# Patient Record
Sex: MALE | Race: BLACK OR AFRICAN AMERICAN | NOT HISPANIC OR LATINO | Employment: OTHER | URBAN - METROPOLITAN AREA
[De-identification: names, ages, dates, MRNs, and addresses within clinical notes are randomized per-mention and may not be internally consistent; named-entity substitution may affect disease eponyms.]

---

## 2017-01-12 ENCOUNTER — OFFICE VISIT (OUTPATIENT)
Dept: INTERNAL MEDICINE | Facility: CLINIC | Age: 62
End: 2017-01-12
Attending: INTERNAL MEDICINE
Payer: MEDICARE

## 2017-01-12 VITALS
BODY MASS INDEX: 42.66 KG/M2 | WEIGHT: 315 LBS | HEART RATE: 89 BPM | DIASTOLIC BLOOD PRESSURE: 78 MMHG | SYSTOLIC BLOOD PRESSURE: 152 MMHG | HEIGHT: 72 IN

## 2017-01-12 DIAGNOSIS — G47.33 OSA (OBSTRUCTIVE SLEEP APNEA): ICD-10-CM

## 2017-01-12 DIAGNOSIS — M17.0 PRIMARY OSTEOARTHRITIS OF BOTH KNEES: ICD-10-CM

## 2017-01-12 DIAGNOSIS — Z12.11 SCREENING FOR COLORECTAL CANCER: ICD-10-CM

## 2017-01-12 DIAGNOSIS — Z12.5 ENCOUNTER FOR SCREENING FOR MALIGNANT NEOPLASM OF PROSTATE: ICD-10-CM

## 2017-01-12 DIAGNOSIS — Z11.59 NEED FOR HEPATITIS C SCREENING TEST: Primary | ICD-10-CM

## 2017-01-12 DIAGNOSIS — Z12.12 SCREENING FOR COLORECTAL CANCER: ICD-10-CM

## 2017-01-12 DIAGNOSIS — R79.9 ABNORMAL BLOOD CHEMISTRY: ICD-10-CM

## 2017-01-12 DIAGNOSIS — I71.60 THORACOABDOMINAL AORTIC ANEURYSM, WITHOUT RUPTURE: ICD-10-CM

## 2017-01-12 DIAGNOSIS — I10 BENIGN ESSENTIAL HTN: ICD-10-CM

## 2017-01-12 PROCEDURE — 99214 OFFICE O/P EST MOD 30 MIN: CPT | Mod: S$PBB,,, | Performed by: INTERNAL MEDICINE

## 2017-01-12 PROCEDURE — 99214 OFFICE O/P EST MOD 30 MIN: CPT | Mod: PBBFAC | Performed by: INTERNAL MEDICINE

## 2017-01-12 PROCEDURE — 90715 TDAP VACCINE 7 YRS/> IM: CPT | Mod: PBBFAC | Performed by: INTERNAL MEDICINE

## 2017-01-12 PROCEDURE — G0008 ADMIN INFLUENZA VIRUS VAC: HCPCS | Mod: PBBFAC | Performed by: INTERNAL MEDICINE

## 2017-01-12 PROCEDURE — 90471 IMMUNIZATION ADMIN: CPT | Mod: PBBFAC | Performed by: INTERNAL MEDICINE

## 2017-01-12 PROCEDURE — 99999 PR PBB SHADOW E&M-EST. PATIENT-LVL IV: CPT | Mod: PBBFAC,,, | Performed by: INTERNAL MEDICINE

## 2017-01-12 RX ORDER — CARVEDILOL 25 MG/1
25 TABLET ORAL 2 TIMES DAILY WITH MEALS
Qty: 180 TABLET | Refills: 1 | Status: SHIPPED | OUTPATIENT
Start: 2017-01-12 | End: 2017-07-17 | Stop reason: SDUPTHER

## 2017-01-12 NOTE — MR AVS SNAPSHOT
[facility]  [address]  [facility] [address]  Phone: [phone]  Fax: [phone]                  Edd Wills   2017 11:20 AM   Office Visit    Description:  Male : 1955   Provider:  Haresh Yang MD   Department:  [facility]           Reason for Visit     Establish Care           Diagnoses this Visit        Comments    Need for hepatitis C screening test    -  Primary     Screening for colorectal cancer         Thoracoabdominal aortic aneurysm, without rupture         LLUVIA (obstructive sleep apnea)         Abnormal blood chemistry         Nocturia         Encounter for screening for malignant neoplasm of prostate                To Do List           Future Appointments        Provider Department Dept Phone    2017 2:15 PM Dany Ahn MD [facility] [phone]      Goals (5 Years of Data)     None       These Medications        Disp Refills Start End    carvedilol (COREG) 25 MG tablet 180 tablet 1 2017     Take 1 tablet (25 mg total) by mouth 2 (two) times daily with meals. - Oral    Pharmacy: [facility] [address] [place] [address] AT [facility] Ph #: [phone]         [facility] On Call     [facility] On Call Nurse Care Line -  Assistance  Registered nurses in the [facility] On Call Center provide clinical advisement, health education, appointment booking, and other advisory services.  Call for this free service at [phone].             Medications           Message regarding Medications     Verify the changes and/or additions to your medication regime listed below are the same as discussed with your clinician today.  If any of these changes or additions are incorrect, please notify your healthcare provider.        CHANGE how you are taking these medications     Start Taking Instead of    carvedilol (COREG) 25 MG tablet carvedilol (COREG) 12.5 MG tablet     Dosage:  Take 1 tablet (25 mg total) by mouth 2 (two) times daily with meals. Dosage:  TAKE 1 TABLET(12.5 MG) BY MOUTH TWICE DAILY    Reason for Change:  Reorder            Verify that the below list of medications is an accurate representation of the medications you are currently taking.  If none reported, the list may be blank. If incorrect, please contact your healthcare provider. Carry this list with you in case of emergency.           Current Medications     amlodipine (NORVASC) 5 MG tablet TAKE 1 TABLET(5 MG) BY MOUTH EVERY DAY    carvedilol (COREG) 25 MG tablet Take 1 tablet (25 mg total) by mouth 2 (two) times daily with meals.    diclofenac sodium 1 % Gel Apply 2 g topically 4 (four) times daily.    docusate sodium (COLACE) 100 MG capsule Take 1 capsule (100 mg total) by mouth 2 (two) times daily as needed for Constipation.    furosemide (LASIX) 40 MG tablet TAKE 1 TABLET(40 MG) BY MOUTH EVERY DAY    gabapentin (NEURONTIN) 600 MG tablet TAKE 1/2 TABLET BY MOUTH TWICE DAILY(MORNING AND EARLY AFTERNOON) AND 1 TABLET EVERY NIGHT AT BEDTIME.    lisinopril (PRINIVIL,ZESTRIL) 40 MG tablet TAKE 1 TABLET(40 MG) BY MOUTH EVERY DAY    methocarbamol (ROBAXIN) 750 MG Tab TAKE 1 TABLET(750 MG) BY MOUTH THREE TIMES DAILY AS NEEDED    oxycodone-acetaminophen (PERCOCET) 7.5-325 mg per tablet Take 1 tablet by mouth every 6 to 8 hours as needed for Pain.    polyethylene glycol (GLYCOLAX) 17 gram PwPk TAKE 1 PACKET(17 GIVE) BY MOUTH ONCE DAILY.           Clinical Reference Information           Vital Signs - Last Recorded  Most recent update: 1/12/2017 11:43 AM by Jose Merlos MA    BP Pulse Ht Wt BMI    (!) 152/78 89 6' (1.829 m) (!) 169.1 kg (372 lb 12.8 oz) 50.56 kg/m2      Blood Pressure          Most Recent Value    BP  (!)  152/78      Allergies as of 1/12/2017     No Known Allergies      Immunizations Administered on Date of Encounter - 1/12/2017     Name Date Dose VIS Date Route    TDAP 1/12/2017 0.5 mL 2/24/2015  Intramuscular    influenza - Quadrivalent - PF (ADULT) 1/12/2017 0.5 mL 8/7/2015 Intramuscular      Orders Placed During Today's Visit      Normal Orders This Visit    Ambulatory consult for Sleep Study     Influenza - Quadrivalent (3 years & older) (PF)     Tdap Vaccine (Adult)     Future Labs/Procedures Expected by Expires    CTA Chest Non Coronary  1/12/2017 1/12/2018    Hemoglobin A1c  1/12/2017 3/13/2018    Hepatitis C antibody  1/12/2017 3/12/2018    Lipid panel  1/12/2017 1/12/2018    PSA, Screening  1/12/2017 4/12/2017      [facility] Sign-Up     Activating your [facility] account is as easy as 1-2-3!     1) Visit [email or web address], select Sign Up Now, enter this activation code and your date of birth, then select Next.  [email or web address]  Expires: 2/26/2017 12:30 PM      2) Create a username and password to use when you visit [facility] in the future and select a security question in case you lose your password and select Next.    3) Enter your e-mail address and click Sign Up!    Additional Information  If you have questions, please e-mail [email or web address] or call [phone] to talk to our [facility] staff. Remember, [facility] is NOT to be used for urgent needs. For medical emergencies, dial 911.         Instructions      Low-Salt Diet  This diet removes foods that are high in salt. It also limits the amount of salt you use when cooking. It is most often used for people with high blood pressure, edema (fluid retention), and kidney, liver, or heart disease.  Table salt contains the mineral sodium. Your body needs sodium to work normally. But too much sodium can make your health problems worse. Your healthcare provider is recommending a low-salt (also called low-sodium) diet for you. Your total daily allowance of salt is 1,500 to 2,300 milligrams (mg). It is less than 1 teaspoon of table salt. This means you can have only about 500 to 700 mg of sodium at each meal. People with certain health problems should  limit salt intake to the lower end of the recommended range.    When you cook, dont add much salt. If you can cook without using salt, even better. Dont add salt to your food at the table.  When shopping, read food labels. Salt is often called sodium on the label. Choose foods that are salt-free, low salt, or very low salt. Note that foods with reduced salt may not lower your salt intake enough.    Beans, potatoes, and pasta  Ok: Dry beans, split peas, lentils, potatoes, rice, macaroni, pasta, spaghetti without added salt  Avoid: Potato chips, tortilla chips, and similar products  Breads and cereals  Ok: Low-sodium breads, rolls, cereals, and cakes; low-salt crackers, matzo crackers  Avoid: Salted crackers, pretzels, popcorn, Vietnamese toast, pancakes, muffins  Dairy  Ok: Milk, chocolate milk, hot chocolate mix, low-salt cheeses, and yogurt  Avoid: Processed cheese and cheese spreads; Roquefort, Camembert, and cottage cheese; buttermilk, instant breakfast drink  Desserts  Ok: Ice cream, frozen yogurt, juice bars, gelatin, cookies and pies, sugar, honey, jelly, hard candy  Avoid: Most pies, cakes and cookies prepared or processed with salt; instant pudding  Drinks  Ok: Tea, coffee, fizzy (carbonated) drinks, juices  Avoid: Flavored coffees, electrolyte replacement drinks, sports drinks  Meats  Ok: All fresh meat, fish, poultry, low-salt tuna, eggs, egg substitute  Avoid: Smoked, pickled, brine-cured, or salted meats and fish. This includes quinonez, chipped beef, corned beef, hot dogs, deli meats, ham, kosher meats, salt pork, sausage, canned tuna, salted codfish, smoked salmon, herring, sardines, or anchovies.  Seasonings and spices  Ok: Most seasonings are okay. Good substitutes for salt include: fresh herb blends, hot sauce, lemon, garlic, chaudhry, vinegar, dry mustard, parsley, cilantro, horseradish, tomato paste, regular margarine, mayonnaise, unsalted butter, cream cheese, vegetable oil, cream, low-salt salad  dressing and gravy.  Avoid: Regular ketchup, relishes, pickles, soy sauce, teriyaki sauce, Worcestershire sauce, BBQ sauce, tartar sauce, meat tenderizer, chili sauce, regular gravy, regular salad dressing, salted butter  Soups  Ok: Low-salt soups and broths made with allowed foods  Avoid: Bouillon cubes, soups with smoked or salted meats, regular soup and broth  Vegetables  Ok: Most vegetables are okay; also low-salt tomato and vegetable juices  Avoid: Sauerkraut and other brine-soaked vegetables; pickles and other pickled vegetables; tomato juice, olives  © 4941-3764 [employer]. [address] [place] [address], [place] [address]. All rights reserved. This information is not intended as a substitute for professional medical care. Always follow your healthcare professional's instructions.
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
## 2017-01-12 NOTE — PATIENT INSTRUCTIONS

## 2017-01-12 NOTE — PROGRESS NOTES
Patient given Quadrivalent Influenza IM in the RD. Patient tolerated well and Band-Aid was applied. Lot#[id number] Exp:06/30/2017. Patient advised to wait in the lobby for 15 min to make sure no adverse reactions occur. Patient given VIS information sheet.Patient states verbal understanding and has no further questions.Patient given TDAP IM in the LD. Patient tolerated well and Band-Aid was applied. Lot#[id number] Exp:12/10/2018. Patient advised to wait in the lobby for 15 min to make sure no adverse reactions occur.Patient given VIS information sheet. Patient states verbal understanding and has no further questions.

## 2017-01-17 ENCOUNTER — TELEPHONE (OUTPATIENT)
Dept: SPINE | Facility: CLINIC | Age: 62
End: 2017-01-17

## 2017-01-17 ENCOUNTER — HOSPITAL ENCOUNTER (OUTPATIENT)
Dept: RADIOLOGY | Facility: HOSPITAL | Age: 62
Discharge: HOME | End: 2017-01-17
Attending: ORTHOPAEDIC SURGERY
Payer: MEDICARE

## 2017-01-17 ENCOUNTER — OFFICE VISIT (OUTPATIENT)
Dept: ORTHOPEDICS | Facility: CLINIC | Age: 62
End: 2017-01-17
Payer: MEDICARE

## 2017-01-17 VITALS
DIASTOLIC BLOOD PRESSURE: 97 MMHG | BODY MASS INDEX: 42.66 KG/M2 | HEIGHT: 72 IN | SYSTOLIC BLOOD PRESSURE: 176 MMHG | WEIGHT: 315 LBS | HEART RATE: 71 BPM | RESPIRATION RATE: 18 BRPM

## 2017-01-17 DIAGNOSIS — M54.2 CERVICAL SPINE PAIN: Primary | ICD-10-CM

## 2017-01-17 DIAGNOSIS — M54.12 CERVICAL RADICULOPATHY: Primary | ICD-10-CM

## 2017-01-17 DIAGNOSIS — M54.2 CERVICAL SPINE PAIN: ICD-10-CM

## 2017-01-17 PROCEDURE — 99999 PR PBB SHADOW E&M-EST. PATIENT-LVL III: CPT | Mod: PBBFAC,,, | Performed by: ORTHOPAEDIC SURGERY

## 2017-01-17 PROCEDURE — 72040 X-RAY EXAM NECK SPINE 2-3 VW: CPT | Mod: 26,,, | Performed by: RADIOLOGY

## 2017-01-17 PROCEDURE — 72040 X-RAY EXAM NECK SPINE 2-3 VW: CPT | Mod: TC

## 2017-01-17 PROCEDURE — 99212 OFFICE O/P EST SF 10 MIN: CPT | Mod: S$PBB,,, | Performed by: ORTHOPAEDIC SURGERY

## 2017-01-17 RX ORDER — OXYCODONE AND ACETAMINOPHEN 5; 325 MG/1; MG/1
1 TABLET ORAL EVERY 8 HOURS PRN
Qty: 90 TABLET | Refills: 0 | Status: SHIPPED | OUTPATIENT
Start: 2017-01-17 | End: 2017-03-06 | Stop reason: SDUPTHER

## 2017-01-17 NOTE — PROGRESS NOTES
Date of Surgery: 6/14/16    Procedure: C2-5 Lami/Fusion for myelopathy    History: Edd Wills is seen today for follow-up following the above listed procedure. Overall the patient is doing well, his hand strength and dexterity is much improved, he is walking with a walker.    Exam: Incision is healed. There is no sign of infection. The patient ambulates well with a walker, he has good  strength bilaterally. He no longer demonstrates small finger escape.    Radiographs: AP/Lat c-spine films today demonstrate no e/o loosening or hardware complication    Assessment/Plan: Doing well postoperatively with good return of funciton. I will plan to see the patient back for the next postop visit in 5 months. Thank you for the opportunity to participate in this patient's care. Please give me a call if there are any concerns or questions.    RTC @  1 year from surgery.

## 2017-02-03 ENCOUNTER — TELEPHONE (OUTPATIENT)
Dept: INTERNAL MEDICINE | Facility: CLINIC | Age: 62
End: 2017-02-03

## 2017-02-03 DIAGNOSIS — Z91.89 AT RISK FOR SIDE EFFECT OF MEDICATION: Primary | ICD-10-CM

## 2017-02-03 NOTE — TELEPHONE ENCOUNTER
----- Message from Janna Dill sent at 2/2/2017  9:09 AM CST -----  Contact: Nataliya with [facility]  X   1st Request  _  2nd Request  _  3rd Request        Who: Nataliya with [facility]    Why: Pt has a CT today at 11 am and pt needs labs for Bun/ Creatinine. Please call Nataliya with any questions, thanks!    What Number to Call Back: Ext [phone]    When to Expect a call back: (Before the end of the day)   -- if the call is after 12:00, the call back will be tomorrow.

## 2017-02-06 NOTE — TELEPHONE ENCOUNTER
After reviewing pt chart, pt had CT completed on 02/02/17, but BUN and creatine labs were not completed

## 2017-02-16 ENCOUNTER — TELEPHONE (OUTPATIENT)
Dept: INTERNAL MEDICINE | Facility: CLINIC | Age: 62
End: 2017-02-16

## 2017-02-16 NOTE — TELEPHONE ENCOUNTER
----- Message from Sharyn Franklin sent at 2/15/2017  2:22 PM CST -----  Contact: JUHI CARPIO [[id number]]  x_  1st Request  _  2nd Request  _  3rd Request        Who: JUHI CARPIO [[id number]]    Why: Patient would like a call back says he would like to reschedule the appts that he missed. I attempted to reschedule but patient would need orders. Please give patient a call back at your earliest convenience. Thanks!    What Number to Call Back:[phone]    When to Expect a call back: (Before the end of the day)   -- if the call is after 12:00, the call back will be tomorrow.

## 2017-02-20 DIAGNOSIS — Z98.1 S/P CERVICAL SPINAL FUSION: Primary | ICD-10-CM

## 2017-02-21 RX ORDER — TRAMADOL HYDROCHLORIDE 50 MG/1
50 TABLET ORAL EVERY 6 HOURS PRN
Qty: 60 TABLET | Refills: 0 | Status: SHIPPED | OUTPATIENT
Start: 2017-02-21 | End: 2017-03-03

## 2017-03-02 ENCOUNTER — TELEPHONE (OUTPATIENT)
Dept: INTERNAL MEDICINE | Facility: CLINIC | Age: 62
End: 2017-03-02

## 2017-03-02 NOTE — TELEPHONE ENCOUNTER
----- Message from Anne Artis sent at 3/2/2017  1:14 PM CST -----  Dr. Yi Ext is  [phone] called and stated that this would be a better number for him to be reached.

## 2017-03-03 ENCOUNTER — HOSPITAL ENCOUNTER (OUTPATIENT)
Dept: RADIOLOGY | Facility: OTHER | Age: 62
Discharge: HOME | End: 2017-03-03
Attending: INTERNAL MEDICINE
Payer: MEDICARE

## 2017-03-03 DIAGNOSIS — I71.019 AORTIC DISSECTION DISTAL TO LEFT SUBCLAVIAN: ICD-10-CM

## 2017-03-03 PROCEDURE — 74174 CTA ABD&PLVS W/CONTRAST: CPT | Mod: 26,,, | Performed by: INTERNAL MEDICINE

## 2017-03-03 PROCEDURE — 74174 CTA ABD&PLVS W/CONTRAST: CPT | Mod: TC

## 2017-03-03 PROCEDURE — 25500020 PHARM REV CODE 255: Performed by: INTERNAL MEDICINE

## 2017-03-03 PROCEDURE — 71275 CT ANGIOGRAPHY CHEST: CPT | Mod: 26,,, | Performed by: INTERNAL MEDICINE

## 2017-03-03 RX ADMIN — IOHEXOL 100 ML: 350 INJECTION, SOLUTION INTRAVENOUS at 10:03

## 2017-03-06 DIAGNOSIS — Z98.1 S/P CERVICAL SPINAL FUSION: Primary | ICD-10-CM

## 2017-03-07 RX ORDER — OXYCODONE AND ACETAMINOPHEN 5; 325 MG/1; MG/1
1 TABLET ORAL EVERY 8 HOURS PRN
Qty: 90 TABLET | Refills: 0 | Status: SHIPPED | OUTPATIENT
Start: 2017-03-07 | End: 2017-05-25

## 2017-04-04 ENCOUNTER — TELEPHONE (OUTPATIENT)
Dept: SLEEP MEDICINE | Facility: CLINIC | Age: 62
End: 2017-04-04

## 2017-04-10 RX ORDER — METHOCARBAMOL 750 MG/1
TABLET, FILM COATED ORAL
Qty: 90 TABLET | Refills: 4 | Status: SHIPPED | OUTPATIENT
Start: 2017-04-10 | End: 2018-01-15 | Stop reason: SDUPTHER

## 2017-04-17 DIAGNOSIS — Z98.1 S/P CERVICAL SPINAL FUSION: ICD-10-CM

## 2017-04-17 NOTE — TELEPHONE ENCOUNTER
----- Message from Rosetta Eastman sent at 4/17/2017  9:05 AM CDT -----  _  1st Request  _  2nd Request  _  3rd Request    Please refill the medication(s) listed below. Please call the patient when the prescription(s) is ready for  at the phone number [phone]    Medication #1    oxycodone-acetaminophen (PERCOCET) 5-325 mg per tablet 90 tablet 0 3/7/2017  No  Sig - Route: Take 1 tablet by mouth every 8 (eight) hours as needed for Pain. - Oral  Class: Print          Preferred Pharmacy:

## 2017-04-18 DIAGNOSIS — Z98.1 S/P CERVICAL SPINAL FUSION: ICD-10-CM

## 2017-04-18 RX ORDER — OXYCODONE AND ACETAMINOPHEN 5; 325 MG/1; MG/1
1 TABLET ORAL EVERY 8 HOURS PRN
Qty: 90 TABLET | Refills: 0 | OUTPATIENT
Start: 2017-04-18

## 2017-04-18 RX ORDER — OXYCODONE AND ACETAMINOPHEN 5; 325 MG/1; MG/1
1 TABLET ORAL EVERY 8 HOURS PRN
Qty: 90 TABLET | Refills: 0 | Status: CANCELLED | OUTPATIENT
Start: 2017-04-18

## 2017-04-19 RX ORDER — FUROSEMIDE 40 MG/1
TABLET ORAL
Qty: 90 TABLET | Refills: 1 | Status: SHIPPED | OUTPATIENT
Start: 2017-04-19 | End: 2017-11-16 | Stop reason: SDUPTHER

## 2017-04-19 RX ORDER — LISINOPRIL 40 MG/1
TABLET ORAL
Qty: 90 TABLET | Refills: 0 | Status: SHIPPED | OUTPATIENT
Start: 2017-04-19 | End: 2017-07-17 | Stop reason: SDUPTHER

## 2017-04-19 RX ORDER — AMLODIPINE BESYLATE 5 MG/1
TABLET ORAL
Qty: 90 TABLET | Refills: 0 | Status: SHIPPED | OUTPATIENT
Start: 2017-04-19 | End: 2017-07-17 | Stop reason: SDUPTHER

## 2017-04-19 NOTE — TELEPHONE ENCOUNTER
Left message for pt advising that Dr. Ahn said he is too far out from surgery for him to refill the Percocet script. He feels that he needs to go to something not as strong, so he has called in Tramadol to his pharmacy. Script was called in to [facility] as listed in chart.

## 2017-04-26 ENCOUNTER — OFFICE VISIT (OUTPATIENT)
Dept: ORTHOPEDICS | Facility: CLINIC | Age: 62
End: 2017-04-26
Payer: MEDICARE

## 2017-04-26 VITALS — WEIGHT: 315 LBS | BODY MASS INDEX: 42.66 KG/M2 | HEIGHT: 72 IN

## 2017-04-26 DIAGNOSIS — M17.0 PRIMARY OSTEOARTHRITIS OF KNEES, BILATERAL: Primary | ICD-10-CM

## 2017-04-26 PROCEDURE — 99213 OFFICE O/P EST LOW 20 MIN: CPT | Mod: 25,S$PBB,, | Performed by: PHYSICIAN ASSISTANT

## 2017-04-26 PROCEDURE — 99999 PR PBB SHADOW E&M-EST. PATIENT-LVL III: CPT | Mod: PBBFAC,,, | Performed by: PHYSICIAN ASSISTANT

## 2017-04-26 PROCEDURE — 99213 OFFICE O/P EST LOW 20 MIN: CPT | Mod: PBBFAC | Performed by: PHYSICIAN ASSISTANT

## 2017-04-26 PROCEDURE — 20610 DRAIN/INJ JOINT/BURSA W/O US: CPT | Mod: 50,PBBFAC | Performed by: PHYSICIAN ASSISTANT

## 2017-04-26 PROCEDURE — 20610 DRAIN/INJ JOINT/BURSA W/O US: CPT | Mod: 50,S$PBB,, | Performed by: PHYSICIAN ASSISTANT

## 2017-04-26 RX ORDER — TRIAMCINOLONE ACETONIDE 40 MG/ML
80 INJECTION, SUSPENSION INTRA-ARTICULAR; INTRAMUSCULAR
Status: COMPLETED | OUTPATIENT
Start: 2017-04-26 | End: 2017-04-26

## 2017-04-26 RX ADMIN — TRIAMCINOLONE ACETONIDE 80 MG: 40 INJECTION, SUSPENSION INTRA-ARTICULAR; INTRAMUSCULAR at 10:04

## 2017-04-26 NOTE — PROGRESS NOTES
CC:  Knee pain    HX:  Edd Wills returns for re-evaluation of bilateral knee arthritis. He was last seen by me 11/2/2016.  Today he is doing well but notes the synvisc gave some relief until recently.  He has a known history of osteoarthritis of the bilateral knee. He localizes the pain anterior and medial and describes the pain as achy. He has tried NSAID's.  He has tried prior injection therapy.     PE:  On physical examination there is not an effusion in the knee.  The knee is ligamentally stable to varus/valgus stress.  There is no warmth or erythema. There is no specific pain over the pes anserine bursa. ROM is 0 to 110.    ROM of the hip does not reproduce pain.  There is no significant distal edema, and there is no calf tenderness to palpation.     Impression:  Diagnoses and all orders for this visit:    Primary osteoarthritis of knees, bilateral    Other orders  -     triamcinolone acetonide injection 80 mg; Inject 2 mLs (80 mg total) into the articular space one time.    Plan:  The conservative options including NSAIDs, activity modification, physical therapy, corticosteroid injection, and viscosupplimentation were discussed. He is not interested in surgical intervention at this time.    It is too soon for synvisc injections -- 5/3    Cortisone injections today.  Will schedule synvisc injections for a few weeks out.  He will call prior to his appointment to see how he is feeling.  If he is doing well, may postpone synvisc.     PROCEDURE:  I have explained the risks, benefits, and alternatives of the procedure in detail.  The patient voices understanding and all questions have been answered.  The patient agrees to proceed as planned. So after I performed a sterile prep of the skin in the normal fashion the bilateral knee is injected using a 22 gauge needle from the anterolateral approach with a combination of 4cc 1% plain lidocaine and 40 mg of kenalog.  The patient is cautioned an immediate relief of  pain is secondary to the local anesthetic and will be temporary.  After the anesthetic wears off there may be a increase in pain that may last for a few hours or a few days and they should use ice to help alleviate this flair up of pain.

## 2017-04-30 RX ORDER — DICLOFENAC SODIUM 10 MG/G
GEL TOPICAL
Qty: 500 G | Refills: 6 | Status: SHIPPED | OUTPATIENT
Start: 2017-04-30 | End: 2020-07-08 | Stop reason: SDUPTHER

## 2017-05-08 DIAGNOSIS — Z98.1 S/P CERVICAL SPINAL FUSION: Primary | ICD-10-CM

## 2017-05-24 ENCOUNTER — OFFICE VISIT (OUTPATIENT)
Dept: ORTHOPEDICS | Facility: CLINIC | Age: 62
End: 2017-05-24
Payer: MEDICARE

## 2017-05-24 VITALS — BODY MASS INDEX: 42.66 KG/M2 | WEIGHT: 315 LBS | HEIGHT: 72 IN

## 2017-05-24 DIAGNOSIS — M17.0 PRIMARY OSTEOARTHRITIS OF KNEES, BILATERAL: Primary | ICD-10-CM

## 2017-05-24 PROCEDURE — 99999 PR PBB SHADOW E&M-EST. PATIENT-LVL III: CPT | Mod: PBBFAC,,, | Performed by: PHYSICIAN ASSISTANT

## 2017-05-24 PROCEDURE — 99499 UNLISTED E&M SERVICE: CPT | Mod: S$PBB,,, | Performed by: PHYSICIAN ASSISTANT

## 2017-05-24 PROCEDURE — 20610 DRAIN/INJ JOINT/BURSA W/O US: CPT | Mod: 50,PBBFAC | Performed by: PHYSICIAN ASSISTANT

## 2017-05-24 PROCEDURE — 20610 DRAIN/INJ JOINT/BURSA W/O US: CPT | Mod: 50,S$PBB,, | Performed by: PHYSICIAN ASSISTANT

## 2017-05-24 PROCEDURE — 99213 OFFICE O/P EST LOW 20 MIN: CPT | Mod: PBBFAC | Performed by: PHYSICIAN ASSISTANT

## 2017-05-24 RX ORDER — TRAMADOL HYDROCHLORIDE 50 MG/1
TABLET ORAL
Refills: 0 | COMMUNITY
Start: 2017-05-08 | End: 2017-05-25 | Stop reason: SDUPTHER

## 2017-05-24 RX ADMIN — Medication 32 MG: at 11:05

## 2017-05-24 NOTE — PROGRESS NOTES
Edd Wills is a 62 y.o. year old his here today for his first Synvisc injection for degenerative changes of his bilateral knee.  He was last seen and treated in the clinic on 4/26/2017. There has been no significant change in his medical status since his last visit. No Fever, chills, malaise, or unexplained weight change.      Allergies, Medications, past medical and surgical history were reviewed .    Examination of the knee demonstrates  No evidence of edema, erythema , echymosis strength and range of motion are unchanged from previous visit.    PROCEDURE:  I have explained the risks, benefits, and alternatives of the procedure in detail.  The patient voices understanding and all questions have been answered.  The patient agrees to proceed as planned. So after I performed a sterile prep of the skin in the normal fashion the bilateral knee is injected using a 22 gauge needle from the anterolateral approach with 2cc of synvisc solution. The patient is reminded that it can take 6 - 8 weeks to see all the affects of this treatment, they must complete all three injections to see all the affects and the treatment can not be repeated any earlier than six months.  I will see him back in 1 week. Sooner if he has any problems or concerns.

## 2017-05-24 NOTE — LETTER
May 24, 2017      Dany Ahn MD  [address] [facility] [address]           [place] [address] [facility]  [facility] [address]  Phone: [phone]          Patient: Edd Wills   MR Number: [id number]   YOB: 1955   Date of Visit: 5/24/2017       Dear Dr. Dany Ahn:    Thank you for referring Edd Wills to me for evaluation. Attached you will find relevant portions of my assessment and plan of care.    If you have questions, please do not hesitate to call me. I look forward to following Edd Wills along with you.    Sincerely,    Khadra Lopez PA-C    Enclosure  CC:  No Recipients    If you would like to receive this communication electronically, please contact [email or web address] or [phone] to request more information on [facility] access.    For providers and/or their staff who would like to refer a patient to [facility], please contact us through our one-stop-shop provider referral line, [facility], at [phone].    If you feel you have received this communication in error or would no longer like to receive these types of communications, please e-mail [email or web address]

## 2017-05-25 ENCOUNTER — TELEPHONE (OUTPATIENT)
Dept: SPINE | Facility: CLINIC | Age: 62
End: 2017-05-25

## 2017-05-25 DIAGNOSIS — Z98.890 S/P SPINAL SURGERY: Primary | ICD-10-CM

## 2017-05-25 RX ORDER — TRAMADOL HYDROCHLORIDE 50 MG/1
TABLET ORAL
Qty: 90 TABLET | Refills: 0 | Status: SHIPPED | OUTPATIENT
Start: 2017-05-25 | End: 2017-06-20 | Stop reason: SDUPTHER

## 2017-05-25 NOTE — TELEPHONE ENCOUNTER
----- Message from Gee Germain sent at 5/25/2017  2:06 PM CDT -----  X_  1st Request  _  2nd Request  _  3rd Request    Please refill the medication(s) listed below. Please call the patient when the prescription(s) is ready for  at the phone number [phone] .    Medication #1  tramadol (ULTRAM) 50 mg tablet  0 5/8/2017  --  Sig: TK 1 T PO  Q 6 H  Class: Historical Med  Order: [id number]

## 2017-05-26 DIAGNOSIS — Z98.890 S/P SPINAL SURGERY: ICD-10-CM

## 2017-05-26 RX ORDER — TRAMADOL HYDROCHLORIDE 50 MG/1
TABLET ORAL
Qty: 60 TABLET | Refills: 0 | Status: CANCELLED | OUTPATIENT
Start: 2017-05-26

## 2017-06-01 ENCOUNTER — OFFICE VISIT (OUTPATIENT)
Dept: ORTHOPEDICS | Facility: CLINIC | Age: 62
End: 2017-06-01
Payer: MEDICARE

## 2017-06-01 DIAGNOSIS — M17.0 PRIMARY OSTEOARTHRITIS OF KNEES, BILATERAL: Primary | ICD-10-CM

## 2017-06-01 PROCEDURE — 20610 DRAIN/INJ JOINT/BURSA W/O US: CPT | Mod: 50,PBBFAC | Performed by: PHYSICIAN ASSISTANT

## 2017-06-01 PROCEDURE — 99999 PR PBB SHADOW E&M-EST. PATIENT-LVL II: CPT | Mod: PBBFAC,,, | Performed by: PHYSICIAN ASSISTANT

## 2017-06-01 PROCEDURE — 20610 DRAIN/INJ JOINT/BURSA W/O US: CPT | Mod: 50,S$PBB,, | Performed by: PHYSICIAN ASSISTANT

## 2017-06-01 PROCEDURE — 99499 UNLISTED E&M SERVICE: CPT | Mod: S$PBB,,, | Performed by: PHYSICIAN ASSISTANT

## 2017-06-01 PROCEDURE — 99212 OFFICE O/P EST SF 10 MIN: CPT | Mod: PBBFAC | Performed by: PHYSICIAN ASSISTANT

## 2017-06-01 RX ADMIN — Medication 32 MG: at 10:06

## 2017-06-01 NOTE — PROGRESS NOTES
Edd Wills is a 62 y.o. year old his here today for his second Synvisc injection for degenerative changes of his bilateral knee.  He was last seen and treated in the clinic on 5/24/2017. There has been no significant change in his medical status since his last visit. No Fever, chills, malaise, or unexplained weight change.      Allergies, Medications, past medical and surgical history were reviewed .    Examination of the knee demonstrates  No evidence of edema, erythema , echymosis strength and range of motion are unchanged from previous visit.    PROCEDURE:  I have explained the risks, benefits, and alternatives of the procedure in detail.  The patient voices understanding and all questions have been answered.  The patient agrees to proceed as planned. So after I performed a sterile prep of the skin in the normal fashion the bilateral knee is injected using a 22 gauge needle from the anterolateral approach with 2cc of synvisc solution. The patient is reminded that it can take 6 - 8 weeks to see all the affects of this treatment, they must complete all three injections to see all the affects and the treatment can not be repeated any earlier than six months.  I will see him back in 1 week. Sooner if he has any problems or concerns.

## 2017-06-01 NOTE — LETTER
June 1, 2017      Dany Ahn MD  [address] [facility] [address]           [place] [id number] [facility]  [facility] [address]  Phone: [phone]          Patient: Edd Wills   MR Number: [id number]   YOB: 1955   Date of Visit: 6/1/2017       Dear Dr. Dany Ahn:    Thank you for referring Edd Wills to me for evaluation. Attached you will find relevant portions of my assessment and plan of care.    If you have questions, please do not hesitate to call me. I look forward to following Edd Wills along with you.    Sincerely,    Khadra Lopez PA-C    Enclosure  CC:  No Recipients    If you would like to receive this communication electronically, please contact [email or web address] or [phone] to request more information on [email or web address] Link access.    For providers and/or their staff who would like to refer a patient to [facility], please contact us through our one-stop-shop provider referral line, [facility], at [phone].    If you feel you have received this communication in error or would no longer like to receive these types of communications, please e-mail [email or web address]

## 2017-06-07 ENCOUNTER — OFFICE VISIT (OUTPATIENT)
Dept: ORTHOPEDICS | Facility: CLINIC | Age: 62
End: 2017-06-07
Payer: MEDICARE

## 2017-06-07 VITALS — HEIGHT: 72 IN | WEIGHT: 315 LBS | BODY MASS INDEX: 42.66 KG/M2

## 2017-06-07 DIAGNOSIS — M17.0 PRIMARY OSTEOARTHRITIS OF KNEES, BILATERAL: Primary | ICD-10-CM

## 2017-06-07 PROCEDURE — 20610 DRAIN/INJ JOINT/BURSA W/O US: CPT | Mod: 50,PBBFAC | Performed by: PHYSICIAN ASSISTANT

## 2017-06-07 PROCEDURE — 99499 UNLISTED E&M SERVICE: CPT | Mod: S$PBB,,, | Performed by: PHYSICIAN ASSISTANT

## 2017-06-07 PROCEDURE — 20610 DRAIN/INJ JOINT/BURSA W/O US: CPT | Mod: 50,S$PBB,, | Performed by: PHYSICIAN ASSISTANT

## 2017-06-07 PROCEDURE — 99213 OFFICE O/P EST LOW 20 MIN: CPT | Mod: PBBFAC | Performed by: PHYSICIAN ASSISTANT

## 2017-06-07 PROCEDURE — 99999 PR PBB SHADOW E&M-EST. PATIENT-LVL III: CPT | Mod: PBBFAC,,, | Performed by: PHYSICIAN ASSISTANT

## 2017-06-07 RX ADMIN — Medication 32 MG: at 11:06

## 2017-06-07 NOTE — PROGRESS NOTES
Edd Wills is a 62 y.o. year old his here today for his third Synvisc injection for degenerative changes of his bilateral knee.  He was last seen and treated in the clinic on 6/1/2017. There has been no significant change in his medical status since his last visit. No Fever, chills, malaise, or unexplained weight change.      Allergies, Medications, past medical and surgical history were reviewed .    Examination of the knee demonstrates  No evidence of edema, erythema , echymosis strength and range of motion are unchanged from previous visit.    PROCEDURE:  I have explained the risks, benefits, and alternatives of the procedure in detail.  The patient voices understanding and all questions have been answered.  The patient agrees to proceed as planned. So after I performed a sterile prep of the skin in the normal fashion the bilateral knee is injected using a 22 gauge needle from the anterolateral approach with 2cc of synvisc solution. The patient is reminded that it can take 6 - 8 weeks to see all the affects of this treatment, they must complete all three injections to see all the affects and the treatment can not be repeated any earlier than six months.  I will see him back as needed. Sooner if he has any problems or concerns.

## 2017-06-07 NOTE — LETTER
June 7, 2017      Dany Ahn MD  [address] [facility] [address]           [place] [address] [facility]  [facility] [address]  Phone: [phone]          Patient: Edd Wills   MR Number: [id number]   YOB: 1955   Date of Visit: 6/7/2017       Dear Dr. Dany Ahn:    Thank you for referring Edd Wills to me for evaluation. Attached you will find relevant portions of my assessment and plan of care.    If you have questions, please do not hesitate to call me. I look forward to following Edd Wills along with you.    Sincerely,    Khadra Lopez PA-C    Enclosure  CC:  No Recipients    If you would like to receive this communication electronically, please contact [email or web address] or [phone] to request more information on [email or web address] Link access.    For providers and/or their staff who would like to refer a patient to [facility], please contact us through our one-stop-shop provider referral line, [facility], at [phone].    If you feel you have received this communication in error or would no longer like to receive these types of communications, please e-mail [email or web address]

## 2017-06-15 DIAGNOSIS — Z98.890 S/P SPINAL SURGERY: ICD-10-CM

## 2017-06-15 RX ORDER — TRAMADOL HYDROCHLORIDE 50 MG/1
TABLET ORAL
Qty: 60 TABLET | Refills: 0 | Status: CANCELLED | OUTPATIENT
Start: 2017-06-15

## 2017-06-15 RX ORDER — GABAPENTIN 600 MG/1
TABLET ORAL
Qty: 60 TABLET | Refills: 6 | Status: SHIPPED | OUTPATIENT
Start: 2017-06-15 | End: 2020-07-08

## 2017-06-20 DIAGNOSIS — Z98.890 S/P SPINAL SURGERY: ICD-10-CM

## 2017-06-20 RX ORDER — TRAMADOL HYDROCHLORIDE 50 MG/1
TABLET ORAL
Qty: 90 TABLET | Refills: 0 | Status: SHIPPED | OUTPATIENT
Start: 2017-06-20 | End: 2017-07-17 | Stop reason: SDUPTHER

## 2017-07-10 DIAGNOSIS — Z12.11 COLON CANCER SCREENING: ICD-10-CM

## 2017-07-17 DIAGNOSIS — Z98.890 S/P SPINAL SURGERY: ICD-10-CM

## 2017-07-17 RX ORDER — CARVEDILOL 25 MG/1
TABLET ORAL
Qty: 180 TABLET | Refills: 2 | Status: SHIPPED | OUTPATIENT
Start: 2017-07-17 | End: 2018-04-15 | Stop reason: SDUPTHER

## 2017-07-17 RX ORDER — LISINOPRIL 40 MG/1
TABLET ORAL
Qty: 90 TABLET | Refills: 2 | Status: SHIPPED | OUTPATIENT
Start: 2017-07-17 | End: 2020-07-08 | Stop reason: SDUPTHER

## 2017-07-17 RX ORDER — TRAMADOL HYDROCHLORIDE 50 MG/1
TABLET ORAL
Qty: 90 TABLET | Refills: 0 | Status: SHIPPED | OUTPATIENT
Start: 2017-07-17 | End: 2017-08-08 | Stop reason: SDUPTHER

## 2017-07-17 RX ORDER — AMLODIPINE BESYLATE 5 MG/1
TABLET ORAL
Qty: 90 TABLET | Refills: 2 | Status: SHIPPED | OUTPATIENT
Start: 2017-07-17 | End: 2017-09-13 | Stop reason: SDUPTHER

## 2017-07-20 RX ORDER — POLYETHYLENE GLYCOL 3350 17 G/17G
POWDER, FOR SOLUTION ORAL
Refills: 0 | OUTPATIENT
Start: 2017-07-20

## 2017-08-04 DIAGNOSIS — Z12.11 COLON CANCER SCREENING: ICD-10-CM

## 2017-08-08 DIAGNOSIS — Z98.890 S/P SPINAL SURGERY: ICD-10-CM

## 2017-08-08 RX ORDER — TRAMADOL HYDROCHLORIDE 50 MG/1
TABLET ORAL
Qty: 90 TABLET | Refills: 0 | OUTPATIENT
Start: 2017-08-08

## 2017-08-08 RX ORDER — TRAMADOL HYDROCHLORIDE 50 MG/1
TABLET ORAL
Qty: 90 TABLET | Refills: 0 | Status: SHIPPED | OUTPATIENT
Start: 2017-08-08 | End: 2017-10-16 | Stop reason: SDUPTHER

## 2017-08-08 RX ORDER — POLYETHYLENE GLYCOL 3350 17 G/17G
POWDER, FOR SOLUTION ORAL
Qty: 30 PACKET | Refills: 11 | Status: SHIPPED | OUTPATIENT
Start: 2017-08-08 | End: 2018-09-27 | Stop reason: SDUPTHER

## 2017-08-30 ENCOUNTER — PATIENT OUTREACH (OUTPATIENT)
Dept: ADMINISTRATIVE | Facility: HOSPITAL | Age: 62
End: 2017-08-30

## 2017-08-30 NOTE — PROGRESS NOTES
Pre chart for visit with Dr. Yang on 9/13/17. Health maintenance letter mailed to pt.       [facility] is committed to your overall health.  To help you get the most out of each of your visits, we will review your information to make sure you are up to date on all of your recommended tests and/or procedures.      Dr. Yang has found that you may be due for:    Colonoscopy  Flu shot  Zoster vaccine  Hepatitis C screening    If you have had any of the above done at another facility, please bring the records or information with you so that your record at [facility] will be complete.  If you would like to schedule any of these, please contact me.    Medicare does not cover all immunizations to be given in the clinic.  Check your benefits to ensure that you do not need to receive your immunizations at the pharmacy. (Zoster)    If you are currently taking medication, please bring it with you to your appointment for review.    Also, if you have any type of Advanced Directives, please bring them with you to your office visit so we may scan them into your chart.    Elida Lim LPN  Clinic Care Coordinator  [facility]  [address]  [place] [address]

## 2017-09-07 DIAGNOSIS — Z98.890 S/P SPINAL SURGERY: ICD-10-CM

## 2017-09-07 RX ORDER — TRAMADOL HYDROCHLORIDE 50 MG/1
TABLET ORAL
Qty: 90 TABLET | Refills: 0 | OUTPATIENT
Start: 2017-09-07

## 2017-09-13 ENCOUNTER — OFFICE VISIT (OUTPATIENT)
Dept: INTERNAL MEDICINE | Facility: CLINIC | Age: 62
End: 2017-09-13
Attending: INTERNAL MEDICINE
Payer: MEDICARE

## 2017-09-13 ENCOUNTER — LAB VISIT (OUTPATIENT)
Dept: LAB | Facility: OTHER | Age: 62
End: 2017-09-13
Attending: INTERNAL MEDICINE
Payer: MEDICARE

## 2017-09-13 ENCOUNTER — DOCUMENTATION ONLY (OUTPATIENT)
Dept: INTERNAL MEDICINE | Facility: CLINIC | Age: 62
End: 2017-09-13

## 2017-09-13 ENCOUNTER — TELEPHONE (OUTPATIENT)
Dept: INTERNAL MEDICINE | Facility: CLINIC | Age: 62
End: 2017-09-13

## 2017-09-13 VITALS
WEIGHT: 315 LBS | HEART RATE: 65 BPM | SYSTOLIC BLOOD PRESSURE: 150 MMHG | DIASTOLIC BLOOD PRESSURE: 98 MMHG | HEIGHT: 72 IN | BODY MASS INDEX: 42.66 KG/M2

## 2017-09-13 DIAGNOSIS — Z11.59 NEED FOR HEPATITIS C SCREENING TEST: ICD-10-CM

## 2017-09-13 DIAGNOSIS — I71.00 DISSECTION OF AORTA, UNSPECIFIED PORTION OF AORTA: ICD-10-CM

## 2017-09-13 DIAGNOSIS — I10 BENIGN ESSENTIAL HTN: ICD-10-CM

## 2017-09-13 DIAGNOSIS — E66.01 MORBID OBESITY, UNSPECIFIED OBESITY TYPE: ICD-10-CM

## 2017-09-13 DIAGNOSIS — R79.9 ABNORMAL BLOOD CHEMISTRY: ICD-10-CM

## 2017-09-13 DIAGNOSIS — Z12.11 SCREEN FOR COLON CANCER: Primary | ICD-10-CM

## 2017-09-13 DIAGNOSIS — Z98.890 S/P SPINAL SURGERY: ICD-10-CM

## 2017-09-13 DIAGNOSIS — G47.33 OSA (OBSTRUCTIVE SLEEP APNEA): ICD-10-CM

## 2017-09-13 LAB
ALBUMIN SERPL BCP-MCNC: 3.7 G/DL
ALP SERPL-CCNC: 78 U/L
ALT SERPL W/O P-5'-P-CCNC: 11 U/L
ANION GAP SERPL CALC-SCNC: 7 MMOL/L
AST SERPL-CCNC: 18 U/L
BASOPHILS # BLD AUTO: 0.01 K/UL
BASOPHILS NFR BLD: 0.2 %
BILIRUB SERPL-MCNC: 0.4 MG/DL
BUN SERPL-MCNC: 15 MG/DL
CALCIUM SERPL-MCNC: 9.5 MG/DL
CHLORIDE SERPL-SCNC: 103 MMOL/L
CHOLEST SERPL-MCNC: 164 MG/DL
CHOLEST/HDLC SERPL: 3.8 {RATIO}
CO2 SERPL-SCNC: 28 MMOL/L
CREAT SERPL-MCNC: 0.9 MG/DL
DIFFERENTIAL METHOD: NORMAL
EOSINOPHIL # BLD AUTO: 0.1 K/UL
EOSINOPHIL NFR BLD: 1.9 %
ERYTHROCYTE [DISTWIDTH] IN BLOOD BY AUTOMATED COUNT: 13.8 %
EST. GFR  (AFRICAN AMERICAN): >60 ML/MIN/1.73 M^2
EST. GFR  (NON AFRICAN AMERICAN): >60 ML/MIN/1.73 M^2
GLUCOSE SERPL-MCNC: 82 MG/DL
HCT VFR BLD AUTO: 44.3 %
HDLC SERPL-MCNC: 43 MG/DL
HDLC SERPL: 26.2 %
HGB BLD-MCNC: 14.4 G/DL
LDLC SERPL CALC-MCNC: 107 MG/DL
LYMPHOCYTES # BLD AUTO: 2.4 K/UL
LYMPHOCYTES NFR BLD: 37.7 %
MCH RBC QN AUTO: 28 PG
MCHC RBC AUTO-ENTMCNC: 32.5 G/DL
MCV RBC AUTO: 86 FL
MONOCYTES # BLD AUTO: 0.6 K/UL
MONOCYTES NFR BLD: 9.4 %
NEUTROPHILS # BLD AUTO: 3.2 K/UL
NEUTROPHILS NFR BLD: 50.5 %
NONHDLC SERPL-MCNC: 121 MG/DL
PLATELET # BLD AUTO: 233 K/UL
PMV BLD AUTO: 11.3 FL
POTASSIUM SERPL-SCNC: 4.2 MMOL/L
PROT SERPL-MCNC: 7.8 G/DL
RBC # BLD AUTO: 5.15 M/UL
SODIUM SERPL-SCNC: 138 MMOL/L
TRIGL SERPL-MCNC: 70 MG/DL
WBC # BLD AUTO: 6.37 K/UL

## 2017-09-13 PROCEDURE — 83036 HEMOGLOBIN GLYCOSYLATED A1C: CPT

## 2017-09-13 PROCEDURE — 80061 LIPID PANEL: CPT

## 2017-09-13 PROCEDURE — 3077F SYST BP >= 140 MM HG: CPT | Mod: ,,, | Performed by: INTERNAL MEDICINE

## 2017-09-13 PROCEDURE — 99214 OFFICE O/P EST MOD 30 MIN: CPT | Mod: S$PBB,,, | Performed by: INTERNAL MEDICINE

## 2017-09-13 PROCEDURE — 99213 OFFICE O/P EST LOW 20 MIN: CPT | Mod: PBBFAC | Performed by: INTERNAL MEDICINE

## 2017-09-13 PROCEDURE — 3080F DIAST BP >= 90 MM HG: CPT | Mod: ,,, | Performed by: INTERNAL MEDICINE

## 2017-09-13 PROCEDURE — 80053 COMPREHEN METABOLIC PANEL: CPT

## 2017-09-13 PROCEDURE — 99999 PR PBB SHADOW E&M-EST. PATIENT-LVL III: CPT | Mod: PBBFAC,,, | Performed by: INTERNAL MEDICINE

## 2017-09-13 PROCEDURE — 86803 HEPATITIS C AB TEST: CPT

## 2017-09-13 PROCEDURE — 36415 COLL VENOUS BLD VENIPUNCTURE: CPT

## 2017-09-13 PROCEDURE — 85025 COMPLETE CBC W/AUTO DIFF WBC: CPT

## 2017-09-13 RX ORDER — DICLOFENAC SODIUM 75 MG/1
75 TABLET, DELAYED RELEASE ORAL 2 TIMES DAILY
Qty: 60 TABLET | Refills: 1 | Status: SHIPPED | OUTPATIENT
Start: 2017-09-13 | End: 2017-09-13 | Stop reason: SDUPTHER

## 2017-09-13 RX ORDER — AMLODIPINE BESYLATE 10 MG/1
10 TABLET ORAL DAILY
Qty: 90 TABLET | Refills: 2 | Status: SHIPPED | OUTPATIENT
Start: 2017-09-13 | End: 2018-07-14 | Stop reason: SDUPTHER

## 2017-09-13 RX ORDER — CICLOPIROX 80 MG/ML
SOLUTION TOPICAL DAILY
Qty: 6.6 ML | Refills: 3 | Status: SHIPPED | OUTPATIENT
Start: 2017-09-13 | End: 2020-07-08

## 2017-09-13 RX ORDER — DICLOFENAC SODIUM 75 MG/1
TABLET, DELAYED RELEASE ORAL
Qty: 180 TABLET | Refills: 1 | Status: SHIPPED | OUTPATIENT
Start: 2017-09-13 | End: 2020-07-08

## 2017-09-13 NOTE — PATIENT INSTRUCTIONS
Zyrtec, claritin, or allegra    Low-Salt Diet  This diet removes foods that are high in salt. It also limits the amount of salt you use when cooking. It is most often used for people with high blood pressure, edema (fluid retention), and kidney, liver, or heart disease.  Table salt contains the mineral sodium. Your body needs sodium to work normally. But too much sodium can make your health problems worse. Your healthcare provider is recommending a low-salt (also called low-sodium) diet for you. Your total daily allowance of salt is 1,500 to 2,300 milligrams (mg). It is less than 1 teaspoon of table salt. This means you can have only about 500 to 700 mg of sodium at each meal. People with certain health problems should limit salt intake to the lower end of the recommended range.    When you cook, dont add much salt. If you can cook without using salt, even better. Dont add salt to your food at the table.  When shopping, read food labels. Salt is often called sodium on the label. Choose foods that are salt-free, low salt, or very low salt. Note that foods with reduced salt may not lower your salt intake enough.    Beans, potatoes, and pasta  Ok: Dry beans, split peas, lentils, potatoes, rice, macaroni, pasta, spaghetti without added salt  Avoid: Potato chips, tortilla chips, and similar products  Breads and cereals  Ok: Low-sodium breads, rolls, cereals, and cakes; low-salt crackers, matzo crackers  Avoid: Salted crackers, pretzels, popcorn, Mont[place]grin toast, pancakes, muffins  Dairy  Ok: Milk, chocolate milk, hot chocolate mix, low-salt cheeses, and yogurt  Avoid: Processed cheese and cheese spreads; Roquefort, Camembert, and cottage cheese; buttermilk, instant breakfast drink  Desserts  Ok: Ice cream, frozen yogurt, juice bars, gelatin, cookies and pies, sugar, honey, jelly, hard candy  Avoid: Most pies, cakes and cookies prepared or processed with salt; instant pudding  Drinks  Ok: Tea, coffee, fizzy (carbonated)  drinks, juices  Avoid: Flavored coffees, electrolyte replacement drinks, sports drinks  Meats  Ok: All fresh meat, fish, poultry, low-salt tuna, eggs, egg substitute  Avoid: Smoked, pickled, brine-cured, or salted meats and fish. This includes quinonez, chipped beef, corned beef, hot dogs, deli meats, ham, kosher meats, salt pork, sausage, canned tuna, salted codfish, smoked salmon, herring, sardines, or anchovies.  Seasonings and spices  Ok: Most seasonings are okay. Good substitutes for salt include: fresh herb blends, hot sauce, lemon, garlic, chaudhry, vinegar, dry mustard, parsley, cilantro, horseradish, tomato paste, regular margarine, mayonnaise, unsalted butter, cream cheese, vegetable oil, cream, low-salt salad dressing and gravy.  Avoid: Regular ketchup, relishes, pickles, soy sauce, teriyaki sauce, Worcestershire sauce, BBQ sauce, tartar sauce, meat tenderizer, chili sauce, regular gravy, regular salad dressing, salted butter  Soups  Ok: Low-salt soups and broths made with allowed foods  Avoid: Bouillon cubes, soups with smoked or salted meats, regular soup and broth  Vegetables  Ok: Most vegetables are okay; also low-salt tomato and vegetable juices  Avoid: Sauerkraut and other brine-soaked vegetables; pickles and other pickled vegetables; tomato juice, olives  Date Last Reviewed: 8/1/2016 © 2000-2017 [employer]. [address] [place] [id number]. All rights reserved. This information is not intended as a substitute for professional medical care. Always follow your healthcare professional's instructions.        Kidney Disease: Avoiding High-Sodium Foods  Sodium is a mineral that the body needs in small amounts. Sodium is found in table salt. Table salt is sodium chloride. Most people eat far more salt than they need. There are 2 main reasons for this. Salt is present in high amounts in most processed foods (pre-prepared foods like breakfast cereals, cookies, and pickles) and in all  restaurant foods. In other words, if you are not cooking from fresh ingredients at home, you are very likely eating more salt than you need. When sodium intake is too high, it can increase thirst and cause the body to retain fluid. This can increase blood pressure and strain the kidneys. If you have chronic kidney disease, try not to eat the foods listed here, unless the label states that they are made without salt. People with chronic kidney disease should restrict their sodium intake to less than 1,500 mg of sodium (3,800 mg of table salt) each day.     · Canned and processed foods, such as gravies, instant cereal, packaged noodles and potato mixes, olives, pickles, soups, vegetables  · Cheeses, such as American, Blue, Parmesan, Roquefort  · Cured meats, such as quinonez, beef jerky, bologna, corned beef, ham, hot dogs, sandwich meats, sausages  · Fast foods, such as burritos, fish sandwiches, milk shakes, salted French fries, tacos  · Frozen foods, such as meat pies, TV dinners, waffles  · Salted snacks, such as chips, crackers, peanut popcorn, pretzels, and nuts  · Other packaged items, such as antacids, baking soda, bouillon, catsup, lite salt, relish, salted butter and margarine, soy and teriyaki sauce, steak sauce, vegetable juices  Date Last Reviewed: 2/1/2017 © 2000-2017 [employer]. [address] [place] [address], [place] [address]. All rights reserved. This information is not intended as a substitute for professional medical care. Always follow your healthcare professional's instructions.

## 2017-09-13 NOTE — PROGRESS NOTES
Subjective:       Patient ID: Edd Wills is a 62 y.o. male.    Chief Complaint: Annual Exam    Here for f/u  Last seen approx 9 months prior when he came to establish care    Has completed synovist injections of knee for OA. He has continued neck pain for which he takes tramadol. Hx of LLUVIA. Has not seen sleep MD. He has not had colon CA screening. He uses [employer] for transportation and is fearful of SE from colon prep with public transportation as well as difficulty finding someone to accompany him. His weight is up along with his BP. He admits to lack of dietary adherence. Hx of thoracic/abdominal AA. Saw Vascular Surgery (Dr Ash) once, hasn't followed up on this yet. Discussed need for tight BP control with goal 120/80. CTA last done 03/2017 which showed dialtion is stable.           Review of Systems    Objective:      Vitals:    09/13/17 1106   BP: (!) 150/98   Pulse: 65   Weight: (!) 173.2 kg (381 lb 13.4 oz)   Height: 6' (1.829 m)      Physical Exam   Constitutional: He is oriented to person, place, and time. He appears well-developed and well-nourished. No distress.   HENT:   Head: Normocephalic and atraumatic.   Mouth/Throat: Oropharynx is clear and moist. No oropharyngeal exudate.   Eyes: Conjunctivae and EOM are normal. Pupils are equal, round, and reactive to light. No scleral icterus.   Neck: No thyromegaly present.   Cardiovascular: Normal rate, regular rhythm and normal heart sounds.    No murmur heard.  Pulmonary/Chest: Effort normal and breath sounds normal. He has no wheezes. He has no rales.   Abdominal: Soft. He exhibits no distension. There is no tenderness.   Musculoskeletal: He exhibits edema (1 pitting bilaterally). He exhibits no tenderness.   Lymphadenopathy:     He has no cervical adenopathy.   Neurological: He is alert and oriented to person, place, and time.   Skin: Skin is warm and dry.        Psychiatric: He has a normal mood and affect. His behavior is normal.       Assessment:        1. Screen for colon cancer    2. Need for hepatitis C screening test    3. Dissection of aorta, unspecified portion of aorta    4. Benign essential HTN    5. Morbid obesity, unspecified obesity type    6. LLUVIA (obstructive sleep apnea)        Plan:       Edd was seen today for annual exam.    Diagnoses and all orders for this visit:    Screen for colon cancer  -will obtain FIT test due to logistical complication of colonoscopy.    Need for hepatitis C screening test  -     Hepatitis C antibody; Future    Dissection of aorta, unspecified portion of aorta  -     Comprehensive metabolic panel; Future  -     CBC auto differential; Future    Benign essential HTN  -increase amlodipine back to 10mg and f/u in 3-4 weeks for nurse visit for BP check   -     amlodipine (NORVASC) 10 MG tablet; Take 1 tablet (10 mg total) by mouth once daily.  Morbid obesity, unspecified obesity type  Diet and exercise stressed to patient and need for weight loss.  Discussed intake as well as at minimum walking at moderate pace for 30-45 minutes/day 4 times per week.    LLUVIA (obstructive sleep apnea)  -needs to f/u with sleep.       -     ciclopirox (PENLAC) 8 % Soln; Apply topically once daily. Remove with rubbing alcohol every 7 days  -     diclofenac (VOLTAREN) 75 MG EC tablet; Take 1 tablet (75 mg total) by mouth 2 (two) times daily.    RTC in 6 months or sooner prn             Notification of Lab Results: Phone Call    Side effects of medication(s) were discussed in detail and patient voiced understanding.  Patient will call back for any issues or complications.

## 2017-09-14 LAB
ESTIMATED AVG GLUCOSE: 103 MG/DL
HBA1C MFR BLD HPLC: 5.2 %
HCV AB SERPL QL IA: NEGATIVE
HCV AB SERPL QL IA: NEGATIVE

## 2017-09-14 NOTE — TELEPHONE ENCOUNTER
Pt inform that labs are fine and he can continue taking med as Rx Pt agrees and verbalize and has no furher questions.

## 2017-09-22 ENCOUNTER — TELEPHONE (OUTPATIENT)
Dept: INTERNAL MEDICINE | Facility: CLINIC | Age: 62
End: 2017-09-22

## 2017-09-22 ENCOUNTER — LAB VISIT (OUTPATIENT)
Dept: LAB | Facility: HOSPITAL | Age: 62
End: 2017-09-22
Attending: INTERNAL MEDICINE
Payer: MEDICARE

## 2017-09-22 DIAGNOSIS — Z12.11 COLON CANCER SCREENING: ICD-10-CM

## 2017-09-22 LAB — HEMOCCULT STL QL IA: POSITIVE

## 2017-09-22 PROCEDURE — 82274 ASSAY TEST FOR BLOOD FECAL: CPT

## 2017-09-22 NOTE — TELEPHONE ENCOUNTER
Please let patient know that his stool studies do contain microscopic amounts of blood. While there are plenty of benign causes of blood in our stool, such as hemorrhoids or diverticula, there is no way to be certain that this is not due to colon cancer or a precancerous polyp without getting a colonoscopy done. I have placed an order for you to have a colonoscopy done. This will be done at main campus. If there is any problem or concern about this plans, or any other concerns, please let us know.

## 2017-09-29 ENCOUNTER — TELEPHONE (OUTPATIENT)
Dept: INTERNAL MEDICINE | Facility: CLINIC | Age: 62
End: 2017-09-29

## 2017-09-29 NOTE — TELEPHONE ENCOUNTER
----- Message from Toya Eastman sent at 9/28/2017  4:11 PM CDT -----  Contact: Pt  X 1st Request  _  2nd Request  _  3rd Request        Who: JUHI CARPIO [[id number]]    Why: Requesting a call back in regards to getting the [employer] paperwork filled out.  Please return the call at earliest convenience. Thanks!    What Number to Call Back: [phone]    When to Expect a call back: (Within 24 hours)

## 2017-10-03 ENCOUNTER — TELEPHONE (OUTPATIENT)
Dept: INTERNAL MEDICINE | Facility: CLINIC | Age: 62
End: 2017-10-03

## 2017-10-03 NOTE — TELEPHONE ENCOUNTER
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

## 2017-10-10 ENCOUNTER — TELEPHONE (OUTPATIENT)
Dept: INTERNAL MEDICINE | Facility: CLINIC | Age: 62
End: 2017-10-10

## 2017-10-10 NOTE — TELEPHONE ENCOUNTER
Spoke with pt regarding his concern. Advised pt that Dr. Yang is out of the office until tomorrow.

## 2017-10-10 NOTE — TELEPHONE ENCOUNTER
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 Thanks!

## 2017-10-10 NOTE — TELEPHONE ENCOUNTER
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 Thanks!

## 2017-10-13 ENCOUNTER — TELEPHONE (OUTPATIENT)
Dept: INTERNAL MEDICINE | Facility: CLINIC | Age: 62
End: 2017-10-13

## 2017-10-13 DIAGNOSIS — Z98.890 S/P SPINAL SURGERY: ICD-10-CM

## 2017-10-13 RX ORDER — TRAMADOL HYDROCHLORIDE 50 MG/1
TABLET ORAL
Qty: 60 TABLET | Refills: 0 | Status: CANCELLED | OUTPATIENT
Start: 2017-10-13

## 2017-10-13 NOTE — TELEPHONE ENCOUNTER
Spoke with pt regarding [employer] form. Advised pt that [employer] has to give him the form and he has to bring it in to get filled out .

## 2017-10-13 NOTE — TELEPHONE ENCOUNTER
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 Thanks!

## 2017-10-16 DIAGNOSIS — Z98.890 S/P SPINAL SURGERY: ICD-10-CM

## 2017-10-16 RX ORDER — TRAMADOL HYDROCHLORIDE 50 MG/1
50 TABLET ORAL EVERY 6 HOURS
Qty: 60 TABLET | Refills: 0 | Status: SHIPPED | OUTPATIENT
Start: 2017-10-16 | End: 2018-01-18 | Stop reason: SDUPTHER

## 2017-10-16 NOTE — TELEPHONE ENCOUNTER
Spoke with pt and let him know that his Tramadol refill has been sent to his pharmacy. I also advised that per Khadra Lopez, this the last time we can refill it, after that he will need to be referred to pain management. Pt verbalized understanding.

## 2017-10-16 NOTE — TELEPHONE ENCOUNTER
----- Message from Hailey Armando sent at 10/16/2017  1:28 PM CDT -----  Contact: self  Patient ask for a call in regards to needing a refill on his tramadol (ULTRAM) 50 mg tablet Patient ask for prescription be sent to [facility] on file patient can be reached at

## 2017-11-16 RX ORDER — FUROSEMIDE 40 MG/1
TABLET ORAL
Qty: 90 TABLET | Refills: 2 | Status: SHIPPED | OUTPATIENT
Start: 2017-11-16 | End: 2018-09-26 | Stop reason: SDUPTHER

## 2017-11-16 RX ORDER — DICLOFENAC SODIUM 75 MG/1
TABLET, DELAYED RELEASE ORAL
Qty: 60 TABLET | Refills: 2 | Status: SHIPPED | OUTPATIENT
Start: 2017-11-16 | End: 2018-03-20 | Stop reason: SDUPTHER

## 2018-01-15 DIAGNOSIS — M19.90 OSTEOARTHRITIS, UNSPECIFIED OSTEOARTHRITIS TYPE, UNSPECIFIED SITE: Primary | ICD-10-CM

## 2018-01-15 RX ORDER — METHOCARBAMOL 750 MG/1
TABLET, FILM COATED ORAL
Qty: 90 TABLET | Refills: 4 | Status: SHIPPED | OUTPATIENT
Start: 2018-01-15 | End: 2018-11-20 | Stop reason: SDUPTHER

## 2018-01-15 NOTE — TELEPHONE ENCOUNTER
----- Message from Becky Dupree sent at 1/15/2018  9:24 AM CST -----  Contact: pt   x  1st Request  _  2nd Request  _  3rd Request      Please refill the medication(s) listed below. Please call the patient when the prescription(s) is ready for  at the phone number [phone] .    Medication #1methocarbamol (ROBAXIN) 750 MG Tab 90 tablet     Medication #2      Preferred Pharmacy:  [facility] [address] [place] [address] AT [facility]

## 2018-01-18 DIAGNOSIS — Z98.890 S/P SPINAL SURGERY: ICD-10-CM

## 2018-01-18 RX ORDER — TRAMADOL HYDROCHLORIDE 50 MG/1
50 TABLET ORAL EVERY 6 HOURS
Qty: 60 TABLET | Refills: 0 | Status: SHIPPED | OUTPATIENT
Start: 2018-01-18 | End: 2018-02-16 | Stop reason: SDUPTHER

## 2018-01-18 NOTE — TELEPHONE ENCOUNTER
----- Message from Janna Dill sent at 1/18/2018  9:22 AM CST -----  Contact: Self    x  1st Request  _  2nd Request  _  3rd Request    Please refill the medication(s) listed below. Please call the patient when the prescription(s) is ready for  at this phone number  [phone]         Medication #1 tramadol (ULTRAM) 50 mg tablet      Preferred Pharmacy: [facility] at [phone] fax [phone]

## 2018-02-16 DIAGNOSIS — Z98.890 S/P SPINAL SURGERY: ICD-10-CM

## 2018-02-16 RX ORDER — TRAMADOL HYDROCHLORIDE 50 MG/1
TABLET ORAL
Qty: 60 TABLET | Refills: 0 | Status: SHIPPED | OUTPATIENT
Start: 2018-02-16 | End: 2018-03-20 | Stop reason: SDUPTHER

## 2018-03-09 RX ORDER — TRAMADOL HYDROCHLORIDE 50 MG/1
50 TABLET ORAL EVERY 6 HOURS PRN
Qty: 60 TABLET | Refills: 0 | OUTPATIENT
Start: 2018-03-09 | End: 2018-03-19

## 2018-03-09 RX ORDER — TRAMADOL HYDROCHLORIDE 50 MG/1
50 TABLET ORAL
Qty: 90 TABLET | Refills: 0 | OUTPATIENT
Start: 2018-03-09 | End: 2018-03-19

## 2018-03-20 DIAGNOSIS — Z98.890 S/P SPINAL SURGERY: ICD-10-CM

## 2018-03-20 RX ORDER — TRAMADOL HYDROCHLORIDE 50 MG/1
TABLET ORAL
Qty: 60 TABLET | Refills: 0 | Status: SHIPPED | OUTPATIENT
Start: 2018-03-20 | End: 2018-04-15 | Stop reason: SDUPTHER

## 2018-03-20 RX ORDER — DICLOFENAC SODIUM 75 MG/1
TABLET, DELAYED RELEASE ORAL
Qty: 60 TABLET | Refills: 0 | Status: SHIPPED | OUTPATIENT
Start: 2018-03-20 | End: 2018-04-15 | Stop reason: SDUPTHER

## 2018-03-20 NOTE — TELEPHONE ENCOUNTER
Refill sent in. PLease remind pt his stool has blood in it and he needs to get a colonoscopy done at main campus.

## 2018-04-15 DIAGNOSIS — Z98.890 S/P SPINAL SURGERY: ICD-10-CM

## 2018-04-16 RX ORDER — DICLOFENAC SODIUM 75 MG/1
TABLET, DELAYED RELEASE ORAL
Qty: 60 TABLET | Refills: 0 | Status: SHIPPED | OUTPATIENT
Start: 2018-04-16 | End: 2018-05-15 | Stop reason: SDUPTHER

## 2018-04-16 RX ORDER — CARVEDILOL 25 MG/1
TABLET ORAL
Qty: 180 TABLET | Refills: 0 | Status: SHIPPED | OUTPATIENT
Start: 2018-04-16 | End: 2018-07-14 | Stop reason: SDUPTHER

## 2018-04-16 RX ORDER — TRAMADOL HYDROCHLORIDE 50 MG/1
TABLET ORAL
Qty: 60 TABLET | Refills: 0 | Status: SHIPPED | OUTPATIENT
Start: 2018-04-16 | End: 2018-05-15 | Stop reason: SDUPTHER

## 2018-05-15 DIAGNOSIS — Z98.890 S/P SPINAL SURGERY: ICD-10-CM

## 2018-05-15 RX ORDER — DICLOFENAC SODIUM 75 MG/1
TABLET, DELAYED RELEASE ORAL
Qty: 180 TABLET | Refills: 1 | Status: SHIPPED | OUTPATIENT
Start: 2018-05-15 | End: 2018-11-02 | Stop reason: SDUPTHER

## 2018-05-15 RX ORDER — TRAMADOL HYDROCHLORIDE 50 MG/1
TABLET ORAL
Qty: 60 TABLET | Refills: 0 | Status: SHIPPED | OUTPATIENT
Start: 2018-05-15 | End: 2021-05-27

## 2018-06-20 DIAGNOSIS — Z98.890 S/P SPINAL SURGERY: ICD-10-CM

## 2018-06-20 RX ORDER — TRAMADOL HYDROCHLORIDE 50 MG/1
TABLET ORAL
Qty: 60 TABLET | Refills: 0 | OUTPATIENT
Start: 2018-06-20

## 2018-07-14 RX ORDER — CARVEDILOL 25 MG/1
TABLET ORAL
Qty: 180 TABLET | Refills: 2 | Status: SHIPPED | OUTPATIENT
Start: 2018-07-14 | End: 2020-05-12 | Stop reason: SDUPTHER

## 2018-07-14 RX ORDER — AMLODIPINE BESYLATE 10 MG/1
TABLET ORAL
Qty: 90 TABLET | Refills: 2 | Status: SHIPPED | OUTPATIENT
Start: 2018-07-14 | End: 2020-05-12 | Stop reason: SDUPTHER

## 2018-09-27 DIAGNOSIS — R53.81 PHYSICAL DECONDITIONING: Primary | ICD-10-CM

## 2018-09-27 RX ORDER — FUROSEMIDE 40 MG/1
TABLET ORAL
Qty: 90 TABLET | Refills: 0 | Status: SHIPPED | OUTPATIENT
Start: 2018-09-27 | End: 2018-12-13 | Stop reason: SDUPTHER

## 2018-09-27 NOTE — TELEPHONE ENCOUNTER
Please let patient know a 3 month refill has been sent in, but no more refills until seen in clinic.

## 2018-09-27 NOTE — TELEPHONE ENCOUNTER
----- Message from Janna Dill sent at 9/27/2018  8:57 AM CDT -----  Contact: Self      Can the clinic reply in [facility]: N      Please refill the medication(s) listed below. Please call the patient when the prescription(s) is ready for  at this phone number   [phone]      Medication #1 polyethylene glycol (GLYCOLAX) 17 gram PwPk(previously prescribed by another Physician, pt no longer sees)      Preferred Pharmacy: [facility] at [phone] fax [phone]

## 2018-09-28 DIAGNOSIS — Z12.11 COLON CANCER SCREENING: ICD-10-CM

## 2018-09-28 RX ORDER — POLYETHYLENE GLYCOL 3350 17 G/17G
POWDER, FOR SOLUTION ORAL
Qty: 30 PACKET | Refills: 11 | Status: SHIPPED | OUTPATIENT
Start: 2018-09-28 | End: 2019-09-13 | Stop reason: SDUPTHER

## 2018-11-05 ENCOUNTER — PATIENT OUTREACH (OUTPATIENT)
Dept: ADMINISTRATIVE | Facility: HOSPITAL | Age: 63
End: 2018-11-05

## 2018-11-05 NOTE — PROGRESS NOTES
Patient was contacted to scheduled visit with Haresh Yang MD. Attempt unsuccessful,will follow up with patient at a later time.     Left message for patient to contact office to schedule annual visit and labs. Will mail letter in efforts to reach patient.

## 2018-11-07 RX ORDER — DICLOFENAC SODIUM 75 MG/1
TABLET, DELAYED RELEASE ORAL
Qty: 180 TABLET | Refills: 0 | Status: SHIPPED | OUTPATIENT
Start: 2018-11-07 | End: 2020-07-08

## 2018-11-08 ENCOUNTER — PATIENT OUTREACH (OUTPATIENT)
Dept: ADMINISTRATIVE | Facility: HOSPITAL | Age: 63
End: 2018-11-08

## 2018-11-08 NOTE — PROGRESS NOTES
[facility] is committed to your overall health.  To help you get the most out of each of your visits, we will review your information to make sure you are up to date on all of your recommended tests and/or procedures.       Your PCP  Haresh Yang MD   found that you may be due for:       Health Maintenance Due   Topic Date Due    Zoster Vaccine  02/11/2013    Pneumococcal (65+) (1 of 2 - PCV13) 02/11/2018    Influenza Vaccine  08/01/2018    Fecal Occult Blood Test (FOBT)/FitKit  09/22/2018             If you have had any of the above done at another facility, please bring the records or information with you so that your record at [facility] will be complete.  If you would like to schedule any of these, please contact me.     If you are currently taking medication, please bring it with you to your appointment for review.     Also, if you have any type of Advanced Directives, please bring them with you to your office visit so we may scan them into your chart.       Thank you for Choosing [facility] for your healthcare needs.        Additional Information  If you have questions, you can email [email or web address] or call [phone]  to talk to our [facility] staff. Remember, [facility] is NOT to be used for urgent needs. For medical emergencies, dial 911.

## 2018-11-20 DIAGNOSIS — M19.90 OSTEOARTHRITIS, UNSPECIFIED OSTEOARTHRITIS TYPE, UNSPECIFIED SITE: ICD-10-CM

## 2018-11-20 RX ORDER — METHOCARBAMOL 750 MG/1
TABLET, FILM COATED ORAL
Qty: 60 TABLET | Refills: 0 | Status: SHIPPED | OUTPATIENT
Start: 2018-11-20 | End: 2020-07-08

## 2018-12-13 RX ORDER — FUROSEMIDE 40 MG/1
TABLET ORAL
Qty: 90 TABLET | Refills: 2 | Status: SHIPPED | OUTPATIENT
Start: 2018-12-13 | End: 2020-07-08

## 2019-01-24 ENCOUNTER — PATIENT OUTREACH (OUTPATIENT)
Dept: ADMINISTRATIVE | Facility: HOSPITAL | Age: 64
End: 2019-01-24

## 2019-01-24 NOTE — PROGRESS NOTES
[facility] is committed to your overall health and would to ensure that you are up to date on your recommended health testing. Haresh Yang MD has found that you maybe due for the following:    Health Maintenance Due   Topic Date Due    Zoster Vaccine  02/11/2013    Pneumococcal Vaccine (65+ Low/Medium Risk) (1 of 2 - PCV13) 02/11/2018    Influenza Vaccine  08/01/2018    Fecal Occult Blood Test (FOBT)/FitKit  09/22/2018           Also, Your last recorded [facility] blood pressure reading was elevated. Please schedule a nurse visit or doctor visit to have your blood pressure retaken. Please take any prescribed medication as directed the day of the appointment. Please bring in a home blood pressure machine to compare if you have one.     Please schedule these appointments at your earliest convenience by calling [phone]* or going to [email or web address]

## 2019-04-23 ENCOUNTER — PES CALL (OUTPATIENT)
Dept: ADMINISTRATIVE | Facility: CLINIC | Age: 64
End: 2019-04-23

## 2019-09-13 DIAGNOSIS — R53.81 PHYSICAL DECONDITIONING: ICD-10-CM

## 2019-09-17 RX ORDER — POLYETHYLENE GLYCOL 3350 17 G/17G
POWDER, FOR SOLUTION ORAL
Qty: 30 PACKET | Refills: 11 | Status: SHIPPED | OUTPATIENT
Start: 2019-09-17 | End: 2020-07-08

## 2020-02-12 DIAGNOSIS — M17.0 PRIMARY OSTEOARTHRITIS OF BOTH KNEES: Primary | ICD-10-CM

## 2020-05-12 ENCOUNTER — OFFICE VISIT (OUTPATIENT)
Dept: ORTHOPEDICS | Facility: CLINIC | Age: 65
End: 2020-05-12
Payer: MEDICARE

## 2020-05-12 VITALS — BODY MASS INDEX: 42.66 KG/M2 | HEIGHT: 72 IN | WEIGHT: 315 LBS

## 2020-05-12 DIAGNOSIS — M17.0 PRIMARY OSTEOARTHRITIS OF BOTH KNEES: Primary | ICD-10-CM

## 2020-05-12 DIAGNOSIS — E66.01 CLASS 3 SEVERE OBESITY DUE TO EXCESS CALORIES WITH BODY MASS INDEX (BMI) OF 50.0 TO 59.9 IN ADULT, UNSPECIFIED WHETHER SERIOUS COMORBIDITY PRESENT: ICD-10-CM

## 2020-05-12 DIAGNOSIS — I10 BENIGN ESSENTIAL HTN: Primary | ICD-10-CM

## 2020-05-12 PROCEDURE — 99213 OFFICE O/P EST LOW 20 MIN: CPT | Mod: PBBFAC | Performed by: NURSE PRACTITIONER

## 2020-05-12 PROCEDURE — 20610 DRAIN/INJ JOINT/BURSA W/O US: CPT | Mod: 50,PBBFAC | Performed by: NURSE PRACTITIONER

## 2020-05-12 PROCEDURE — 99213 PR OFFICE/OUTPT VISIT, EST, LEVL III, 20-29 MIN: ICD-10-PCS | Mod: S$PBB,25,, | Performed by: NURSE PRACTITIONER

## 2020-05-12 PROCEDURE — 99213 OFFICE O/P EST LOW 20 MIN: CPT | Mod: S$PBB,25,, | Performed by: NURSE PRACTITIONER

## 2020-05-12 PROCEDURE — 99999 PR PBB SHADOW E&M-EST. PATIENT-LVL III: CPT | Mod: PBBFAC,,, | Performed by: NURSE PRACTITIONER

## 2020-05-12 PROCEDURE — 20610 DRAIN/INJ JOINT/BURSA W/O US: CPT | Mod: 50,S$PBB,, | Performed by: NURSE PRACTITIONER

## 2020-05-12 PROCEDURE — 99999 PR PBB SHADOW E&M-EST. PATIENT-LVL III: ICD-10-PCS | Mod: PBBFAC,,, | Performed by: NURSE PRACTITIONER

## 2020-05-12 PROCEDURE — 20610 PR DRAIN/INJECT LARGE JOINT/BURSA: ICD-10-PCS | Mod: 50,S$PBB,, | Performed by: NURSE PRACTITIONER

## 2020-05-12 RX ORDER — CARVEDILOL 25 MG/1
25 TABLET ORAL 2 TIMES DAILY WITH MEALS
Qty: 180 TABLET | Refills: 2 | Status: SHIPPED | OUTPATIENT
Start: 2020-05-12 | End: 2020-07-08 | Stop reason: SDUPTHER

## 2020-05-12 RX ORDER — AMLODIPINE BESYLATE 10 MG/1
TABLET ORAL
Qty: 90 TABLET | Refills: 2 | Status: SHIPPED | OUTPATIENT
Start: 2020-05-12 | End: 2021-05-27 | Stop reason: SDUPTHER

## 2020-05-12 RX ADMIN — TRIAMCINOLONE ACETONIDE 80 MG: 40 INJECTION, SUSPENSION INTRA-ARTICULAR; INTRAMUSCULAR at 10:05

## 2020-05-13 NOTE — PROGRESS NOTES
CC: Injections of the Right Knee and Injections of the Left Knee      HPI: Pt with c/o of bilateral knee pain for several years. The pain is aching and global and worse with increased activity. He has known severe djd of the knees and has had injections in the past with some relief. He is ambulating with a rolling walker for support. He is aware that he has to lose some weight to be a surgical candidate, but he has not been to bariatric medicine or done anything to pursue weight loss. He is taking voltaren as needed with minimal relief.    ROS  General: denies fever and chills  Resp: no c/o sob  CVS: no c/o cp  MSK: c/o bilateral knee pain which is aching and global and worse with getting up and down and with ambulating    PE  General: AAOx3, pleasant and cooperative  Resp: respirations even and unlabored  MSK: bilateral knee exam  -5 degrees extension  100 degrees flexion  No warmth or erythema   - effusion  + crepitus  + tenderness over the medial and lateral joint line    Assessment:  Bilateral knee djd    Plan:  Cortisone injection in bilateral knees today  Consult with bariatric medicine- long discussion with patient regarding necessity of weight loss for future surgery  nsaids as ordered  F/u as needed for injections    Knee Injection Procedure Note  Diagnosis: bilateral knee degenerative arthritis  Indications: bilateral knee pain  Procedure Details: Verbal consent was obtained for the procedure. The injection site was identified and the skin was prepared with alcohol. The bilateral knee was injected from an anterolateral approach with 1 ml of Kenalog and 4 ml Lidocaine each under sterile technique using a 22 gauge needle. The needle was removed and the area cleansed and dressed.  Complications:  Patient tolerated the procedure well.    he was advised to rest the knee today, using ice and elevation as needed for comfort and swelling.Immediate relief of the knee pain may be short lived and secondary to the  lidocaine. he may have an increase in discomfort tonight followed by steady improvement over the next several days. It may take 1-2 weeks following the injection to get the full benefit of the medication.

## 2020-05-14 RX ORDER — TRIAMCINOLONE ACETONIDE 40 MG/ML
80 INJECTION, SUSPENSION INTRA-ARTICULAR; INTRAMUSCULAR
Status: COMPLETED | OUTPATIENT
Start: 2020-05-12 | End: 2020-05-12

## 2020-06-02 ENCOUNTER — TELEPHONE (OUTPATIENT)
Dept: ORTHOPEDICS | Facility: CLINIC | Age: 65
End: 2020-06-02

## 2020-06-02 NOTE — TELEPHONE ENCOUNTER
Spoke to patient, he had an issue with transportation for his 1st two injection appointments. He advised he will keep his upcoming appointment next Tuesday 6/9. We will schedule following injection appointments when he comes in     ----- Message from Joyce Chua sent at 6/2/2020 10:11 AM CDT -----  Contact: JUHI CARPIO [[id number]]  Name of Who is Calling : JUHI CARPIO [[id number]]    Patient is requesting a call from staff in regards to rescheduling patient transportation never came   .....Please contact to further discuss and advise.    Can the clinic reply by [facility] : No    What Number to Call Back :  [phone]

## 2020-06-09 ENCOUNTER — OFFICE VISIT (OUTPATIENT)
Dept: ORTHOPEDICS | Facility: CLINIC | Age: 65
End: 2020-06-09
Payer: MEDICARE

## 2020-06-09 VITALS — HEIGHT: 72 IN | BODY MASS INDEX: 51.79 KG/M2

## 2020-06-09 DIAGNOSIS — M17.0 PRIMARY OSTEOARTHRITIS OF BOTH KNEES: Primary | ICD-10-CM

## 2020-06-09 PROCEDURE — 20610 PR DRAIN/INJECT LARGE JOINT/BURSA: ICD-10-PCS | Mod: 50,S$PBB,, | Performed by: NURSE PRACTITIONER

## 2020-06-09 PROCEDURE — 99999 PR PBB SHADOW E&M-EST. PATIENT-LVL II: ICD-10-PCS | Mod: PBBFAC,,, | Performed by: NURSE PRACTITIONER

## 2020-06-09 PROCEDURE — 99499 UNLISTED E&M SERVICE: CPT | Mod: S$PBB,,, | Performed by: NURSE PRACTITIONER

## 2020-06-09 PROCEDURE — 99212 OFFICE O/P EST SF 10 MIN: CPT | Mod: PBBFAC,25 | Performed by: NURSE PRACTITIONER

## 2020-06-09 PROCEDURE — 99499 NO LOS: ICD-10-PCS | Mod: S$PBB,,, | Performed by: NURSE PRACTITIONER

## 2020-06-09 PROCEDURE — 99999 PR PBB SHADOW E&M-EST. PATIENT-LVL II: CPT | Mod: PBBFAC,,, | Performed by: NURSE PRACTITIONER

## 2020-06-09 PROCEDURE — 20610 DRAIN/INJ JOINT/BURSA W/O US: CPT | Mod: 50,PBBFAC | Performed by: NURSE PRACTITIONER

## 2020-06-09 PROCEDURE — 20610 DRAIN/INJ JOINT/BURSA W/O US: CPT | Mod: 50,S$PBB,, | Performed by: NURSE PRACTITIONER

## 2020-06-09 RX ORDER — HYDROCODONE BITARTRATE AND ACETAMINOPHEN 5; 325 MG/1; MG/1
1 TABLET ORAL EVERY 6 HOURS PRN
Qty: 28 TABLET | Refills: 0 | Status: SHIPPED | OUTPATIENT
Start: 2020-06-09 | End: 2020-06-19

## 2020-06-09 RX ADMIN — Medication 32 MG: at 01:06

## 2020-06-10 NOTE — PROGRESS NOTES
Edd Wills presents to clinic today for the first bilateral knee Synvisc injection.    Exam demonstrates the no effusion in the bilateral knee, and the skin is intact.    Diagnosis: primary osteoarthritis of both knees     After time out was performed and patient ID, side, and site were verified, the right knee and the left knee were sterilly prepped in the standard fashion.  A 22-gauge needle was introduced into the right knee and the left knee joint from an maria fernanda-lateral site without complication. The right knee and the left knee were then injected with 2 ml of Synvisc each. Sterile dressing was applied.  The patient was instructed to resume activities as tolerated and to call with any problems.     We will see Edd Wills back next week for the second injection.

## 2020-07-01 DIAGNOSIS — M25.561 PAIN IN BOTH KNEES, UNSPECIFIED CHRONICITY: Primary | ICD-10-CM

## 2020-07-01 DIAGNOSIS — M25.562 PAIN IN BOTH KNEES, UNSPECIFIED CHRONICITY: Primary | ICD-10-CM

## 2020-07-08 ENCOUNTER — OFFICE VISIT (OUTPATIENT)
Dept: PRIMARY CARE CLINIC | Facility: CLINIC | Age: 65
End: 2020-07-08
Payer: MEDICARE

## 2020-07-08 VITALS
BODY MASS INDEX: 45.1 KG/M2 | DIASTOLIC BLOOD PRESSURE: 84 MMHG | HEIGHT: 70 IN | OXYGEN SATURATION: 98 % | HEART RATE: 84 BPM | TEMPERATURE: 98 F | SYSTOLIC BLOOD PRESSURE: 158 MMHG | WEIGHT: 315 LBS

## 2020-07-08 DIAGNOSIS — I10 ESSENTIAL HYPERTENSION: ICD-10-CM

## 2020-07-08 DIAGNOSIS — Z76.89 ESTABLISHING CARE WITH NEW DOCTOR, ENCOUNTER FOR: ICD-10-CM

## 2020-07-08 DIAGNOSIS — Z12.11 SCREENING FOR MALIGNANT NEOPLASM OF COLON: ICD-10-CM

## 2020-07-08 DIAGNOSIS — L02.91 ABSCESS: ICD-10-CM

## 2020-07-08 DIAGNOSIS — Z53.20 HIV SCREENING DECLINED: ICD-10-CM

## 2020-07-08 DIAGNOSIS — Z01.84 IMMUNITY STATUS TESTING: ICD-10-CM

## 2020-07-08 DIAGNOSIS — E66.01 CLASS 3 SEVERE OBESITY DUE TO EXCESS CALORIES WITH SERIOUS COMORBIDITY AND BODY MASS INDEX (BMI) OF 50.0 TO 59.9 IN ADULT: ICD-10-CM

## 2020-07-08 DIAGNOSIS — Z01.818 PREOP TESTING: ICD-10-CM

## 2020-07-08 DIAGNOSIS — Z12.5 SCREENING FOR MALIGNANT NEOPLASM OF PROSTATE: ICD-10-CM

## 2020-07-08 DIAGNOSIS — Z00.00 ANNUAL PHYSICAL EXAM: Primary | ICD-10-CM

## 2020-07-08 DIAGNOSIS — R26.81 GAIT INSTABILITY: ICD-10-CM

## 2020-07-08 DIAGNOSIS — Z86.79 HISTORY OF AORTIC DISSECTION: ICD-10-CM

## 2020-07-08 DIAGNOSIS — G47.33 OSA (OBSTRUCTIVE SLEEP APNEA): ICD-10-CM

## 2020-07-08 DIAGNOSIS — Z13.220 ENCOUNTER FOR LIPID SCREENING FOR CARDIOVASCULAR DISEASE: ICD-10-CM

## 2020-07-08 DIAGNOSIS — Z13.6 ENCOUNTER FOR LIPID SCREENING FOR CARDIOVASCULAR DISEASE: ICD-10-CM

## 2020-07-08 DIAGNOSIS — M17.0 PRIMARY OSTEOARTHRITIS OF BOTH KNEES: ICD-10-CM

## 2020-07-08 DIAGNOSIS — K59.04 CHRONIC IDIOPATHIC CONSTIPATION: ICD-10-CM

## 2020-07-08 DIAGNOSIS — Z98.890 HISTORY OF CERVICAL SPINAL SURGERY: ICD-10-CM

## 2020-07-08 LAB
ALBUMIN/CREAT UR: 8.2 UG/MG (ref 0–30)
CREAT UR-MCNC: 182 MG/DL (ref 23–375)
MICROALBUMIN UR DL<=1MG/L-MCNC: 15 UG/ML

## 2020-07-08 PROCEDURE — 99397 PR PREVENTIVE VISIT,EST,65 & OVER: ICD-10-PCS | Mod: S$PBB,,, | Performed by: FAMILY MEDICINE

## 2020-07-08 PROCEDURE — 93005 ELECTROCARDIOGRAM TRACING: CPT | Mod: PBBFAC,PN | Performed by: INTERNAL MEDICINE

## 2020-07-08 PROCEDURE — 93010 ELECTROCARDIOGRAM REPORT: CPT | Mod: S$PBB,,, | Performed by: INTERNAL MEDICINE

## 2020-07-08 PROCEDURE — 99999 PR PBB SHADOW E&M-EST. PATIENT-LVL V: CPT | Mod: PBBFAC,,, | Performed by: FAMILY MEDICINE

## 2020-07-08 PROCEDURE — 82043 UR ALBUMIN QUANTITATIVE: CPT

## 2020-07-08 PROCEDURE — 99999 PR PBB SHADOW E&M-EST. PATIENT-LVL V: ICD-10-PCS | Mod: PBBFAC,,, | Performed by: FAMILY MEDICINE

## 2020-07-08 PROCEDURE — 99397 PER PM REEVAL EST PAT 65+ YR: CPT | Mod: S$PBB,,, | Performed by: FAMILY MEDICINE

## 2020-07-08 PROCEDURE — 93010 EKG 12-LEAD: ICD-10-PCS | Mod: S$PBB,,, | Performed by: INTERNAL MEDICINE

## 2020-07-08 PROCEDURE — 99215 OFFICE O/P EST HI 40 MIN: CPT | Mod: PBBFAC,25,PN | Performed by: FAMILY MEDICINE

## 2020-07-08 RX ORDER — IBUPROFEN 200 MG
200 TABLET ORAL
Status: ON HOLD | COMMUNITY
End: 2023-05-31 | Stop reason: HOSPADM

## 2020-07-08 RX ORDER — DICLOFENAC SODIUM 10 MG/G
GEL TOPICAL 3 TIMES DAILY PRN
Qty: 500 G | Refills: 6 | Status: SHIPPED | OUTPATIENT
Start: 2020-07-08 | End: 2021-05-27

## 2020-07-08 RX ORDER — DOCUSATE SODIUM 100 MG/1
100 CAPSULE, LIQUID FILLED ORAL 2 TIMES DAILY PRN
Qty: 60 CAPSULE | Refills: 0 | Status: ON HOLD | OUTPATIENT
Start: 2020-07-08 | End: 2023-05-31 | Stop reason: HOSPADM

## 2020-07-08 RX ORDER — LISINOPRIL 40 MG/1
40 TABLET ORAL DAILY
Qty: 90 TABLET | Refills: 3 | Status: SHIPPED | OUTPATIENT
Start: 2020-07-08 | End: 2021-05-27 | Stop reason: SDUPTHER

## 2020-07-08 RX ORDER — CARVEDILOL 25 MG/1
25 TABLET ORAL DAILY
Qty: 90 TABLET | Refills: 3 | Status: SHIPPED | OUTPATIENT
Start: 2020-07-08 | End: 2021-05-27 | Stop reason: SDUPTHER

## 2020-07-09 PROBLEM — Z86.79 HISTORY OF AORTIC DISSECTION: Status: ACTIVE | Noted: 2020-07-09

## 2020-07-13 ENCOUNTER — TELEPHONE (OUTPATIENT)
Dept: SPORTS MEDICINE | Facility: CLINIC | Age: 65
End: 2020-07-13

## 2020-07-13 NOTE — TELEPHONE ENCOUNTER
Called patient again to let him know if he is only coming to get his injection, we can not give injection due to order already done by another Provider.  Patient needs to finish his injection with same provider then if he want can start here at [facility] due transportation.

## 2020-07-13 NOTE — TELEPHONE ENCOUNTER
Called and spoke with patient Edd, to let him know that we will be canceling his apt for today due to he already has an apt tomorrow with an Orthopedic injections. Patient Edd state that he wants to finish his injection and start at [facility] due to it is close to home.

## 2020-07-17 ENCOUNTER — OFFICE VISIT (OUTPATIENT)
Dept: ORTHOPEDICS | Facility: CLINIC | Age: 65
End: 2020-07-17
Payer: MEDICARE

## 2020-07-17 DIAGNOSIS — M17.0 PRIMARY OSTEOARTHRITIS OF BOTH KNEES: ICD-10-CM

## 2020-07-17 PROCEDURE — 99499 NO LOS: ICD-10-PCS | Mod: S$PBB,,, | Performed by: NURSE PRACTITIONER

## 2020-07-17 PROCEDURE — 20610 PR DRAIN/INJECT LARGE JOINT/BURSA: ICD-10-PCS | Mod: 50,S$PBB,, | Performed by: NURSE PRACTITIONER

## 2020-07-17 PROCEDURE — 20610 DRAIN/INJ JOINT/BURSA W/O US: CPT | Mod: 50,S$PBB,, | Performed by: NURSE PRACTITIONER

## 2020-07-17 PROCEDURE — 99999 PR PBB SHADOW E&M-EST. PATIENT-LVL III: CPT | Mod: PBBFAC,,, | Performed by: NURSE PRACTITIONER

## 2020-07-17 PROCEDURE — 99213 OFFICE O/P EST LOW 20 MIN: CPT | Mod: PBBFAC | Performed by: NURSE PRACTITIONER

## 2020-07-17 PROCEDURE — 20610 DRAIN/INJ JOINT/BURSA W/O US: CPT | Mod: 50,PBBFAC | Performed by: NURSE PRACTITIONER

## 2020-07-17 PROCEDURE — 99999 PR PBB SHADOW E&M-EST. PATIENT-LVL III: ICD-10-PCS | Mod: PBBFAC,,, | Performed by: NURSE PRACTITIONER

## 2020-07-17 PROCEDURE — 99499 UNLISTED E&M SERVICE: CPT | Mod: S$PBB,,, | Performed by: NURSE PRACTITIONER

## 2020-07-17 RX ADMIN — Medication 32 MG: at 12:07

## 2020-07-17 NOTE — PROGRESS NOTES
Edd Wills presents to clinic today for the second bilateral knee Synvisc injection.    Exam demonstrates the no effusion in the bilateral knee, and the skin is intact.    Diagnosis: primary osteoarthritis of both knees     After time out was performed and patient ID, side, and site were verified, the right knee and the left knee were sterilly prepped in the standard fashion.  A 22-gauge needle was introduced into the right knee and the left knee joint from an maria fernanda-lateral site without complication. The right knee and the left knee were then injected with 2 ml of Synvisc each. Sterile dressing was applied.  The patient was instructed to resume activities as tolerated and to call with any problems.     We will see Edd Wills back next week for the third injection.

## 2020-07-22 ENCOUNTER — CLINICAL SUPPORT (OUTPATIENT)
Dept: PRIMARY CARE CLINIC | Facility: CLINIC | Age: 65
End: 2020-07-22
Payer: MEDICARE

## 2020-07-22 ENCOUNTER — LAB VISIT (OUTPATIENT)
Dept: LAB | Facility: HOSPITAL | Age: 65
End: 2020-07-22
Attending: FAMILY MEDICINE
Payer: MEDICARE

## 2020-07-22 DIAGNOSIS — Z01.30 BP CHECK: Primary | ICD-10-CM

## 2020-07-22 DIAGNOSIS — Z01.84 IMMUNITY STATUS TESTING: ICD-10-CM

## 2020-07-22 DIAGNOSIS — I10 ESSENTIAL HYPERTENSION: ICD-10-CM

## 2020-07-22 DIAGNOSIS — Z13.6 ENCOUNTER FOR LIPID SCREENING FOR CARDIOVASCULAR DISEASE: ICD-10-CM

## 2020-07-22 DIAGNOSIS — Z12.5 SCREENING FOR MALIGNANT NEOPLASM OF PROSTATE: ICD-10-CM

## 2020-07-22 DIAGNOSIS — Z13.220 ENCOUNTER FOR LIPID SCREENING FOR CARDIOVASCULAR DISEASE: ICD-10-CM

## 2020-07-22 LAB
ALBUMIN SERPL BCP-MCNC: 3.9 G/DL (ref 3.5–5.2)
ALP SERPL-CCNC: 86 U/L (ref 55–135)
ALT SERPL W/O P-5'-P-CCNC: 17 U/L (ref 10–44)
ANION GAP SERPL CALC-SCNC: 10 MMOL/L (ref 8–16)
AST SERPL-CCNC: 21 U/L (ref 10–40)
BILIRUB SERPL-MCNC: 0.4 MG/DL (ref 0.1–1)
BUN SERPL-MCNC: 13 MG/DL (ref 8–23)
CALCIUM SERPL-MCNC: 9.3 MG/DL (ref 8.7–10.5)
CHLORIDE SERPL-SCNC: 104 MMOL/L (ref 95–110)
CHOLEST SERPL-MCNC: 163 MG/DL (ref 120–199)
CHOLEST/HDLC SERPL: 3.8 {RATIO} (ref 2–5)
CO2 SERPL-SCNC: 24 MMOL/L (ref 23–29)
CREAT SERPL-MCNC: 0.8 MG/DL (ref 0.5–1.4)
ERYTHROCYTE [DISTWIDTH] IN BLOOD BY AUTOMATED COUNT: 14.9 % (ref 11.5–14.5)
EST. GFR  (AFRICAN AMERICAN): >60 ML/MIN/1.73 M^2
EST. GFR  (NON AFRICAN AMERICAN): >60 ML/MIN/1.73 M^2
GLUCOSE SERPL-MCNC: 71 MG/DL (ref 70–110)
HCT VFR BLD AUTO: 49 % (ref 40–54)
HDLC SERPL-MCNC: 43 MG/DL (ref 40–75)
HDLC SERPL: 26.4 % (ref 20–50)
HGB BLD-MCNC: 15.2 G/DL (ref 14–18)
LDLC SERPL CALC-MCNC: 109.4 MG/DL (ref 63–159)
MCH RBC QN AUTO: 27.4 PG (ref 27–31)
MCHC RBC AUTO-ENTMCNC: 31 G/DL (ref 32–36)
MCV RBC AUTO: 88 FL (ref 82–98)
NONHDLC SERPL-MCNC: 120 MG/DL
PLATELET # BLD AUTO: 251 K/UL (ref 150–350)
PMV BLD AUTO: 11.6 FL (ref 9.2–12.9)
POTASSIUM SERPL-SCNC: 3.7 MMOL/L (ref 3.5–5.1)
PROT SERPL-MCNC: 8.1 G/DL (ref 6–8.4)
RBC # BLD AUTO: 5.55 M/UL (ref 4.6–6.2)
SARS-COV-2 IGG SERPLBLD QL IA.RAPID: NEGATIVE
SODIUM SERPL-SCNC: 138 MMOL/L (ref 136–145)
TRIGL SERPL-MCNC: 53 MG/DL (ref 30–150)
TSH SERPL DL<=0.005 MIU/L-ACNC: 1.53 UIU/ML (ref 0.4–4)
WBC # BLD AUTO: 6.72 K/UL (ref 3.9–12.7)

## 2020-07-22 PROCEDURE — 99999 PR PBB SHADOW E&M-EST. PATIENT-LVL III: ICD-10-PCS | Mod: PBBFAC,,,

## 2020-07-22 PROCEDURE — 99999 PR PBB SHADOW E&M-EST. PATIENT-LVL III: CPT | Mod: PBBFAC,,,

## 2020-07-22 PROCEDURE — 80061 LIPID PANEL: CPT

## 2020-07-22 PROCEDURE — 80053 COMPREHEN METABOLIC PANEL: CPT

## 2020-07-22 PROCEDURE — 36415 COLL VENOUS BLD VENIPUNCTURE: CPT | Mod: PN

## 2020-07-22 PROCEDURE — 85027 COMPLETE CBC AUTOMATED: CPT

## 2020-07-22 PROCEDURE — 99213 OFFICE O/P EST LOW 20 MIN: CPT | Mod: PBBFAC,PN

## 2020-07-22 PROCEDURE — 84153 ASSAY OF PSA TOTAL: CPT

## 2020-07-22 PROCEDURE — 86769 SARS-COV-2 COVID-19 ANTIBODY: CPT

## 2020-07-22 PROCEDURE — 84443 ASSAY THYROID STIM HORMONE: CPT

## 2020-07-23 ENCOUNTER — TELEPHONE (OUTPATIENT)
Dept: PRIMARY CARE CLINIC | Facility: CLINIC | Age: 65
End: 2020-07-23

## 2020-07-23 VITALS — SYSTOLIC BLOOD PRESSURE: 126 MMHG | DIASTOLIC BLOOD PRESSURE: 67 MMHG

## 2020-07-23 VITALS — DIASTOLIC BLOOD PRESSURE: 84 MMHG | HEART RATE: 78 BPM | SYSTOLIC BLOOD PRESSURE: 142 MMHG | OXYGEN SATURATION: 97 %

## 2020-07-23 DIAGNOSIS — E78.2 MIXED HYPERLIPIDEMIA: Primary | ICD-10-CM

## 2020-07-23 LAB — COMPLEXED PSA SERPL-MCNC: 0.57 NG/ML (ref 0–4)

## 2020-07-23 RX ORDER — ATORVASTATIN CALCIUM 20 MG/1
20 TABLET, FILM COATED ORAL DAILY
Qty: 90 TABLET | Refills: 3 | Status: SHIPPED | OUTPATIENT
Start: 2020-07-23 | End: 2021-05-27 | Stop reason: SDUPTHER

## 2020-07-23 NOTE — TELEPHONE ENCOUNTER
Patient present in clinic today for a Nurse BP Check. Patient advised that he is feeling well. When reviewing his medication list, he admitted to occasionally forgetting to take his BP medication.    Medications & Allergy List reviewed.    Initial BP Reading in Right Arm - 156/88    Five minute interval prior the second set of readings.     Second Reading Right Arm - 142/84  Pulse - 78    Advised the patient that they will be contacted for any medication changes or if further appointments are required.

## 2020-07-23 NOTE — PROGRESS NOTES
Patient present in clinic today for a Nurse BP Check. Patient advised that he is feeling well. When reviewing his medication list, he admitted to occasionally forgetting to take his BP medication.    Medications & Allergy List reviewed.    Initial BP Reading in Right Arm - 156/88    Five minute interval prior the second set of readings.     Second Reading Right Arm - 142/84  Pulse - 78    Dr. Ruiz advised of the readings via telephone encounter.  Advised the patient that they will be contacted for any medication changes or if further appointments are required.

## 2020-07-23 NOTE — TELEPHONE ENCOUNTER
Please update chart to reflect the blood pressure reading is 126/67.  Patient took his blood pressure medications when he reached home and recorded as blood pressure.  Thank you.

## 2020-07-23 NOTE — TELEPHONE ENCOUNTER
Coordinator: Please assist in scheduling Bariatric appointment or route to appropriate team.  .....................    Staff: please record /67     ..........................................    General note: patient agrees to starting statin, side effects reviewed; no hx of brain bleed. He was given the number for colonoscopy again; strongly recommend he does the colonoscopy given positive FIT kit. /67; states that at his nurse only today he did not take medications but when he got home he took his medication and rechecked /67.

## 2020-07-31 ENCOUNTER — TELEPHONE (OUTPATIENT)
Dept: PRIMARY CARE CLINIC | Facility: CLINIC | Age: 65
End: 2020-07-31

## 2020-07-31 NOTE — TELEPHONE ENCOUNTER
----- Message from Karine Hannah MA sent at 7/23/2020  4:19 PM CDT -----    ----- Message -----  From: Misti Ruiz MD  Sent: 7/23/2020   8:11 AM CDT  To: Sara Chappell Staff    Letter sent. I will need an update on his blood pressure, if he has no assess to a cuff then a nurse only visit can be arranged or he will need to tell me his readings from his upcoming appointments (I will not remember to go back into the chart). If he had no history of brain bleeding, I STRONLY RECOMMEND that he starts a cholesterol pill to reduce future heart attacks/ strokes. Let me know if he is willing to start a cholesterol medication once daily at bedtime. He should get his colonoscopy scheduled since he had a positive FIT test.  I would like to see him again in 3 months. Please let patient know about the tests below:    Prostate test, within normal limits    No COVID-19 antibodies detected    Normal thyroid function    No diabetes as evidenced by normal fasting glucose.    Normal liver and kidney function testing    Your calculated risk of heart disease and stroke is high.    Cell count within normal limits    I STRONGLY URGE that you get a colonoscopy:  This will screen for colon cancer in prevent risk of morbidity and death from untreated conditions.

## 2020-07-31 NOTE — TELEPHONE ENCOUNTER
Results were reviewed with the pt, by Dr. Ruiz via telephone call on 07/23/2020. Pt agreed to statin therapy on the call & rx was sent.

## 2020-08-03 ENCOUNTER — PATIENT OUTREACH (OUTPATIENT)
Dept: ADMINISTRATIVE | Facility: OTHER | Age: 65
End: 2020-08-03

## 2020-08-05 ENCOUNTER — OFFICE VISIT (OUTPATIENT)
Dept: SURGERY | Facility: CLINIC | Age: 65
End: 2020-08-05
Payer: MEDICARE

## 2020-08-05 VITALS
SYSTOLIC BLOOD PRESSURE: 204 MMHG | OXYGEN SATURATION: 95 % | DIASTOLIC BLOOD PRESSURE: 97 MMHG | TEMPERATURE: 98 F | HEART RATE: 71 BPM

## 2020-08-05 DIAGNOSIS — L03.115 CELLULITIS OF RIGHT THIGH: Primary | ICD-10-CM

## 2020-08-05 PROCEDURE — 99213 OFFICE O/P EST LOW 20 MIN: CPT | Mod: PBBFAC | Performed by: SURGERY

## 2020-08-05 PROCEDURE — 99202 OFFICE O/P NEW SF 15 MIN: CPT | Mod: S$PBB,,, | Performed by: SURGERY

## 2020-08-05 PROCEDURE — 99999 PR PBB SHADOW E&M-EST. PATIENT-LVL III: CPT | Mod: PBBFAC,,, | Performed by: SURGERY

## 2020-08-05 PROCEDURE — 99999 PR PBB SHADOW E&M-EST. PATIENT-LVL III: ICD-10-PCS | Mod: PBBFAC,,, | Performed by: SURGERY

## 2020-08-05 PROCEDURE — 99202 PR OFFICE/OUTPT VISIT, NEW, LEVL II, 15-29 MIN: ICD-10-PCS | Mod: S$PBB,,, | Performed by: SURGERY

## 2020-08-05 RX ORDER — SULFAMETHOXAZOLE AND TRIMETHOPRIM 800; 160 MG/1; MG/1
1 TABLET ORAL 2 TIMES DAILY
Qty: 20 TABLET | Refills: 1 | Status: SHIPPED | OUTPATIENT
Start: 2020-08-05 | End: 2020-08-15

## 2020-08-05 NOTE — PROGRESS NOTES
64 yo male, morbidly obese with two week history of swollen right medial thigh    No fevers, chills, mild tenderness  No drainage    PE:  Mild erythema in redundant area of obese medial thigh    No flocculence,   Minimal tenderness      Imp :   Cellulitis vs abcess    Will attempt course of oral antibiotics and follow up in one week

## 2020-08-05 NOTE — LETTER
August 5, 2020      Misti Ruiz MD  [address]  [facility] [address]           [facility]  [address]  [place] [address]  Phone: [phone]          Patient: Edd Wills   MR Number: [id number]   YOB: 1955   Date of Visit: 8/5/2020       Dear Dr. Misti Ruiz:    Thank you for referring Edd Wills to me for evaluation. Attached you will find relevant portions of my assessment and plan of care.    If you have questions, please do not hesitate to call me. I look forward to following Edd Wills along with you.    Sincerely,    José Manuel Bruner MD    Enclosure  CC:  No Recipients    If you would like to receive this communication electronically, please contact [email or web address] or [phone] to request more information on [employer] Link access.    For providers and/or their staff who would like to refer a patient to [facility], please contact us through our one-stop-shop provider referral line, [facility] , at [phone].    If you feel you have received this communication in error or would no longer like to receive these types of communications, please e-mail [email or web address]

## 2020-08-10 ENCOUNTER — TELEPHONE (OUTPATIENT)
Dept: SPORTS MEDICINE | Facility: CLINIC | Age: 65
End: 2020-08-10

## 2020-08-10 NOTE — TELEPHONE ENCOUNTER
LVM to let patient know that his appointment has been cancel per Jurgen due to his last visit with his PCP. He can reschedule his apt this Wednesday or 08/24/20. The injection may trig to his health.      ----- Message from Ren North sent at 8/10/2020  9:12 AM CDT -----  Pt is requesting a call back to see why his appt was cancelled, please call back.     Contact Info [phone]

## 2020-08-10 NOTE — TELEPHONE ENCOUNTER
Contacted patient to discuss possible complication with injections in his knees today due to recent cellulitis/abscess infeciton in right thigh.  Patient seen by pcp on 8/5/2020.  Rx bactrim, he did not pick this up until Friday due to transportation issues.  He has 2 days of abx so far.  I advised that giving injections at this time may be a risk due to recent infection.  I offered to have him come in to see me for evaluation to see if the injections would be an issue based on infection site.  He states he had cancelled his transportation to [facility] already.  I discussed with him that I will see about possibly coming back to this location at an earlier time if possible to give him the injections.  If not, I will see him on 8/24 for his final synvisc injection.  Patient happy with phone call and verbalized understanding.    Naveed Kuhn PA-C

## 2020-08-10 NOTE — TELEPHONE ENCOUNTER
LVM to let patient know that his appointment has been cancel due to his visit last week with his PCP and his injection for today might link trig to his health.

## 2020-08-10 NOTE — TELEPHONE ENCOUNTER
LVM to let patient know that I got his message, his appointment is schedule for 08/24/20 at 10 am for his injection of synvisc #3 , ok per Jurgen and Tatum. Jurgen didn't want to give injection due to Edd appointment with his PCP and was giving oral antibiotic and didn't want to link injection to his health.    ----- Message from Lety Dill sent at 8/10/2020  9:29 AM CDT -----  Pt rescheduled appt to 8/24, but he is asking or a call back.    Contact info [phone]

## 2020-08-12 ENCOUNTER — OFFICE VISIT (OUTPATIENT)
Dept: SURGERY | Facility: CLINIC | Age: 65
End: 2020-08-12
Payer: MEDICARE

## 2020-08-12 VITALS
HEART RATE: 77 BPM | DIASTOLIC BLOOD PRESSURE: 86 MMHG | TEMPERATURE: 99 F | OXYGEN SATURATION: 95 % | SYSTOLIC BLOOD PRESSURE: 189 MMHG

## 2020-08-12 DIAGNOSIS — M79.89 RIGHT LEG SWELLING: Primary | ICD-10-CM

## 2020-08-12 PROCEDURE — 99213 PR OFFICE/OUTPT VISIT, EST, LEVL III, 20-29 MIN: ICD-10-PCS | Mod: S$PBB,,, | Performed by: SURGERY

## 2020-08-12 PROCEDURE — 99213 OFFICE O/P EST LOW 20 MIN: CPT | Mod: S$PBB,,, | Performed by: SURGERY

## 2020-08-12 PROCEDURE — 99999 PR PBB SHADOW E&M-EST. PATIENT-LVL III: CPT | Mod: PBBFAC,,, | Performed by: SURGERY

## 2020-08-12 PROCEDURE — 99999 PR PBB SHADOW E&M-EST. PATIENT-LVL III: ICD-10-PCS | Mod: PBBFAC,,, | Performed by: SURGERY

## 2020-08-12 PROCEDURE — 99213 OFFICE O/P EST LOW 20 MIN: CPT | Mod: PBBFAC | Performed by: SURGERY
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
## 2020-08-14 ENCOUNTER — HOSPITAL ENCOUNTER (OUTPATIENT)
Dept: RADIOLOGY | Facility: HOSPITAL | Age: 65
Discharge: HOME | End: 2020-08-14
Attending: STUDENT IN AN ORGANIZED HEALTH CARE EDUCATION/TRAINING PROGRAM
Payer: MEDICARE

## 2020-08-14 DIAGNOSIS — M79.89 RIGHT LEG SWELLING: ICD-10-CM

## 2020-08-14 PROCEDURE — 76882 US LMTD JT/FCL EVL NVASC XTR: CPT | Mod: TC,RT

## 2020-08-14 PROCEDURE — 76882 US LMTD JT/FCL EVL NVASC XTR: CPT | Mod: 26,RT,, | Performed by: RADIOLOGY

## 2020-08-14 PROCEDURE — 76882 US SOFT TISSUE, LOWER EXTREMITY, RIGHT: ICD-10-PCS | Mod: 26,RT,, | Performed by: RADIOLOGY

## 2020-08-19 ENCOUNTER — TELEPHONE (OUTPATIENT)
Dept: SURGERY | Facility: CLINIC | Age: 65
End: 2020-08-19

## 2020-08-19 NOTE — TELEPHONE ENCOUNTER
----- Message from Lety Dill sent at 8/19/2020 11:54 AM CDT -----  Pt is calling to cancel appt for today at 2pm and he wants to reschedule asking for a call back.      Contact [facility] [phone]      Called Mr. Wills canceled appointment for 8/19/20 and rescheduled for 8/26/20.

## 2020-09-02 ENCOUNTER — OFFICE VISIT (OUTPATIENT)
Dept: SURGERY | Facility: CLINIC | Age: 65
End: 2020-09-02
Payer: MEDICARE

## 2020-09-02 ENCOUNTER — TELEPHONE (OUTPATIENT)
Dept: SPORTS MEDICINE | Facility: CLINIC | Age: 65
End: 2020-09-02

## 2020-09-02 VITALS
SYSTOLIC BLOOD PRESSURE: 211 MMHG | DIASTOLIC BLOOD PRESSURE: 103 MMHG | TEMPERATURE: 98 F | OXYGEN SATURATION: 96 % | HEART RATE: 79 BPM

## 2020-09-02 DIAGNOSIS — L03.116 CELLULITIS OF LEFT THIGH: Primary | ICD-10-CM

## 2020-09-02 PROCEDURE — 99213 OFFICE O/P EST LOW 20 MIN: CPT | Mod: S$PBB,,, | Performed by: SURGERY

## 2020-09-02 PROCEDURE — 99999 PR PBB SHADOW E&M-EST. PATIENT-LVL II: CPT | Mod: PBBFAC,,, | Performed by: SURGERY

## 2020-09-02 PROCEDURE — 99999 PR PBB SHADOW E&M-EST. PATIENT-LVL II: ICD-10-PCS | Mod: PBBFAC,,, | Performed by: SURGERY

## 2020-09-02 PROCEDURE — 99213 PR OFFICE/OUTPT VISIT, EST, LEVL III, 20-29 MIN: ICD-10-PCS | Mod: S$PBB,,, | Performed by: SURGERY

## 2020-09-02 PROCEDURE — 99212 OFFICE O/P EST SF 10 MIN: CPT | Mod: PBBFAC | Performed by: SURGERY

## 2020-09-02 NOTE — TELEPHONE ENCOUNTER
Spoke with patient Edd and schedule him for next week on 09/09/20 at 10:30am.      ----- Message from Marnie Mcclelland sent at 9/2/2020 12:38 PM CDT -----  Contact: pt  Please call pt at [phone]    Patient is requesting an injection appt     Thank you
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
## 2020-09-04 ENCOUNTER — PATIENT OUTREACH (OUTPATIENT)
Dept: ADMINISTRATIVE | Facility: OTHER | Age: 65
End: 2020-09-04

## 2020-09-04 NOTE — PROGRESS NOTES
Care Everywhere: updated  Immunization: updated  Health Maintenance: updated  Media Review: review for outside colon cancer report  Legacy Review:   Order placed:   Upcoming appts:  Colonoscopy case request 7/8/2020

## 2020-09-09 ENCOUNTER — OFFICE VISIT (OUTPATIENT)
Dept: SPORTS MEDICINE | Facility: CLINIC | Age: 65
End: 2020-09-09
Payer: MEDICARE

## 2020-09-09 VITALS
SYSTOLIC BLOOD PRESSURE: 180 MMHG | HEIGHT: 70 IN | WEIGHT: 315 LBS | HEART RATE: 74 BPM | BODY MASS INDEX: 45.1 KG/M2 | DIASTOLIC BLOOD PRESSURE: 82 MMHG

## 2020-09-09 DIAGNOSIS — M17.0 PRIMARY OSTEOARTHRITIS OF BOTH KNEES: Primary | ICD-10-CM

## 2020-09-09 DIAGNOSIS — E66.01 CLASS 3 SEVERE OBESITY DUE TO EXCESS CALORIES WITH BODY MASS INDEX (BMI) OF 50.0 TO 59.9 IN ADULT, UNSPECIFIED WHETHER SERIOUS COMORBIDITY PRESENT: ICD-10-CM

## 2020-09-09 PROCEDURE — 20610 PR DRAIN/INJECT LARGE JOINT/BURSA: ICD-10-PCS | Mod: 50,S$PBB,, | Performed by: PHYSICIAN ASSISTANT

## 2020-09-09 PROCEDURE — 20610 DRAIN/INJ JOINT/BURSA W/O US: CPT | Mod: 50,PBBFAC,PN | Performed by: PHYSICIAN ASSISTANT

## 2020-09-09 PROCEDURE — 99214 OFFICE O/P EST MOD 30 MIN: CPT | Mod: PBBFAC,PN,25 | Performed by: PHYSICIAN ASSISTANT

## 2020-09-09 PROCEDURE — 99999 PR PBB SHADOW E&M-EST. PATIENT-LVL IV: ICD-10-PCS | Mod: PBBFAC,,, | Performed by: PHYSICIAN ASSISTANT

## 2020-09-09 PROCEDURE — 99499 NO LOS: ICD-10-PCS | Mod: S$PBB,,, | Performed by: PHYSICIAN ASSISTANT

## 2020-09-09 PROCEDURE — 99499 UNLISTED E&M SERVICE: CPT | Mod: S$PBB,,, | Performed by: PHYSICIAN ASSISTANT

## 2020-09-09 PROCEDURE — 99999 PR PBB SHADOW E&M-EST. PATIENT-LVL IV: CPT | Mod: PBBFAC,,, | Performed by: PHYSICIAN ASSISTANT

## 2020-09-09 PROCEDURE — 20610 DRAIN/INJ JOINT/BURSA W/O US: CPT | Mod: 50,S$PBB,, | Performed by: PHYSICIAN ASSISTANT

## 2020-09-09 RX ADMIN — Medication 32 MG: at 10:09

## 2020-09-09 NOTE — LETTER
September 9, 2020      Tatum Hernandez, TALYA  [address]  [facility] [address]           [facility]  [address]  [facility] [address]  Phone: [phone]  Fax: [phone]          Patient: Edd Wills   MR Number: [id number]   YOB: 1955   Date of Visit: 9/9/2020       Dear Tatum Hernandez:    Thank you for referring dEd Wills to me for evaluation. Attached you will find relevant portions of my assessment and plan of care.    If you have questions, please do not hesitate to call me. I look forward to following Edd Wills along with you.    Sincerely,    Naveed Kuhn PA-C    Enclosure  CC:  No Recipients    If you would like to receive this communication electronically, please contact [email or web address] or [phone] to request more information on [employer] Link access.    For providers and/or their staff who would like to refer a patient to [facility], please contact us through our one-stop-shop provider referral line, [facility], at [phone].    If you feel you have received this communication in error or would no longer like to receive these types of communications, please e-mail [email or web address]

## 2020-09-09 NOTE — PROGRESS NOTES
Edd Wills is a 65 y.o. year old, his is here today for his 3rd Synvisc injection for degenerative changes of his bilateral knee. he was last seen and treated in the clinic on Visit date not found. There has been no significant change in his medical status since his last visit. No Fever, chills, malaise, or unexplained weight change.      Allergies, Medications, past medical and surgical history were reviewed.    Examination of the knee demonstrates no evidence of edema, erythema, or echymosis. Strength and range of motion are unchanged from previous visit.    The injection site was identified and the skin was prepared with a chloraprep. The  bilateral knee  joint was injected with 2 ml of Synvisc solution under sterile technique. Edd Wills tolerated the procedure well, he was advised to rest the knee today, ice and elevation. It may take 3-6 weeks following the last injection to get the full benefit of the medication.  I will see him back in 3 months. Sooner if he has any problems or concerns.

## 2020-12-07 ENCOUNTER — TELEPHONE (OUTPATIENT)
Dept: ADMINISTRATIVE | Facility: HOSPITAL | Age: 65
End: 2020-12-07

## 2021-05-15 ENCOUNTER — PATIENT OUTREACH (OUTPATIENT)
Dept: ADMINISTRATIVE | Facility: OTHER | Age: 66
End: 2021-05-15

## 2021-05-17 ENCOUNTER — OFFICE VISIT (OUTPATIENT)
Dept: SPORTS MEDICINE | Facility: CLINIC | Age: 66
End: 2021-05-17
Payer: MEDICARE

## 2021-05-17 VITALS
HEIGHT: 70 IN | WEIGHT: 315 LBS | BODY MASS INDEX: 45.1 KG/M2 | SYSTOLIC BLOOD PRESSURE: 198 MMHG | HEART RATE: 92 BPM | DIASTOLIC BLOOD PRESSURE: 89 MMHG

## 2021-05-17 DIAGNOSIS — M17.0 PRIMARY OSTEOARTHRITIS OF BOTH KNEES: Primary | ICD-10-CM

## 2021-05-17 DIAGNOSIS — M25.562 PAIN IN BOTH KNEES, UNSPECIFIED CHRONICITY: ICD-10-CM

## 2021-05-17 DIAGNOSIS — M25.561 PAIN IN BOTH KNEES, UNSPECIFIED CHRONICITY: ICD-10-CM

## 2021-05-17 PROCEDURE — 20610 DRAIN/INJ JOINT/BURSA W/O US: CPT | Mod: 50,PBBFAC,PN | Performed by: PHYSICIAN ASSISTANT

## 2021-05-17 PROCEDURE — 99999 PR PBB SHADOW E&M-EST. PATIENT-LVL III: ICD-10-PCS | Mod: PBBFAC,,, | Performed by: PHYSICIAN ASSISTANT

## 2021-05-17 PROCEDURE — 99999 PR PBB SHADOW E&M-EST. PATIENT-LVL III: CPT | Mod: PBBFAC,,, | Performed by: PHYSICIAN ASSISTANT

## 2021-05-17 PROCEDURE — 20610 DRAIN/INJ JOINT/BURSA W/O US: CPT | Mod: 50,S$PBB,, | Performed by: PHYSICIAN ASSISTANT

## 2021-05-17 PROCEDURE — 20610 PR DRAIN/INJECT LARGE JOINT/BURSA: ICD-10-PCS | Mod: 50,S$PBB,, | Performed by: PHYSICIAN ASSISTANT

## 2021-05-17 PROCEDURE — 99213 OFFICE O/P EST LOW 20 MIN: CPT | Mod: PBBFAC,PN,25 | Performed by: PHYSICIAN ASSISTANT

## 2021-05-17 PROCEDURE — 99213 PR OFFICE/OUTPT VISIT, EST, LEVL III, 20-29 MIN: ICD-10-PCS | Mod: S$PBB,25,, | Performed by: PHYSICIAN ASSISTANT

## 2021-05-17 PROCEDURE — 99213 OFFICE O/P EST LOW 20 MIN: CPT | Mod: S$PBB,25,, | Performed by: PHYSICIAN ASSISTANT

## 2021-05-17 RX ORDER — TRIAMCINOLONE ACETONIDE 40 MG/ML
40 INJECTION, SUSPENSION INTRA-ARTICULAR; INTRAMUSCULAR
Status: COMPLETED | OUTPATIENT
Start: 2021-05-17 | End: 2021-05-17

## 2021-05-17 RX ADMIN — TRIAMCINOLONE ACETONIDE 40 MG: 40 INJECTION, SUSPENSION INTRA-ARTICULAR; INTRAMUSCULAR at 12:05

## 2021-05-26 ENCOUNTER — TELEPHONE (OUTPATIENT)
Dept: SPORTS MEDICINE | Facility: CLINIC | Age: 66
End: 2021-05-26

## 2021-05-27 ENCOUNTER — TELEPHONE (OUTPATIENT)
Dept: SPORTS MEDICINE | Facility: CLINIC | Age: 66
End: 2021-05-27

## 2021-05-27 ENCOUNTER — OFFICE VISIT (OUTPATIENT)
Dept: PRIMARY CARE CLINIC | Facility: CLINIC | Age: 66
End: 2021-05-27
Payer: MEDICARE

## 2021-05-27 ENCOUNTER — LAB VISIT (OUTPATIENT)
Dept: LAB | Facility: HOSPITAL | Age: 66
End: 2021-05-27
Attending: FAMILY MEDICINE
Payer: MEDICARE

## 2021-05-27 VITALS
OXYGEN SATURATION: 96 % | HEIGHT: 70 IN | TEMPERATURE: 99 F | BODY MASS INDEX: 45.1 KG/M2 | DIASTOLIC BLOOD PRESSURE: 124 MMHG | SYSTOLIC BLOOD PRESSURE: 200 MMHG | WEIGHT: 315 LBS | HEART RATE: 74 BPM

## 2021-05-27 DIAGNOSIS — Z86.39 PERSONAL HISTORY OF OTHER ENDOCRINE, METABOLIC, AND IMMUNITY DISORDERS: ICD-10-CM

## 2021-05-27 DIAGNOSIS — I87.2 VENOUS INSUFFICIENCY: ICD-10-CM

## 2021-05-27 DIAGNOSIS — Z86.79 HISTORY OF AORTIC DISSECTION: ICD-10-CM

## 2021-05-27 DIAGNOSIS — R19.5 POSITIVE FIT (FECAL IMMUNOCHEMICAL TEST): ICD-10-CM

## 2021-05-27 DIAGNOSIS — R26.81 GAIT INSTABILITY: ICD-10-CM

## 2021-05-27 DIAGNOSIS — L03.115 CELLULITIS OF RIGHT LOWER EXTREMITY: ICD-10-CM

## 2021-05-27 DIAGNOSIS — Z86.2 PERSONAL HISTORY OF OTHER ENDOCRINE, METABOLIC, AND IMMUNITY DISORDERS: ICD-10-CM

## 2021-05-27 DIAGNOSIS — I10 ESSENTIAL HYPERTENSION: Primary | ICD-10-CM

## 2021-05-27 DIAGNOSIS — M17.0 PRIMARY OSTEOARTHRITIS OF BOTH KNEES: ICD-10-CM

## 2021-05-27 DIAGNOSIS — I10 ESSENTIAL HYPERTENSION: ICD-10-CM

## 2021-05-27 DIAGNOSIS — E66.01 CLASS 3 SEVERE OBESITY DUE TO EXCESS CALORIES WITH SERIOUS COMORBIDITY AND BODY MASS INDEX (BMI) OF 60.0 TO 69.9 IN ADULT: ICD-10-CM

## 2021-05-27 DIAGNOSIS — E78.2 MIXED HYPERLIPIDEMIA: ICD-10-CM

## 2021-05-27 DIAGNOSIS — G47.33 OSA (OBSTRUCTIVE SLEEP APNEA): ICD-10-CM

## 2021-05-27 DIAGNOSIS — R22.41 LUMP OF RIGHT THIGH: ICD-10-CM

## 2021-05-27 DIAGNOSIS — Z76.89 ENCOUNTER FOR WEIGHT MANAGEMENT: ICD-10-CM

## 2021-05-27 DIAGNOSIS — K59.04 CHRONIC IDIOPATHIC CONSTIPATION: ICD-10-CM

## 2021-05-27 DIAGNOSIS — G95.9 CERVICAL MYELOPATHY: ICD-10-CM

## 2021-05-27 PROCEDURE — 99215 OFFICE O/P EST HI 40 MIN: CPT | Mod: S$PBB,,, | Performed by: FAMILY MEDICINE

## 2021-05-27 PROCEDURE — 99215 OFFICE O/P EST HI 40 MIN: CPT | Mod: PBBFAC,PN | Performed by: FAMILY MEDICINE

## 2021-05-27 PROCEDURE — 83036 HEMOGLOBIN GLYCOSYLATED A1C: CPT | Performed by: FAMILY MEDICINE

## 2021-05-27 PROCEDURE — 36415 COLL VENOUS BLD VENIPUNCTURE: CPT | Mod: PN | Performed by: FAMILY MEDICINE

## 2021-05-27 PROCEDURE — 99215 PR OFFICE/OUTPT VISIT, EST, LEVL V, 40-54 MIN: ICD-10-PCS | Mod: S$PBB,,, | Performed by: FAMILY MEDICINE

## 2021-05-27 PROCEDURE — 84443 ASSAY THYROID STIM HORMONE: CPT | Performed by: FAMILY MEDICINE

## 2021-05-27 PROCEDURE — 82043 UR ALBUMIN QUANTITATIVE: CPT | Performed by: FAMILY MEDICINE

## 2021-05-27 PROCEDURE — 85025 COMPLETE CBC W/AUTO DIFF WBC: CPT | Performed by: FAMILY MEDICINE

## 2021-05-27 PROCEDURE — 99999 PR PBB SHADOW E&M-EST. PATIENT-LVL V: ICD-10-PCS | Mod: PBBFAC,,, | Performed by: FAMILY MEDICINE

## 2021-05-27 PROCEDURE — 99999 PR PBB SHADOW E&M-EST. PATIENT-LVL V: CPT | Mod: PBBFAC,,, | Performed by: FAMILY MEDICINE

## 2021-05-27 PROCEDURE — 80053 COMPREHEN METABOLIC PANEL: CPT | Performed by: FAMILY MEDICINE

## 2021-05-27 PROCEDURE — 82570 ASSAY OF URINE CREATININE: CPT | Performed by: FAMILY MEDICINE

## 2021-05-27 RX ORDER — CARVEDILOL 25 MG/1
25 TABLET ORAL 2 TIMES DAILY
Qty: 180 TABLET | Refills: 3 | Status: SHIPPED | OUTPATIENT
Start: 2021-05-27 | End: 2021-12-16

## 2021-05-27 RX ORDER — SULFAMETHOXAZOLE AND TRIMETHOPRIM 800; 160 MG/1; MG/1
1 TABLET ORAL 2 TIMES DAILY
Qty: 20 TABLET | Refills: 0 | Status: SHIPPED | OUTPATIENT
Start: 2021-05-27 | End: 2021-06-06

## 2021-05-27 RX ORDER — POLYETHYLENE GLYCOL 3350 17 G/17G
POWDER, FOR SOLUTION ORAL
COMMUNITY
End: 2021-10-19

## 2021-05-27 RX ORDER — FUROSEMIDE 20 MG/1
20 TABLET ORAL DAILY PRN
Qty: 90 TABLET | Refills: 3 | Status: SHIPPED | OUTPATIENT
Start: 2021-05-27 | End: 2021-12-16

## 2021-05-27 RX ORDER — DEXTROMETHORPHAN HYDROBROMIDE, GUAIFENESIN 5; 100 MG/5ML; MG/5ML
650 LIQUID ORAL EVERY 8 HOURS
COMMUNITY
End: 2021-10-19

## 2021-05-27 RX ORDER — LISINOPRIL 40 MG/1
40 TABLET ORAL DAILY
Qty: 90 TABLET | Refills: 3 | Status: SHIPPED | OUTPATIENT
Start: 2021-05-27 | End: 2022-12-22 | Stop reason: SDUPTHER

## 2021-05-27 RX ORDER — AMLODIPINE BESYLATE 10 MG/1
TABLET ORAL
Qty: 90 TABLET | Refills: 3 | Status: SHIPPED | OUTPATIENT
Start: 2021-05-27 | End: 2022-05-31

## 2021-05-27 RX ORDER — ATORVASTATIN CALCIUM 20 MG/1
20 TABLET, FILM COATED ORAL DAILY
Qty: 90 TABLET | Refills: 3 | Status: SHIPPED | OUTPATIENT
Start: 2021-05-27 | End: 2021-10-19 | Stop reason: SDUPTHER

## 2021-05-28 ENCOUNTER — TELEPHONE (OUTPATIENT)
Dept: PRIMARY CARE CLINIC | Facility: CLINIC | Age: 66
End: 2021-05-28

## 2021-05-28 LAB
ALBUMIN SERPL BCP-MCNC: 3.8 G/DL (ref 3.5–5.2)
ALBUMIN/CREAT UR: 22 UG/MG (ref 0–30)
ALP SERPL-CCNC: 84 U/L (ref 55–135)
ALT SERPL W/O P-5'-P-CCNC: 15 U/L (ref 10–44)
ANION GAP SERPL CALC-SCNC: 13 MMOL/L (ref 8–16)
AST SERPL-CCNC: 19 U/L (ref 10–40)
BASOPHILS # BLD AUTO: 0.04 K/UL (ref 0–0.2)
BASOPHILS NFR BLD: 0.6 % (ref 0–1.9)
BILIRUB SERPL-MCNC: 0.4 MG/DL (ref 0.1–1)
BUN SERPL-MCNC: 16 MG/DL (ref 8–23)
CALCIUM SERPL-MCNC: 9.8 MG/DL (ref 8.7–10.5)
CHLORIDE SERPL-SCNC: 105 MMOL/L (ref 95–110)
CO2 SERPL-SCNC: 22 MMOL/L (ref 23–29)
CREAT SERPL-MCNC: 0.9 MG/DL (ref 0.5–1.4)
CREAT UR-MCNC: 127 MG/DL (ref 23–375)
DIFFERENTIAL METHOD: ABNORMAL
EOSINOPHIL # BLD AUTO: 0.1 K/UL (ref 0–0.5)
EOSINOPHIL NFR BLD: 1 % (ref 0–8)
ERYTHROCYTE [DISTWIDTH] IN BLOOD BY AUTOMATED COUNT: 15.3 % (ref 11.5–14.5)
EST. GFR  (AFRICAN AMERICAN): >60 ML/MIN/1.73 M^2
EST. GFR  (NON AFRICAN AMERICAN): >60 ML/MIN/1.73 M^2
ESTIMATED AVG GLUCOSE: 108 MG/DL (ref 68–131)
GLUCOSE SERPL-MCNC: 79 MG/DL (ref 70–110)
HBA1C MFR BLD: 5.4 % (ref 4–5.6)
HCT VFR BLD AUTO: 46.3 % (ref 40–54)
HGB BLD-MCNC: 14.1 G/DL (ref 14–18)
IMM GRANULOCYTES # BLD AUTO: 0.02 K/UL (ref 0–0.04)
IMM GRANULOCYTES NFR BLD AUTO: 0.3 % (ref 0–0.5)
LYMPHOCYTES # BLD AUTO: 2.1 K/UL (ref 1–4.8)
LYMPHOCYTES NFR BLD: 30 % (ref 18–48)
MCH RBC QN AUTO: 26 PG (ref 27–31)
MCHC RBC AUTO-ENTMCNC: 30.5 G/DL (ref 32–36)
MCV RBC AUTO: 85 FL (ref 82–98)
MICROALBUMIN UR DL<=1MG/L-MCNC: 28 UG/ML
MONOCYTES # BLD AUTO: 0.9 K/UL (ref 0.3–1)
MONOCYTES NFR BLD: 13.1 % (ref 4–15)
NEUTROPHILS # BLD AUTO: 3.9 K/UL (ref 1.8–7.7)
NEUTROPHILS NFR BLD: 55 % (ref 38–73)
NRBC BLD-RTO: 0 /100 WBC
PLATELET # BLD AUTO: 276 K/UL (ref 150–450)
PMV BLD AUTO: 12 FL (ref 9.2–12.9)
POTASSIUM SERPL-SCNC: 4.6 MMOL/L (ref 3.5–5.1)
PROT SERPL-MCNC: 8.1 G/DL (ref 6–8.4)
RBC # BLD AUTO: 5.42 M/UL (ref 4.6–6.2)
SODIUM SERPL-SCNC: 140 MMOL/L (ref 136–145)
TSH SERPL DL<=0.005 MIU/L-ACNC: 1.76 UIU/ML (ref 0.4–4)
WBC # BLD AUTO: 7.11 K/UL (ref 3.9–12.7)

## 2021-06-07 ENCOUNTER — TELEPHONE (OUTPATIENT)
Dept: SPORTS MEDICINE | Facility: CLINIC | Age: 66
End: 2021-06-07

## 2021-06-14 ENCOUNTER — TELEPHONE (OUTPATIENT)
Dept: SPORTS MEDICINE | Facility: CLINIC | Age: 66
End: 2021-06-14

## 2021-06-18 ENCOUNTER — PATIENT OUTREACH (OUTPATIENT)
Dept: ADMINISTRATIVE | Facility: OTHER | Age: 66
End: 2021-06-18

## 2021-06-23 ENCOUNTER — TELEPHONE (OUTPATIENT)
Dept: PRIMARY CARE CLINIC | Facility: CLINIC | Age: 66
End: 2021-06-23

## 2021-06-24 ENCOUNTER — TELEPHONE (OUTPATIENT)
Dept: SPORTS MEDICINE | Facility: CLINIC | Age: 66
End: 2021-06-24

## 2021-06-29 ENCOUNTER — TELEPHONE (OUTPATIENT)
Dept: INTERNAL MEDICINE | Facility: CLINIC | Age: 66
End: 2021-06-29

## 2021-07-22 ENCOUNTER — TELEPHONE (OUTPATIENT)
Dept: SPORTS MEDICINE | Facility: CLINIC | Age: 66
End: 2021-07-22

## 2021-07-22 DIAGNOSIS — M25.561 PAIN IN BOTH KNEES, UNSPECIFIED CHRONICITY: Primary | ICD-10-CM

## 2021-07-22 DIAGNOSIS — M25.562 PAIN IN BOTH KNEES, UNSPECIFIED CHRONICITY: Primary | ICD-10-CM

## 2021-07-29 ENCOUNTER — PATIENT OUTREACH (OUTPATIENT)
Dept: ADMINISTRATIVE | Facility: OTHER | Age: 66
End: 2021-07-29

## 2021-08-02 ENCOUNTER — OFFICE VISIT (OUTPATIENT)
Dept: SPORTS MEDICINE | Facility: CLINIC | Age: 66
End: 2021-08-02
Payer: MEDICARE

## 2021-08-02 ENCOUNTER — HOSPITAL ENCOUNTER (OUTPATIENT)
Dept: RADIOLOGY | Facility: HOSPITAL | Age: 66
Discharge: HOME | End: 2021-08-02
Attending: PHYSICIAN ASSISTANT
Payer: MEDICARE

## 2021-08-02 VITALS
BODY MASS INDEX: 45.1 KG/M2 | HEIGHT: 70 IN | DIASTOLIC BLOOD PRESSURE: 84 MMHG | WEIGHT: 315 LBS | SYSTOLIC BLOOD PRESSURE: 172 MMHG | HEART RATE: 76 BPM

## 2021-08-02 DIAGNOSIS — M25.561 PAIN IN BOTH KNEES, UNSPECIFIED CHRONICITY: ICD-10-CM

## 2021-08-02 DIAGNOSIS — M17.0 PRIMARY OSTEOARTHRITIS OF BOTH KNEES: Primary | ICD-10-CM

## 2021-08-02 DIAGNOSIS — M25.562 PAIN IN BOTH KNEES, UNSPECIFIED CHRONICITY: ICD-10-CM

## 2021-08-02 PROCEDURE — 99213 OFFICE O/P EST LOW 20 MIN: CPT | Mod: PBBFAC,PN,25 | Performed by: PHYSICIAN ASSISTANT

## 2021-08-02 PROCEDURE — 99999 PR PBB SHADOW E&M-EST. PATIENT-LVL III: CPT | Mod: PBBFAC,,, | Performed by: PHYSICIAN ASSISTANT

## 2021-08-02 PROCEDURE — 20610 DRAIN/INJ JOINT/BURSA W/O US: CPT | Mod: 50,PBBFAC,PN | Performed by: PHYSICIAN ASSISTANT

## 2021-08-02 PROCEDURE — 73565 XR KNEE AP STANDING BILATERAL: ICD-10-PCS | Mod: 26,,, | Performed by: RADIOLOGY

## 2021-08-02 PROCEDURE — 20610 PR DRAIN/INJECT LARGE JOINT/BURSA: ICD-10-PCS | Mod: 50,S$PBB,, | Performed by: PHYSICIAN ASSISTANT

## 2021-08-02 PROCEDURE — 99499 NO LOS: ICD-10-PCS | Mod: S$PBB,,, | Performed by: PHYSICIAN ASSISTANT

## 2021-08-02 PROCEDURE — 73565 X-RAY EXAM OF KNEES: CPT | Mod: 26,,, | Performed by: RADIOLOGY

## 2021-08-02 PROCEDURE — 99999 PR PBB SHADOW E&M-EST. PATIENT-LVL III: ICD-10-PCS | Mod: PBBFAC,,, | Performed by: PHYSICIAN ASSISTANT

## 2021-08-02 PROCEDURE — 73565 X-RAY EXAM OF KNEES: CPT | Mod: TC,PN

## 2021-08-02 PROCEDURE — 99499 UNLISTED E&M SERVICE: CPT | Mod: S$PBB,,, | Performed by: PHYSICIAN ASSISTANT

## 2021-08-02 PROCEDURE — 20610 DRAIN/INJ JOINT/BURSA W/O US: CPT | Mod: 50,S$PBB,, | Performed by: PHYSICIAN ASSISTANT

## 2021-08-02 RX ADMIN — Medication 16 MG: at 11:08

## 2021-08-09 ENCOUNTER — OFFICE VISIT (OUTPATIENT)
Dept: SPORTS MEDICINE | Facility: CLINIC | Age: 66
End: 2021-08-09
Payer: MEDICARE

## 2021-08-09 VITALS
TEMPERATURE: 99 F | HEIGHT: 70 IN | WEIGHT: 315 LBS | DIASTOLIC BLOOD PRESSURE: 79 MMHG | BODY MASS INDEX: 45.1 KG/M2 | HEART RATE: 76 BPM | SYSTOLIC BLOOD PRESSURE: 155 MMHG

## 2021-08-09 DIAGNOSIS — M17.12 PRIMARY OSTEOARTHRITIS OF LEFT KNEE: ICD-10-CM

## 2021-08-09 DIAGNOSIS — M17.11 PRIMARY OSTEOARTHRITIS OF RIGHT KNEE: Primary | ICD-10-CM

## 2021-08-09 PROCEDURE — 20610 PR DRAIN/INJECT LARGE JOINT/BURSA: ICD-10-PCS | Mod: 50,S$PBB,, | Performed by: PHYSICIAN ASSISTANT

## 2021-08-09 PROCEDURE — 20610 DRAIN/INJ JOINT/BURSA W/O US: CPT | Mod: 50,PBBFAC | Performed by: PHYSICIAN ASSISTANT

## 2021-08-09 PROCEDURE — 20610 DRAIN/INJ JOINT/BURSA W/O US: CPT | Mod: 50,S$PBB,, | Performed by: PHYSICIAN ASSISTANT

## 2021-08-09 PROCEDURE — 99213 OFFICE O/P EST LOW 20 MIN: CPT | Mod: PBBFAC | Performed by: PHYSICIAN ASSISTANT

## 2021-08-09 PROCEDURE — 99999 PR PBB SHADOW E&M-EST. PATIENT-LVL III: CPT | Mod: PBBFAC,,, | Performed by: PHYSICIAN ASSISTANT

## 2021-08-09 PROCEDURE — 99999 PR PBB SHADOW E&M-EST. PATIENT-LVL III: ICD-10-PCS | Mod: PBBFAC,,, | Performed by: PHYSICIAN ASSISTANT

## 2021-08-09 PROCEDURE — 99499 NO LOS: ICD-10-PCS | Mod: S$PBB,,, | Performed by: PHYSICIAN ASSISTANT

## 2021-08-09 PROCEDURE — 99499 UNLISTED E&M SERVICE: CPT | Mod: S$PBB,,, | Performed by: PHYSICIAN ASSISTANT

## 2021-08-09 RX ADMIN — Medication 16 MG: at 10:08

## 2021-08-16 ENCOUNTER — TELEPHONE (OUTPATIENT)
Dept: SPORTS MEDICINE | Facility: CLINIC | Age: 66
End: 2021-08-16

## 2021-08-20 ENCOUNTER — OFFICE VISIT (OUTPATIENT)
Dept: SPORTS MEDICINE | Facility: CLINIC | Age: 66
End: 2021-08-20
Payer: MEDICARE

## 2021-08-20 VITALS
HEIGHT: 72 IN | SYSTOLIC BLOOD PRESSURE: 181 MMHG | WEIGHT: 315 LBS | BODY MASS INDEX: 42.66 KG/M2 | DIASTOLIC BLOOD PRESSURE: 92 MMHG | HEART RATE: 73 BPM

## 2021-08-20 DIAGNOSIS — M17.12 PRIMARY OSTEOARTHRITIS OF LEFT KNEE: ICD-10-CM

## 2021-08-20 DIAGNOSIS — M17.11 PRIMARY OSTEOARTHRITIS OF RIGHT KNEE: Primary | ICD-10-CM

## 2021-08-20 PROCEDURE — 99499 NO LOS: ICD-10-PCS | Mod: S$PBB,,, | Performed by: PHYSICIAN ASSISTANT

## 2021-08-20 PROCEDURE — 99999 PR PBB SHADOW E&M-EST. PATIENT-LVL III: CPT | Mod: PBBFAC,,, | Performed by: PHYSICIAN ASSISTANT

## 2021-08-20 PROCEDURE — 20610 DRAIN/INJ JOINT/BURSA W/O US: CPT | Mod: 50,S$PBB,, | Performed by: PHYSICIAN ASSISTANT

## 2021-08-20 PROCEDURE — 20610 PR DRAIN/INJECT LARGE JOINT/BURSA: ICD-10-PCS | Mod: 50,S$PBB,, | Performed by: PHYSICIAN ASSISTANT

## 2021-08-20 PROCEDURE — 99213 OFFICE O/P EST LOW 20 MIN: CPT | Mod: PBBFAC | Performed by: PHYSICIAN ASSISTANT

## 2021-08-20 PROCEDURE — 20610 DRAIN/INJ JOINT/BURSA W/O US: CPT | Mod: 50,PBBFAC | Performed by: PHYSICIAN ASSISTANT

## 2021-08-20 PROCEDURE — 99499 UNLISTED E&M SERVICE: CPT | Mod: S$PBB,,, | Performed by: PHYSICIAN ASSISTANT

## 2021-08-20 PROCEDURE — 99999 PR PBB SHADOW E&M-EST. PATIENT-LVL III: ICD-10-PCS | Mod: PBBFAC,,, | Performed by: PHYSICIAN ASSISTANT

## 2021-08-20 RX ADMIN — Medication 16 MG: at 12:08

## 2021-10-19 ENCOUNTER — TELEPHONE (OUTPATIENT)
Dept: ADMINISTRATIVE | Facility: OTHER | Age: 66
End: 2021-10-19

## 2021-10-19 ENCOUNTER — OFFICE VISIT (OUTPATIENT)
Dept: INTERNAL MEDICINE | Facility: CLINIC | Age: 66
End: 2021-10-19
Payer: MEDICARE

## 2021-10-19 ENCOUNTER — LAB VISIT (OUTPATIENT)
Dept: LAB | Facility: OTHER | Age: 66
End: 2021-10-19
Attending: STUDENT IN AN ORGANIZED HEALTH CARE EDUCATION/TRAINING PROGRAM
Payer: MEDICARE

## 2021-10-19 ENCOUNTER — TELEPHONE (OUTPATIENT)
Dept: INTERNAL MEDICINE | Facility: CLINIC | Age: 66
End: 2021-10-19

## 2021-10-19 VITALS
HEIGHT: 72 IN | DIASTOLIC BLOOD PRESSURE: 90 MMHG | SYSTOLIC BLOOD PRESSURE: 142 MMHG | HEART RATE: 70 BPM | WEIGHT: 315 LBS | OXYGEN SATURATION: 96 % | BODY MASS INDEX: 42.66 KG/M2

## 2021-10-19 DIAGNOSIS — Z23 NEED FOR VACCINATION: ICD-10-CM

## 2021-10-19 DIAGNOSIS — Z12.5 SCREENING FOR PROSTATE CANCER: ICD-10-CM

## 2021-10-19 DIAGNOSIS — R73.01 IMPAIRED FASTING GLUCOSE: ICD-10-CM

## 2021-10-19 DIAGNOSIS — E66.01 MORBID OBESITY: ICD-10-CM

## 2021-10-19 DIAGNOSIS — Z00.00 HEALTH MAINTENANCE EXAMINATION: ICD-10-CM

## 2021-10-19 DIAGNOSIS — I35.8 AORTIC VALVE SCLEROSIS: ICD-10-CM

## 2021-10-19 DIAGNOSIS — E65 PANNICULUS: ICD-10-CM

## 2021-10-19 DIAGNOSIS — I10 PRIMARY HYPERTENSION: ICD-10-CM

## 2021-10-19 DIAGNOSIS — E78.2 MIXED HYPERLIPIDEMIA: ICD-10-CM

## 2021-10-19 DIAGNOSIS — I71.03 DISSECTION OF THORACOABDOMINAL AORTA: ICD-10-CM

## 2021-10-19 DIAGNOSIS — I10 PRIMARY HYPERTENSION: Primary | ICD-10-CM

## 2021-10-19 DIAGNOSIS — I87.8 VENOUS STASIS: ICD-10-CM

## 2021-10-19 DIAGNOSIS — G47.33 OSA (OBSTRUCTIVE SLEEP APNEA): ICD-10-CM

## 2021-10-19 DIAGNOSIS — Z12.11 SCREENING FOR COLORECTAL CANCER: ICD-10-CM

## 2021-10-19 DIAGNOSIS — Z12.12 SCREENING FOR COLORECTAL CANCER: ICD-10-CM

## 2021-10-19 LAB
ALBUMIN SERPL BCP-MCNC: 3.8 G/DL (ref 3.5–5.2)
ALP SERPL-CCNC: 85 U/L (ref 55–135)
ALT SERPL W/O P-5'-P-CCNC: 18 U/L (ref 10–44)
ANION GAP SERPL CALC-SCNC: 11 MMOL/L (ref 8–16)
AST SERPL-CCNC: 22 U/L (ref 10–40)
BASOPHILS # BLD AUTO: 0.04 K/UL (ref 0–0.2)
BASOPHILS NFR BLD: 0.6 % (ref 0–1.9)
BILIRUB SERPL-MCNC: 0.3 MG/DL (ref 0.1–1)
BUN SERPL-MCNC: 15 MG/DL (ref 8–23)
CALCIUM SERPL-MCNC: 9.4 MG/DL (ref 8.7–10.5)
CHLORIDE SERPL-SCNC: 104 MMOL/L (ref 95–110)
CHOLEST SERPL-MCNC: 148 MG/DL (ref 120–199)
CHOLEST/HDLC SERPL: 3.6 {RATIO} (ref 2–5)
CO2 SERPL-SCNC: 25 MMOL/L (ref 23–29)
CREAT SERPL-MCNC: 0.8 MG/DL (ref 0.5–1.4)
DIFFERENTIAL METHOD: ABNORMAL
EOSINOPHIL # BLD AUTO: 0.1 K/UL (ref 0–0.5)
EOSINOPHIL NFR BLD: 1.3 % (ref 0–8)
ERYTHROCYTE [DISTWIDTH] IN BLOOD BY AUTOMATED COUNT: 13.7 % (ref 11.5–14.5)
EST. GFR  (AFRICAN AMERICAN): >60 ML/MIN/1.73 M^2
EST. GFR  (NON AFRICAN AMERICAN): >60 ML/MIN/1.73 M^2
ESTIMATED AVG GLUCOSE: 103 MG/DL (ref 68–131)
GLUCOSE SERPL-MCNC: 102 MG/DL (ref 70–110)
HBA1C MFR BLD: 5.2 % (ref 4–5.6)
HCT VFR BLD AUTO: 44.9 % (ref 40–54)
HDLC SERPL-MCNC: 41 MG/DL (ref 40–75)
HDLC SERPL: 27.7 % (ref 20–50)
HGB BLD-MCNC: 14.3 G/DL (ref 14–18)
IMM GRANULOCYTES # BLD AUTO: 0.02 K/UL (ref 0–0.04)
IMM GRANULOCYTES NFR BLD AUTO: 0.3 % (ref 0–0.5)
LDLC SERPL CALC-MCNC: 94.6 MG/DL (ref 63–159)
LYMPHOCYTES # BLD AUTO: 2.1 K/UL (ref 1–4.8)
LYMPHOCYTES NFR BLD: 30.9 % (ref 18–48)
MCH RBC QN AUTO: 27.3 PG (ref 27–31)
MCHC RBC AUTO-ENTMCNC: 31.8 G/DL (ref 32–36)
MCV RBC AUTO: 86 FL (ref 82–98)
MONOCYTES # BLD AUTO: 0.9 K/UL (ref 0.3–1)
MONOCYTES NFR BLD: 12.6 % (ref 4–15)
NEUTROPHILS # BLD AUTO: 3.7 K/UL (ref 1.8–7.7)
NEUTROPHILS NFR BLD: 54.3 % (ref 38–73)
NONHDLC SERPL-MCNC: 107 MG/DL
NRBC BLD-RTO: 0 /100 WBC
PLATELET # BLD AUTO: 244 K/UL (ref 150–450)
PMV BLD AUTO: 10.6 FL (ref 9.2–12.9)
POTASSIUM SERPL-SCNC: 3.9 MMOL/L (ref 3.5–5.1)
PROT SERPL-MCNC: 8.3 G/DL (ref 6–8.4)
RBC # BLD AUTO: 5.23 M/UL (ref 4.6–6.2)
SODIUM SERPL-SCNC: 140 MMOL/L (ref 136–145)
TRIGL SERPL-MCNC: 62 MG/DL (ref 30–150)
TSH SERPL DL<=0.005 MIU/L-ACNC: 1.51 UIU/ML (ref 0.4–4)
WBC # BLD AUTO: 6.76 K/UL (ref 3.9–12.7)

## 2021-10-19 PROCEDURE — 99999 PR PBB SHADOW E&M-EST. PATIENT-LVL IV: CPT | Mod: PBBFAC,,, | Performed by: STUDENT IN AN ORGANIZED HEALTH CARE EDUCATION/TRAINING PROGRAM

## 2021-10-19 PROCEDURE — 99215 OFFICE O/P EST HI 40 MIN: CPT | Mod: S$PBB,,, | Performed by: STUDENT IN AN ORGANIZED HEALTH CARE EDUCATION/TRAINING PROGRAM

## 2021-10-19 PROCEDURE — 99999 PR PBB SHADOW E&M-EST. PATIENT-LVL IV: ICD-10-PCS | Mod: PBBFAC,,, | Performed by: STUDENT IN AN ORGANIZED HEALTH CARE EDUCATION/TRAINING PROGRAM

## 2021-10-19 PROCEDURE — 83036 HEMOGLOBIN GLYCOSYLATED A1C: CPT | Performed by: STUDENT IN AN ORGANIZED HEALTH CARE EDUCATION/TRAINING PROGRAM

## 2021-10-19 PROCEDURE — 84443 ASSAY THYROID STIM HORMONE: CPT | Performed by: STUDENT IN AN ORGANIZED HEALTH CARE EDUCATION/TRAINING PROGRAM

## 2021-10-19 PROCEDURE — 99215 PR OFFICE/OUTPT VISIT, EST, LEVL V, 40-54 MIN: ICD-10-PCS | Mod: S$PBB,,, | Performed by: STUDENT IN AN ORGANIZED HEALTH CARE EDUCATION/TRAINING PROGRAM

## 2021-10-19 PROCEDURE — 85025 COMPLETE CBC W/AUTO DIFF WBC: CPT | Performed by: STUDENT IN AN ORGANIZED HEALTH CARE EDUCATION/TRAINING PROGRAM

## 2021-10-19 PROCEDURE — 80053 COMPREHEN METABOLIC PANEL: CPT | Performed by: STUDENT IN AN ORGANIZED HEALTH CARE EDUCATION/TRAINING PROGRAM

## 2021-10-19 PROCEDURE — 99214 OFFICE O/P EST MOD 30 MIN: CPT | Mod: PBBFAC | Performed by: STUDENT IN AN ORGANIZED HEALTH CARE EDUCATION/TRAINING PROGRAM

## 2021-10-19 PROCEDURE — 80061 LIPID PANEL: CPT | Performed by: STUDENT IN AN ORGANIZED HEALTH CARE EDUCATION/TRAINING PROGRAM

## 2021-10-19 PROCEDURE — 36415 COLL VENOUS BLD VENIPUNCTURE: CPT | Performed by: STUDENT IN AN ORGANIZED HEALTH CARE EDUCATION/TRAINING PROGRAM

## 2021-10-19 RX ORDER — ATORVASTATIN CALCIUM 20 MG/1
20 TABLET, FILM COATED ORAL DAILY
Qty: 90 TABLET | Refills: 0 | Status: SHIPPED | OUTPATIENT
Start: 2021-10-19 | End: 2021-12-16

## 2021-10-21 ENCOUNTER — TELEPHONE (OUTPATIENT)
Dept: INTERNAL MEDICINE | Facility: CLINIC | Age: 66
End: 2021-10-21

## 2021-10-22 ENCOUNTER — TELEPHONE (OUTPATIENT)
Dept: INTERNAL MEDICINE | Facility: CLINIC | Age: 66
End: 2021-10-22

## 2021-10-22 ENCOUNTER — TELEPHONE (OUTPATIENT)
Dept: PHARMACY | Facility: CLINIC | Age: 66
End: 2021-10-22
Payer: MEDICARE

## 2021-10-22 DIAGNOSIS — I10 BENIGN ESSENTIAL HTN: ICD-10-CM

## 2021-10-22 DIAGNOSIS — Z86.79 HISTORY OF AORTIC DISSECTION: ICD-10-CM

## 2021-10-22 DIAGNOSIS — E66.01 MORBID OBESITY: Primary | ICD-10-CM

## 2021-10-22 RX ORDER — SEMAGLUTIDE 0.5 MG/.5ML
0.5 INJECTION, SOLUTION SUBCUTANEOUS WEEKLY
Qty: 2 ML | Refills: 0 | Status: SHIPPED | OUTPATIENT
Start: 2021-11-22 | End: 2021-11-17 | Stop reason: SDUPTHER

## 2021-10-25 ENCOUNTER — TELEPHONE (OUTPATIENT)
Dept: INTERNAL MEDICINE | Facility: CLINIC | Age: 66
End: 2021-10-25
Payer: MEDICARE

## 2021-10-26 ENCOUNTER — OFFICE VISIT (OUTPATIENT)
Dept: PODIATRY | Facility: CLINIC | Age: 66
End: 2021-10-26
Payer: MEDICARE

## 2021-10-26 VITALS
WEIGHT: 315 LBS | SYSTOLIC BLOOD PRESSURE: 182 MMHG | HEART RATE: 75 BPM | BODY MASS INDEX: 42.66 KG/M2 | HEIGHT: 72 IN | DIASTOLIC BLOOD PRESSURE: 85 MMHG

## 2021-10-26 DIAGNOSIS — I87.8 VENOUS STASIS: ICD-10-CM

## 2021-10-26 DIAGNOSIS — I89.0 LYMPHEDEMA: Primary | ICD-10-CM

## 2021-10-26 PROCEDURE — 99202 OFFICE O/P NEW SF 15 MIN: CPT | Mod: S$PBB,,, | Performed by: PODIATRIST

## 2021-10-26 PROCEDURE — 99214 OFFICE O/P EST MOD 30 MIN: CPT | Mod: PBBFAC,PN | Performed by: PODIATRIST

## 2021-10-26 PROCEDURE — 99202 PR OFFICE/OUTPT VISIT, NEW, LEVL II, 15-29 MIN: ICD-10-PCS | Mod: S$PBB,,, | Performed by: PODIATRIST

## 2021-10-26 PROCEDURE — 99999 PR PBB SHADOW E&M-EST. PATIENT-LVL IV: CPT | Mod: PBBFAC,,, | Performed by: PODIATRIST

## 2021-10-26 PROCEDURE — 99999 PR PBB SHADOW E&M-EST. PATIENT-LVL IV: ICD-10-PCS | Mod: PBBFAC,,, | Performed by: PODIATRIST

## 2021-10-28 ENCOUNTER — HOSPITAL ENCOUNTER (OUTPATIENT)
Dept: CARDIOLOGY | Facility: OTHER | Age: 66
Discharge: HOME | End: 2021-10-28
Attending: STUDENT IN AN ORGANIZED HEALTH CARE EDUCATION/TRAINING PROGRAM
Payer: MEDICARE

## 2021-10-28 VITALS
HEIGHT: 72 IN | DIASTOLIC BLOOD PRESSURE: 85 MMHG | SYSTOLIC BLOOD PRESSURE: 182 MMHG | BODY MASS INDEX: 42.66 KG/M2 | HEART RATE: 75 BPM | WEIGHT: 315 LBS

## 2021-10-28 DIAGNOSIS — I35.8 AORTIC VALVE SCLEROSIS: ICD-10-CM

## 2021-10-28 LAB
ASCENDING AORTA: 2.68 CM
AV INDEX (PROSTH): 0.83
AV MEAN GRADIENT: 3 MMHG
AV PEAK GRADIENT: 7 MMHG
AV VALVE AREA: 3.31 CM2
AV VELOCITY RATIO: 0.8
BSA FOR ECHO PROCEDURE: 3.12 M2
CV ECHO LV RWT: 0.57 CM
DOP CALC AO PEAK VEL: 1.34 M/S
DOP CALC AO VTI: 25.98 CM
DOP CALC LVOT AREA: 4 CM2
DOP CALC LVOT DIAMETER: 2.25 CM
DOP CALC LVOT PEAK VEL: 1.07 M/S
DOP CALC LVOT STROKE VOLUME: 86.08 CM3
DOP CALCLVOT PEAK VEL VTI: 21.66 CM
E WAVE DECELERATION TIME: 145.47 MSEC
E/A RATIO: 0.68
E/E' RATIO: 4.52 M/S
ECHO LV POSTERIOR WALL: 1.28 CM (ref 0.6–1.1)
FRACTIONAL SHORTENING: 26 % (ref 28–44)
INTERVENTRICULAR SEPTUM: 1.33 CM (ref 0.6–1.1)
IVRT: 96.12 MSEC
LA MAJOR: 5.13 CM
LA MINOR: 4.22 CM
LA WIDTH: 4.02 CM
LEFT ATRIUM SIZE: 3.61 CM
LEFT ATRIUM VOLUME INDEX MOD: 20.8 ML/M2
LEFT ATRIUM VOLUME INDEX: 19.5 ML/M2
LEFT ATRIUM VOLUME MOD: 61 CM3
LEFT ATRIUM VOLUME: 57.12 CM3
LEFT INTERNAL DIMENSION IN SYSTOLE: 3.31 CM (ref 2.1–4)
LEFT VENTRICLE DIASTOLIC VOLUME INDEX: 31.43 ML/M2
LEFT VENTRICLE DIASTOLIC VOLUME: 92.09 ML
LEFT VENTRICLE MASS INDEX: 76 G/M2
LEFT VENTRICLE SYSTOLIC VOLUME INDEX: 15.1 ML/M2
LEFT VENTRICLE SYSTOLIC VOLUME: 44.3 ML
LEFT VENTRICULAR INTERNAL DIMENSION IN DIASTOLE: 4.49 CM (ref 3.5–6)
LEFT VENTRICULAR MASS: 223.07 G
LV LATERAL E/E' RATIO: 3.71 M/S
LV SEPTAL E/E' RATIO: 5.78 M/S
MV PEAK A VEL: 0.76 M/S
MV PEAK E VEL: 0.52 M/S
MV STENOSIS PRESSURE HALF TIME: 42.19 MS
MV VALVE AREA P 1/2 METHOD: 5.21 CM2
PISA TR MAX VEL: 1.99 M/S
PV PEAK VELOCITY: 1.51 CM/S
RA MAJOR: 4.91 CM
RA WIDTH: 4.86 CM
RV TISSUE DOPPLER FREE WALL SYSTOLIC VELOCITY 1 (APICAL 4 CHAMBER VIEW): 16.07 CM/S
SINUS: 2.85 CM
STJ: 2.64 CM
TDI LATERAL: 0.14 M/S
TDI SEPTAL: 0.09 M/S
TDI: 0.12 M/S
TR MAX PG: 16 MMHG
TRICUSPID ANNULAR PLANE SYSTOLIC EXCURSION: 1.64 CM

## 2021-10-28 PROCEDURE — 93306 ECHO (CUPID ONLY): ICD-10-PCS | Mod: 26,,, | Performed by: INTERNAL MEDICINE

## 2021-10-28 PROCEDURE — 93306 TTE W/DOPPLER COMPLETE: CPT | Mod: 26,,, | Performed by: INTERNAL MEDICINE

## 2021-10-28 PROCEDURE — 93306 TTE W/DOPPLER COMPLETE: CPT

## 2021-11-01 ENCOUNTER — TELEPHONE (OUTPATIENT)
Dept: INTERNAL MEDICINE | Facility: CLINIC | Age: 66
End: 2021-11-01
Payer: MEDICARE

## 2021-11-02 ENCOUNTER — TELEPHONE (OUTPATIENT)
Dept: VASCULAR SURGERY | Facility: CLINIC | Age: 66
End: 2021-11-02
Payer: MEDICARE

## 2021-11-04 DIAGNOSIS — I87.8 VENOUS STASIS: Primary | ICD-10-CM

## 2021-11-08 ENCOUNTER — TELEPHONE (OUTPATIENT)
Dept: INTERNAL MEDICINE | Facility: CLINIC | Age: 66
End: 2021-11-08
Payer: MEDICARE

## 2021-11-09 ENCOUNTER — TELEPHONE (OUTPATIENT)
Dept: INTERNAL MEDICINE | Facility: CLINIC | Age: 66
End: 2021-11-09
Payer: MEDICARE

## 2021-11-17 ENCOUNTER — TELEPHONE (OUTPATIENT)
Dept: PHARMACY | Facility: CLINIC | Age: 66
End: 2021-11-17
Payer: MEDICARE

## 2021-11-17 DIAGNOSIS — E66.01 MORBID OBESITY: ICD-10-CM

## 2021-11-17 DIAGNOSIS — Z86.79 HISTORY OF AORTIC DISSECTION: ICD-10-CM

## 2021-11-17 DIAGNOSIS — I10 BENIGN ESSENTIAL HTN: ICD-10-CM

## 2021-11-17 PROBLEM — I51.89 GRADE I DIASTOLIC DYSFUNCTION: Status: ACTIVE | Noted: 2021-11-17

## 2021-11-17 RX ORDER — SEMAGLUTIDE 0.5 MG/.5ML
INJECTION, SOLUTION SUBCUTANEOUS
Qty: 2 ML | Refills: 0 | Status: SHIPPED | OUTPATIENT
Start: 2021-11-22 | End: 2021-12-16

## 2021-11-30 ENCOUNTER — PATIENT OUTREACH (OUTPATIENT)
Dept: ADMINISTRATIVE | Facility: HOSPITAL | Age: 66
End: 2021-11-30
Payer: MEDICARE

## 2021-12-01 ENCOUNTER — PATIENT OUTREACH (OUTPATIENT)
Dept: ADMINISTRATIVE | Facility: HOSPITAL | Age: 66
End: 2021-12-01
Payer: MEDICARE

## 2021-12-06 ENCOUNTER — TELEPHONE (OUTPATIENT)
Dept: PHARMACY | Facility: CLINIC | Age: 66
End: 2021-12-06
Payer: MEDICARE

## 2021-12-15 ENCOUNTER — TELEPHONE (OUTPATIENT)
Dept: CARDIOLOGY | Facility: CLINIC | Age: 66
End: 2021-12-15
Payer: MEDICARE

## 2021-12-16 ENCOUNTER — PATIENT OUTREACH (OUTPATIENT)
Dept: ADMINISTRATIVE | Facility: OTHER | Age: 66
End: 2021-12-16
Payer: MEDICARE

## 2021-12-16 ENCOUNTER — OFFICE VISIT (OUTPATIENT)
Dept: CARDIOLOGY | Facility: CLINIC | Age: 66
End: 2021-12-16
Payer: MEDICARE

## 2021-12-16 VITALS
DIASTOLIC BLOOD PRESSURE: 88 MMHG | HEART RATE: 78 BPM | SYSTOLIC BLOOD PRESSURE: 188 MMHG | BODY MASS INDEX: 50.62 KG/M2 | HEIGHT: 66 IN | WEIGHT: 315 LBS

## 2021-12-16 DIAGNOSIS — I10 ESSENTIAL HYPERTENSION: ICD-10-CM

## 2021-12-16 DIAGNOSIS — Z86.79 HISTORY OF AORTIC DISSECTION: ICD-10-CM

## 2021-12-16 DIAGNOSIS — I87.8 VENOUS STASIS: ICD-10-CM

## 2021-12-16 DIAGNOSIS — I35.8 AORTIC VALVE SCLEROSIS: ICD-10-CM

## 2021-12-16 DIAGNOSIS — E66.01 MORBID OBESITY: ICD-10-CM

## 2021-12-16 DIAGNOSIS — E78.2 MIXED HYPERLIPIDEMIA: ICD-10-CM

## 2021-12-16 DIAGNOSIS — I89.0 LYMPHEDEMA: Primary | ICD-10-CM

## 2021-12-16 DIAGNOSIS — I10 PRIMARY HYPERTENSION: ICD-10-CM

## 2021-12-16 PROCEDURE — 99205 PR OFFICE/OUTPT VISIT, NEW, LEVL V, 60-74 MIN: ICD-10-PCS | Mod: S$PBB,,, | Performed by: INTERNAL MEDICINE

## 2021-12-16 PROCEDURE — 99205 OFFICE O/P NEW HI 60 MIN: CPT | Mod: S$PBB,,, | Performed by: INTERNAL MEDICINE

## 2021-12-16 PROCEDURE — 99999 PR PBB SHADOW E&M-EST. PATIENT-LVL V: CPT | Mod: PBBFAC,,, | Performed by: INTERNAL MEDICINE

## 2021-12-16 PROCEDURE — 99999 PR PBB SHADOW E&M-EST. PATIENT-LVL V: ICD-10-PCS | Mod: PBBFAC,,, | Performed by: INTERNAL MEDICINE

## 2021-12-16 PROCEDURE — 99215 OFFICE O/P EST HI 40 MIN: CPT | Mod: PBBFAC | Performed by: INTERNAL MEDICINE

## 2021-12-16 RX ORDER — ATORVASTATIN CALCIUM 20 MG/1
40 TABLET, FILM COATED ORAL DAILY
Qty: 180 TABLET | Refills: 0 | Status: SHIPPED | OUTPATIENT
Start: 2021-12-16 | End: 2022-04-27

## 2021-12-16 RX ORDER — CARVEDILOL 25 MG/1
50 TABLET ORAL 2 TIMES DAILY
Qty: 180 TABLET | Refills: 3 | Status: SHIPPED | OUTPATIENT
Start: 2021-12-16 | End: 2022-05-31

## 2021-12-16 RX ORDER — BUMETANIDE 0.5 MG/1
1 TABLET ORAL 2 TIMES DAILY
Qty: 120 TABLET | Refills: 11 | Status: SHIPPED | OUTPATIENT
Start: 2021-12-16 | End: 2022-06-08

## 2021-12-17 ENCOUNTER — TELEPHONE (OUTPATIENT)
Dept: BARIATRICS | Facility: CLINIC | Age: 66
End: 2021-12-17
Payer: MEDICARE

## 2021-12-22 ENCOUNTER — TELEPHONE (OUTPATIENT)
Dept: BARIATRICS | Facility: CLINIC | Age: 66
End: 2021-12-22
Payer: MEDICARE

## 2022-02-09 ENCOUNTER — TELEPHONE (OUTPATIENT)
Dept: ENDOSCOPY | Facility: HOSPITAL | Age: 67
End: 2022-02-09
Payer: MEDICARE

## 2022-03-23 ENCOUNTER — TELEPHONE (OUTPATIENT)
Dept: ENDOSCOPY | Facility: HOSPITAL | Age: 67
End: 2022-03-23
Payer: MEDICARE

## 2022-04-05 ENCOUNTER — TELEPHONE (OUTPATIENT)
Dept: ENDOSCOPY | Facility: HOSPITAL | Age: 67
End: 2022-04-05
Payer: MEDICARE

## 2022-04-26 DIAGNOSIS — E78.2 MIXED HYPERLIPIDEMIA: ICD-10-CM

## 2022-04-26 NOTE — TELEPHONE ENCOUNTER
No new care gaps identified.  Powered by [employer]. Reference number: [id number].   4/26/2022 6:07:21 PM CDT

## 2022-04-27 ENCOUNTER — TELEPHONE (OUTPATIENT)
Dept: INTERNAL MEDICINE | Facility: CLINIC | Age: 67
End: 2022-04-27
Payer: MEDICARE

## 2022-04-27 RX ORDER — ATORVASTATIN CALCIUM 20 MG/1
40 TABLET, FILM COATED ORAL DAILY
Qty: 180 TABLET | Refills: 3 | Status: SHIPPED | OUTPATIENT
Start: 2022-04-27 | End: 2022-07-20 | Stop reason: DRUGHIGH

## 2022-04-27 NOTE — TELEPHONE ENCOUNTER
Call received from mother asking about HH set-up. Verified pt identifiers- mother asking about process of HH set-up. Phone number given to mother for [facility].   Refill Routing Note   Medication(s) are not appropriate for processing by [facility] for the following reason(s):      - Medication requested has undergone a recent dosage adjustment (<3 months)    [facility] action(s):  Defer Medication-related problems identified: Dose adjustment     Medication Therapy Plan: Dose increased by cards 12/2020  --->Care Gap information included in message below if applicable.   Medication reconciliation completed: No   Automatic Epic Generated Protocol Data:        Requested Prescriptions   Pending Prescriptions Disp Refills    atorvastatin (LIPITOR) 20 MG tablet [Pharmacy Med Name: ATORVASTATIN 20MG TABLETS] 180 tablet 1     Sig: Take 2 tablets (40 mg total) by mouth once daily.       Cardiovascular:  Antilipid - Statins Failed - 4/26/2022  6:06 PM        Failed - Matches previous order       Previous Authorizing Provider: Neva Gardiner MD (atorvastatin (LIPITOR) 20 MG tablet)  Previous Pharmacy: [facility][id number] [address] AT [facility]            Passed - Patient is at least 18 years old        Passed - Valid encounter within last 15 months     Recent Visits  Date Type Provider Dept   10/19/21 Office Visit Zuleyka Sabillon MD [facility] Internal Medicine   05/27/21 Office Visit Misti Ruiz MD [facility] Primary Care   07/08/20 Office Visit Misti Ruiz MD [facility] Primary Care   Showing recent visits within past 720 days and meeting all other requirements  Future Appointments  No visits were found meeting these conditions.  Showing future appointments within next 150 days and meeting all other requirements                Passed - No ED/Hospital visits since last PCP visit     Last PCP Visit: 10/19/2021 Last Admission: 6/17/2016 Last ED Visit: 6/8/2016          Passed - ALT is 131 or below and within 360 days     ALT   Date Value Ref Range Status   10/19/2021 18 10 - 44 U/L Final   05/27/2021 15 10 - 44 U/L  Final   07/22/2020 17 10 - 44 U/L Final              Passed - AST is 119 or below and within 360 days     AST   Date Value Ref Range Status   10/19/2021 22 10 - 40 U/L Final   05/27/2021 19 10 - 40 U/L Final   07/22/2020 21 10 - 40 U/L Final              Passed - Total Cholesterol within 360 days     Lab Results   Component Value Date    CHOL 148 10/19/2021    CHOL 163 07/22/2020    CHOL 164 09/13/2017              Passed - LDL within 360 days     LDL Cholesterol   Date Value Ref Range Status   10/19/2021 94.6 63.0 - 159.0 mg/dL Final     Comment:     The National Cholesterol Education Program (NCEP) has set the  following guidelines (reference values) for LDL Cholesterol:  Optimal.......................<130 mg/dL  Borderline High...............130-159 mg/dL  High..........................160-189 mg/dL  Very High.....................>190 mg/dL              Passed - HDL within 360 days     HDL   Date Value Ref Range Status   10/19/2021 41 40 - 75 mg/dL Final     Comment:     The National Cholesterol Education Program (NCEP) has set the  following guidelines (reference values) for HDL Cholesterol:  Low...............<40 mg/dL  Optimal...........>60 mg/dL              Passed - Triglycerides within 360 days     Lab Results   Component Value Date    TRIG 62 10/19/2021    TRIG 53 07/22/2020    TRIG 70 09/13/2017                    Appointments  past 12m or future 3m with PCP    Date Provider   Last Visit   10/19/2021 Zuleyka Sabillon MD   Next Visit   Visit date not found Zuleyka Sabillon MD   ED visits in past 90 days: 0        Note composed:11:17 AM 04/27/2022

## 2022-05-20 ENCOUNTER — TELEPHONE (OUTPATIENT)
Dept: INTERNAL MEDICINE | Facility: CLINIC | Age: 67
End: 2022-05-20
Payer: MEDICARE

## 2022-05-20 DIAGNOSIS — I35.8 AORTIC VALVE SCLEROSIS: ICD-10-CM

## 2022-05-20 DIAGNOSIS — R26.81 GAIT INSTABILITY: ICD-10-CM

## 2022-05-20 DIAGNOSIS — M79.672 FOOT PAIN, BILATERAL: Primary | ICD-10-CM

## 2022-05-20 DIAGNOSIS — E66.01 MORBID OBESITY: ICD-10-CM

## 2022-05-20 DIAGNOSIS — M79.671 FOOT PAIN, BILATERAL: Primary | ICD-10-CM

## 2022-05-20 DIAGNOSIS — E66.01 MORBID OBESITY: Primary | ICD-10-CM

## 2022-05-20 DIAGNOSIS — R53.1 WEAKNESS: ICD-10-CM

## 2022-05-20 DIAGNOSIS — I71.00 DISSECTION OF AORTA, UNSPECIFIED PORTION OF AORTA: ICD-10-CM

## 2022-05-20 DIAGNOSIS — R53.81 PHYSICAL DECONDITIONING: ICD-10-CM

## 2022-05-20 NOTE — TELEPHONE ENCOUNTER
Orders placed for care at home and home health, please let patient know. Would also like him to come in for appt as soon as possible to ensure he's doing ok. Thank you!

## 2022-05-20 NOTE — TELEPHONE ENCOUNTER
----- Message from Cherrie Dubois MA sent at 5/20/2022 11:53 AM CDT -----  Type: Patient Call Back    Who called:self    What is the request in detail:pt. Would like to speak to someone in regards to receiving therapy at home ...    Can the clinic reply by [facility]?no    Would the patient rather a call back or a response via [facility]? yes    Best call back number:[phone]

## 2022-05-20 NOTE — TELEPHONE ENCOUNTER
Returned pt's call.  Pt wants to know if he can have nursing therapy to his home.  Asked pt questions for clarification.  Pt states his feet are painful and getting a bit worse, making it hard for him to get around. He says he has some swelling to one foot, but it's the pain that's making it hard for him to get to the bus for [employer] program and changing busses,e tc to get to MD office. Discussed Care at Home with pt for assessment. Pt agreed and said he'd appreciate that if MD approves.  Pended.

## 2022-05-20 NOTE — TELEPHONE ENCOUNTER
----- Message from Jackie Palmer sent at 5/20/2022  1:09 PM CDT -----  Contact: JUHI CARPIO [[id number]]  Type:  Patient Returning Call    Who Called:JUHI CARPIO [[id number]]    Who Left Message for Patient:Breezy    Does the patient know what this is regarding?:yes    Would the patient rather a call back or a response via [facility]? Call back     Best Call Back Number:[phone]  Additional Information:

## 2022-05-25 ENCOUNTER — PES CALL (OUTPATIENT)
Dept: ADMINISTRATIVE | Facility: CLINIC | Age: 67
End: 2022-05-25
Payer: MEDICARE

## 2022-05-26 ENCOUNTER — OUTPATIENT CASE MANAGEMENT (OUTPATIENT)
Dept: ADMINISTRATIVE | Facility: OTHER | Age: 67
End: 2022-05-26
Payer: MEDICARE

## 2022-05-26 PROCEDURE — G0180 MD CERTIFICATION HHA PATIENT: HCPCS | Mod: ,,, | Performed by: STUDENT IN AN ORGANIZED HEALTH CARE EDUCATION/TRAINING PROGRAM

## 2022-05-26 PROCEDURE — G0180 PR HOME HEALTH MD CERTIFICATION: ICD-10-PCS | Mod: ,,, | Performed by: STUDENT IN AN ORGANIZED HEALTH CARE EDUCATION/TRAINING PROGRAM

## 2022-05-26 NOTE — PROGRESS NOTES
Outpatient Care Management   - Low Risk Patient Assessment    Patient: Edd Wills  MRN:  [id number]  Date of Service:  5/26/2022  Completed by:  Susan Clark LMSW  Referral Date: 05/20/2022  Program: OPCM Low Risk    Reason for Visit   Patient presents with    OPCM SW First Assessment Attempt     05/26/2022    Social Work Assessment - Low/Mod Risk     05/26/2022    Plan Of Care     05/26/2022    Case Closure     05/26/2022       Brief Summary:SW completed assessment with patient. Patient resides alone. Patient reports son check in on him occasionally. Patient ambulates with a walker and rollator. Patient reports needing assistance with ADL's such as help with bathing and dressing. Patient reports foot swollen which prevents him for completing these tasks. Per chart review patient has a scheduled appointment with NP with [facility] on 05/31/2022. SW explained to patient at that time NP can assess foot. Patient voiced understanding. Patient denied he's able to afford a PCA to assist with needs. Patient denied being a . [facility] to wait list for services. Patient an agreement with SW referring him to [facility] for MOW and  services. Patient aware theres a wait list for service. Patient denied needing assistance with food, shelter, medication or medical. Patient uses [employer] for transportation services. Patient interested in Advance Care Planning. SW mailed Advance Directives. Patient reports current symptom as sleep disturbance. Patient reports previously using a CPAP machine but unsure what happened to equipment. Patient reports being without equipment since 2010. SW will send NP a message to address sleep disturbance on visit. SW completed PHQ screening with patient. Patient scored a 1. Patient denied SI or HI.  STONE mailed all available resources and provided her contact information for any additional needs.      Patient Summary     OPCM Social Work Assessment (Low/Moderate Risk)    General  Level of Caregiver support: Unclear if caregiver will provide assistance  Have you had to make a decision between paying for any of the following in the last 2 months?: None  Transportation means: Para transit service  Employment status: Disabled  Current symptoms: Sleep disturbances  Assessments  Was the PHQ Depression Screening completed this visit?: Yes         Complex Care Plan     Care plan was discussed and completed today with input from patient and/or caregiver.    Patient Instructions     No follow-ups on file.    Todays OPCM Self-Management Care Plan was developed with the patients/caregivers input and was based on identified barriers from todays assessment.  Goals were written today with the patient/caregiver and the patient has agreed to work towards these goals to improve his/her overall well-being. Patient verbalized understanding of the care plan, goals, and all of today's instructions. Encouraged patient/caregiver to communicate with his/her physician and health care team about health conditions and the treatment plan.  Provided my contact information today and encouraged patient/caregiver to call me with any questions as needed.Attempt #:  1  This LMSW attempted to reach patient/caregiver to provide resource and left a message requesting a return call.

## 2022-05-27 ENCOUNTER — TELEPHONE (OUTPATIENT)
Dept: INTERNAL MEDICINE | Facility: CLINIC | Age: 67
End: 2022-05-27
Payer: MEDICARE

## 2022-05-27 DIAGNOSIS — S99.912A INJURY OF LEFT ANKLE, INITIAL ENCOUNTER: ICD-10-CM

## 2022-05-27 DIAGNOSIS — G95.9 CERVICAL MYELOPATHY: ICD-10-CM

## 2022-05-27 DIAGNOSIS — S99.919S ANKLE INJURY, UNSPECIFIED LATERALITY, SEQUELA: Primary | ICD-10-CM

## 2022-05-27 NOTE — TELEPHONE ENCOUNTER
----- Message from Marva Quirosmfield sent at 5/26/2022  3:19 PM CDT -----  Who Called: Jose Luis (Home Health Nurse)    What is the request in detail: Requesting a  to be added to home health care and an order for wound care due to the patient having blister on the right inner ankle. Please advise.     Can the clinic reply by [facility]?: No    Best Call Back Number: [phone]

## 2022-05-27 NOTE — TELEPHONE ENCOUNTER
Called Jose Luis RODRIGUEZ back  He asked us to fax them to his office at [phone]  Faxed three documents

## 2022-05-27 NOTE — TELEPHONE ENCOUNTER
Returned pt's call   He said he missed a call  I apologized and let him know we did not need to speak with him, we needed to call his HH nurse, which we did, and it's all sorted out  Pt verbalized understanding and had no further concerns or questions.

## 2022-05-27 NOTE — TELEPHONE ENCOUNTER
----- Message from Nicole Butterfield sent at 5/27/2022 12:23 PM CDT -----  Contact: JUHI CARPIO [[id number]]  Type:  Patient Returning Call      Who Called:   JUHI CARPIO [[id number]]      Who Left Message for Patient: Sharyn West MA         Does the patient know what this is regarding?: Yes      Would the patient rather a call back or a response via [facility]?  Call back       Best Call Back Number: [phone] (mobile)      Additional Information:

## 2022-05-30 ENCOUNTER — TELEPHONE (OUTPATIENT)
Dept: WOUND CARE | Facility: HOSPITAL | Age: 67
End: 2022-05-30
Payer: MEDICARE

## 2022-05-31 ENCOUNTER — CARE AT HOME (OUTPATIENT)
Dept: HOME HEALTH SERVICES | Facility: CLINIC | Age: 67
End: 2022-05-31
Payer: MEDICARE

## 2022-05-31 DIAGNOSIS — G57.93 NEUROPATHY OF BOTH FEET: ICD-10-CM

## 2022-05-31 DIAGNOSIS — M17.0 PRIMARY OSTEOARTHRITIS OF BOTH KNEES: ICD-10-CM

## 2022-05-31 DIAGNOSIS — G62.9 NEUROPATHY: ICD-10-CM

## 2022-05-31 DIAGNOSIS — I87.2 VENOUS INSUFFICIENCY: ICD-10-CM

## 2022-05-31 DIAGNOSIS — R53.81 PHYSICAL DECONDITIONING: ICD-10-CM

## 2022-05-31 DIAGNOSIS — E78.2 MIXED HYPERLIPIDEMIA: ICD-10-CM

## 2022-05-31 DIAGNOSIS — I10 PRIMARY HYPERTENSION: ICD-10-CM

## 2022-05-31 DIAGNOSIS — R60.0 PERIPHERAL EDEMA: ICD-10-CM

## 2022-05-31 DIAGNOSIS — I10 ESSENTIAL HYPERTENSION: ICD-10-CM

## 2022-05-31 DIAGNOSIS — M25.569 KNEE PAIN, UNSPECIFIED CHRONICITY, UNSPECIFIED LATERALITY: Primary | ICD-10-CM

## 2022-05-31 DIAGNOSIS — E66.01 MORBID OBESITY: ICD-10-CM

## 2022-05-31 DIAGNOSIS — R26.81 GAIT INSTABILITY: ICD-10-CM

## 2022-05-31 PROCEDURE — 99350 HOME/RES VST EST HIGH MDM 60: CPT | Mod: S$GLB,,, | Performed by: NURSE PRACTITIONER

## 2022-05-31 PROCEDURE — 99497 ADVNCD CARE PLAN 30 MIN: CPT | Mod: S$GLB,,, | Performed by: NURSE PRACTITIONER

## 2022-05-31 PROCEDURE — 99497 PR ADVNCD CARE PLAN 30 MIN: ICD-10-PCS | Mod: S$GLB,,, | Performed by: NURSE PRACTITIONER

## 2022-05-31 PROCEDURE — 99350 PR HOME VISIT,ESTAB PATIENT,LEVEL IV: ICD-10-PCS | Mod: S$GLB,,, | Performed by: NURSE PRACTITIONER

## 2022-05-31 RX ORDER — CARVEDILOL 25 MG/1
25 TABLET ORAL 2 TIMES DAILY
Qty: 180 TABLET | Refills: 3 | Status: SHIPPED | OUTPATIENT
Start: 2022-05-31 | End: 2022-09-21 | Stop reason: SDUPTHER

## 2022-05-31 RX ORDER — GABAPENTIN 300 MG/1
300 CAPSULE ORAL DAILY
Qty: 30 CAPSULE | Refills: 1 | Status: SHIPPED | OUTPATIENT
Start: 2022-05-31 | End: 2022-06-08

## 2022-05-31 RX ORDER — DICLOFENAC SODIUM 10 MG/G
2 GEL TOPICAL DAILY
Qty: 30 EACH | Refills: 0 | Status: SHIPPED | OUTPATIENT
Start: 2022-05-31 | End: 2022-09-21 | Stop reason: SDUPTHER

## 2022-05-31 RX ORDER — GABAPENTIN 300 MG/1
300 CAPSULE ORAL DAILY
Qty: 30 CAPSULE | Refills: 1 | Status: SHIPPED | OUTPATIENT
Start: 2022-05-31 | End: 2022-05-31

## 2022-05-31 RX ORDER — FUROSEMIDE 20 MG/1
TABLET ORAL
Qty: 90 TABLET | Refills: 3 | OUTPATIENT
Start: 2022-05-31

## 2022-05-31 RX ORDER — AMLODIPINE BESYLATE 10 MG/1
TABLET ORAL
Qty: 90 TABLET | Refills: 3 | Status: SHIPPED | OUTPATIENT
Start: 2022-05-31 | End: 2022-09-21 | Stop reason: SDUPTHER

## 2022-05-31 NOTE — TELEPHONE ENCOUNTER
No new care gaps identified.  [facility] Embedded Care Gaps. Reference number: [id number]. 5/31/2022   5:53:24 AM CDT

## 2022-05-31 NOTE — TELEPHONE ENCOUNTER
No new care gaps identified.  [facility] Embedded Care Gaps. Reference number: [id number]. 5/31/2022   2:42:45 PM CDT

## 2022-05-31 NOTE — TELEPHONE ENCOUNTER
Refill Routing Note   Medication(s) are not appropriate for processing by [facility] for the following reason(s):      - Required vitals are abnormal  - Medication not previously prescribed by PCP  - Medication requested has undergone a recent dosage adjustment (<3 months)    OR action(s):  Defer Medication-related problems identified: Dose adjustment     Medication Therapy Plan: CARVEDIOLO 25 MG; PREV DOSE WAS TK 1 PO BID; IN RECENT ENCOUNTER ON 12/16/21 WAS INCREASED TO 2 TABS BID; WILL PEND WITH THE NEW DOSE;  --->Care Gap information included in message below if applicable.   Medication reconciliation completed: No   Automatic Epic Generated Protocol Data:        Requested Prescriptions   Pending Prescriptions Disp Refills    amLODIPine (NORVASC) 10 MG tablet [Pharmacy Med Name: AMLODIPINE BESYLATE 10MG TABLETS] 90 tablet 1     Sig: TAKE 1 TABLET(10 MG) BY MOUTH EVERY DAY       Cardiovascular:  Calcium Channel Blockers Failed - 5/31/2022  5:51 AM        Failed - Last BP in normal range within 360 days     BP Readings from Last 3 Encounters:   12/16/21 (!) 188/88   10/28/21 (!) 182/85   10/26/21 (!) 182/85               Failed - Matches previous order       Previous Authorizing Provider: Misti Ruiz MD (amLODIPine (NORVASC) 10 MG tablet)  Previous Authorizing Provider: Neva Gardiner MD (carvediloL (COREG) 25 MG tablet)  [email or web address] [address] [place] [address]  Previous Pharmacy: [facility] [address] [place] [address] AT [facility]            Passed - Patient is at least 18 years old        Passed - Valid encounter within last 15 months     Recent Visits  Date Type Provider Dept   10/19/21 Office Visit Zuleyka Sabillon MD [facility] Internal Medicine   05/27/21 Office Visit Misti Ruiz MD [facility] Primary Care   07/08/20 Office Visit Misti Ruiz  MD [facility] Primary Care   Showing recent visits within past 720 days and meeting all other requirements  Future Appointments  No visits were found meeting these conditions.  Showing future appointments within next 150 days and meeting all other requirements                Passed - No ED/Hospital visits since last PCP visit     Last PCP Visit: 10/19/2021 Last Admission: 6/17/2016 Last ED Visit: 6/8/2016            carvediloL (COREG) 25 MG tablet [Pharmacy Med Name: CARVEDILOL 25MG TABLETS] 360 tablet 1     Sig: Take 2 tablets (50 mg total) by mouth 2 (two) times a day.       Cardiovascular:  Beta Blockers Failed - 5/31/2022  5:51 AM        Failed - Last BP in normal range within 360 days     BP Readings from Last 3 Encounters:   12/16/21 (!) 188/88   10/28/21 (!) 182/85   10/26/21 (!) 182/85               Failed - Matches previous order       Previous Authorizing Provider: Misti Ruiz MD (amLODIPine (NORVASC) 10 MG tablet)  Previous Authorizing Provider: Neva Gardiner MD (carvediloL (COREG) 25 MG tablet)  Previous Pharmacy: [facility] #[id number] [address] AT [facility]  Previous Pharmacy: [facility] #[id number] [address] AT [facility]            Passed - Patient is at least 18 years old        Passed - Last Heart Rate in normal range within 360 days     Pulse Readings from Last 1 Encounters:   12/16/21 78              Passed - Valid encounter within last 15 months     Recent Visits  Date Type Provider Dept   10/19/21 Office Visit Zuleyka Sabillon MD [facility] Internal Medicine   05/27/21 Office Visit Misti Ruiz MD [facility] Primary Care   07/08/20 Office Visit Misti Ruiz MD [facility] Primary Care   Showing recent visits within past 720 days and meeting all other requirements  Future Appointments  No visits were found meeting these conditions.  Showing future appointments within next  150 days and meeting all other requirements                Passed - No ED/Hospital visits since last PCP visit     Last PCP Visit: 10/19/2021 Last Admission: 6/17/2016 Last ED Visit: 6/8/2016                Appointments  past 12m or future 3m with PCP    Date Provider   Last Visit   10/19/2021 Zuleyka Sabillon MD   Next Visit   Visit date not found Zuleyka Sabillon MD   ED visits in past 90 days: 0        Note composed:11:34 AM 05/31/2022

## 2022-06-01 NOTE — TELEPHONE ENCOUNTER
Quick DC. Inappropriate Request    Refill Authorization Note   Edd Wills  is requesting a refill authorization.  Brief Assessment and Rationale for Refill:  Quick Discontinue  Medication Therapy Plan:  Furosemide 20mg was d/c'd on 12/16/21 by Neva Gardiner MD.  Furosemide 20mg  was switched  to bumetanide 1mg.    Medication Reconciliation Completed:  No      Comments:     Note composed:11:37 PM 05/31/2022

## 2022-06-08 RX ORDER — GABAPENTIN 600 MG/1
600 TABLET ORAL 2 TIMES DAILY
Qty: 60 TABLET | Refills: 2 | Status: SHIPPED | OUTPATIENT
Start: 2022-06-08 | End: 2022-07-20 | Stop reason: SDUPTHER

## 2022-06-08 RX ORDER — FUROSEMIDE 40 MG/1
40 TABLET ORAL DAILY
Qty: 30 TABLET | Refills: 2 | Status: SHIPPED | OUTPATIENT
Start: 2022-06-08 | End: 2022-07-20 | Stop reason: SDUPTHER

## 2022-06-09 ENCOUNTER — CARE AT HOME (OUTPATIENT)
Dept: HOME HEALTH SERVICES | Facility: CLINIC | Age: 67
End: 2022-06-09
Payer: MEDICARE

## 2022-06-09 DIAGNOSIS — M79.606 CHRONIC PAIN OF LOWER EXTREMITY, UNSPECIFIED LATERALITY: ICD-10-CM

## 2022-06-09 DIAGNOSIS — R60.0 PERIPHERAL EDEMA: ICD-10-CM

## 2022-06-09 DIAGNOSIS — E66.01 MORBID OBESITY: ICD-10-CM

## 2022-06-09 DIAGNOSIS — I10 PRIMARY HYPERTENSION: ICD-10-CM

## 2022-06-09 DIAGNOSIS — G89.29 CHRONIC PAIN OF LOWER EXTREMITY, UNSPECIFIED LATERALITY: ICD-10-CM

## 2022-06-09 DIAGNOSIS — G89.29 CHRONIC PAIN OF BOTH KNEES: Primary | ICD-10-CM

## 2022-06-09 DIAGNOSIS — G57.93 NEUROPATHY OF BOTH FEET: ICD-10-CM

## 2022-06-09 DIAGNOSIS — M17.0 PRIMARY OSTEOARTHRITIS OF BOTH KNEES: ICD-10-CM

## 2022-06-09 DIAGNOSIS — M25.561 CHRONIC PAIN OF BOTH KNEES: Primary | ICD-10-CM

## 2022-06-09 DIAGNOSIS — E78.2 MIXED HYPERLIPIDEMIA: ICD-10-CM

## 2022-06-09 DIAGNOSIS — M25.562 CHRONIC PAIN OF BOTH KNEES: Primary | ICD-10-CM

## 2022-06-09 DIAGNOSIS — R23.8 BLISTERS OF MULTIPLE SITES: ICD-10-CM

## 2022-06-09 PROCEDURE — 99350 PR HOME VISIT,ESTAB PATIENT,LEVEL IV: ICD-10-PCS | Mod: S$GLB,,, | Performed by: NURSE PRACTITIONER

## 2022-06-09 PROCEDURE — 99350 HOME/RES VST EST HIGH MDM 60: CPT | Mod: S$GLB,,, | Performed by: NURSE PRACTITIONER

## 2022-06-15 ENCOUNTER — TELEPHONE (OUTPATIENT)
Dept: PAIN MEDICINE | Facility: CLINIC | Age: 67
End: 2022-06-15
Payer: MEDICARE

## 2022-06-15 NOTE — TELEPHONE ENCOUNTER
Staff called pt in regards to scheduling his appt for a  virtual with one of the physician staff stated to pt that he is a new patient and would have to have a in- person visit. Staff stated to pt that we will reach out to the Mrs. Pappas and give him a call back with any confirmation. Staff lvm with Mrs. Pappas to give us a call back in regards to Mr. Puga referral. Pt verbalized understanding and confirmed SG        ---- Message from Yeimi Villegas sent at 6/15/2022  3:17 PM CDT -----  Regarding: self [phone]  Type: Patient Call Back    Who called: self    What is the request in detail: patient was told to schedule virtual appt but none available.     Can the clinic reply by [facility]? no    Would the patient rather a call back or a response via [facility]?  [facility]    Best call back number:[phone]

## 2022-06-16 ENCOUNTER — PATIENT OUTREACH (OUTPATIENT)
Dept: ADMINISTRATIVE | Facility: HOSPITAL | Age: 67
End: 2022-06-16
Payer: MEDICARE

## 2022-06-16 VITALS
TEMPERATURE: 98 F | SYSTOLIC BLOOD PRESSURE: 126 MMHG | DIASTOLIC BLOOD PRESSURE: 75 MMHG | RESPIRATION RATE: 18 BRPM | HEART RATE: 66 BPM | OXYGEN SATURATION: 99 %

## 2022-06-16 PROBLEM — G57.93 NEUROPATHY OF BOTH FEET: Status: ACTIVE | Noted: 2022-06-16

## 2022-06-16 PROBLEM — R60.0 PERIPHERAL EDEMA: Status: ACTIVE | Noted: 2022-06-16

## 2022-06-16 PROBLEM — R23.8 BLISTERS OF MULTIPLE SITES: Status: ACTIVE | Noted: 2022-06-16

## 2022-06-16 NOTE — ASSESSMENT & PLAN NOTE
Weeping areas and blisters noted to bilateral lower extremities likely due to peripheral edema   Continue home health wound care

## 2022-06-16 NOTE — PATIENT INSTRUCTIONS
Instructions:  - [facility] Practitioner to schedule home follow-up visit with patient in 4-6 weeks or as needed.  - Continue all medications, treatments and therapies as ordered.   - Follow all instructions, recommendations as discussed.  - Maintain Safety Precautions at all times.  - Attend all medical appointments as scheduled.  - For worsening symptoms: call Primary Care Physician or Nurse Practitioner.  - For emergencies, call 911 or immediately report to the nearest emergency room.  - Limit Risks of environmental exposure to coronavirus/COVID-19 as discussed including: social distancing, hand hygiene, and limiting departures from the home for necessities only.

## 2022-06-16 NOTE — ASSESSMENT & PLAN NOTE
Elevate legs when resting. Limit sodium intake to 2000mg per day and fluid intake to 1.5L per day. 20-30 mm Hg compression stockings.   Continue Lasix as prescribed  Cardiology previously referred to Lymphdema clinic  Continue to follow with Cardiology

## 2022-06-16 NOTE — PROGRESS NOTES
msg sent to provider to get clarification for colon cancer screening. 2017 fit kit was positive with no follow-up after.

## 2022-06-16 NOTE — PROGRESS NOTES
"  [facility]  Medical Home Visit    Visit Date: 6/09/2022  Encounter Provider: Reynold Smith NP  PCP:  Zuleyka Sabillon MD    Subjective:      Patient ID: Edd Wills is a 67 y.o. male.    Consult Requested By:  No ref. provider found  Reason for Consult: Medical Visit by Home Care Provider    The patient is being seen at home due to a physical debility that presents a taxing effort to leave the home, to mitigate high risk of hospital readmission or due to the limited availability of reliable or safe options for transportation to the point of access to the provider. The visit meets the criteria for medical necessity as defined by CMS as "health-care services needed to prevent, diagnose, or treat an illness, injury, condition, disease, or its symptoms and that meet accepted standards of medicine." Prior to treatment on this visit the chart was reviewed and patient consent was obtained.    Chief Complaint: Follow-up for chronic medical conditions and medication review.     HPI: Edd Wills is a 66 y.o. male with HTN, HLD, morbid obesity.     Today:  Mr. Edd Wills is a 67 y.o. male. Edd presents with reports of continued pain to lower extremities.  "Burning" in feet and aching to bilateral knees.  Was previously prescribed gabapentin for bilateral foot pain without relief and Voltaren for bilateral knee pain without relief.  Will place referral to pain management.  Denies chest pain, signs of distress, SOB, fever, chills, cough, congestion. VSS.  Reports taking all medications as prescribed. No other needs identified at this time. Risks of environmental exposure to coronavirus discussed including: social distancing, hand hygiene, and limiting departures from the home for necessities only.  Reports understanding and willingness to comply.      Attestation: Screening criteria to assess the level of the patient's risk for infection with COVID-19 as recommended by the CDC at the time of the above " documented home visit concluded appropriateness to proceed. Universal precautions were maintained at all times, including provider use of >60% alcohol gel hand  immediately prior to entry and upon departing the patient's home as well as cleaning of equipment used in home visit with antibacterial/germicidal disposable wipes.     Review of Systems   Constitutional: Negative for chills and fatigue.   HENT: Negative for congestion and postnasal drip.    Eyes: Negative for visual disturbance.   Respiratory: Negative for chest tightness and shortness of breath.    Cardiovascular: Positive for leg swelling.   Gastrointestinal: Negative for abdominal pain, constipation, diarrhea, nausea and vomiting.   Genitourinary: Negative for difficulty urinating.   Musculoskeletal: Positive for arthralgias and myalgias.   Skin: Positive for wound. Negative for color change.   Neurological: Negative for dizziness and headaches.   Hematological: Does not bruise/bleed easily.   Psychiatric/Behavioral: Negative for agitation.       Assessments:  · Environmental: single story home, no steps to enter, adequate lighting and temperature control  · Functional Status: Assistance with ADL's/IADL's, ambulates with assistance of a cane/walker, continent of bowel and bladder  · Safety: Fall Precautions, COVID Precautions/Social Distancing/Mask Use  · Nutritional: Adequate  · Home Health: [facility]   · DME/Supplies: Rollator        Objective:     Vitals:    06/02/22 1200   BP: 126/75   Pulse: 66   Resp: 18   Temp: 98 °F (36.7 °C)   SpO2: 99%   PainSc:   6   PainLoc: Knee     There is no height or weight on file to calculate BMI.    Physical Exam  HENT:      Head: Normocephalic.      Nose: No congestion.      Mouth/Throat:      Mouth: Mucous membranes are moist.   Cardiovascular:      Rate and Rhythm: Normal rate.      Pulses: Normal pulses.   Pulmonary:      Effort: Pulmonary effort is normal. No respiratory distress.      Breath  sounds: Normal breath sounds.   Abdominal:      General: Bowel sounds are normal.      Palpations: Abdomen is soft.   Musculoskeletal:      Cervical back: Normal range of motion and neck supple.      Right lower leg: Edema present.      Left lower leg: Edema present.   Skin:     General: Skin is warm and dry.      Comments: Weeping areas and blisters noted to bilateral lower extremities likely due to peripheral edema    Neurological:      Mental Status: He is alert. Mental status is at baseline.   Psychiatric:         Mood and Affect: Mood normal.         Assessment:     1. Chronic pain of both knees    2. Morbid obesity    3. Primary osteoarthritis of both knees    4. Primary hypertension    5. Mixed hyperlipidemia    6. Peripheral edema    7. Chronic pain of lower extremity, unspecified laterality    8. Neuropathy of both feet    9. Blisters of multiple sites      Plan:          1. Chronic pain of both knees  -     Ambulatory referral/consult to Pain Clinic    2. Morbid obesity  Assessment & Plan:  He has been educated on the negative effects of obesity to one's health and encouraged to consider lifestyle diet modifications and exercise.       3. Primary osteoarthritis of both knees  Assessment & Plan:  Voltaren not working, according to patient  Referral placed to pain management       4. Primary hypertension  Assessment & Plan:  Chronic, stable  Continue meds as prescribed  Continue to follow with cardiology  Low Na diet       5. Mixed hyperlipidemia  Assessment & Plan:  Denies chest pain/SOB  Continue statin       6. Peripheral edema  Assessment & Plan:  Elevate legs when resting. Limit sodium intake to 2000mg per day and fluid intake to 1.5L per day. 20-30 mm Hg compression stockings.   Continue Lasix as prescribed  Cardiology previously referred to Lymphdema clinic  Continue to follow with Cardiology       7. Chronic pain of lower extremity, unspecified laterality  -     Ambulatory referral/consult to Pain  Clinic    8. Neuropathy of both feet  Assessment & Plan:  Continue gabapentin  Referral placed to pain management       9. Blisters of multiple sites  Assessment & Plan:  Weeping areas and blisters noted to bilateral lower extremities likely due to peripheral edema   Continue home health wound care             Encounter for Medical Follow-Up and Medication Review   - [facility] Nurse Practitioner to schedule home visit with patient in one month or PRN.    Were controlled substances prescribed? No    Instructions:  - [facility] to schedule home follow-up visit with patient in 4-6 weeks or as needed.  - Continue all medications, treatments and therapies as ordered.   - Follow all instructions, recommendations as discussed.  - Maintain Safety Precautions at all times.  - Attend all medical appointments as scheduled.  - For worsening symptoms: call Primary Care Physician or Nurse Practitioner.  - For emergencies, call 911 or immediately report to the nearest emergency room.  - Limit Risks of environmental exposure to coronavirus/COVID-19 as discussed including: social distancing, hand hygiene, and limiting departures from the home for necessities only.         Follow Up Appointments:   No future appointments.    Patient consent was obtained prior to treatment on this visit.    Signature:       Reynold Smith, MSN, APRN, FNP-C  [facility]    Total face-to-face time was 60 min, >50% of this was spent on counseling and coordination of care. The following issues were discussed: primary and secondary diagnoses, co-morbidities, prescribed medications, treatment modalities, importance of compliance with medical advice and directives for follow-up care

## 2022-06-16 NOTE — ASSESSMENT & PLAN NOTE
He has been educated on the negative effects of obesity to one's health and encouraged to consider lifestyle diet modifications and exercise.

## 2022-06-18 VITALS
SYSTOLIC BLOOD PRESSURE: 126 MMHG | OXYGEN SATURATION: 99 % | RESPIRATION RATE: 18 BRPM | TEMPERATURE: 98 F | DIASTOLIC BLOOD PRESSURE: 66 MMHG | HEART RATE: 74 BPM

## 2022-06-18 NOTE — PROGRESS NOTES
"[facility]  Medical Home Visit    Visit Date: 5/31/2022  Encounter Provider: Reynold Smith NP  PCP:  Zuleyka Sabillon MD    Subjective:      Patient ID: Edd Wills is a 67 y.o. male.    Consult Requested By:  Dr. Zuleyka Sabillon  Reason for Consult: Medical Visit by Home Care Provider    The patient is being seen at home due to a physical debility that presents a taxing effort to leave the home, to mitigate high risk of hospital readmission or due to the limited availability of reliable or safe options for transportation to the point of access to the provider. The visit meets the criteria for medical necessity as defined by CMS as "health-care services needed to prevent, diagnose, or treat an illness, injury, condition, disease, or its symptoms and that meet accepted standards of medicine." Prior to treatment on this visit the chart was reviewed and patient consent was obtained.    Chief Complaint: Follow-up for chronic medical conditions and medication review    HPI: Edd Wills is a 66 y.o. male with HTN, HLD, morbid obesity. He follows with PCP Dr. Sabillon and cardiology.     Today:  Mr. Edd Wills is a 67 y.o. male. Edd presents with reports of increased lower extremity edema, right greater than left and lower extremity pain.  "Burning" in feet and aching to bilateral knees. He reports he recently applied an over the counter "arthritis cream" that made pain and swelling worse. No signs of distress noted. Denies chest pain, signs of distress, SOB, fever, chills, cough, congestion. VSS.  Reports taking all medications as prescribed. He lives alone.  He requires assistance with ADLs.  He has family members that come over and assist when needed.  He is current with [facility]. No other needs identified at this time. Risks of environmental exposure to coronavirus discussed including: social distancing, hand hygiene, and limiting departures from the home for necessities only.  Reports understanding " and willingness to comply.         Attestation: Screening criteria to assess the level of the patient's risk for infection with COVID-19 as recommended by the CDC at the time of the above documented home visit concluded appropriateness to proceed. Universal precautions were maintained at all times, including provider use of >60% alcohol gel hand  immediately prior to entry and upon departing the patient's home as well as cleaning of equipment used in home visit with antibacterial/germicidal disposable wipes.     Review of Systems   Constitutional: Negative for chills and fever.   HENT: Negative for congestion, postnasal drip and rhinorrhea.    Eyes: Negative for visual disturbance.   Respiratory: Negative for chest tightness and shortness of breath.    Cardiovascular: Positive for leg swelling. Negative for chest pain and palpitations.   Gastrointestinal: Negative for abdominal pain, constipation, diarrhea, nausea and vomiting.   Genitourinary: Negative for difficulty urinating.   Musculoskeletal: Positive for arthralgias, gait problem and myalgias.   Neurological: Negative for dizziness.   Hematological: Does not bruise/bleed easily.   Psychiatric/Behavioral: Negative for agitation.       Assessments:  · Environmental: single story home, no steps to enter, adequate lighting and temperature control  · Functional Status: Assistance with ADL's/IADL's, ambulates with assistance of a cane/walker, continent of bowel and bladder  · Safety: Fall Precautions, COVID Precautions/Social Distancing/Mask Use  · Nutritional: Adequate  · Home Health: [facility]   · DME/Supplies: rollator        Ethical / Legal: Advance Care Planning   Capacity to make medical decisions:  Yes, Conflict No  · Surrogate decision maker:  Name Edd Fuentes JrArpit, Relationship: son  · Advance Directives:  No  · HCPOA: No  · [place]:  No  · Code Status:  Full code      Advance Care Planning/Goals of Care:  Advanced Care Directives, HCPOA and [place]  forms left in the home for family review, discussion and signing with instructions to return upon their next provider encounter for inclusion to the medical record.  Discussed Advance Care Planning for 20 minutes.     Objective:     Vitals:    05/31/22 1400   BP: 126/66   Pulse: 74   Resp: 18   Temp: 98 °F (36.7 °C)   SpO2: 99%   PainSc:   6   PainLoc: Knee     There is no height or weight on file to calculate BMI.    Physical Exam  Constitutional:       Appearance: Normal appearance.   HENT:      Head: Normocephalic and atraumatic.      Nose: No congestion or rhinorrhea.      Mouth/Throat:      Mouth: Mucous membranes are moist.   Cardiovascular:      Rate and Rhythm: Normal rate.   Pulmonary:      Effort: No respiratory distress.      Breath sounds: Normal breath sounds.   Abdominal:      General: Bowel sounds are normal.      Palpations: Abdomen is soft.      Tenderness: There is no abdominal tenderness.   Musculoskeletal:      Cervical back: Neck supple.      Right lower leg: Edema present.      Left lower leg: Edema present.   Skin:     General: Skin is warm and dry.   Neurological:      General: No focal deficit present.      Mental Status: He is alert and oriented to person, place, and time.   Psychiatric:         Mood and Affect: Mood normal.         Assessment:     1. Knee pain, unspecified chronicity, unspecified laterality    2. Morbid obesity    3. Physical deconditioning    4. Gait instability    5. Neuropathy    6. Neuropathy of both feet    7. Peripheral edema    8. Mixed hyperlipidemia    9. Primary hypertension    10. Primary osteoarthritis of both knees      Plan:          1. Knee pain, unspecified chronicity, unspecified laterality  -     Ambulatory referral/consult to Pain Clinic    2. Morbid obesity  Assessment & Plan:  He has been educated on the negative effects of obesity to one's health and encouraged to consider lifestyle diet modifications and exercise.     Orders:  -     Ambulatory  referral/consult to [facility]    3. Physical deconditioning  Assessment & Plan:  Continue home health pt/ot    Orders:  -     Ambulatory referral/consult to [facility]    4. Gait instability  -     Ambulatory referral/consult to [facility]    5. Neuropathy  -     Ambulatory referral/consult to Pain Clinic  -     gabapentin (NEURONTIN) 600 MG tablet    6. Neuropathy of both feet  Assessment & Plan:  Ordered Gabapentin  Referral to pain management         7. Peripheral edema  Assessment & Plan:  Blisters of multiple sites    Right leg greater than left, has been a chronic problem but has increased more over the last week   Currently taking furosemide 40mg daily, instructed to take twice daily for the next 5 days  Low Na diet   Elevate lower extremities while sitting/lying  Compression stocking with 20mmhg-30mmhg          8. Mixed hyperlipidemia  Assessment & Plan:  Chronic  Continue statin       9. Primary hypertension  Assessment & Plan:  Chronic, stable  Continue amlodipine, coreg, furosemide, lisinopril        10. Primary osteoarthritis of both knees  Assessment & Plan:  Start voltaren  Continue home therapy  Tylenol prn       Other orders  -     diclofenac sodium (VOLTAREN) 1 % Gel  -     furosemide (LASIX) 40 MG tablet       Were controlled substances prescribed? No    Instructions:  - [facility] Nurse Practitioner to schedule home follow-up visit with patient in 4-6 weeks or as needed.  - Continue all medications, treatments and therapies as ordered.   - Follow all instructions, recommendations as discussed.  - Maintain Safety Precautions at all times.  - Attend all medical appointments as scheduled.  - For worsening symptoms: call Primary Care Physician or Nurse Practitioner.  - For emergencies, call 911 or immediately report to the nearest emergency room.  - Limit Risks of environmental exposure to  coronavirus/COVID-19 as discussed including: social distancing, hand hygiene, and limiting departures from the home for necessities only.         Follow Up Appointments:   Future Appointments   Date Time Provider Department Center   7/20/2022  8:00 AM Edie Smith NP [address]       Patient consent was obtained prior to treatment on this visit.    Signature:       Reynold Smith, MSN, APRN, FNP-C  [facility] [place]    Total face-to-face time was 60 min, >50% of this was spent on counseling and coordination of care. The following issues were discussed: primary and secondary diagnoses, co-morbidities, prescribed medications, treatment modalities, importance of compliance with medical advice and directives for follow-up care

## 2022-06-18 NOTE — PATIENT INSTRUCTIONS
Instructions:  - [facility] schedule home follow-up visit with patient in 4-6 weeks or as needed.  - Continue all medications, treatments and therapies as ordered.   - Follow all instructions, recommendations as discussed.  - Maintain Safety Precautions at all times.  - Attend all medical appointments as scheduled.  - For worsening symptoms: call Primary Care Physician or Nurse Practitioner.  - For emergencies, call 911 or immediately report to the nearest emergency room.  - Limit Risks of environmental exposure to coronavirus/COVID-19 as discussed including: social distancing, hand hygiene, and limiting departures from the home for necessities only.

## 2022-06-18 NOTE — ASSESSMENT & PLAN NOTE
Blisters of multiple sites    Right leg greater than left, has been a chronic problem but has increased more over the last week   Currently taking furosemide 40mg daily, instructed to take twice daily for the next 5 days  Low Na diet   Elevate lower extremities while sitting/lying  Compression stocking with 20mmhg-30mmhg

## 2022-06-20 ENCOUNTER — TELEPHONE (OUTPATIENT)
Dept: CARDIOLOGY | Facility: CLINIC | Age: 67
End: 2022-06-20

## 2022-06-29 ENCOUNTER — PATIENT MESSAGE (OUTPATIENT)
Dept: ADMINISTRATIVE | Facility: HOSPITAL | Age: 67
End: 2022-06-29
Payer: MEDICARE

## 2022-06-29 ENCOUNTER — PATIENT OUTREACH (OUTPATIENT)
Dept: ADMINISTRATIVE | Facility: HOSPITAL | Age: 67
End: 2022-06-29
Payer: MEDICARE

## 2022-06-30 ENCOUNTER — DOCUMENT SCAN (OUTPATIENT)
Dept: HOME HEALTH SERVICES | Facility: HOSPITAL | Age: 67
End: 2022-06-30
Payer: MEDICARE

## 2022-07-17 ENCOUNTER — DOCUMENT SCAN (OUTPATIENT)
Dept: HOME HEALTH SERVICES | Facility: HOSPITAL | Age: 67
End: 2022-07-17
Payer: MEDICARE

## 2022-07-20 ENCOUNTER — CARE AT HOME (OUTPATIENT)
Dept: HOME HEALTH SERVICES | Facility: CLINIC | Age: 67
End: 2022-07-20
Payer: MEDICARE

## 2022-07-20 DIAGNOSIS — G57.93 NEUROPATHY OF BOTH FEET: ICD-10-CM

## 2022-07-20 DIAGNOSIS — R60.0 PERIPHERAL EDEMA: ICD-10-CM

## 2022-07-20 DIAGNOSIS — G62.9 NEUROPATHY: Primary | ICD-10-CM

## 2022-07-20 DIAGNOSIS — R23.8 BLISTERS OF MULTIPLE SITES: ICD-10-CM

## 2022-07-20 DIAGNOSIS — E66.01 MORBID OBESITY: ICD-10-CM

## 2022-07-20 DIAGNOSIS — I10 PRIMARY HYPERTENSION: ICD-10-CM

## 2022-07-20 PROCEDURE — 99350 HOME/RES VST EST HIGH MDM 60: CPT | Mod: S$GLB,,, | Performed by: NURSE PRACTITIONER

## 2022-07-20 PROCEDURE — 99350 PR HOME VISIT,ESTAB PATIENT,LEVEL IV: ICD-10-PCS | Mod: S$GLB,,, | Performed by: NURSE PRACTITIONER

## 2022-07-20 RX ORDER — GABAPENTIN 600 MG/1
600 TABLET ORAL 2 TIMES DAILY
Qty: 180 TABLET | Refills: 2 | Status: SHIPPED | OUTPATIENT
Start: 2022-07-20 | End: 2023-12-22 | Stop reason: SDUPTHER

## 2022-07-20 RX ORDER — FUROSEMIDE 40 MG/1
40 TABLET ORAL DAILY
Qty: 90 TABLET | Refills: 2 | Status: SHIPPED | OUTPATIENT
Start: 2022-07-20 | End: 2023-09-21 | Stop reason: SDUPTHER

## 2022-07-20 RX ORDER — ATORVASTATIN CALCIUM 40 MG/1
40 TABLET, FILM COATED ORAL DAILY
Qty: 90 TABLET | Refills: 3 | Status: SHIPPED | OUTPATIENT
Start: 2022-07-20 | End: 2023-12-22 | Stop reason: SDUPTHER

## 2022-07-22 ENCOUNTER — EXTERNAL HOME HEALTH (OUTPATIENT)
Dept: HOME HEALTH SERVICES | Facility: HOSPITAL | Age: 67
End: 2022-07-22
Payer: MEDICARE

## 2022-07-24 VITALS
HEART RATE: 76 BPM | OXYGEN SATURATION: 99 % | SYSTOLIC BLOOD PRESSURE: 121 MMHG | TEMPERATURE: 98 F | RESPIRATION RATE: 18 BRPM | DIASTOLIC BLOOD PRESSURE: 62 MMHG

## 2022-07-24 NOTE — PROGRESS NOTES
"  [facility]  Medical Home Visit    Visit Date: 7/20/2022  Encounter Provider: Reynold Smith NP  PCP:  Zuleyka Sabillon MD    Subjective:      Patient ID: Edd Wills is a 67 y.o. male.    Consult Requested By:  No ref. provider found  Reason for Consult: Medical Visit by Home Care Provider    The patient is being seen at home due to a physical debility that presents a taxing effort to leave the home, to mitigate high risk of hospital readmission or due to the limited availability of reliable or safe options for transportation to the point of access to the provider. The visit meets the criteria for medical necessity as defined by CMS as "health-care services needed to prevent, diagnose, or treat an illness, injury, condition, disease, or its symptoms and that meet accepted standards of medicine." Prior to treatment on this visit the chart was reviewed and patient consent was obtained.    Chief Complaint: Follow-up for chronic medical conditions and medication review.     HPI: Edd Wills is a 66 y.o. male with HTN, HLD, morbid obesity.     Today:  Mr. Edd Wills is a 67 y.o. male. Edd presents with improved pain to lower extremities, currently on gabapentin.  He is currently receiving in home wound care and leg wraps by home health. Blisters to right lower extremity not improving, will consult [facility] home wound care service.  Right leg swelling is significantly greater than left.  Reports taking lasix as prescribed, compliant with leg wraps.  Discussed Low Na diet and elevation at rest.  He would benefit from lymphedema therapy but does not have transportation to clinic.   Denies chest pain, signs of distress, SOB, fever, chills, cough, congestion. VSS.  Reports taking all medications as prescribed. No other needs identified at this time. Risks of environmental exposure to coronavirus discussed including: social distancing, hand hygiene, and limiting departures from the home for necessities " only.  Reports understanding and willingness to comply.      Attestation: Screening criteria to assess the level of the patient's risk for infection with COVID-19 as recommended by the CDC at the time of the above documented home visit concluded appropriateness to proceed. Universal precautions were maintained at all times, including provider use of >60% alcohol gel hand  immediately prior to entry and upon departing the patient's home as well as cleaning of equipment used in home visit with antibacterial/germicidal disposable wipes.     Review of Systems   Constitutional: Negative for chills and fatigue.   HENT: Negative for congestion and postnasal drip.    Eyes: Negative for visual disturbance.   Respiratory: Negative for chest tightness and shortness of breath.    Cardiovascular: Positive for leg swelling.   Gastrointestinal: Negative for abdominal pain, constipation, diarrhea, nausea and vomiting.   Genitourinary: Negative for difficulty urinating.   Musculoskeletal: Positive for arthralgias and myalgias.   Skin: Positive for wound. Negative for color change.   Neurological: Negative for dizziness and headaches.   Hematological: Does not bruise/bleed easily.   Psychiatric/Behavioral: Negative for agitation.       Assessments:  · Environmental: single story home, no steps to enter, adequate lighting and temperature control  · Functional Status: Assistance with ADL's/IADL's, ambulates with assistance of a cane/walker, continent of bowel and bladder  · Safety: Fall Precautions, COVID Precautions/Social Distancing/Mask Use  · Nutritional: Adequate  · Home Health: [facility]   · DME/Supplies: [employer]        Objective:     Vitals:    07/20/22 1400   BP: 121/62   Pulse: 76   Resp: 18   Temp: 98 °F (36.7 °C)   SpO2: 99%   PainSc: 0-No pain     There is no height or weight on file to calculate BMI.    Physical Exam  HENT:      Head: Normocephalic.      Nose: No congestion.      Mouth/Throat:      Mouth:  Mucous membranes are moist.   Cardiovascular:      Rate and Rhythm: Normal rate.      Pulses: Normal pulses.   Pulmonary:      Effort: Pulmonary effort is normal. No respiratory distress.      Breath sounds: Normal breath sounds.   Abdominal:      General: Bowel sounds are normal.      Palpations: Abdomen is soft.   Musculoskeletal:      Cervical back: Normal range of motion and neck supple.      Right lower leg: Edema (greater than left ) present.      Left lower leg: Edema present.   Skin:     General: Skin is warm and dry.      Comments: Weeping areas and blisters noted to bilateral lower extremities likely due to peripheral edema    Neurological:      Mental Status: He is alert. Mental status is at baseline.   Psychiatric:         Mood and Affect: Mood normal.         Assessment:     1. Neuropathy    2. Neuropathy of both feet    3. Blisters of multiple sites    4. Primary hypertension    5. Morbid obesity    6. Peripheral edema      Plan:          1. Neuropathy  -     gabapentin (NEURONTIN) 600 MG tablet    2. Neuropathy of both feet  Assessment & Plan:  Improved on gabapentin       3. Blisters of multiple sites  Assessment & Plan:  Weeping areas and blisters noted to right lower extremity likely due to peripheral edema   Continue home health wound care  Ordered [facility] home wound care       4. Primary hypertension  Assessment & Plan:  Chronic, stable  Continue meds as prescribed  Continue to follow with cardiology  Low Na diet       5. Morbid obesity  Assessment & Plan:  He has been educated on the negative effects of obesity to one's health and encouraged to consider lifestyle diet modifications and exercise.       6. Peripheral edema  Assessment & Plan:  Blisters of multiple sites    Right leg greater than left, has been a chronic problem  Continue lasix as prescribed  Elevate lower extremities while sitting/lying  Continue leg wrap          Other orders  -     atorvastatin (LIPITOR) 40 MG tablet  -      furosemide (LASIX) 40 MG tablet         Were controlled substances prescribed? No    Instructions:  - [facility] Nurse Practitioner to schedule home follow-up visit with patient in 4-6 weeks or as needed.  - Continue all medications, treatments and therapies as ordered.   - Follow all instructions, recommendations as discussed.  - Maintain Safety Precautions at all times.  - Attend all medical appointments as scheduled.  - For worsening symptoms: call Primary Care Physician or Nurse Practitioner.  - For emergencies, call 911 or immediately report to the nearest emergency room.  - Limit Risks of environmental exposure to coronavirus/COVID-19 as discussed including: social distancing, hand hygiene, and limiting departures from the home for necessities only.         Follow Up Appointments:   No future appointments.    Patient consent was obtained prior to treatment on this visit.    Signature:       Reynold Smith, MSN, APRN, FNP-C  [facility]    Total face-to-face time was 60 min, >50% of this was spent on counseling and coordination of care. The following issues were discussed: primary and secondary diagnoses, co-morbidities, prescribed medications, treatment modalities, importance of compliance with medical advice and directives for follow-up care

## 2022-07-24 NOTE — PATIENT INSTRUCTIONS
Instructions:  - [facility] to schedule home follow-up visit with patient in 4-6 weeks or as needed.  - Continue all medications, treatments and therapies as ordered.   - Follow all instructions, recommendations as discussed.  - Maintain Safety Precautions at all times.  - Attend all medical appointments as scheduled.  - For worsening symptoms: call Primary Care Physician or Nurse Practitioner.  - For emergencies, call 911 or immediately report to the nearest emergency room.  - Limit Risks of environmental exposure to coronavirus/COVID-19 as discussed including: social distancing, hand hygiene, and limiting departures from the home for necessities only.

## 2022-07-24 NOTE — ASSESSMENT & PLAN NOTE
Weeping areas and blisters noted to right lower extremity likely due to peripheral edema   Continue home health wound care  Ordered [facility] home wound care

## 2022-07-24 NOTE — ASSESSMENT & PLAN NOTE
Blisters of multiple sites    Right leg greater than left, has been a chronic problem  Continue lasix as prescribed  Elevate lower extremities while sitting/lying  Continue leg wrap

## 2022-07-25 PROCEDURE — G0179 MD RECERTIFICATION HHA PT: HCPCS | Mod: ,,, | Performed by: NURSE PRACTITIONER

## 2022-07-25 PROCEDURE — G0179 PR HOME HEALTH MD RECERTIFICATION: ICD-10-PCS | Mod: ,,, | Performed by: NURSE PRACTITIONER

## 2022-08-24 ENCOUNTER — EXTERNAL HOME HEALTH (OUTPATIENT)
Dept: HOME HEALTH SERVICES | Facility: HOSPITAL | Age: 67
End: 2022-08-24
Payer: MEDICARE

## 2022-08-30 ENCOUNTER — EXTERNAL HOME HEALTH (OUTPATIENT)
Dept: HOME HEALTH SERVICES | Facility: HOSPITAL | Age: 67
End: 2022-08-30
Payer: MEDICARE

## 2022-09-01 ENCOUNTER — DOCUMENT SCAN (OUTPATIENT)
Dept: HOME HEALTH SERVICES | Facility: HOSPITAL | Age: 67
End: 2022-09-01
Payer: MEDICARE

## 2022-09-15 ENCOUNTER — DOCUMENT SCAN (OUTPATIENT)
Dept: HOME HEALTH SERVICES | Facility: HOSPITAL | Age: 67
End: 2022-09-15
Payer: MEDICARE

## 2022-09-16 ENCOUNTER — LAB VISIT (OUTPATIENT)
Dept: PRIMARY CARE CLINIC | Facility: CLINIC | Age: 67
End: 2022-09-16
Payer: MEDICARE

## 2022-09-16 DIAGNOSIS — I87.2 PERIPHERAL VENOUS INSUFFICIENCY: Primary | ICD-10-CM

## 2022-09-16 DIAGNOSIS — I87.2 PERIPHERAL VENOUS INSUFFICIENCY: ICD-10-CM

## 2022-09-16 PROCEDURE — 85027 COMPLETE CBC AUTOMATED: CPT

## 2022-09-16 PROCEDURE — 85652 RBC SED RATE AUTOMATED: CPT

## 2022-09-17 ENCOUNTER — LAB VISIT (OUTPATIENT)
Dept: LAB | Facility: HOSPITAL | Age: 67
End: 2022-09-17
Payer: MEDICARE

## 2022-09-17 DIAGNOSIS — I87.2 PERIPHERAL VENOUS INSUFFICIENCY: Primary | ICD-10-CM

## 2022-09-17 LAB
ERYTHROCYTE [DISTWIDTH] IN BLOOD BY AUTOMATED COUNT: 15.8 % (ref 11.5–14.5)
ERYTHROCYTE [SEDIMENTATION RATE] IN BLOOD BY PHOTOMETRIC METHOD: 78 MM/HR (ref 0–23)
HCT VFR BLD AUTO: 40.4 % (ref 40–54)
HGB BLD-MCNC: 12.7 G/DL (ref 14–18)
MCH RBC QN AUTO: 27.1 PG (ref 27–31)
MCHC RBC AUTO-ENTMCNC: 31.4 G/DL (ref 32–36)
MCV RBC AUTO: 86 FL (ref 82–98)
PLATELET # BLD AUTO: 295 K/UL (ref 150–450)
PMV BLD AUTO: 11.7 FL (ref 9.2–12.9)
RBC # BLD AUTO: 4.69 M/UL (ref 4.6–6.2)
WBC # BLD AUTO: 6.47 K/UL (ref 3.9–12.7)

## 2022-09-17 PROCEDURE — 36415 COLL VENOUS BLD VENIPUNCTURE: CPT

## 2022-09-21 DIAGNOSIS — I10 ESSENTIAL HYPERTENSION: ICD-10-CM

## 2022-09-21 RX ORDER — DICLOFENAC SODIUM 10 MG/G
2 GEL TOPICAL DAILY
Qty: 30 EACH | Refills: 3 | Status: SHIPPED | OUTPATIENT
Start: 2022-09-21

## 2022-09-21 RX ORDER — CARVEDILOL 25 MG/1
25 TABLET ORAL 2 TIMES DAILY
Qty: 180 TABLET | Refills: 3 | Status: SHIPPED | OUTPATIENT
Start: 2022-09-21 | End: 2023-12-22 | Stop reason: DRUGHIGH

## 2022-09-21 RX ORDER — AMLODIPINE BESYLATE 10 MG/1
10 TABLET ORAL DAILY
Qty: 90 TABLET | Refills: 3 | Status: SHIPPED | OUTPATIENT
Start: 2022-09-21 | End: 2023-12-22 | Stop reason: DRUGHIGH

## 2022-09-23 PROCEDURE — G0179 PR HOME HEALTH MD RECERTIFICATION: ICD-10-PCS | Mod: ,,, | Performed by: NURSE PRACTITIONER

## 2022-09-23 PROCEDURE — G0179 MD RECERTIFICATION HHA PT: HCPCS | Mod: ,,, | Performed by: NURSE PRACTITIONER

## 2022-09-28 ENCOUNTER — CARE AT HOME (OUTPATIENT)
Dept: HOME HEALTH SERVICES | Facility: CLINIC | Age: 67
End: 2022-09-28
Payer: MEDICARE

## 2022-09-28 DIAGNOSIS — R53.81 PHYSICAL DECONDITIONING: ICD-10-CM

## 2022-09-28 DIAGNOSIS — I89.0 LYMPHEDEMA: ICD-10-CM

## 2022-09-28 DIAGNOSIS — G57.93 NEUROPATHY OF BOTH FEET: ICD-10-CM

## 2022-09-28 DIAGNOSIS — I10 PRIMARY HYPERTENSION: ICD-10-CM

## 2022-09-28 DIAGNOSIS — E66.01 MORBID OBESITY: ICD-10-CM

## 2022-09-28 DIAGNOSIS — I87.8 VENOUS STASIS: ICD-10-CM

## 2022-09-28 PROCEDURE — 99349 HOME/RES VST EST MOD MDM 40: CPT | Mod: S$GLB,,, | Performed by: NURSE PRACTITIONER

## 2022-09-28 PROCEDURE — 99349 PR HOME VISIT,ESTAB PATIENT,LEVEL III: ICD-10-PCS | Mod: S$GLB,,, | Performed by: NURSE PRACTITIONER

## 2022-10-04 VITALS
TEMPERATURE: 98 F | SYSTOLIC BLOOD PRESSURE: 139 MMHG | OXYGEN SATURATION: 98 % | DIASTOLIC BLOOD PRESSURE: 81 MMHG | RESPIRATION RATE: 20 BRPM | HEART RATE: 88 BPM

## 2022-10-04 PROBLEM — I89.0 LYMPHEDEMA: Status: ACTIVE | Noted: 2022-10-04

## 2022-10-05 NOTE — PROGRESS NOTES
"  [facility]  Medical Home Visit    Visit Date: 9/28/2022  Encounter Provider: Reynold Smith NP  PCP:  Zuleyka Sabillon MD    Subjective:      Patient ID: Edd Wills is a 67 y.o. male.    Consult Requested By:  No ref. provider found  Reason for Consult: Medical Visit by Home Care Provider    The patient is being seen at home due to a physical debility that presents a taxing effort to leave the home, to mitigate high risk of hospital readmission or due to the limited availability of reliable or safe options for transportation to the point of access to the provider. The visit meets the criteria for medical necessity as defined by CMS as "health-care services needed to prevent, diagnose, or treat an illness, injury, condition, disease, or its symptoms and that meet accepted standards of medicine." Prior to treatment on this visit the chart was reviewed and patient consent was obtained.    Chief Complaint: Follow-up for chronic medical conditions and medication review.     HPI: Edd Wills is a 66 y.o. male with HTN, HLD, morbid obesity.     Today:  Mr. Edd Wills is a 67 y.o. male. Edd presents with worsening edema to right lower extremity, declining functional ability due to worsening edema.  Edema due to lymphedema and possible venous insufficiency. Previously referred to lymphedema clinic, however, patient's mobility continues to decline and patient unable to transport to clinic.  Attempted to set up with home lymphedema therapy, however, not available at this time.  He is currently receiving in home wound care and leg wraps by home health. [facility] wound care managing wound.  Blisters to right lower extremity not improving.  Right leg swelling is significantly greater than left.  Reports taking lasix as prescribed, compliant with leg wraps.  Discussed Low Na diet and elevation at rest. Denies chest pain, signs of distress, SOB, fever, chills, cough, congestion. VSS.  Reports taking all " medications as prescribed. No other needs identified at this time. Risks of environmental exposure to coronavirus discussed including: social distancing, hand hygiene, and limiting departures from the home for necessities only.  Reports understanding and willingness to comply.      Attestation: Screening criteria to assess the level of the patient's risk for infection with COVID-19 as recommended by the CDC at the time of the above documented home visit concluded appropriateness to proceed. Universal precautions were maintained at all times, including provider use of >60% alcohol gel hand  immediately prior to entry and upon departing the patient's home as well as cleaning of equipment used in home visit with antibacterial/germicidal disposable wipes.     Review of Systems   Constitutional:  Negative for chills and fatigue.   HENT:  Negative for congestion and postnasal drip.    Eyes:  Negative for visual disturbance.   Respiratory:  Negative for chest tightness and shortness of breath.    Cardiovascular:  Positive for leg swelling.   Gastrointestinal:  Negative for abdominal pain, constipation, diarrhea, nausea and vomiting.   Genitourinary:  Negative for difficulty urinating.   Musculoskeletal:  Positive for arthralgias and myalgias.   Skin:  Positive for wound. Negative for color change.   Neurological:  Negative for dizziness and headaches.   Hematological:  Does not bruise/bleed easily.   Psychiatric/Behavioral:  Negative for agitation.      Assessments:  Environmental: single story home, no steps to enter, adequate lighting and temperature control  Functional Status: Assistance with ADL's/IADL's, ambulates with assistance of a cane/walker, continent of bowel and bladder  Safety: Fall Precautions, COVID Precautions/Social Distancing/Mask Use  Nutritional: Adequate  Home Health: [facility]  DME/Supplies: [employer]        Objective:     Vitals:    09/28/22 1200   BP: 139/81    Pulse: 88   Resp: 20   Temp: 98.3 °F (36.8 °C)   SpO2: 98%     There is no height or weight on file to calculate BMI.    Physical Exam  HENT:      Head: Normocephalic.      Nose: No congestion.      Mouth/Throat:      Mouth: Mucous membranes are moist.   Cardiovascular:      Rate and Rhythm: Normal rate.      Pulses: Normal pulses.   Pulmonary:      Effort: Pulmonary effort is normal. No respiratory distress.      Breath sounds: Normal breath sounds.   Abdominal:      General: Bowel sounds are normal.      Palpations: Abdomen is soft.   Musculoskeletal:      Cervical back: Normal range of motion and neck supple.      Right lower leg: Edema (greater than left ) present.      Left lower leg: Edema present.   Skin:     General: Skin is warm and dry.      Comments: Peripheral edema noted, right greater than left. Weeping ulcerated, blistered areas to right lower extremity.   Neurological:      Mental Status: He is alert. Mental status is at baseline.   Psychiatric:         Mood and Affect: Mood normal.       Assessment:     1. Lymphedema    2. Morbid obesity    3. Physical deconditioning    4. Venous stasis    5. Primary hypertension    6. Neuropathy of both feet        Plan:          1. Lymphedema    2. Morbid obesity  Assessment & Plan:  He has been educated on the negative effects of obesity to one's health and encouraged to consider lifestyle diet modifications and exercise.       3. Physical deconditioning  Assessment & Plan:  Mobility continues to decline, likely due to leg swelling.  Patient lives alone, needs more assistance with ADLs.  Will refer to rehab       4. Venous stasis  Assessment & Plan:  Lymphedema    Continue home health wound care for now  Elevated low extremities while sitting/lying  Low Na diet   Continue furosemide  Could benefit from inpatient therapy and wound care, will refer      5. Primary hypertension  Assessment & Plan:  Chronic  Continue meds as prescribed  Continue home health wound  care      6. Neuropathy of both feet  Assessment & Plan:  Continue gabapentin              Were controlled substances prescribed? No    Instructions:  - [facility] Nurse Practitioner to schedule home follow-up visit with patient in 4-6 weeks or as needed.  - Continue all medications, treatments and therapies as ordered.   - Follow all instructions, recommendations as discussed.  - Maintain Safety Precautions at all times.  - Attend all medical appointments as scheduled.  - For worsening symptoms: call Primary Care Physician or Nurse Practitioner.  - For emergencies, call 911 or immediately report to the nearest emergency room.  - Limit Risks of environmental exposure to coronavirus/COVID-19 as discussed including: social distancing, hand hygiene, and limiting departures from the home for necessities only.         Follow Up Appointments:   Future Appointments   Date Time Provider Department Center   11/4/2022 10:30 AM Rambo Alracon MD PhD [facility]       Patient consent was obtained prior to treatment on this visit.    Signature:       Reynold Smith, JOSEPH, APRN, FNP-C  [facility]    Total face-to-face time was 60 min, >50% of this was spent on counseling and coordination of care. The following issues were discussed: primary and secondary diagnoses, co-morbidities, prescribed medications, treatment modalities, importance of compliance with medical advice and directives for follow-up care

## 2022-10-05 NOTE — ASSESSMENT & PLAN NOTE
Lymphedema    Continue home health wound care for now  Elevated low extremities while sitting/lying  Low Na diet   Continue furosemide  Could benefit from inpatient therapy and wound care, will refer

## 2022-10-05 NOTE — ASSESSMENT & PLAN NOTE
Mobility continues to decline, likely due to leg swelling.  Patient lives alone, needs more assistance with ADLs.  Will refer to rehab

## 2022-10-05 NOTE — PATIENT INSTRUCTIONS
Instructions:  - [facility] to schedule home follow-up visit with patient in 4-6 weeks or as needed.  - Continue all medications, treatments and therapies as ordered.   - Follow all instructions, recommendations as discussed.  - Maintain Safety Precautions at all times.  - Attend all medical appointments as scheduled.  - For worsening symptoms: call Primary Care Physician or Nurse Practitioner.  - For emergencies, call 911 or immediately report to the nearest emergency room.  - Limit Risks of environmental exposure to coronavirus/COVID-19 as discussed including: social distancing, hand hygiene, and limiting departures from the home for necessities only.

## 2022-10-06 DIAGNOSIS — I89.0 LYMPHEDEMA: Primary | ICD-10-CM

## 2022-10-06 DIAGNOSIS — R53.81 DECLINING FUNCTIONAL STATUS: ICD-10-CM

## 2022-10-06 NOTE — PROGRESS NOTES
Admit to [facility]  Consult wound care, PT/OT         ADRIANNA Bains FNP-C   Nurse Practitioner- [facility]

## 2022-10-11 ENCOUNTER — EXTERNAL HOME HEALTH (OUTPATIENT)
Dept: HOME HEALTH SERVICES | Facility: HOSPITAL | Age: 67
End: 2022-10-11
Payer: MEDICARE

## 2022-10-28 ENCOUNTER — PATIENT OUTREACH (OUTPATIENT)
Dept: ADMINISTRATIVE | Facility: HOSPITAL | Age: 67
End: 2022-10-28
Payer: MEDICARE

## 2022-11-07 ENCOUNTER — DOCUMENT SCAN (OUTPATIENT)
Dept: HOME HEALTH SERVICES | Facility: HOSPITAL | Age: 67
End: 2022-11-07
Payer: MEDICARE

## 2022-11-12 ENCOUNTER — DOCUMENT SCAN (OUTPATIENT)
Dept: HOME HEALTH SERVICES | Facility: HOSPITAL | Age: 67
End: 2022-11-12
Payer: MEDICARE

## 2022-11-12 ENCOUNTER — DOCUMENT SCAN (OUTPATIENT)
Dept: HOME HEALTH SERVICES | Facility: HOSPITAL | Age: 67
End: 2022-11-12

## 2022-11-17 ENCOUNTER — LAB VISIT (OUTPATIENT)
Dept: LAB | Facility: HOSPITAL | Age: 67
End: 2022-11-17
Payer: MEDICARE

## 2022-11-17 DIAGNOSIS — I87.2 PERIPHERAL VENOUS INSUFFICIENCY: Primary | ICD-10-CM

## 2022-11-17 LAB
ALBUMIN SERPL BCP-MCNC: 3.7 G/DL (ref 3.5–5.2)
ALP SERPL-CCNC: 87 U/L (ref 55–135)
ALT SERPL W/O P-5'-P-CCNC: 10 U/L (ref 10–44)
ANION GAP SERPL CALC-SCNC: 11 MMOL/L (ref 8–16)
AST SERPL-CCNC: 18 U/L (ref 10–40)
BILIRUB SERPL-MCNC: 0.5 MG/DL (ref 0.1–1)
BUN SERPL-MCNC: 15 MG/DL (ref 8–23)
CALCIUM SERPL-MCNC: 9.3 MG/DL (ref 8.7–10.5)
CHLORIDE SERPL-SCNC: 104 MMOL/L (ref 95–110)
CO2 SERPL-SCNC: 25 MMOL/L (ref 23–29)
CREAT SERPL-MCNC: 0.9 MG/DL (ref 0.5–1.4)
CRP SERPL-MCNC: 8.7 MG/L (ref 0–8.2)
ERYTHROCYTE [DISTWIDTH] IN BLOOD BY AUTOMATED COUNT: 15.9 % (ref 11.5–14.5)
ERYTHROCYTE [SEDIMENTATION RATE] IN BLOOD BY PHOTOMETRIC METHOD: 40 MM/HR (ref 0–23)
EST. GFR  (NO RACE VARIABLE): >60 ML/MIN/1.73 M^2
GLUCOSE SERPL-MCNC: 49 MG/DL (ref 70–110)
HCT VFR BLD AUTO: 40.3 % (ref 40–54)
HGB BLD-MCNC: 12.4 G/DL (ref 14–18)
MCH RBC QN AUTO: 26.4 PG (ref 27–31)
MCHC RBC AUTO-ENTMCNC: 30.8 G/DL (ref 32–36)
MCV RBC AUTO: 86 FL (ref 82–98)
PLATELET # BLD AUTO: 235 K/UL (ref 150–450)
PMV BLD AUTO: 11.8 FL (ref 9.2–12.9)
POTASSIUM SERPL-SCNC: 4.8 MMOL/L (ref 3.5–5.1)
PREALB SERPL-MCNC: 18 MG/DL (ref 20–43)
PROT SERPL-MCNC: 7.7 G/DL (ref 6–8.4)
RBC # BLD AUTO: 4.7 M/UL (ref 4.6–6.2)
SODIUM SERPL-SCNC: 140 MMOL/L (ref 136–145)
WBC # BLD AUTO: 6.78 K/UL (ref 3.9–12.7)

## 2022-11-17 PROCEDURE — 80053 COMPREHEN METABOLIC PANEL: CPT

## 2022-11-17 PROCEDURE — 86140 C-REACTIVE PROTEIN: CPT

## 2022-11-17 PROCEDURE — 85027 COMPLETE CBC AUTOMATED: CPT

## 2022-11-17 PROCEDURE — 84134 ASSAY OF PREALBUMIN: CPT

## 2022-11-17 PROCEDURE — 85652 RBC SED RATE AUTOMATED: CPT

## 2022-11-21 ENCOUNTER — DOCUMENT SCAN (OUTPATIENT)
Dept: HOME HEALTH SERVICES | Facility: HOSPITAL | Age: 67
End: 2022-11-21
Payer: MEDICARE

## 2022-11-22 PROCEDURE — G0179 PR HOME HEALTH MD RECERTIFICATION: ICD-10-PCS | Mod: ,,, | Performed by: NURSE PRACTITIONER

## 2022-11-22 PROCEDURE — G0179 MD RECERTIFICATION HHA PT: HCPCS | Mod: ,,, | Performed by: NURSE PRACTITIONER

## 2022-12-05 ENCOUNTER — PATIENT OUTREACH (OUTPATIENT)
Dept: INTERNAL MEDICINE | Facility: CLINIC | Age: 67
End: 2022-12-05
Payer: MEDICARE

## 2022-12-09 ENCOUNTER — TELEPHONE (OUTPATIENT)
Dept: HOME HEALTH SERVICES | Facility: CLINIC | Age: 67
End: 2022-12-09
Payer: MEDICARE

## 2022-12-10 NOTE — TELEPHONE ENCOUNTER
I spoke with [facility] wound care provider, Austin.  Austin has been managing patient's wounds in the home. He stated he is currently treating wound to right heel that is almost closed, right thigh mass has increased swelling, lower extremities are weeping profusely since yesterday.  He has about 4-5 blisters that have burst to lower extremities.  Austin dressed and wrapped wounds today.  He is working on getting patient leg pumps for lymphedema.  I instructed patient to increase lasix to 40mg bid for 3days.  Reports no signs of distress.  Will follow up next week.

## 2022-12-12 ENCOUNTER — LAB VISIT (OUTPATIENT)
Dept: LAB | Facility: HOSPITAL | Age: 67
End: 2022-12-12
Attending: NURSE PRACTITIONER
Payer: MEDICARE

## 2022-12-12 DIAGNOSIS — I11.9 MALIGNANT HYPERTENSIVE HEART DISEASE WITHOUT HEART FAILURE: Primary | ICD-10-CM

## 2022-12-12 LAB
ALBUMIN SERPL BCP-MCNC: 3.3 G/DL (ref 3.5–5.2)
ALP SERPL-CCNC: 83 U/L (ref 55–135)
ALT SERPL W/O P-5'-P-CCNC: 15 U/L (ref 10–44)
ANION GAP SERPL CALC-SCNC: 12 MMOL/L (ref 8–16)
AST SERPL-CCNC: 18 U/L (ref 10–40)
BASOPHILS # BLD AUTO: 0.04 K/UL (ref 0–0.2)
BASOPHILS NFR BLD: 0.4 % (ref 0–1.9)
BILIRUB SERPL-MCNC: 0.5 MG/DL (ref 0.1–1)
BUN SERPL-MCNC: 18 MG/DL (ref 8–23)
CALCIUM SERPL-MCNC: 9.4 MG/DL (ref 8.7–10.5)
CHLORIDE SERPL-SCNC: 103 MMOL/L (ref 95–110)
CO2 SERPL-SCNC: 23 MMOL/L (ref 23–29)
CREAT SERPL-MCNC: 0.9 MG/DL (ref 0.5–1.4)
DIFFERENTIAL METHOD: ABNORMAL
EOSINOPHIL # BLD AUTO: 0.2 K/UL (ref 0–0.5)
EOSINOPHIL NFR BLD: 2.6 % (ref 0–8)
ERYTHROCYTE [DISTWIDTH] IN BLOOD BY AUTOMATED COUNT: 15.6 % (ref 11.5–14.5)
EST. GFR  (NO RACE VARIABLE): >60 ML/MIN/1.73 M^2
GLUCOSE SERPL-MCNC: 62 MG/DL (ref 70–110)
HCT VFR BLD AUTO: 38.3 % (ref 40–54)
HGB BLD-MCNC: 12.1 G/DL (ref 14–18)
IMM GRANULOCYTES # BLD AUTO: 0.02 K/UL (ref 0–0.04)
IMM GRANULOCYTES NFR BLD AUTO: 0.2 % (ref 0–0.5)
LYMPHOCYTES # BLD AUTO: 2.3 K/UL (ref 1–4.8)
LYMPHOCYTES NFR BLD: 25.2 % (ref 18–48)
MCH RBC QN AUTO: 26 PG (ref 27–31)
MCHC RBC AUTO-ENTMCNC: 31.6 G/DL (ref 32–36)
MCV RBC AUTO: 82 FL (ref 82–98)
MONOCYTES # BLD AUTO: 1 K/UL (ref 0.3–1)
MONOCYTES NFR BLD: 11 % (ref 4–15)
NEUTROPHILS # BLD AUTO: 5.5 K/UL (ref 1.8–7.7)
NEUTROPHILS NFR BLD: 60.6 % (ref 38–73)
NRBC BLD-RTO: 0 /100 WBC
PLATELET # BLD AUTO: 381 K/UL (ref 150–450)
PMV BLD AUTO: 10.4 FL (ref 9.2–12.9)
POTASSIUM SERPL-SCNC: 3.5 MMOL/L (ref 3.5–5.1)
PROT SERPL-MCNC: 8 G/DL (ref 6–8.4)
RBC # BLD AUTO: 4.65 M/UL (ref 4.6–6.2)
SODIUM SERPL-SCNC: 138 MMOL/L (ref 136–145)
WBC # BLD AUTO: 9.13 K/UL (ref 3.9–12.7)

## 2022-12-12 PROCEDURE — 85025 COMPLETE CBC W/AUTO DIFF WBC: CPT | Performed by: NURSE PRACTITIONER

## 2022-12-12 PROCEDURE — 80053 COMPREHEN METABOLIC PANEL: CPT | Performed by: NURSE PRACTITIONER

## 2022-12-13 ENCOUNTER — CARE AT HOME (OUTPATIENT)
Dept: HOME HEALTH SERVICES | Facility: CLINIC | Age: 67
End: 2022-12-13
Payer: MEDICARE

## 2022-12-13 DIAGNOSIS — G57.93 NEUROPATHY OF BOTH FEET: ICD-10-CM

## 2022-12-13 DIAGNOSIS — R23.8 BLISTERS OF MULTIPLE SITES: ICD-10-CM

## 2022-12-13 DIAGNOSIS — E78.2 MIXED HYPERLIPIDEMIA: ICD-10-CM

## 2022-12-13 DIAGNOSIS — I51.89 GRADE I DIASTOLIC DYSFUNCTION: ICD-10-CM

## 2022-12-13 DIAGNOSIS — E66.01 MORBID OBESITY: ICD-10-CM

## 2022-12-13 DIAGNOSIS — I10 PRIMARY HYPERTENSION: ICD-10-CM

## 2022-12-13 DIAGNOSIS — R60.0 PERIPHERAL EDEMA: ICD-10-CM

## 2022-12-13 PROCEDURE — 99348 HOME/RES VST EST LOW MDM 30: CPT | Mod: ,,, | Performed by: NURSE PRACTITIONER

## 2022-12-13 PROCEDURE — 99348 PR HOME VISIT,ESTAB PATIENT,LEVEL II: ICD-10-PCS | Mod: ,,, | Performed by: NURSE PRACTITIONER

## 2022-12-14 VITALS
OXYGEN SATURATION: 98 % | HEART RATE: 72 BPM | RESPIRATION RATE: 20 BRPM | DIASTOLIC BLOOD PRESSURE: 78 MMHG | SYSTOLIC BLOOD PRESSURE: 160 MMHG

## 2022-12-15 NOTE — PATIENT INSTRUCTIONS
Instructions:  - [facility] Nurse Practitioner to schedule home follow-up visit with patient in 4-6 weeks or as needed.  - Continue all medications, treatments and therapies as ordered.   - Follow all instructions, recommendations as discussed.  - Maintain Safety Precautions at all times.  - Attend all medical appointments as scheduled.  - For worsening symptoms: call Primary Care Physician or Nurse Practitioner.  - For emergencies, call 911 or immediately report to the nearest emergency room.  - Limit Risks of environmental exposure to coronavirus/COVID-19 as discussed including: social distancing, hand hygiene, and limiting departures from the home for necessities only.

## 2022-12-15 NOTE — PROGRESS NOTES
"  [facility]  Medical Home Visit    Visit Date: 12/13/22  Encounter Provider: Reynold Smith NP  PCP:  Zuleyka Sabillon MD    Subjective:      Patient ID: Edd Wills is a 67 y.o. male.    Consult Requested By:  No ref. provider found  Reason for Consult: Medical Visit by Home Care Provider    The patient is being seen at home due to a physical debility that presents a taxing effort to leave the home, to mitigate high risk of hospital readmission or due to the limited availability of reliable or safe options for transportation to the point of access to the provider. The visit meets the criteria for medical necessity as defined by CMS as "health-care services needed to prevent, diagnose, or treat an illness, injury, condition, disease, or its symptoms and that meet accepted standards of medicine." Prior to treatment on this visit the chart was reviewed and patient consent was obtained.    Chief Complaint: Follow-up for chronic medical conditions and medication review/peripheral edema/lymphedema.     HPI: Edd Wills is a 67 y.o. male with HTN, HLD, morbid obesity.     Today:  Mr. Edd Wills is a 67 y.o. male. Edd presents with worsening peripheral edema/lymphedema.  He was previously instructed to double lasix to twice daily for 3 days but has not had any improvement in peripheral edema/lymphedema.  He endorses high sodium diet.  He reports eating a lot of quinonez and pork.  He also sits with legs not elevated most of day.  BP elevated on exam, he reports non-compliance with medication regimen.  Mr. Wills is, overall, non-compliant with plan of care.  Discussed, in detail, the risk of non-compliance, patient verbalized understanding.  He is current with [facility] and [facility] home wound care.  [facility] is working on getting home leg compressions for lymphedema therapy.  His right lower extremity wounds are not visible, they are dressed, dressing not changed due to no wound care supplies in home.  " Patient awaiting home health to deliver supplies. Denies chest pain, signs of distress, SOB, fever, chills, cough, congestion. Reports good bm pattern, sleep pattern, appetite. No other needs identified at this time. Risks of environmental exposure to coronavirus discussed including: social distancing, hand hygiene, and limiting departures from the home for necessities only.  Reports understanding and willingness to comply.      Attestation: Screening criteria to assess the level of the patient's risk for infection with COVID-19 as recommended by the CDC at the time of the above documented home visit concluded appropriateness to proceed. Universal precautions were maintained at all times, including provider use of >60% alcohol gel hand  immediately prior to entry and upon departing the patient's home as well as cleaning of equipment used in home visit with antibacterial/germicidal disposable wipes.     Review of Systems   Constitutional:  Negative for chills and fatigue.   HENT:  Negative for congestion and postnasal drip.    Eyes:  Negative for visual disturbance.   Respiratory:  Negative for chest tightness and shortness of breath.    Cardiovascular:  Positive for leg swelling.   Gastrointestinal:  Negative for abdominal pain, constipation, diarrhea, nausea and vomiting.   Genitourinary:  Negative for difficulty urinating.   Musculoskeletal:  Positive for gait problem.   Skin:  Positive for wound. Negative for color change.   Neurological:  Negative for dizziness and headaches.   Hematological:  Does not bruise/bleed easily.   Psychiatric/Behavioral:  Negative for agitation.      Assessments:  Environmental: single story home, no steps to enter, adequate lighting and temperature control  Functional Status: Assistance with ADL's/IADL's, ambulates with assistance of a cane/walker, continent of bowel and bladder  Safety: Fall Precautions, COVID Precautions/Social Distancing/Mask Use  Nutritional:  Adequate  Home Health: [facility]  DME/Supplies: Rollator        Objective:     Vitals:    12/13/22 1100   BP: (!) 160/78   Pulse: 72   Resp: 20   SpO2: 98%   PainSc: 0-No pain     There is no height or weight on file to calculate BMI.    Physical Exam  HENT:      Head: Normocephalic.      Nose: No congestion.      Mouth/Throat:      Mouth: Mucous membranes are moist.   Cardiovascular:      Rate and Rhythm: Normal rate.      Pulses: Normal pulses.   Pulmonary:      Effort: Pulmonary effort is normal. No respiratory distress.      Breath sounds: Normal breath sounds.   Abdominal:      General: Bowel sounds are normal.      Palpations: Abdomen is soft.   Musculoskeletal:      Cervical back: Normal range of motion and neck supple.      Right lower leg: Edema (greater than left ) present.      Left lower leg: Edema present.   Skin:     General: Skin is warm and dry.      Comments: Dressings to right leg, clean, dry, intact    Neurological:      Mental Status: He is alert. Mental status is at baseline.   Psychiatric:         Mood and Affect: Mood normal.       Assessment:     1. Neuropathy of both feet    2. Blisters of multiple sites    3. Primary hypertension    4. Mixed hyperlipidemia    5. Grade I diastolic dysfunction    6. Morbid obesity    7. Peripheral edema        Plan:          1. Neuropathy of both feet  Assessment & Plan:  Continue gabapentin       2. Blisters of multiple sites  Assessment & Plan:  Due to increased peripheral edema   Continue home health wound care  Discussed low Na diet, medication compliance and elevation of lower extremities to assist with decreasing edema.       3. Primary hypertension  Assessment & Plan:  BP elevated  Reports non-compliance  Continue norvasc, coreg, lisinopril and lasix as prescribed       4. Mixed hyperlipidemia  Assessment & Plan:  Denies chest pain  Continue statin       5. Grade I diastolic dysfunction  Assessment & Plan:  Denies chest  pain, SOB  Peripheral edema noted on exam  Continue medication as prescribed  Keep scheduled follow up appts  Activity and Diet restrictions:   Recommend 2-3 gram sodium restriction and 1500cc- 2000cc fluid restriction.  Encourage physical activity with graded exercise program.  Requested patient to weigh themselves daily, and to notify us if their weight increases by more than 3 lbs in 1 day or 5 lbs in 3 days.  Elevate lower extremities while sitting/lying         6. Morbid obesity  Assessment & Plan:  He has been educated on the negative effects of obesity to one's health and encouraged to consider lifestyle diet modifications and exercise.       7. Peripheral edema  Assessment & Plan:  Non-compliant with treatment regimen   Discussed low Na diet, medication compliance and elevation of lower extremities to assist with decreasing edema.   Continue home health wound care and compression wraps                Were controlled substances prescribed? No    Instructions:  - [facility] Nurse Practitioner to schedule home follow-up visit with patient in 4-6 weeks or as needed.  - Continue all medications, treatments and therapies as ordered.   - Follow all instructions, recommendations as discussed.  - Maintain Safety Precautions at all times.  - Attend all medical appointments as scheduled.  - For worsening symptoms: call Primary Care Physician or Nurse Practitioner.  - For emergencies, call 911 or immediately report to the nearest emergency room.  - Limit Risks of environmental exposure to coronavirus/COVID-19 as discussed including: social distancing, hand hygiene, and limiting departures from the home for necessities only.         Follow Up Appointments:   No future appointments.    Patient consent was obtained prior to treatment on this visit.    Signature:       Reynold Smith, MSN, APRN, FNP-C  [facility]    Total face-to-face time was 24 min, >50% of this was spent on counseling and coordination of care. The  following issues were discussed: primary and secondary diagnoses, co-morbidities, prescribed medications, treatment modalities, importance of compliance with medical advice and directives for follow-up care

## 2022-12-15 NOTE — ASSESSMENT & PLAN NOTE
Non-compliant with treatment regimen   Discussed low Na diet, medication compliance and elevation of lower extremities to assist with decreasing edema.   Continue home health wound care and compression wraps

## 2022-12-15 NOTE — ASSESSMENT & PLAN NOTE
Due to increased peripheral edema   Continue home health wound care  Discussed low Na diet, medication compliance and elevation of lower extremities to assist with decreasing edema.

## 2022-12-15 NOTE — ASSESSMENT & PLAN NOTE
1. Denies chest pain, SOB  2. Peripheral edema noted on exam  3. Continue medication as prescribed  4. Keep scheduled follow up appts  5. Activity and Diet restrictions:   ? Recommend 2-3 gram sodium restriction and 1500cc- 2000cc fluid restriction.  ? Encourage physical activity with graded exercise program.  ? Requested patient to weigh themselves daily, and to notify us if their weight increases by more than 3 lbs in 1 day or 5 lbs in 3 days.  ? Elevate lower extremities while sitting/lying

## 2022-12-22 RX ORDER — LISINOPRIL 40 MG/1
40 TABLET ORAL DAILY
Qty: 90 TABLET | Refills: 3 | Status: SHIPPED | OUTPATIENT
Start: 2022-12-22 | End: 2023-02-28 | Stop reason: SDUPTHER

## 2022-12-22 RX ORDER — POTASSIUM CHLORIDE 20 MEQ/1
20 TABLET, EXTENDED RELEASE ORAL 2 TIMES DAILY
Qty: 60 TABLET | Refills: 3 | Status: ON HOLD | OUTPATIENT
Start: 2022-12-22 | End: 2023-05-31 | Stop reason: HOSPADM

## 2022-12-30 ENCOUNTER — PATIENT OUTREACH (OUTPATIENT)
Dept: HOME HEALTH SERVICES | Facility: HOSPITAL | Age: 67
End: 2022-12-30
Payer: MEDICARE

## 2023-01-03 ENCOUNTER — LAB VISIT (OUTPATIENT)
Dept: LAB | Facility: HOSPITAL | Age: 68
End: 2023-01-03
Attending: NURSE PRACTITIONER
Payer: MEDICARE

## 2023-01-03 ENCOUNTER — TELEPHONE (OUTPATIENT)
Dept: INTERNAL MEDICINE | Facility: CLINIC | Age: 68
End: 2023-01-03
Payer: MEDICARE

## 2023-01-03 ENCOUNTER — EXTERNAL HOME HEALTH (OUTPATIENT)
Dept: HOME HEALTH SERVICES | Facility: HOSPITAL | Age: 68
End: 2023-01-03
Payer: MEDICARE

## 2023-01-03 DIAGNOSIS — E65 PANNICULUS: ICD-10-CM

## 2023-01-03 DIAGNOSIS — Z86.79 HISTORY OF AORTIC DISSECTION: ICD-10-CM

## 2023-01-03 DIAGNOSIS — S14.109A INJURY OF CERVICAL SPINAL CORD, INITIAL ENCOUNTER: Primary | ICD-10-CM

## 2023-01-03 DIAGNOSIS — I87.2 PERIPHERAL VENOUS INSUFFICIENCY: Primary | ICD-10-CM

## 2023-01-03 LAB
ALBUMIN SERPL BCP-MCNC: 3.4 G/DL (ref 3.5–5.2)
ALP SERPL-CCNC: 96 U/L (ref 55–135)
ALT SERPL W/O P-5'-P-CCNC: 9 U/L (ref 10–44)
ANION GAP SERPL CALC-SCNC: 8 MMOL/L (ref 8–16)
AST SERPL-CCNC: 17 U/L (ref 10–40)
BASOPHILS # BLD AUTO: 0.06 K/UL (ref 0–0.2)
BASOPHILS NFR BLD: 0.8 % (ref 0–1.9)
BILIRUB SERPL-MCNC: 0.5 MG/DL (ref 0.1–1)
BUN SERPL-MCNC: 17 MG/DL (ref 8–23)
CALCIUM SERPL-MCNC: 9.2 MG/DL (ref 8.7–10.5)
CHLORIDE SERPL-SCNC: 102 MMOL/L (ref 95–110)
CO2 SERPL-SCNC: 26 MMOL/L (ref 23–29)
CREAT SERPL-MCNC: 0.9 MG/DL (ref 0.5–1.4)
DIFFERENTIAL METHOD: ABNORMAL
EOSINOPHIL # BLD AUTO: 0.3 K/UL (ref 0–0.5)
EOSINOPHIL NFR BLD: 4.1 % (ref 0–8)
ERYTHROCYTE [DISTWIDTH] IN BLOOD BY AUTOMATED COUNT: 15.5 % (ref 11.5–14.5)
EST. GFR  (NO RACE VARIABLE): >60 ML/MIN/1.73 M^2
GLUCOSE SERPL-MCNC: 66 MG/DL (ref 70–110)
HCT VFR BLD AUTO: 36.8 % (ref 40–54)
HGB BLD-MCNC: 11.6 G/DL (ref 14–18)
IMM GRANULOCYTES # BLD AUTO: 0.02 K/UL (ref 0–0.04)
IMM GRANULOCYTES NFR BLD AUTO: 0.3 % (ref 0–0.5)
LYMPHOCYTES # BLD AUTO: 1.9 K/UL (ref 1–4.8)
LYMPHOCYTES NFR BLD: 25.2 % (ref 18–48)
MCH RBC QN AUTO: 25.8 PG (ref 27–31)
MCHC RBC AUTO-ENTMCNC: 31.5 G/DL (ref 32–36)
MCV RBC AUTO: 82 FL (ref 82–98)
MONOCYTES # BLD AUTO: 1.1 K/UL (ref 0.3–1)
MONOCYTES NFR BLD: 13.8 % (ref 4–15)
NEUTROPHILS # BLD AUTO: 4.3 K/UL (ref 1.8–7.7)
NEUTROPHILS NFR BLD: 55.8 % (ref 38–73)
NRBC BLD-RTO: 0 /100 WBC
PLATELET # BLD AUTO: 337 K/UL (ref 150–450)
PMV BLD AUTO: 11.1 FL (ref 9.2–12.9)
POTASSIUM SERPL-SCNC: 3.5 MMOL/L (ref 3.5–5.1)
PROT SERPL-MCNC: 7.9 G/DL (ref 6–8.4)
RBC # BLD AUTO: 4.49 M/UL (ref 4.6–6.2)
SODIUM SERPL-SCNC: 136 MMOL/L (ref 136–145)
WBC # BLD AUTO: 7.63 K/UL (ref 3.9–12.7)

## 2023-01-03 PROCEDURE — 80053 COMPREHEN METABOLIC PANEL: CPT | Performed by: NURSE PRACTITIONER

## 2023-01-03 PROCEDURE — 85025 COMPLETE CBC W/AUTO DIFF WBC: CPT | Performed by: NURSE PRACTITIONER

## 2023-01-03 NOTE — TELEPHONE ENCOUNTER
----- Message from Merced Logan MA sent at 12/29/2022 10:04 AM CST -----  Regarding: FW: OPCM Potential- HHRR Recertification    ----- Message -----  From: Patricia Fuentes LPN  Sent: 12/29/2022   7:46 AM CST  To: [facility] Staff  Subject: OPCM Potential- HHRR Recertification             The recertification of home health services for Edd Wills is under review by our Post-Acute UM team.  Please note the following:  A potential need for OPCM has been identified. Please consider referring to OPCM for further assistance.    Per the statements listed in the home health order, it is noted that patient resides alone, has had falls in the home, and is incontinent. In addition, patient has an active wound, and takes multiple medications. Please consider placing an order for an OPCM (outpatient case management) referral, so that they can review this patient and perhaps identify additional community resources that would be needed. Thank you. (Summary/Free Text)      Respectfully,    Patricia Fuentes LPN  Clinical Coordinator  [facility] Util[facility] Management

## 2023-01-06 ENCOUNTER — TELEPHONE (OUTPATIENT)
Dept: HEPATOLOGY | Facility: HOSPITAL | Age: 68
End: 2023-01-06
Payer: MEDICARE

## 2023-01-06 NOTE — TELEPHONE ENCOUNTER
Contacted by home health due to multiple blisters and wounds to patient's lower extremities.  Patient agreeable to go to LTAC for further management.  [facility] assessed patient in home and is willing to admit him.  Will place orders.

## 2023-01-06 NOTE — PROGRESS NOTES
Admit to [facility]  Low Salt Cardiac diet  Activity as tolerated    Admit with current meds:  Ibuprofen 600mg po BID prn  Protonix 40mg po daily  Gabapentin 600mg po bid  Atorvastatin 40mg po qhs  Amlodipine 10mg po daily  Carvedilol 25mg po bid  Lasix 40mg po daily  Potassium Chloride 20meq po bid  Lisinopril 40mg po daily      Consult wound care, PT/OT           ADRIANNA Bains FNP-C   Nurse Practitioner- [facility]

## 2023-01-12 ENCOUNTER — OUTPATIENT CASE MANAGEMENT (OUTPATIENT)
Dept: ADMINISTRATIVE | Facility: OTHER | Age: 68
End: 2023-01-12
Payer: MEDICARE

## 2023-01-12 NOTE — PROGRESS NOTES
SW contacted patient regarding referral. SW explained to patient reason for referral to assist with community resources . Patient reports he's currently in Rehab. Per chart review patient in LTAC. Patient an agreement with SW contacting him in two weeks to complete SW assessment 01/27/2023

## 2023-01-27 NOTE — PROGRESS NOTES
Attempt #:  1  This LMSW attempted to reach patient/caregiver to provide resource and left a message requesting a return call.         SW reports he's still in SNF. SW provided patient with her contact information if any needs arises after discharge.

## 2023-02-13 ENCOUNTER — TELEPHONE (OUTPATIENT)
Dept: CARDIOLOGY | Facility: CLINIC | Age: 68
End: 2023-02-13
Payer: MEDICARE

## 2023-02-13 NOTE — TELEPHONE ENCOUNTER
----- Message from Larissa Loera sent at 2/13/2023  3:28 PM CST -----  Contact: [facility] Shasha/Lindsay/ call pt @[phone]  Lindsay said that she is calling in regards to pt was discharged from the hospital and was sent to the rehab where she is , pt stated that pt has venus statis ulcers in the  right lower extremity and she is asking for the nurse to call the pt to schedule an appointment. Please advise

## 2023-02-27 ENCOUNTER — PATIENT OUTREACH (OUTPATIENT)
Dept: ADMINISTRATIVE | Facility: HOSPITAL | Age: 68
End: 2023-02-27
Payer: MEDICARE

## 2023-02-27 DIAGNOSIS — R73.03 PREDIABETES: Primary | ICD-10-CM

## 2023-02-28 ENCOUNTER — CARE AT HOME (OUTPATIENT)
Dept: HOME HEALTH SERVICES | Facility: CLINIC | Age: 68
End: 2023-02-28
Payer: MEDICARE

## 2023-02-28 DIAGNOSIS — I10 BENIGN ESSENTIAL HTN: ICD-10-CM

## 2023-02-28 DIAGNOSIS — R23.8 BLISTERS OF MULTIPLE SITES: ICD-10-CM

## 2023-02-28 DIAGNOSIS — I87.8 VENOUS STASIS: ICD-10-CM

## 2023-02-28 DIAGNOSIS — E78.2 MIXED HYPERLIPIDEMIA: ICD-10-CM

## 2023-02-28 DIAGNOSIS — R53.81 PHYSICAL DECONDITIONING: ICD-10-CM

## 2023-02-28 DIAGNOSIS — R26.81 GAIT INSTABILITY: ICD-10-CM

## 2023-02-28 DIAGNOSIS — G57.93 NEUROPATHY OF BOTH FEET: ICD-10-CM

## 2023-02-28 DIAGNOSIS — E66.01 MORBID OBESITY: ICD-10-CM

## 2023-02-28 DIAGNOSIS — I51.89 GRADE I DIASTOLIC DYSFUNCTION: ICD-10-CM

## 2023-02-28 DIAGNOSIS — M17.0 PRIMARY OSTEOARTHRITIS OF BOTH KNEES: ICD-10-CM

## 2023-02-28 PROCEDURE — 99350 HOME/RES VST EST HIGH MDM 60: CPT | Mod: S$GLB,,, | Performed by: NURSE PRACTITIONER

## 2023-02-28 PROCEDURE — 99350 PR HOME VISIT,ESTAB PATIENT,LEVEL IV: ICD-10-PCS | Mod: S$GLB,,, | Performed by: NURSE PRACTITIONER

## 2023-02-28 RX ORDER — LISINOPRIL 40 MG/1
40 TABLET ORAL DAILY
Qty: 90 TABLET | Refills: 3 | Status: SHIPPED | OUTPATIENT
Start: 2023-02-28 | End: 2023-12-22 | Stop reason: SDUPTHER

## 2023-02-28 RX ORDER — DOXYCYCLINE 100 MG/1
100 CAPSULE ORAL EVERY 12 HOURS
Qty: 20 CAPSULE | Refills: 0 | Status: SHIPPED | OUTPATIENT
Start: 2023-02-28 | End: 2023-03-10

## 2023-03-02 VITALS
RESPIRATION RATE: 20 BRPM | DIASTOLIC BLOOD PRESSURE: 80 MMHG | HEART RATE: 78 BPM | TEMPERATURE: 98 F | SYSTOLIC BLOOD PRESSURE: 140 MMHG | OXYGEN SATURATION: 99 %

## 2023-03-02 RX ORDER — ASPIRIN 81 MG/1
81 TABLET ORAL DAILY
COMMUNITY

## 2023-03-02 RX ORDER — TRAMADOL HYDROCHLORIDE 50 MG/1
50 TABLET ORAL EVERY 8 HOURS PRN
Status: ON HOLD | COMMUNITY
End: 2023-05-31 | Stop reason: HOSPADM

## 2023-03-02 NOTE — ASSESSMENT & PLAN NOTE
Venous Stasis   Lymphedema    3 indurated foul draining wounds noted to right leg mass   Denies fever, chills  Ordered doxycycline   Packed wounds and dressed  Continue home health wound care for now  Will order [facility] wound care  Elevated low extremities while sitting/lying  Low Na diet   Continue furosemide

## 2023-03-02 NOTE — PATIENT INSTRUCTIONS
Instructions:  - Continue all medications, treatments and therapies as ordered.   - Follow all instructions, recommendations as discussed.  - Maintain Safety Precautions at all times.  - Attend all medical appointments as scheduled.  - For worsening symptoms: call Primary Care Physician or Nurse Practitioner.  - For emergencies, call 911 or immediately report to the nearest emergency room.  - Limit Risks of environmental exposure to coronavirus/COVID-19 as discussed including: social distancing, hand hygiene, and limiting departures from the home for necessities only.

## 2023-03-02 NOTE — ASSESSMENT & PLAN NOTE
Not taking lisinopril  Discussed importance of taking meds as prescribed  Continue lisinopril, furosemide, coreg and amlodipine as prescribed  Low Na diet

## 2023-03-02 NOTE — PROGRESS NOTES
"  [facility]  Medical Home Visit    Visit Date: 2/28/23  Encounter Provider: Reynold Smith NP  PCP:  Zuleyka Sabillon MD    Subjective:      Patient ID: Edd Wills is a 68 y.o. male.    Consult Requested By:  No ref. provider found  Reason for Consult: Medical Visit by Home Care Provider    The patient is being seen at home due to a physical debility that presents a taxing effort to leave the home, to mitigate high risk of hospital readmission or due to the limited availability of reliable or safe options for transportation to the point of access to the provider. The visit meets the criteria for medical necessity as defined by CMS as "health-care services needed to prevent, diagnose, or treat an illness, injury, condition, disease, or its symptoms and that meet accepted standards of medicine." Prior to treatment on this visit the chart was reviewed and patient consent was obtained.    Chief Complaint: Follow-up for chronic medical conditions and medication review/peripheral edema/lymphedema.     HPI: Edd Wills is a 67 y.o. male with HTN, HLD, morbid obesity.     Today:  Mr. Edd Wills is a 68 y.o. male. Edd recently discharged from LTAC/rehab where he was receiving wound care and PT/OT.  Peripheral edema to lower extremities still present but improved.  He reports persistent high Na diet. Lymphedema and large mass still noted to right leg. 3 indurated foul draining wounds noted to right leg mass.  Denies fever, chills, SOB, chest pain, abdominal pain, change in bladder habits, change in bowel habits.  He is current with [facility] wound care and PT/OT. Reviewed medications.  He has not been taking lisinopril as prescribed.  Discussed importance of taking meds as prescribed. Discussed risk of non-compliance, patient verbalized understanding. Denies chest pain, signs of distress, SOB, fever, chills, cough, congestion. Reports good bm pattern, sleep pattern, appetite. No other needs " identified at this time. Risks of environmental exposure to coronavirus discussed including: social distancing, hand hygiene, and limiting departures from the home for necessities only.  Reports understanding and willingness to comply.      Attestation: Screening criteria to assess the level of the patient's risk for infection with COVID-19 as recommended by the CDC at the time of the above documented home visit concluded appropriateness to proceed. Universal precautions were maintained at all times, including provider use of >60% alcohol gel hand  immediately prior to entry and upon departing the patient's home as well as cleaning of equipment used in home visit with antibacterial/germicidal disposable wipes.     Review of Systems   Constitutional:  Negative for chills and fatigue.   HENT:  Negative for congestion and postnasal drip.    Eyes:  Negative for visual disturbance.   Respiratory:  Negative for chest tightness and shortness of breath.    Cardiovascular:  Positive for leg swelling.   Gastrointestinal:  Negative for abdominal pain, constipation, diarrhea, nausea and vomiting.   Genitourinary:  Negative for difficulty urinating.   Musculoskeletal:  Positive for gait problem.   Skin:  Positive for wound. Negative for color change.   Neurological:  Negative for dizziness and headaches.   Hematological:  Does not bruise/bleed easily.   Psychiatric/Behavioral:  Negative for agitation.      Assessments:  Environmental: adequate lighting and temperature control  Functional Status: Assistance with ADL's/IADL's, ambulates with assistance of a cane/walker, continent of bowel and bladder  Safety: Fall Precautions, COVID Precautions/Social Distancing/Mask Use  Nutritional: Adequate  Home Health: [facility]  DME/Supplies: Rollator        Objective:     Vitals:    02/28/23 1300 03/02/23 0855   BP: (!) 140/80    Pulse: 78    Resp: 20 20   Temp: 98.3 °F (36.8 °C)    SpO2: 99%    PainSc:   4   4   PainLoc: Foot Foot      There is no height or weight on file to calculate BMI.    Physical Exam  HENT:      Head: Normocephalic.      Nose: No congestion.      Mouth/Throat:      Mouth: Mucous membranes are moist.   Cardiovascular:      Rate and Rhythm: Normal rate.      Pulses: Normal pulses.   Pulmonary:      Effort: Pulmonary effort is normal. No respiratory distress.      Breath sounds: Normal breath sounds.   Abdominal:      General: Bowel sounds are normal.      Palpations: Abdomen is soft.   Musculoskeletal:      Cervical back: Normal range of motion and neck supple.      Right lower leg: Edema (greater than left ) present.      Left lower leg: Edema present.   Skin:     General: Skin is warm and dry.      Comments: Dressings changed to right lower extremity, three indurated wounds noted to right leg mass with foul smelling drainage, packed wound and dressed lower extremity.    Neurological:      Mental Status: He is alert. Mental status is at baseline.   Psychiatric:         Mood and Affect: Mood normal.       Assessment:     1. Morbid obesity    2. Primary osteoarthritis of both knees    3. Physical deconditioning    4. Gait instability    5. Benign essential HTN    6. Mixed hyperlipidemia    7. Venous stasis    8. Blisters of multiple sites    9. Neuropathy of both feet    10. Grade I diastolic dysfunction          Plan:          1. Morbid obesity  Assessment & Plan:  He has been educated on the negative effects of obesity to one's health and encouraged to consider lifestyle diet modifications and exercise.       2. Primary osteoarthritis of both knees  Assessment & Plan:  No acute issues  Continue pain patches  Continue home therapy  Prn pain control       3. Physical deconditioning  Assessment & Plan:  Continue [facility]      4. Gait instability  Assessment & Plan:  Continue [facility] PT/OT      5. Benign essential HTN  Assessment & Plan:  Not taking lisinopril  Discussed importance of taking meds as prescribed  Continue  lisinopril, furosemide, coreg and amlodipine as prescribed  Low Na diet       6. Mixed hyperlipidemia  Assessment & Plan:  Denies chest pain  Continue aspirin and statin       7. Venous stasis    8. Blisters of multiple sites  Assessment & Plan:  Venous Stasis   Lymphedema    3 indurated foul draining wounds noted to right leg mass   Denies fever, chills  Ordered doxycycline   Packed wounds and dressed  Continue home health wound care for now  Will order [facility] wound care  Elevated low extremities while sitting/lying  Low Na diet   Continue furosemide        9. Neuropathy of both feet  Assessment & Plan:  Continue gabapentin       10. Grade I diastolic dysfunction  Assessment & Plan:  Denies chest pain, SOB  Peripheral edema noted on exam  Continue medication as prescribed  Keep scheduled follow up appts  Activity and Diet restrictions:   Recommend 2-3 gram sodium restriction and 1500cc- 2000cc fluid restriction.  Encourage physical activity with graded exercise program.  Requested patient to weigh themselves daily, and to notify us if their weight increases by more than 3 lbs in 1 day or 5 lbs in 3 days.  Elevate lower extremities while sitting/lying         Other orders  -     lisinopriL (PRINIVIL,ZESTRIL) 40 MG tablet; Take 1 tablet (40 mg total) by mouth once daily.  Dispense: 90 tablet; Refill: 3  -     doxycycline (VIBRAMYCIN) 100 MG Cap; Take 1 capsule (100 mg total) by mouth every 12 (twelve) hours. for 10 days  Dispense: 20 capsule; Refill: 0               Were controlled substances prescribed? No    Instructions:  - Continue all medications, treatments and therapies as ordered.   - Follow all instructions, recommendations as discussed.  - Maintain Safety Precautions at all times.  - Attend all medical appointments as scheduled.  - For worsening symptoms: call Primary Care Physician or Nurse Practitioner.  - For emergencies, call 911 or immediately report to the nearest emergency room.  - Limit Risks of  environmental exposure to coronavirus/COVID-19 as discussed including: social distancing, hand hygiene, and limiting departures from the home for necessities only.          Follow Up Appointments:   No future appointments.    Patient consent was obtained prior to treatment on this visit.    Signature:       Reynold Smith, MSN, APRN, FNP-C  [facility]    Total face-to-face time was 60 min, >50% of this was spent on counseling and coordination of care. The following issues were discussed: primary and secondary diagnoses, co-morbidities, prescribed medications, treatment modalities, importance of compliance with medical advice and directives for follow-up care

## 2023-03-22 DIAGNOSIS — L97.909 VENOUS STASIS ULCER, UNSPECIFIED SITE, UNSPECIFIED ULCER STAGE, UNSPECIFIED WHETHER VARICOSE VEINS PRESENT: ICD-10-CM

## 2023-03-22 DIAGNOSIS — I89.0 LYMPHEDEMA: Primary | ICD-10-CM

## 2023-03-22 DIAGNOSIS — I83.009 VENOUS STASIS ULCER, UNSPECIFIED SITE, UNSPECIFIED ULCER STAGE, UNSPECIFIED WHETHER VARICOSE VEINS PRESENT: ICD-10-CM

## 2023-03-22 DIAGNOSIS — I10 HYPERTENSION, UNSPECIFIED TYPE: ICD-10-CM

## 2023-03-22 NOTE — PROGRESS NOTES
[facility] discharged patient due to lack of nurses in area.  Will order [facility].

## 2023-03-23 PROCEDURE — G0180 PR HOME HEALTH MD CERTIFICATION: ICD-10-PCS | Mod: ,,, | Performed by: NURSE PRACTITIONER

## 2023-03-23 PROCEDURE — G0180 MD CERTIFICATION HHA PATIENT: HCPCS | Mod: ,,, | Performed by: NURSE PRACTITIONER

## 2023-05-03 ENCOUNTER — EXTERNAL HOME HEALTH (OUTPATIENT)
Dept: HOME HEALTH SERVICES | Facility: HOSPITAL | Age: 68
End: 2023-05-03
Payer: MEDICARE

## 2023-05-22 PROCEDURE — G0179 MD RECERTIFICATION HHA PT: HCPCS | Mod: ,,, | Performed by: NURSE PRACTITIONER

## 2023-05-22 PROCEDURE — G0179 PR HOME HEALTH MD RECERTIFICATION: ICD-10-PCS | Mod: ,,, | Performed by: NURSE PRACTITIONER

## 2023-05-23 ENCOUNTER — PATIENT OUTREACH (OUTPATIENT)
Dept: ADMINISTRATIVE | Facility: HOSPITAL | Age: 68
End: 2023-05-23
Payer: MEDICARE

## 2023-05-23 ENCOUNTER — PATIENT MESSAGE (OUTPATIENT)
Dept: ADMINISTRATIVE | Facility: HOSPITAL | Age: 68
End: 2023-05-23
Payer: MEDICARE

## 2023-05-23 DIAGNOSIS — R73.03 PREDIABETES: Primary | ICD-10-CM

## 2023-05-26 ENCOUNTER — HOSPITAL ENCOUNTER (INPATIENT)
Facility: HOSPITAL | Age: 68
LOS: 5 days | Discharge: NURSING, REHAB OR LONG-TERM CARE | DRG: 872 | End: 2023-05-31
Attending: EMERGENCY MEDICINE | Admitting: EMERGENCY MEDICINE
Payer: MEDICARE

## 2023-05-26 DIAGNOSIS — S81.801A WOUND OF RIGHT LOWER EXTREMITY, INITIAL ENCOUNTER: Primary | ICD-10-CM

## 2023-05-26 DIAGNOSIS — A41.9 SEPSIS, DUE TO UNSPECIFIED ORGANISM, UNSPECIFIED WHETHER ACUTE ORGAN DYSFUNCTION PRESENT: ICD-10-CM

## 2023-05-26 DIAGNOSIS — R53.81 PHYSICAL DECONDITIONING: ICD-10-CM

## 2023-05-26 DIAGNOSIS — R00.0 TACHYCARDIA: ICD-10-CM

## 2023-05-26 DIAGNOSIS — J18.9 PNEUMONIA OF BOTH LUNGS DUE TO INFECTIOUS ORGANISM, UNSPECIFIED PART OF LUNG: ICD-10-CM

## 2023-05-26 LAB
ALBUMIN SERPL BCP-MCNC: 3.2 G/DL (ref 3.5–5.2)
ALLENS TEST: NORMAL
ALP SERPL-CCNC: 87 U/L (ref 55–135)
ALT SERPL W/O P-5'-P-CCNC: 10 U/L (ref 10–44)
ANION GAP SERPL CALC-SCNC: 7 MMOL/L (ref 8–16)
AST SERPL-CCNC: 22 U/L (ref 10–40)
BASOPHILS # BLD AUTO: 0.03 K/UL (ref 0–0.2)
BASOPHILS NFR BLD: 0.2 % (ref 0–1.9)
BILIRUB SERPL-MCNC: 0.5 MG/DL (ref 0.1–1)
BILIRUB UR QL STRIP: NEGATIVE
BNP SERPL-MCNC: 220 PG/ML (ref 0–99)
BUN SERPL-MCNC: 18 MG/DL (ref 8–23)
CALCIUM SERPL-MCNC: 8.1 MG/DL (ref 8.7–10.5)
CHLORIDE SERPL-SCNC: 109 MMOL/L (ref 95–110)
CLARITY UR REFRACT.AUTO: CLEAR
CO2 SERPL-SCNC: 23 MMOL/L (ref 23–29)
COLOR UR AUTO: YELLOW
CREAT SERPL-MCNC: 1 MG/DL (ref 0.5–1.4)
DIFFERENTIAL METHOD: ABNORMAL
EOSINOPHIL # BLD AUTO: 0 K/UL (ref 0–0.5)
EOSINOPHIL NFR BLD: 0.1 % (ref 0–8)
ERYTHROCYTE [DISTWIDTH] IN BLOOD BY AUTOMATED COUNT: 14.6 % (ref 11.5–14.5)
EST. GFR  (NO RACE VARIABLE): >60 ML/MIN/1.73 M^2
GLUCOSE SERPL-MCNC: 77 MG/DL (ref 70–110)
GLUCOSE UR QL STRIP: NEGATIVE
HCT VFR BLD AUTO: 40.8 % (ref 40–54)
HCV AB SERPL QL IA: NORMAL
HGB BLD-MCNC: 13.2 G/DL (ref 14–18)
HGB UR QL STRIP: NEGATIVE
HIV 1+2 AB+HIV1 P24 AG SERPL QL IA: NORMAL
IMM GRANULOCYTES # BLD AUTO: 0.16 K/UL (ref 0–0.04)
IMM GRANULOCYTES NFR BLD AUTO: 1 % (ref 0–0.5)
KETONES UR QL STRIP: NEGATIVE
LDH SERPL L TO P-CCNC: 1.51 MMOL/L (ref 0.5–2.2)
LEUKOCYTE ESTERASE UR QL STRIP: NEGATIVE
LYMPHOCYTES # BLD AUTO: 0.5 K/UL (ref 1–4.8)
LYMPHOCYTES NFR BLD: 3.2 % (ref 18–48)
MCH RBC QN AUTO: 26.5 PG (ref 27–31)
MCHC RBC AUTO-ENTMCNC: 32.4 G/DL (ref 32–36)
MCV RBC AUTO: 82 FL (ref 82–98)
MONOCYTES # BLD AUTO: 0.7 K/UL (ref 0.3–1)
MONOCYTES NFR BLD: 4.8 % (ref 4–15)
NEUTROPHILS # BLD AUTO: 14.1 K/UL (ref 1.8–7.7)
NEUTROPHILS NFR BLD: 90.7 % (ref 38–73)
NITRITE UR QL STRIP: NEGATIVE
NRBC BLD-RTO: 0 /100 WBC
PH UR STRIP: 7 [PH] (ref 5–8)
PLATELET # BLD AUTO: 252 K/UL (ref 150–450)
PMV BLD AUTO: 10.9 FL (ref 9.2–12.9)
POTASSIUM SERPL-SCNC: 3.8 MMOL/L (ref 3.5–5.1)
PROT SERPL-MCNC: 7.2 G/DL (ref 6–8.4)
PROT UR QL STRIP: ABNORMAL
RBC # BLD AUTO: 4.99 M/UL (ref 4.6–6.2)
SAMPLE: NORMAL
SITE: NORMAL
SODIUM SERPL-SCNC: 139 MMOL/L (ref 136–145)
SP GR UR STRIP: 1.02 (ref 1–1.03)
TROPONIN I SERPL DL<=0.01 NG/ML-MCNC: 0.02 NG/ML (ref 0–0.03)
URN SPEC COLLECT METH UR: ABNORMAL
WBC # BLD AUTO: 15.55 K/UL (ref 3.9–12.7)

## 2023-05-26 PROCEDURE — 99291 CRITICAL CARE FIRST HOUR: CPT | Mod: ,,, | Performed by: EMERGENCY MEDICINE

## 2023-05-26 PROCEDURE — 99291 PR CRITICAL CARE, E/M 30-74 MINUTES: ICD-10-PCS | Mod: ,,, | Performed by: EMERGENCY MEDICINE

## 2023-05-26 PROCEDURE — 21400001 HC TELEMETRY ROOM

## 2023-05-26 PROCEDURE — 25500020 PHARM REV CODE 255: Performed by: EMERGENCY MEDICINE

## 2023-05-26 PROCEDURE — 81003 URINALYSIS AUTO W/O SCOPE: CPT

## 2023-05-26 PROCEDURE — 94761 N-INVAS EAR/PLS OXIMETRY MLT: CPT

## 2023-05-26 PROCEDURE — 63600175 PHARM REV CODE 636 W HCPCS: Performed by: INTERNAL MEDICINE

## 2023-05-26 PROCEDURE — 27000221 HC OXYGEN, UP TO 24 HOURS

## 2023-05-26 PROCEDURE — 25000003 PHARM REV CODE 250: Performed by: INTERNAL MEDICINE

## 2023-05-26 PROCEDURE — 84484 ASSAY OF TROPONIN QUANT: CPT

## 2023-05-26 PROCEDURE — 99223 PR INITIAL HOSPITAL CARE,LEVL III: ICD-10-PCS | Mod: ,,, | Performed by: INTERNAL MEDICINE

## 2023-05-26 PROCEDURE — 93010 ELECTROCARDIOGRAM REPORT: CPT | Mod: ,,, | Performed by: INTERNAL MEDICINE

## 2023-05-26 PROCEDURE — 87040 BLOOD CULTURE FOR BACTERIA: CPT

## 2023-05-26 PROCEDURE — 80053 COMPREHEN METABOLIC PANEL: CPT

## 2023-05-26 PROCEDURE — 86803 HEPATITIS C AB TEST: CPT | Performed by: PHYSICIAN ASSISTANT

## 2023-05-26 PROCEDURE — 85025 COMPLETE CBC W/AUTO DIFF WBC: CPT

## 2023-05-26 PROCEDURE — 63600175 PHARM REV CODE 636 W HCPCS

## 2023-05-26 PROCEDURE — 99900035 HC TECH TIME PER 15 MIN (STAT)

## 2023-05-26 PROCEDURE — 96366 THER/PROPH/DIAG IV INF ADDON: CPT

## 2023-05-26 PROCEDURE — 99223 1ST HOSP IP/OBS HIGH 75: CPT | Mod: ,,, | Performed by: INTERNAL MEDICINE

## 2023-05-26 PROCEDURE — 25000003 PHARM REV CODE 250

## 2023-05-26 PROCEDURE — 99285 EMERGENCY DEPT VISIT HI MDM: CPT | Mod: 25

## 2023-05-26 PROCEDURE — 83605 ASSAY OF LACTIC ACID: CPT

## 2023-05-26 PROCEDURE — 96365 THER/PROPH/DIAG IV INF INIT: CPT

## 2023-05-26 PROCEDURE — 83880 ASSAY OF NATRIURETIC PEPTIDE: CPT

## 2023-05-26 PROCEDURE — 93005 ELECTROCARDIOGRAM TRACING: CPT

## 2023-05-26 PROCEDURE — 93010 EKG 12-LEAD: ICD-10-PCS | Mod: ,,, | Performed by: INTERNAL MEDICINE

## 2023-05-26 PROCEDURE — 96367 TX/PROPH/DG ADDL SEQ IV INF: CPT

## 2023-05-26 PROCEDURE — 87389 HIV-1 AG W/HIV-1&-2 AB AG IA: CPT | Performed by: PHYSICIAN ASSISTANT

## 2023-05-26 RX ORDER — ONDANSETRON 2 MG/ML
4 INJECTION INTRAMUSCULAR; INTRAVENOUS EVERY 8 HOURS PRN
Status: DISCONTINUED | OUTPATIENT
Start: 2023-05-26 | End: 2023-05-31 | Stop reason: HOSPADM

## 2023-05-26 RX ORDER — SODIUM CHLORIDE 450 MG/100ML
INJECTION, SOLUTION INTRAVENOUS CONTINUOUS
Status: ACTIVE | OUTPATIENT
Start: 2023-05-26 | End: 2023-05-27

## 2023-05-26 RX ORDER — HYDRALAZINE HYDROCHLORIDE 25 MG/1
25 TABLET, FILM COATED ORAL EVERY 8 HOURS PRN
Status: DISCONTINUED | OUTPATIENT
Start: 2023-05-26 | End: 2023-05-31 | Stop reason: HOSPADM

## 2023-05-26 RX ORDER — DEXTROSE 40 %
30 GEL (GRAM) ORAL
Status: DISCONTINUED | OUTPATIENT
Start: 2023-05-26 | End: 2023-05-31 | Stop reason: HOSPADM

## 2023-05-26 RX ORDER — ENOXAPARIN SODIUM 100 MG/ML
40 INJECTION SUBCUTANEOUS EVERY 24 HOURS
Status: DISCONTINUED | OUTPATIENT
Start: 2023-05-26 | End: 2023-05-31 | Stop reason: HOSPADM

## 2023-05-26 RX ORDER — ACETAMINOPHEN 500 MG
1000 TABLET ORAL
Status: COMPLETED | OUTPATIENT
Start: 2023-05-26 | End: 2023-05-26

## 2023-05-26 RX ORDER — GLUCAGON 1 MG
1 KIT INJECTION
Status: DISCONTINUED | OUTPATIENT
Start: 2023-05-26 | End: 2023-05-31 | Stop reason: HOSPADM

## 2023-05-26 RX ORDER — DEXTROSE 40 %
15 GEL (GRAM) ORAL
Status: DISCONTINUED | OUTPATIENT
Start: 2023-05-26 | End: 2023-05-31 | Stop reason: HOSPADM

## 2023-05-26 RX ORDER — ASPIRIN 81 MG/1
81 TABLET ORAL DAILY
Status: DISCONTINUED | OUTPATIENT
Start: 2023-05-27 | End: 2023-05-31 | Stop reason: HOSPADM

## 2023-05-26 RX ORDER — SODIUM CHLORIDE 0.9 % (FLUSH) 0.9 %
10 SYRINGE (ML) INJECTION EVERY 12 HOURS PRN
Status: DISCONTINUED | OUTPATIENT
Start: 2023-05-26 | End: 2023-05-31 | Stop reason: HOSPADM

## 2023-05-26 RX ORDER — IBUPROFEN 400 MG/1
400 TABLET ORAL EVERY 6 HOURS PRN
Status: DISCONTINUED | OUTPATIENT
Start: 2023-05-26 | End: 2023-05-26

## 2023-05-26 RX ORDER — ACETAMINOPHEN 325 MG/1
650 TABLET ORAL EVERY 4 HOURS PRN
Status: DISCONTINUED | OUTPATIENT
Start: 2023-05-26 | End: 2023-05-31 | Stop reason: HOSPADM

## 2023-05-26 RX ORDER — OXYCODONE HYDROCHLORIDE 5 MG/1
5 TABLET ORAL EVERY 6 HOURS PRN
Status: DISCONTINUED | OUTPATIENT
Start: 2023-05-26 | End: 2023-05-31 | Stop reason: HOSPADM

## 2023-05-26 RX ORDER — IBUPROFEN 200 MG
16 TABLET ORAL
Status: DISCONTINUED | OUTPATIENT
Start: 2023-05-26 | End: 2023-05-26

## 2023-05-26 RX ORDER — IBUPROFEN 200 MG
24 TABLET ORAL
Status: DISCONTINUED | OUTPATIENT
Start: 2023-05-26 | End: 2023-05-26

## 2023-05-26 RX ORDER — ACETAMINOPHEN 325 MG/1
650 TABLET ORAL EVERY 8 HOURS PRN
Status: DISCONTINUED | OUTPATIENT
Start: 2023-05-26 | End: 2023-05-31 | Stop reason: HOSPADM

## 2023-05-26 RX ORDER — ATORVASTATIN CALCIUM 40 MG/1
40 TABLET, FILM COATED ORAL DAILY
Status: DISCONTINUED | OUTPATIENT
Start: 2023-05-27 | End: 2023-05-31 | Stop reason: HOSPADM

## 2023-05-26 RX ADMIN — IOHEXOL 100 ML: 350 INJECTION, SOLUTION INTRAVENOUS at 09:05

## 2023-05-26 RX ADMIN — SODIUM CHLORIDE, POTASSIUM CHLORIDE, SODIUM LACTATE AND CALCIUM CHLORIDE 500 ML: 600; 310; 30; 20 INJECTION, SOLUTION INTRAVENOUS at 08:05

## 2023-05-26 RX ADMIN — PIPERACILLIN AND TAZOBACTAM 4.5 G: 4; .5 INJECTION, POWDER, LYOPHILIZED, FOR SOLUTION INTRAVENOUS; PARENTERAL at 06:05

## 2023-05-26 RX ADMIN — VANCOMYCIN HYDROCHLORIDE 2000 MG: 500 INJECTION, POWDER, LYOPHILIZED, FOR SOLUTION INTRAVENOUS at 08:05

## 2023-05-26 RX ADMIN — ACETAMINOPHEN 1000 MG: 500 TABLET ORAL at 07:05

## 2023-05-26 RX ADMIN — PIPERACILLIN AND TAZOBACTAM 4.5 G: 4; .5 INJECTION, POWDER, LYOPHILIZED, FOR SOLUTION INTRAVENOUS; PARENTERAL at 08:05

## 2023-05-26 RX ADMIN — VANCOMYCIN HYDROCHLORIDE 1250 MG: 1.25 INJECTION, POWDER, LYOPHILIZED, FOR SOLUTION INTRAVENOUS at 11:05

## 2023-05-26 RX ADMIN — OXYCODONE HYDROCHLORIDE 5 MG: 5 TABLET ORAL at 07:05

## 2023-05-26 RX ADMIN — SODIUM CHLORIDE: 4.5 INJECTION, SOLUTION INTRAVENOUS at 06:05

## 2023-05-26 RX ADMIN — ENOXAPARIN SODIUM 40 MG: 40 INJECTION SUBCUTANEOUS at 06:05

## 2023-05-26 NOTE — H&P
[facility] - [facility] ([address] Medicine  History & Physical    Patient Name: Edd Wills  MRN: [id number]  Patient Class: IP- Inpatient  Admission Date: 5/26/2023  Attending Physician: Mal Collier MD   Primary Care Provider: Zuleyka Sabillon MD         Patient information was obtained from patient and ER records.     Subjective:     Principal Problem:Sepsis    Chief Complaint:   Chief Complaint   Patient presents with    Shortness of Breath     Onset 1am, arrived via ems from home, also reports BLE swelling and legs giving out this AM, denies fall        HPI: 68-year-old gentleman past medical history of hypertension, chronic lymphedema to right lower extremity who has home health aide as an outpatient and history of aortic dissection was well until this morning around 1:00 a.m. he woke from sleep and went to the bathroom to defecate.  Patient said that after passing stool, he was weak and could not get up and therefore asked his nephew to call 911.  Patient was brought to hospital and was found to be febrile.  He denies any urinary symptoms, denies any abdominal pain nausea vomiting or diarrhea.  He denies any chest pain, cough or shortness of breath.  Denies any neck stiffness or headache or visual disturbances.  Did mentioned that his lower extremities has been hurting him more than normal in recent times.  He has a chronic wound to his right lower extremity which he says has according to him been worsening over the last couple of months, uncertain if there is any purulent discharge as he is unable to see.    In the emergency room patient was febrile T-max 103.3°, normal heart rate, mild hypertension.  Leukocytosis noted.  Lactic acid normal.  Patient had chest x-ray, CT chest,abdomen and pelvis, report noted.  Blood cultures obtained and he was started on vancomycin and Zosyn      Past Medical History:   Diagnosis Date    HTN (hypertension)     Hyperlipemia     Obesity     LLUVIA (obstructive  sleep apnea)     Osteoarthritis of knee        Past Surgical History:   Procedure Laterality Date    CERVICAL FUSION  06/14/2016    LAMINECTOMY  06/14/2016       Review of patient's allergies indicates:  No Known Allergies    No current facility-administered medications on file prior to encounter.     Current Outpatient Medications on File Prior to Encounter   Medication Sig    amLODIPine (NORVASC) 10 MG tablet Take 1 tablet (10 mg total) by mouth once daily.    aspirin (ECOTRIN) 81 MG EC tablet Take 81 mg by mouth once daily.    atorvastatin (LIPITOR) 40 MG tablet Take 1 tablet (40 mg total) by mouth once daily.    carvediloL (COREG) 25 MG tablet Take 1 tablet (25 mg total) by mouth 2 (two) times a day.    diclofenac sodium (VOLTAREN) 1 % Gel Apply 2 g topically once daily. Apply to affected area    docusate sodium (COLACE) 100 MG capsule Take 1 capsule (100 mg total) by mouth 2 (two) times daily as needed for Constipation.    furosemide (LASIX) 40 MG tablet Take 1 tablet (40 mg total) by mouth once daily.    gabapentin (NEURONTIN) 600 MG tablet Take 1 tablet (600 mg total) by mouth 2 (two) times daily.    ibuprofen (ADVIL,MOTRIN) 200 MG tablet Take 200 mg by mouth.    lisinopriL (PRINIVIL,ZESTRIL) 40 MG tablet Take 1 tablet (40 mg total) by mouth once daily.    potassium chloride SA (K-DUR,KLOR-CON) 20 MEQ tablet Take 1 tablet (20 mEq total) by mouth 2 (two) times daily.    traMADoL (ULTRAM) 50 mg tablet Take 50 mg by mouth every 8 (eight) hours as needed.     Family History       Problem Relation (Age of Onset)    Cancer Mother    Diabetes Father    Diabetes type II Mother    Hyperlipidemia Mother, Father    Hypertension Mother, Father    Stroke Maternal Grandmother          Tobacco Use    Smoking status: Never    Smokeless tobacco: Never   Substance and Sexual Activity    Alcohol use: Yes    Drug use: No     Comment: Has tried marijuana and crack in past, but no drug use since 2003    Sexual  activity: Not on file     Review of Systems   Constitutional:  Positive for chills and fever.   Musculoskeletal:  Positive for arthralgias and myalgias.   Objective:     Vital Signs (Most Recent):  Temp: 98.3 °F (36.8 °C) (05/26/23 1615)  Pulse: 99 (05/26/23 1615)  Resp: 17 (05/26/23 1615)  BP: 136/76 (05/26/23 1615)  SpO2: 96 % (05/26/23 1615) Vital Signs (24h Range):  Temp:  [98.3 °F (36.8 °C)-103.3 °F (39.6 °C)] 98.3 °F (36.8 °C)  Pulse:  [] 99  Resp:  [17-35] 17  SpO2:  [93 %-99 %] 96 %  BP: (124-195)/(49-85) 136/76     Weight: (!) 191.9 kg (423 lb)  Body mass index is 68.27 kg/m².     Physical Exam  Constitutional:       Appearance: He is obese.   HENT:      Head: Normocephalic.      Right Ear: External ear normal.      Left Ear: External ear normal.      Nose: Nose normal.   Eyes:      General: No scleral icterus.  Cardiovascular:      Rate and Rhythm: Normal rate.      Heart sounds: Normal heart sounds.   Pulmonary:      Breath sounds: Normal breath sounds.   Abdominal:      Palpations: Abdomen is soft.      Tenderness: There is no abdominal tenderness.   Musculoskeletal:      Cervical back: Normal range of motion. No rigidity.      Comments: Significant lymphedema to right lower extremity.  Patient's right thigh has significant swelling, with satellite wounds, partially healing and sloughing, picture taken   Skin:     General: Skin is warm.   Neurological:      Mental Status: He is alert and oriented to person, place, and time.      Comments: Kernig's and Brudzinski's sign negative   Psychiatric:         Mood and Affect: Mood normal.              Significant Labs: All pertinent labs within the past 24 hours have been reviewed.    Significant Imaging: I have reviewed all pertinent imaging results/findings within the past 24 hours.    Assessment/Plan:     * Sepsis  Patient febrile with leukocytosis.  Hemodynamically stable.  Source of infection at this time possible right thigh.  We will obtain General  surgery consult and follow their recommendations re need for further imaging.  We will continue with IV vancomycin and Zosyn, follow up on blood cultures.  Wound care consult.  IV fluids    Hyperlipemia  Continue statin      HTN (hypertension)  We will hold home antihypertensives for now given ongoing infection. P.r.n antihypertensive.      Physical deconditioning  PT OT        VTE Risk Mitigation (From admission, onward)         Ordered     enoxaparin injection 40 mg  Daily         05/26/23 1623     IP VTE HIGH RISK PATIENT  Once         05/26/23 1623     Place sequential compression device  Until discontinued         05/26/23 1623                           Mal Collier MD  Department of Hospital Medicine  [facility] - Intensive Care ([place])

## 2023-05-26 NOTE — ED NOTES
Edd Wills, an 68 y.o. male presents to the ED via EMS for SOB. Per pt, he was using the toilet and was unable to get back up. Also states he has a large swollen wound on inner right thigh since about 8 months ago. Per pt, wound care nurse comes out about once a month to treat it. Pt placed on bedside monitor. ED resident and med student at bedside.      Review of patient's allergies indicates:  No Known Allergies  Chief Complaint   Patient presents with    Shortness of Breath     Onset 1am, arrived via ems from home, also reports BLE swelling and legs giving out this AM, denies fall     Past Medical History:   Diagnosis Date    HTN (hypertension)     Hyperlipemia     Obesity     LLUVIA (obstructive sleep apnea)     Osteoarthritis of knee

## 2023-05-26 NOTE — ED PROVIDER NOTES
Encounter Date: 5/26/2023       History     Chief Complaint   Patient presents with    Shortness of Breath     Onset 1am, arrived via ems from home, also reports BLE swelling and legs giving out this AM, denies fall     Edd Wills is a 68 y.o. male with PMH of hypertension, history of type B aortic dissection, lymphedema, wound of the right upper thigh [place] [facility] [place]ness of breath.  Patient states that he woke up at approximately 1:00 a.m. with acute onset shortness of breath, headache, chest pain.  States that he went to the bathroom and was unable to stand up on his own.  Called EMS, EMS administered 250 cc of LR and 1 dose of nitroglycerin.  Patient states that his chest pain resolved after administration of nitro, shortness of breath improved.  Reports persistent headache.  Patient is unable to characterize why his headache is different from previous but states that it is normal in severity.      Review of patient's allergies indicates:  No Known Allergies  Past Medical History:   Diagnosis Date    HTN (hypertension)     Hyperlipemia     Obesity     LLUVIA (obstructive sleep apnea)     Osteoarthritis of knee      Past Surgical History:   Procedure Laterality Date    CERVICAL FUSION  06/14/2016    LAMINECTOMY  06/14/2016     Family History   Problem Relation Age of Onset    Diabetes type II Mother     Cancer Mother         unknown type    Hypertension Mother     Hyperlipidemia Mother     Diabetes Father     Hypertension Father     Hyperlipidemia Father     Stroke Maternal Grandmother     Heart disease Neg Hx     Prostate cancer Neg Hx      Social History     Tobacco Use    Smoking status: Never    Smokeless tobacco: Never   Substance Use Topics    Alcohol use: Yes    Drug use: No     Comment: Has tried marijuana and crack in past, but no drug use since 2003     Review of Systems   Constitutional:  Positive for chills. Negative for fever.   HENT:  Negative for congestion and rhinorrhea.    Eyes:   Negative for visual disturbance.   Respiratory:  Positive for shortness of breath.    Cardiovascular:  Positive for chest pain. Negative for leg swelling.   Gastrointestinal:  Negative for abdominal distention, abdominal pain, constipation, diarrhea, nausea and vomiting.   Endocrine: Negative for polyuria.   Genitourinary:  Negative for decreased urine volume and dysuria.   Neurological:  Positive for headaches. Negative for facial asymmetry.     Physical Exam     Initial Vitals [05/26/23 0717]   BP Pulse Resp Temp SpO2   (!) 144/49 106 (!) 35 (!) 101.6 °F (38.7 °C) 99 %      MAP       --         Physical Exam    Nursing note and vitals reviewed.  Constitutional: Vital signs are normal. He is Obese . He is cooperative. He appears ill.   HENT:   Head: Normocephalic and atraumatic.   Eyes: Conjunctivae and EOM are normal. Pupils are equal, round, and reactive to light.   Neck: Trachea normal and phonation normal. Neck supple. No tracheal deviation present.   Cardiovascular:  Regular rhythm, normal heart sounds, intact distal pulses and normal pulses.   Tachycardia present.   Exam reveals no gallop, no S3, no S4 and no friction rub.       No murmur heard.  Pulmonary/Chest: Breath sounds normal. No respiratory distress. He has no wheezes. He has no rales.   Abdominal: Abdomen is soft. He exhibits no distension. There is no abdominal tenderness.   Musculoskeletal:      Cervical back: Neck supple.      Comments: Significant lymphedema of the bilateral lower extremities.      Chronic wound on the right upper thigh.  No necrosis, malodorous discharge.     Neurological: He is alert and oriented to person, place, and time. No cranial nerve deficit or sensory deficit. GCS eye subscore is 4. GCS verbal subscore is 5. GCS motor subscore is 6.   Skin: Skin is warm, dry and intact. Capillary refill takes less than 2 seconds.   Psychiatric: He has a normal mood and affect. His speech is normal and behavior is normal. Thought  content normal.        Media Information               ED Course   Critical Care    Date/Time: 5/27/2023 7:06 PM  Performed by: Andrew Emanuel MD  Authorized by: Mal Collier MD   Direct patient critical care time: 65 minutes  Total critical care time (exclusive of procedural time) : 65 minutes  Critical care time was exclusive of separately billable procedures and treating other patients and teaching time.  Critical care was necessary to treat or prevent imminent or life-threatening deterioration of the following conditions: sepsis.  Critical care was time spent personally by me on the following activities: review of old charts, re-evaluation of patient's condition, pulse oximetry, ordering and review of radiographic studies, ordering and performing treatments and interventions, ordering and review of laboratory studies, examination of patient and obtaining history from patient or surrogate.      Labs Reviewed   CBC W/ AUTO DIFFERENTIAL - Abnormal; Notable for the following components:       Result Value    WBC 15.55 (*)     Hemoglobin 13.2 (*)     MCH 26.5 (*)     RDW 14.6 (*)     Immature Granulocytes 1.0 (*)     Gran # (ANC) 14.1 (*)     Immature Grans (Abs) 0.16 (*)     Lymph # 0.5 (*)     Gran % 90.7 (*)     Lymph % 3.2 (*)     All other components within normal limits    Narrative:     Release to patient->Immediate   COMPREHENSIVE METABOLIC PANEL - Abnormal; Notable for the following components:    Calcium 8.1 (*)     Albumin 3.2 (*)     Anion Gap 7 (*)     All other components within normal limits    Narrative:     Release to patient->Immediate   URINALYSIS, REFLEX TO URINE CULTURE - Abnormal; Notable for the following components:    Protein, UA Trace (*)     All other components within normal limits    Narrative:     Specimen Source->Urine   B-TYPE NATRIURETIC PEPTIDE - Abnormal; Notable for the following components:     (*)     All other components within normal limits    Narrative:     Release to  patient->Immediate   TROPONIN I    Narrative:     Release to patient->Immediate   HIV 1 / 2 ANTIBODY    Narrative:     Release to patient->Immediate   HEPATITIS C ANTIBODY    Narrative:     Release to patient->Immediate   ISTAT LACTATE     EKG Readings: (Independently Interpreted)   Previous EKG: Compared with most recent EKG Rhythm: Normal Sinus Rhythm. Heart Rate: 104. Ectopy: No Ectopy. Conduction: Normal. Axis: Normal. Clinical Impression: Normal Sinus Rhythm   ECG Results              EKG 12-lead (Final result)  Result time 05/26/23 10:55:13      Final result by Interface, Lab [facility] (05/26/23 10:55:13)                   Narrative:    Test Reason : R00.0,    Vent. Rate : 104 BPM     Atrial Rate : 104 BPM     P-R Int : 182 ms          QRS Dur : 108 ms      QT Int : 338 ms       P-R-T Axes : 051 -15 038 degrees     QTc Int : 444 ms    Sinus tachycardia  Low voltage QRS  Cannot rule out Anterior infarct ,age undetermined  Abnormal ECG  When compared with ECG of 08-JUL-2020 11:29,  Vent. rate has increased BY  35 BPM  QRS voltage has decreased  Nonspecific T wave abnormality has replaced inverted T waves in Inferior  leads  Nonspecific T wave abnormality now evident in Lateral leads  Confirmed by Amanda Knutson MD ([id number]) on 5/26/2023 10:55:02 AM    Referred By: [email or web address]   SELF           Confirmed By:Amanda Knutson MD                                  Imaging Results              CTA Chest Abdomen Pelvis (Final result)  Result time 05/26/23 11:29:10      Final result by Ketan Arroyo Jr., MD (05/26/23 11:29:10)                   Impression:      Examination mildly degraded by patient motion artifact.    Stable [place] type B aortic dissection extending beyond the origin of the left subclavian artery into the left common iliac artery.  No evidence of propagation of the dissection or new mural thrombus.    Stable descending thoracic aortic aneurysm and infrarenal abdominal aortic aneurysm.    Cholelithiasis.   No CT evidence for cholecystitis.    Prominent right inguinal nodes, correlation with clinical findings suggested.    Otherwise stable exam as above.    Electronically signed by resident: Noah Quesada  Date:    05/26/2023  Time:    10:02    Electronically signed by: Ketan Arroyo MD  Date:    05/26/2023  Time:    11:29               Narrative:    EXAMINATION:  CTA CHEST ABDOMEN PELVIS    CLINICAL HISTORY:  concern for worsening aortic dissection;    TECHNIQUE:  Axial CT angiogram images of the chest, abdomen, and pelvis were obtained before and after the administration of 100 cc of  Omnipaque 350 IV, from the lung apices through the ischial tuberosities, per aortic dissection protocol. Coronal and sagittal reconstructions of the abdominal aorta and visceral arteries performed.    COMPARISON:  CTA 03/03/2017    FINDINGS:  CHEST:    Soft tissue structures of the base of the neck are unremarkable.    The esophagus maintains a normal course and caliber. There is no axillary, mediastinal, or hilar lymphadenopathy.    Central airways are patent..  Bibasilar atelectasis.  No concerning nodules.  No evidence of focal consolidation, pneumothorax, or pleural fluid.    The heart is normal in size and there is no evidence of pericardial effusion.  There are coronary artery calcifications.    VASCULAR:    Stable Stu type B aortic dissection extending distal to the origin of the left subclavian artery through the distal left common iliac artery.  There is no evidence of mural thrombus within the false lumen.  Right renal artery arises from the true lumen.  Left renal artery arises from the false lumen.  Dissection flap extends slightly into the SMA and celiac arteries, similar to prior exam.  EARLENE arises from the false lumen.    Descending thoracic aortic aneurysm measuring 4.3 cm (previously 4.0 cm).  Infrarenal abdominal aortic aneurysm measuring 3.3 cm (previously 3.1 cm).    The pulmonary arteries distribute  normally. Contrast bolus timing was not optimized for evaluation of the pulmonary arteries.  No large central pulmonary thromboembolism.    ABDOMEN/PELVIS:    The liver is normal in size and attenuation with no focal hepatic abnormality.  There is no intra-or extrahepatic biliary ductal dilatation. Nitrogen containing cholelithiasis.  No evidence of wall thickening or pericholecystic fluid.  Adrenal glands, pancreas, and spleen are unremarkable..    The kidneys are normal in size and location.  There is no evidence of hydronephrosis.  Hypodensity left kidney similar.  The ureters appear normal in course and caliber without evidence of ureteral dilatation. The urinary bladder demonstrates no significant abnormality.    The visualized loops of small and large bowel show no evidence of obstruction or inflammation.  There is no ascites, free fluid, or intraperitoneal free air. There is no evidence of lymph node enlargement in the abdomen or pelvis.    No evidence of acute fractures or suspicious destructive osseous lesions.  Few prominent inguinal lymph nodes particularly on the right however is normal fatty hilum.  Correlation with clinical findings would be helpful.  Soft tissues are otherwise unremarkable.                                        X-Ray Chest AP Portable (Final result)  Result time 05/26/23 09:04:35      Final result by Max Wellington MD (05/26/23 09:04:35)                   Impression:      Poor inspiratory result.  Cannot exclude mild bilateral interstitial pulmonary changes, such as could possibly be seen with interstitial pulmonary edema or interstitial pneumonia.  Correlate clinically and with follow-up films.    No dense consolidation is apparent radiographically.    This report was flagged in Epic as abnormal.      Electronically signed by: Max Wellington  Date:    05/26/2023  Time:    09:04               Narrative:    EXAMINATION:  XR CHEST AP PORTABLE    CLINICAL  HISTORY:  Sepsis;    TECHNIQUE:  Single frontal view of the chest was performed.    COMPARISON:  Frontal chest x-ray 06/15/2016    FINDINGS:  Suboptimal inspiratory result.    The patient's torso is rotated to the right.    Mildly lordotic projection.    EKG leads and other presumed external lines project over the torso.    Allowing for the above, the thoracic trachea again deviates to the right at the level of the chronically tortuous or ectatic thoracic aortic arch.    The cardiopericardial silhouette again appears mildly enlarged, though some of the factors above could contribute to this appearance.    No dense consolidation, discrete pneumothorax, or blunting of either lateral costophrenic angle is demonstrated radiographically.  However, it is difficult to exclude mild interstitial pulmonary changes.    Degenerative changes in the poorly visualized spine.                                       Medications   sodium chloride 0.9% flush 10 mL (has no administration in time range)   glucagon (human recombinant) injection 1 mg (has no administration in time range)   enoxaparin injection 40 mg (has no administration in time range)   acetaminophen tablet 650 mg (has no administration in time range)   acetaminophen tablet 650 mg (has no administration in time range)   ondansetron injection 4 mg (has no administration in time range)   aspirin EC tablet 81 mg (has no administration in time range)   atorvastatin tablet 40 mg (has no administration in time range)   piperacillin-tazobactam (ZOSYN) 4.5 g in dextrose 5 % in water (D5W) 5 % 100 mL IVPB (MB+) (has no administration in time range)   vancomycin - pharmacy to dose (has no administration in time range)   oxyCODONE immediate release tablet 5 mg (has no administration in time range)   0.45% NaCl infusion (has no administration in time range)   dextrose 10% bolus 125 mL 125 mL (has no administration in time range)   dextrose 10% bolus 250 mL 250 mL (has no  "administration in time range)   dextrose 40 % gel 15,000 mg (has no administration in time range)   dextrose 40 % gel 30,000 mg (has no administration in time range)   vancomycin 1,250 mg in dextrose 5 % (D5W) 250 mL IVPB (Vial-Mate) (has no administration in time range)   hydrALAZINE tablet 25 mg (has no administration in time range)   acetaminophen tablet 1,000 mg (1,000 mg Oral Given 5/26/23 0732)   vancomycin 2 g in dextrose 5 % 500 mL IVPB (0 mg Intravenous Stopped 5/26/23 1129)   piperacillin-tazobactam (ZOSYN) 4.5 g in dextrose 5 % in water (D5W) 5 % 100 mL IVPB (MB+) (0 g Intravenous Stopped 5/26/23 0840)   lactated ringers bolus 500 mL (0 mLs Intravenous Stopped 5/26/23 1032)   iohexoL (OMNIPAQUE 350) injection 100 mL (100 mLs Intravenous Given 5/26/23 0931)     Medical Decision Making:   Initial Assessment:   68-year-old male with complex medical history presenting with shortness of breath.  Initially, patient is tachycardic, febrile, him hypertensive, with an increased respiratory rate.  Vitals concerning for sepsis.  Differential Diagnosis:   Sepsis, wound infection, pneumonia, worsening aortic dissection, UTI, bacteremia, ACS, pneumothorax  Clinical Tests:   Lab Tests: Ordered and Reviewed  The following lab test(s) were unremarkable: CBC, CMP, Urinalysis, Troponin and BNP  Radiological Study: Ordered and Reviewed  Medical Tests: Ordered and Reviewed  Sepsis Perfusion Assessment: "I attest a sepsis perfusion exam was performed within 6 hours of sepsis, severe sepsis, or septic shock presentation, following fluid resuscitation."  ED Management:  Patient presents with acute onset shortness of breath, chest pain, headache which is concerning for worsening aortic dissection.  Will evaluate for other causes of chest pain such as ACS, pneumonia, pneumothorax, CHF exacerbation.  Additionally, patient presenting with vital signs concerning for sepsis-febrile/tachycardic/tachypneic.  Possible source of infection " is lungs.  Patient does have a chronic wound but it does not appear to be infected at this time.  Sepsis order set ordered.  Empiric antibiotics with vanc/Zosyn given.  Fluid resuscitation with 500 cc of LR given since patient has significant cardiac history and signs of fluid overload at this time.    Workup as detailed below in ED course.  Workup significant for new leukocytosis, chest x-ray concerning for interstitial edema versus atypical pneumonia.  Patient's BNP is mildly elevated which would be consistent with a CHF exacerbation but does not explain patient's headache, fever.  EKG and initial troponin are within normal limits, lower suspicion for ACS.    Discussed patient's case with Hospital Medicine who agreed to admit patient to their service for sepsis-possible source pneumonia.  Patient is hemodynamically stable, he is appropriate for transfer to the floor at this time.            Attending Attestation:   Physician Attestation Statement for Resident:  As the supervising MD       -: I have reviewed and concur with the resident's history, physical, assessment, and plan.  I have personally interviewed and examined the patient at bedside.  See ED course and below addendum for my evaluation and additional findings.  Admitted for sepsis likely 2/2 PNA. Given broad spectrum abx and gentle IVF due to concern for HF and potential volume overload.                    ED Course as of 05/26/23 1649   Fri May 26, 2023   0737 61-year-old male with PMHx of OA, LLUVIA, HTN, cervical and B knee DJD with gait instability and physical deconditioning, obesity, type b aortic dissection and chronic venous stasis with bilateral lower extremity wounds pw chest pain, sob and fevers. Febrile. Tachycardic, tachypneic oxygenating well on RA w reassuring BP. AO3. Morbidly obese. Extensive edema to BL LE R>L with chronic wounds. Given CP/SOB ordered CTA but suspect infectious etiology for patients symptoms. Will cover for sepsis with  broad spectrum abx and give gentle hydration given hx of HF and volume overload.  [BD]   0830 WBC(!): 15.55  New leukocytosis. [ES]   0830 Hemoglobin(!): 13.2  Normocytic anemia consistent with baseline. [ES]   0850 BNP(!): 220  Above baseline [ES]   0850 Troponin I: 0.017 [ES]   0914 X-Ray Chest AP Portable(!)  Concerning for interstitial edema versus pneumonia. [ES]   1012 EKG shows sinus tachycardia rate of 104.  Borderline QT interval otherwise normal intervals.  Wavy baseline.  No STEMI. [BD]   1039 Urinalysis, Reflex to Urine Culture Urine, Clean Catch(!)  Low suspicion for UTI. [ES]      ED Course User Index  [BD] Andrew Emanuel MD  [ES] Larisa Das MD                 Clinical Impression:   Final diagnoses:  [R00.0] Tachycardia  [J18.9] Pneumonia of both lungs due to infectious organism, unspecified part of lung (Primary)  [A41.9] Sepsis, due to unspecified organism, unspecified whether acute organ dysfunction present        ED Disposition Condition    Admit Stable                Larisa Das MD  Resident  05/26/23 1649       Larisa Das MD  Resident  05/26/23 1649       Andrew Emanuel MD  05/27/23 1908

## 2023-05-26 NOTE — ED NOTES
Telemetry Verification   Patient placed on Telemetry Box  Verified with War Room  Box # [id number]   Monitor Tech    Rate 94   Rhythm NSR

## 2023-05-26 NOTE — HPI
68-year-old gentleman past medical history of hypertension, chronic lymphedema to right lower extremity who has home health aide as an outpatient and history of aortic dissection was well until this morning around 1:00 a.m. he woke from sleep and went to the bathroom to defecate.  Patient said that after passing stool, he was weak and could not get up and therefore asked his nephew to call 911.  Patient was brought to hospital and was found to be febrile.  He denies any urinary symptoms, denies any abdominal pain nausea vomiting or diarrhea.  He denies any chest pain, cough or shortness of breath.  Denies any neck stiffness or headache or visual disturbances.  Did mentioned that his lower extremities has been hurting him more than normal in recent times.  He has a chronic wound to his right lower extremity which he says has according to him been worsening over the last couple of months, uncertain if there is any purulent discharge as he is unable to see.    In the emergency room patient was febrile T-max 103.3°, normal heart rate, mild hypertension.  Leukocytosis noted.  Lactic acid normal.  Patient had chest x-ray, CT chest,abdomen and pelvis, report noted.  Blood cultures obtained and he was started on vancomycin and Zosyn

## 2023-05-26 NOTE — SUBJECTIVE & OBJECTIVE
Past Medical History:   Diagnosis Date    HTN (hypertension)     Hyperlipemia     Obesity     LLUVIA (obstructive sleep apnea)     Osteoarthritis of knee        Past Surgical History:   Procedure Laterality Date    CERVICAL FUSION  06/14/2016    LAMINECTOMY  06/14/2016       Review of patient's allergies indicates:  No Known Allergies    No current facility-administered medications on file prior to encounter.     Current Outpatient Medications on File Prior to Encounter   Medication Sig    amLODIPine (NORVASC) 10 MG tablet Take 1 tablet (10 mg total) by mouth once daily.    aspirin (ECOTRIN) 81 MG EC tablet Take 81 mg by mouth once daily.    atorvastatin (LIPITOR) 40 MG tablet Take 1 tablet (40 mg total) by mouth once daily.    carvediloL (COREG) 25 MG tablet Take 1 tablet (25 mg total) by mouth 2 (two) times a day.    diclofenac sodium (VOLTAREN) 1 % Gel Apply 2 g topically once daily. Apply to affected area    docusate sodium (COLACE) 100 MG capsule Take 1 capsule (100 mg total) by mouth 2 (two) times daily as needed for Constipation.    furosemide (LASIX) 40 MG tablet Take 1 tablet (40 mg total) by mouth once daily.    gabapentin (NEURONTIN) 600 MG tablet Take 1 tablet (600 mg total) by mouth 2 (two) times daily.    ibuprofen (ADVIL,MOTRIN) 200 MG tablet Take 200 mg by mouth.    lisinopriL (PRINIVIL,ZESTRIL) 40 MG tablet Take 1 tablet (40 mg total) by mouth once daily.    potassium chloride SA (K-DUR,KLOR-CON) 20 MEQ tablet Take 1 tablet (20 mEq total) by mouth 2 (two) times daily.    traMADoL (ULTRAM) 50 mg tablet Take 50 mg by mouth every 8 (eight) hours as needed.     Family History       Problem Relation (Age of Onset)    Cancer Mother    Diabetes Father    Diabetes type II Mother    Hyperlipidemia Mother, Father    Hypertension Mother, Father    Stroke Maternal Grandmother          Tobacco Use    Smoking status: Never    Smokeless tobacco: Never   Substance and Sexual Activity    Alcohol use: Yes    Drug use:  No     Comment: Has tried marijuana and crack in past, but no drug use since 2003    Sexual activity: Not on file     Review of Systems   Constitutional:  Positive for chills and fever.   Musculoskeletal:  Positive for arthralgias and myalgias.   Objective:     Vital Signs (Most Recent):  Temp: 98.3 °F (36.8 °C) (05/26/23 1615)  Pulse: 99 (05/26/23 1615)  Resp: 17 (05/26/23 1615)  BP: 136/76 (05/26/23 1615)  SpO2: 96 % (05/26/23 1615) Vital Signs (24h Range):  Temp:  [98.3 °F (36.8 °C)-103.3 °F (39.6 °C)] 98.3 °F (36.8 °C)  Pulse:  [] 99  Resp:  [17-35] 17  SpO2:  [93 %-99 %] 96 %  BP: (124-195)/(49-85) 136/76     Weight: (!) 191.9 kg (423 lb)  Body mass index is 68.27 kg/m².     Physical Exam  Constitutional:       Appearance: He is obese.   HENT:      Head: Normocephalic.      Right Ear: External ear normal.      Left Ear: External ear normal.      Nose: Nose normal.   Eyes:      General: No scleral icterus.  Cardiovascular:      Rate and Rhythm: Normal rate.      Heart sounds: Normal heart sounds.   Pulmonary:      Breath sounds: Normal breath sounds.   Abdominal:      Palpations: Abdomen is soft.      Tenderness: There is no abdominal tenderness.   Musculoskeletal:      Cervical back: Normal range of motion. No rigidity.      Comments: Significant lymphedema to right lower extremity.  Patient's right thigh has significant swelling, with satellite wounds, partially healing and sloughing, picture taken   Skin:     General: Skin is warm.   Neurological:      Mental Status: He is alert and oriented to person, place, and time.      Comments: Kernig's and Brudzinski's sign negative   Psychiatric:         Mood and Affect: Mood normal.              Significant Labs: All pertinent labs within the past 24 hours have been reviewed.    Significant Imaging: I have reviewed all pertinent imaging results/findings within the past 24 hours.

## 2023-05-26 NOTE — ASSESSMENT & PLAN NOTE
Patient febrile with leukocytosis.  Hemodynamically stable.  Source of infection at this time possible right thigh.  We will obtain General surgery consult and follow their recommendations re need for further imaging.  We will continue with IV vancomycin and Zosyn, follow up on blood cultures.  Wound care consult.  IV fluids

## 2023-05-26 NOTE — PROGRESS NOTES
Pharmacokinetic Initial Assessment: IV Vancomycin    Assessment/Plan:    Initiate intravenous vancomycin with loading dose of 2000 mg once followed by a maintenance dose of vancomycin 1250 mg IV every 12 hours  Desired empiric serum trough concentration is 10 to 20 mcg/mL  Draw vancomycin trough level 30 min prior to fourth dose on 5/27 at approximately 1930  Pharmacy will continue to follow and monitor vancomycin.      Please contact pharmacy at extension [phone] with any questions regarding this assessment.     Thank you for the consult,   Umu Padilla       Patient brief summary:  Edd Wills is a 68 y.o. male initiated on antimicrobial therapy with IV Vancomycin for treatment of suspected sepsis    Drug Allergies:   Review of patient's allergies indicates:  No Known Allergies    Actual Body Weight:   191.9 kg     Renal Function:   Estimated Creatinine Clearance: 111.8 mL/min (based on SCr of 1 mg/dL).,     Dialysis Method (if applicable):  N/A

## 2023-05-27 LAB
ANION GAP SERPL CALC-SCNC: 9 MMOL/L (ref 8–16)
ANISOCYTOSIS BLD QL SMEAR: SLIGHT
BASOPHILS # BLD AUTO: 0.03 K/UL (ref 0–0.2)
BASOPHILS NFR BLD: 0.1 % (ref 0–1.9)
BUN SERPL-MCNC: 16 MG/DL (ref 8–23)
CALCIUM SERPL-MCNC: 8.2 MG/DL (ref 8.7–10.5)
CHLORIDE SERPL-SCNC: 104 MMOL/L (ref 95–110)
CO2 SERPL-SCNC: 22 MMOL/L (ref 23–29)
CREAT SERPL-MCNC: 1.2 MG/DL (ref 0.5–1.4)
DIFFERENTIAL METHOD: ABNORMAL
EOSINOPHIL # BLD AUTO: 0 K/UL (ref 0–0.5)
EOSINOPHIL NFR BLD: 0 % (ref 0–8)
ERYTHROCYTE [DISTWIDTH] IN BLOOD BY AUTOMATED COUNT: 15.1 % (ref 11.5–14.5)
EST. GFR  (NO RACE VARIABLE): >60 ML/MIN/1.73 M^2
GLUCOSE SERPL-MCNC: 80 MG/DL (ref 70–110)
HCT VFR BLD AUTO: 34.6 % (ref 40–54)
HGB BLD-MCNC: 11.1 G/DL (ref 14–18)
HYPOCHROMIA BLD QL SMEAR: ABNORMAL
IMM GRANULOCYTES # BLD AUTO: 0.49 K/UL (ref 0–0.04)
IMM GRANULOCYTES NFR BLD AUTO: 2.3 % (ref 0–0.5)
LACTATE SERPL-SCNC: 1.4 MMOL/L (ref 0.5–2.2)
LACTATE SERPL-SCNC: 1.7 MMOL/L (ref 0.5–2.2)
LYMPHOCYTES # BLD AUTO: 0.9 K/UL (ref 1–4.8)
LYMPHOCYTES NFR BLD: 4.3 % (ref 18–48)
MCH RBC QN AUTO: 26 PG (ref 27–31)
MCHC RBC AUTO-ENTMCNC: 32.1 G/DL (ref 32–36)
MCV RBC AUTO: 81 FL (ref 82–98)
MONOCYTES # BLD AUTO: 1.3 K/UL (ref 0.3–1)
MONOCYTES NFR BLD: 6.1 % (ref 4–15)
NEUTROPHILS # BLD AUTO: 18.7 K/UL (ref 1.8–7.7)
NEUTROPHILS NFR BLD: 87.2 % (ref 38–73)
NRBC BLD-RTO: 0 /100 WBC
OVALOCYTES BLD QL SMEAR: ABNORMAL
PLATELET # BLD AUTO: 235 K/UL (ref 150–450)
PMV BLD AUTO: 11.1 FL (ref 9.2–12.9)
POIKILOCYTOSIS BLD QL SMEAR: SLIGHT
POLYCHROMASIA BLD QL SMEAR: ABNORMAL
POTASSIUM SERPL-SCNC: 3.2 MMOL/L (ref 3.5–5.1)
RBC # BLD AUTO: 4.27 M/UL (ref 4.6–6.2)
SODIUM SERPL-SCNC: 135 MMOL/L (ref 136–145)
SPHEROCYTES BLD QL SMEAR: ABNORMAL
WBC # BLD AUTO: 21.5 K/UL (ref 3.9–12.7)

## 2023-05-27 PROCEDURE — 25000003 PHARM REV CODE 250: Performed by: HOSPITALIST

## 2023-05-27 PROCEDURE — 25500020 PHARM REV CODE 255: Performed by: INTERNAL MEDICINE

## 2023-05-27 PROCEDURE — 99232 SBSQ HOSP IP/OBS MODERATE 35: CPT | Mod: ,,, | Performed by: INTERNAL MEDICINE

## 2023-05-27 PROCEDURE — 85025 COMPLETE CBC W/AUTO DIFF WBC: CPT | Performed by: INTERNAL MEDICINE

## 2023-05-27 PROCEDURE — 99232 PR SUBSEQUENT HOSPITAL CARE,LEVL II: ICD-10-PCS | Mod: ,,, | Performed by: INTERNAL MEDICINE

## 2023-05-27 PROCEDURE — 36415 COLL VENOUS BLD VENIPUNCTURE: CPT | Performed by: INTERNAL MEDICINE

## 2023-05-27 PROCEDURE — 63600175 PHARM REV CODE 636 W HCPCS: Performed by: INTERNAL MEDICINE

## 2023-05-27 PROCEDURE — 80048 BASIC METABOLIC PNL TOTAL CA: CPT | Performed by: INTERNAL MEDICINE

## 2023-05-27 PROCEDURE — 83605 ASSAY OF LACTIC ACID: CPT | Performed by: INTERNAL MEDICINE

## 2023-05-27 PROCEDURE — 97165 OT EVAL LOW COMPLEX 30 MIN: CPT

## 2023-05-27 PROCEDURE — 21400001 HC TELEMETRY ROOM

## 2023-05-27 PROCEDURE — 97530 THERAPEUTIC ACTIVITIES: CPT

## 2023-05-27 PROCEDURE — 25000003 PHARM REV CODE 250: Performed by: INTERNAL MEDICINE

## 2023-05-27 RX ORDER — GABAPENTIN 300 MG/1
600 CAPSULE ORAL 2 TIMES DAILY
Status: DISCONTINUED | OUTPATIENT
Start: 2023-05-27 | End: 2023-05-31 | Stop reason: HOSPADM

## 2023-05-27 RX ADMIN — ATORVASTATIN CALCIUM 40 MG: 40 TABLET, FILM COATED ORAL at 09:05

## 2023-05-27 RX ADMIN — PIPERACILLIN AND TAZOBACTAM 4.5 G: 4; .5 INJECTION, POWDER, LYOPHILIZED, FOR SOLUTION INTRAVENOUS; PARENTERAL at 07:05

## 2023-05-27 RX ADMIN — IOHEXOL 100 ML: 350 INJECTION, SOLUTION INTRAVENOUS at 10:05

## 2023-05-27 RX ADMIN — ASPIRIN 81 MG: 81 TABLET, COATED ORAL at 09:05

## 2023-05-27 RX ADMIN — ACETAMINOPHEN 650 MG: 325 TABLET ORAL at 08:05

## 2023-05-27 RX ADMIN — PIPERACILLIN AND TAZOBACTAM 4.5 G: 4; .5 INJECTION, POWDER, LYOPHILIZED, FOR SOLUTION INTRAVENOUS; PARENTERAL at 01:05

## 2023-05-27 RX ADMIN — GABAPENTIN 600 MG: 300 CAPSULE ORAL at 09:05

## 2023-05-27 RX ADMIN — PIPERACILLIN AND TAZOBACTAM 4.5 G: 4; .5 INJECTION, POWDER, LYOPHILIZED, FOR SOLUTION INTRAVENOUS; PARENTERAL at 11:05

## 2023-05-27 RX ADMIN — OXYCODONE HYDROCHLORIDE 5 MG: 5 TABLET ORAL at 12:05

## 2023-05-27 RX ADMIN — ENOXAPARIN SODIUM 40 MG: 40 INJECTION SUBCUTANEOUS at 06:05

## 2023-05-27 RX ADMIN — VANCOMYCIN HYDROCHLORIDE 1250 MG: 1.25 INJECTION, POWDER, LYOPHILIZED, FOR SOLUTION INTRAVENOUS at 01:05

## 2023-05-27 NOTE — PT/OT/SLP EVAL
"Occupational Therapy   Evaluation    Name: Edd Wills  MRN: [id number]  Admitting Diagnosis: Sepsis  Recent Surgery: * No surgery found *      Recommendations:     Discharge Recommendations: nursing facility, skilled  Discharge Equipment Recommendations:  wheelchair  Barriers to discharge:       Assessment:     Edd Wills is a 68 y.o. male with a medical diagnosis of Sepsis.  He presents with performance deficits affecting function: weakness, impaired endurance, impaired self care skills, impaired functional mobility, gait instability, impaired balance, decreased upper extremity function, decreased lower extremity function, decreased safety awareness, pain, impaired skin, edema.      Rehab Prognosis: Fair; patient would benefit from acute skilled OT services to address these deficits and reach maximum level of function.       Plan:     Patient to be seen 3 x/week to address the above listed problems via self-care/home management, therapeutic activities, therapeutic exercises  Plan of Care Expires: 06/27/23  Plan of Care Reviewed with: patient    Subjective     Chief Complaint: Sepsis  Patient/Family Comments/goals: "I'm going to a senior apartment"    Occupational Profile:  [facility] bedroom on 1st floor. Pt reports there are no steps to enter, but dose have a ramp. Pt has a tub with a SC and grab bars.  Previous level of function: independent with FM and ADLs  Roles and Routines: pt enjoys watching TV  Equipment Used at Home: shower chair, rollator, walker, rolling, grab bar  Assistance upon Discharge: Nephew, son    Pain/Comfort:  Pain Rating 1: 4/10  Location - Side 1: Bilateral  Location - Orientation 1: generalized  Location 1: knee  Pain Addressed 1: Reposition, Distraction, Nurse notified    Patients cultural, spiritual, Hindu conflicts given the current situation: no    Objective:     Communicated with: RN prior to session.  Patient found sitting edge of " bed with telemetry, oxygen, PureWick and RN upon OT entry to room. RN present throughout session.    General Precautions: Standard, fall  Orthopedic Precautions: N/A  Braces: N/A  Respiratory Status: Nasal cannula    Occupational Performance:    Bed Mobility:    Patient completed Rolling/Turning to Left with  total assistance and 2 persons  Patient completed Rolling/Turning to Right with total assistance and 2 persons  Patient completed Scooting/Bridging with maximal assistance, total assistance, and 2 persons  Patient completed Sit to Supine with maximal assistance and 2 persons    Functional Mobility/Transfers:  Sit <> Stand attempted with RW for scooting, however pt was unable to complete    Activities of Daily Living:  Toileting: total assistance and of 2 persons for wiping and B rolling    Cognitive/Visual Perceptual:  Cognitive/Psychosocial Skills:     -       Oriented to: Person, Place, Time, and Situation   -       Follows Commands/attention:Follows multistep  commands  -       Communication: clear/fluent  -       Safety awareness/insight to disability: intact     Physical Exam:  Postural examination/scapula alignment:    -       Rounded shoulders  -       Forward head  -       Posterior pelvic tilt  Skin integrity: Wound on lower right extremity   Edema:  Severe in BLE  Sensation:    -       Intact  Upper Extremity Range of Motion:     -       Right Upper Extremity: WFL  -       Left Upper Extremity: WFL  Upper Extremity Strength:    -       Right Upper Extremity: WFL  -       Left Upper Extremity: WFL   Strength:    -       Right Upper Extremity: WFL  -       Left Upper Extremity: WFL    AMPAC 6 Click ADL:  AMPAC Total Score: 16    Treatment & Education:  Pt educated on role of OT, POC, safety during ADLs. Pt given call light and instructed to ask for assistance with needs/transfers when needed.     Patient left HOB elevated with all lines intact and call button in reach    GOALS:   Multidisciplinary  Problems       Occupational Therapy Goals          Problem: Occupational Therapy    Goal Priority Disciplines Outcome Interventions   Occupational Therapy Goal     OT, PT/OT Ongoing, Progressing    Description: Goals to be met by: 6/27/2023     Patient will increase functional independence with ADLs by performing:    UE Dressing with Minimal Assistance.  LE Dressing with Moderate Assistance.  Grooming while EOB with Contact Guard Assistance.  Toileting from bedside commode with Moderate Assistance for hygiene and clothing management.   Rolling to Bilateral with Moderate Assistance.   Supine to sit with Moderate Assistance.  Step transfer with Moderate Assistance  Toilet transfer to bedside commode with Moderate Assistance.                         History:     Past Medical History:   Diagnosis Date    HTN (hypertension)     Hyperlipemia     Obesity     LLUVIA (obstructive sleep apnea)     Osteoarthritis of knee          Past Surgical History:   Procedure Laterality Date    CERVICAL FUSION  06/14/2016    LAMINECTOMY  06/14/2016       Time Tracking:     OT Date of Treatment: 05/27/23  OT Start Time: 0948  OT Stop Time: 1016  OT Total Time (min): 28 min    Billable Minutes:Evaluation 7  Therapeutic Activity 20    5/27/2023

## 2023-05-27 NOTE — NURSING
Nurses Note -- 4 Eyes      5/26/2023   7:27 PM      Skin assessed during: Admit      [] No Altered Skin Integrity Present    []Prevention Measures Documented      [x] Yes- Altered Skin Integrity Present or Discovered   [x] LDA Added if Not in Epic (Describe Wound)   [] New Altered Skin Integrity was Present on Admit and Documented in LDA   [] Wound Image Taken    Wound Care Consulted? Yes    Attending Nurse:  Omayra Olsen RN     Second RN/Staff Member:  Tyra DELGADILLO

## 2023-05-27 NOTE — PROGRESS NOTES
[facility] - [facility] ([address] Medicine  Progress Note    Patient Name: Edd Wills  MRN: [id number]  Patient Class: IP- Inpatient   Admission Date: 5/26/2023  Length of Stay: 1 days  Attending Physician: Mal Collier MD  Primary Care Provider: Zuleyka Sabillon MD        Subjective:     Principal Problem:Sepsis        HPI:  68-year-old gentleman past medical history of hypertension, chronic lymphedema to right lower extremity who has home health aide as an outpatient and history of aortic dissection was well until this morning around 1:00 a.m. he woke from sleep and went to the bathroom to defecate.  Patient said that after passing stool, he was weak and could not get up and therefore asked his nephew to call 911.  Patient was brought to hospital and was found to be febrile.  He denies any urinary symptoms, denies any abdominal pain nausea vomiting or diarrhea.  He denies any chest pain, cough or shortness of breath.  Denies any neck stiffness or headache or visual disturbances.  Did mentioned that his lower extremities has been hurting him more than normal in recent times.  He has a chronic wound to his right lower extremity which he says has according to him been worsening over the last couple of months, uncertain if there is any purulent discharge as he is unable to see.    In the emergency room patient was febrile T-max 103.3°, normal heart rate, mild hypertension.  Leukocytosis noted.  Lactic acid normal.  Patient had chest x-ray, CT chest,abdomen and pelvis, report noted.  Blood cultures obtained and he was started on vancomycin and Zosyn      Overview/Hospital Course:  No notes on file    Interval History:  Low-grade fevers 100.7 last night.  No new complaints today.  Leukocytosis worsened.  I spoke with General surgery, they recommend CT of right thigh without contrast and they will review patient.    Review of Systems  Objective:     Vital Signs (Most Recent):  Temp: 98.7 °F (37.1 °C)  (05/27/23 0424)  Pulse: 92 (05/27/23 0424)  Resp: 17 (05/27/23 0424)  BP: 132/62 (05/27/23 0424)  SpO2: 97 % (05/27/23 0424) Vital Signs (24h Range):  Temp:  [98.3 °F (36.8 °C)-100.7 °F (38.2 °C)] 98.7 °F (37.1 °C)  Pulse:  [] 92  Resp:  [17-20] 17  SpO2:  [93 %-98 %] 97 %  BP: (124-147)/(53-76) 132/62     Weight: (!) 194.5 kg (428 lb 12.7 oz)  Body mass index is 69.21 kg/m².    Intake/Output Summary (Last 24 hours) at 5/27/2023 0816  Last data filed at 5/27/2023 0415  Gross per 24 hour   Intake 322 ml   Output 900 ml   Net -578 ml         Physical Exam  Constitutional:       Appearance: He is obese.   HENT:      Head: Normocephalic.      Right Ear: External ear normal.      Left Ear: External ear normal.      Nose: Nose normal.   Cardiovascular:      Rate and Rhythm: Normal rate.   Pulmonary:      Effort: Pulmonary effort is normal.   Genitourinary:     Comments: PureWick catheter  Musculoskeletal:      Comments: Chronic lymphedema to right lower. Patient's right thigh has significant swelling, with satellite wounds, partially healing and sloughing   Neurological:      Mental Status: He is alert and oriented to person, place, and time.   Psychiatric:         Mood and Affect: Mood normal.           Significant Labs: All pertinent labs within the past 24 hours have been reviewed.          Assessment/Plan:      * Sepsis  Patient febrile with leukocytosis.  Hemodynamically stable.  Despite IV antibiotics, patient still has low-grade fevers and worsening leukocytosis, concerns for abscess of right thigh.  Spoke with general surgeon, CT of right thigh without contrast ordered, they will review patient today. We will continue with IV vancomycin and Zosyn, follow up on blood cultures.  Wound care consult.  IV fluids    Hyperlipemia  Continue statin      HTN (hypertension)  We will hold home antihypertensives for now given ongoing infection. P.r.n antihypertensive.      Physical deconditioning  PT OT        VTE Risk  Mitigation (From admission, onward)         Ordered     enoxaparin injection 40 mg  Daily         05/26/23 1623     IP VTE HIGH RISK PATIENT  Once         05/26/23 1623     Place sequential compression device  Until discontinued         05/26/23 1623                Discharge Planning   BROWN:      Code Status: Full Code   Is the patient medically ready for discharge?:     Reason for patient still in hospital (select all that apply): Patient unstable and Patient trending condition                     Mal Collier MD  Department of Hospital Medicine   [facility] - Intensive Care ([place]-[facility])

## 2023-05-27 NOTE — PLAN OF CARE
[facility] - [facility] ([place] [address])  Initial Discharge Assessment       Primary Care Provider: Zuleyka Sabillon MD    Admission Diagnosis: Tachycardia [R00.0]  Pneumonia of both lungs due to infectious organism, unspecified part of lung [J18.9]  Sepsis, due to unspecified organism, unspecified whether acute organ dysfunction present [A41.9]    Admission Date: 5/26/2023  Expected Discharge Date:     Transition of Care Barriers: No family/friends to help, Other (see comments) (limited support)    Payor: MEDICARE / Plan: MEDICARE PART A & B / Product Type: Government /     Extended Emergency Contact Information  Primary Emergency Contact: Edd Wills  Address: [address] [place] [address] [facility] [address]  Home Phone: [phone]  Relation: Son    Discharge Plan A: Home Health  Discharge Plan B: Skilled Nursing Facility      [id number] [place] - [address] [facility]  [address]  [facility] [address]  Phone: [phone] Fax: [phone]    [facility] [address] [place], [place] - [address]  [address]  [place] [address]  Phone: [phone] Fax: [phone]    [facility]  [address]  [place] [address]  Phone: [phone] Fax: [phone]      Initial Assessment (most recent)       Adult Discharge Assessment - 05/27/23 1727          Discharge Assessment    Assessment Type Discharge Planning Assessment     Confirmed/corrected address, phone number and insurance Yes     Confirmed Demographics Correct on Facesheet     Source of Information patient     Does patient/caregiver understand observation status Yes     Communicated BROWN with patient/caregiver Date not available/Unable to determine     Facility Arrived From: Sepsis/Tachy     Do you expect to return to your current living situation? Other (see comments)   TBD    Do you have help at home or someone  to help you manage your care at home? Yes     Who are your caregiver(s) and their phone number(s)? Edd Wills (son) [phone]     Prior to hospitilization cognitive status: Alert/Oriented     Current cognitive status: Alert/Oriented     Walking or Climbing Stairs ambulation difficulty, requires equipment     Mobility Management rollator, RW     Dressing/Bathing bathing difficulty, requires equipment     Dressing/Bathing Management shower chair/BSC     Do you have any problems with: Errands/Grocery;Needs other help     Home Accessibility other (see comments);stairs within home     Home Layout Able to live on 1st floor     Equipment Currently Used at Home none;bedside commode;walker, rolling;shower chair;rollator;grab bar     Readmission within 30 days? No     Patient currently being followed by outpatient case management? No     Do you currently have service(s) that help you manage your care at home? Yes     Name and Contact number of agency [facility]     Is the pt/caregiver preference to resume services with current agency Yes     Do you take prescription medications? Yes     Do you have prescription coverage? Yes     Coverage Medicare/Medicaid     Do you have any problems affording any of your prescribed medications? No     Is the patient taking medications as prescribed? yes     Who is going to help you get home at discharge? TBD     How do you get to doctors appointments? health plan transportation;public transportation     Are you on dialysis? No     Do you take coumadin? No     Discharge Plan A Home Health     Discharge Plan B Skilled Nursing Facility     DME Needed Upon Discharge  other (see comments)   TBD    Discharge Plan discussed with: Patient     Transition of Care Barriers No family/friends to help;Other (see comments)   limited support       Physical Activity    On average, how many days per week do you engage in moderate to strenuous exercise (like a brisk walk)? 0 days     On average, how  many minutes do you engage in exercise at this level? 0 min        Financial Resource Strain    How hard is it for you to pay for the very basics like food, housing, medical care, and heating? Not hard at all        Housing Stability    In the last 12 months, was there a time when you were not able to pay the mortgage or rent on time? No     In the last 12 months, was there a time when you did not have a steady place to sleep or slept in a shelter (including now)? No        Transportation Needs    In the past 12 months, has lack of transportation kept you from medical appointments or from getting medications? No     In the past 12 months, has lack of transportation kept you from meetings, work, or from getting things needed for daily living? No        Food Insecurity    Within the past 12 months, you worried that your food would run out before you got the money to buy more. Never true     Within the past 12 months, the food you bought just didn't last and you didn't have money to get more. Never true        Stress    Do you feel stress - tense, restless, nervous, or anxious, or unable to sleep at night because your mind is troubled all the time - these days? Not at all        Social Connections    In a typical week, how many times do you talk on the phone with family, friends, or neighbors? Twice a week     How often do you get together with friends or relatives? Twice a week     How often do you attend Episcopal or Faith services? 1 to 4 times per year     Do you belong to any clubs or organizations such as Episcopal groups, unions, fraternal or athletic groups, or school groups? No     How often do you attend meetings of the clubs or organizations you belong to? Never     Are you , , , , never , or living with a partner? Never         Alcohol Use    Q1: How often do you have a drink containing alcohol? Never     Q2: How many drinks containing alcohol do you have on a  typical day when you are drinking? Patient does not drink     Q3: How often do you have six or more drinks on one occasion? Never                        SW met with pt at bedside. Confirmed face sheet information. Reported that he lives alone. Reported that he has some support from his son and nephew. Pt has a rollator, BSC, shower chair, and grab bars. Active with [facility]. No coumadin. No dialysis. Prefers [facility]. Discussed possible placement with pt. Reported that he will think about it. Will need transportation home ( van). SW/CM to follow.    Sharyn Ross, LOREN, LCSW  Weekend -[facility] [phone]

## 2023-05-27 NOTE — CARE UPDATE
RAPID RESPONSE NURSE PROACTIVE ROUNDING NOTE       Time of Visit: 0852    Admit Date: 2023  LOS: 1  Code Status: Full Code   Date of Visit: 2023  : 1955  Age: 68 y.o.  Sex: male  Race: Black or   Bed: 94157/16203 A:   MRN: [id number]  Was the patient discharged from an ICU this admission? No   Was the patient discharged from a PACU within last 24 hours? No   Did the patient receive conscious sedation/general anesthesia in last 24 hours? No  Was the patient in the ED within the past 24 hours? Yes  Was the patient on NIPPV within the past 24 hours? No   Attending Physician: Mal Collier MD  Primary Service: [facility] MED D   Time spent at the bedside: < 15 min    SITUATION    Notified by charge RN during rounding.  Reason for alert: increases SOB, placed on O2  Called to evaluate the patient for Respiratory    BACKGROUND     Why is the patient in the hospital?: Sepsis    Patient has a past medical history of HTN (hypertension), Hyperlipemia, Obesity, LLUVIA (obstructive sleep apnea), and Osteoarthritis of knee.    Last Vitals:  Temp: 98.7 °F (37.1 °C) ( 1152)  Pulse: 86 ( 1152)  Resp: 16 ( 1250)  BP: 141/64 ( 1152)  SpO2: 94 % ( 1152)    24 Hours Vitals Range:  Temp:  [98.3 °F (36.8 °C)-102.4 °F (39.1 °C)]   Pulse:  [86-99]   Resp:  [16-32]   BP: (127-147)/(60-76)   SpO2:  [91 %-98 %]     Labs:  Recent Labs     23  0742 23  0506   WBC 15.55* 21.50*   HGB 13.2* 11.1*   HCT 40.8 34.6*    235       Recent Labs     23  0742 23  0506    135*   K 3.8 3.2*    104   CO2 23 22*   CREATININE 1.0 1.2   GLU 77 80        No results for input(s): PH, PCO2, PO2, HCO3, POCSATURATED, BE in the last 72 hours.     ASSESSMENT    Physical Exam  Vitals and nursing note reviewed.   Cardiovascular:      Rate and Rhythm: Tachycardia present.   Pulmonary:      Effort: Tachypnea present.      Breath sounds: Decreased breath sounds present.    Musculoskeletal:      Right lower leg: Swelling present.      Left lower leg: Swelling present.   Skin:     General: Skin is warm.      Capillary Refill: Capillary refill takes 2 to 3 seconds.   Neurological:      Mental Status: He is alert and oriented to person, place, and time.      GCS: GCS eye subscore is 4. GCS verbal subscore is 5. GCS motor subscore is 6.   Psychiatric:         Attention and Perception: Attention normal.       INTERVENTIONS    The patient was seen for Respiratory problem. Staff concerns included tachypnea, new onset of difficulty breathing, oxygen saturation < 90% despite supplemental oxygen, and increased WOB. The following interventions were performed: supplemental oxygen, continued pulse ox monitoring continued, and continued cardiac monitoring continued.    RECOMMENDATIONS    Cont care per primary team  Maintain Iv access  Cont tele and Pulse Ox monitoring  Cont ABX  Monitor for signs of sepsis      PROVIDER ESCALATION    Yes/No  No    Orders received and case discussed with NA.    Disposition: Remain in room 84509.    FOLLOW-UP    Charge Halley DELGADILLO  updated on plan of care. Instructed to call the Rapid Response Nurse, Jorge Ayala RN at [phone] for additional questions or concerns.

## 2023-05-27 NOTE — PLAN OF CARE
Problem: Occupational Therapy  Goal: Occupational Therapy Goal  Description: Goals to be met by: 6/27/2023     Patient will increase functional independence with ADLs by performing:    UE Dressing with Minimal Assistance.  LE Dressing with Moderate Assistance.  Grooming while EOB with Contact Guard Assistance.  Toileting from bedside commode with Moderate Assistance for hygiene and clothing management.   Rolling to Bilateral with Moderate Assistance.   Supine to sit with Moderate Assistance.  Step transfer with Moderate Assistance  Toilet transfer to bedside commode with Moderate Assistance.    Outcome: Ongoing, Progressing

## 2023-05-27 NOTE — PLAN OF CARE
Problem: Adult Inpatient Plan of Care  Goal: Plan of Care Review  Outcome: Ongoing, Not Progressing  Goal: Optimal Comfort and Wellbeing  Outcome: Ongoing, Not Progressing     Problem: Impaired Wound Healing  Goal: Optimal Wound Healing  Outcome: Ongoing, Not Progressing     AAOx4.  Awaiting general surgery consult. Drsg in place to karina LITTLE. Continues with ivf and pain management provided as needed.

## 2023-05-27 NOTE — PLAN OF CARE
Problem: Adult Inpatient Plan of Care  Goal: Plan of Care Review  Outcome: Ongoing, Progressing  Goal: Optimal Comfort and Wellbeing  Outcome: Ongoing, Progressing  Goal: Readiness for Transition of Care  Outcome: Ongoing, Progressing     Problem: Bariatric Environmental Safety  Goal: Safety Maintained with Care  Outcome: Ongoing, Progressing     Problem: Impaired Wound Healing  Goal: Optimal Wound Healing  Outcome: Ongoing, Progressing     Problem: Skin Injury Risk Increased  Goal: Skin Health and Integrity  Outcome: Ongoing, Progressing

## 2023-05-27 NOTE — SUBJECTIVE & OBJECTIVE
Interval History:  Low-grade fevers 100.7 last night.  No new complaints today.  Leukocytosis worsened.  I spoke with General surgery, they recommend CT of right thigh without contrast and they will review patient.    Review of Systems  Objective:     Vital Signs (Most Recent):  Temp: 98.7 °F (37.1 °C) (05/27/23 0424)  Pulse: 92 (05/27/23 0424)  Resp: 17 (05/27/23 0424)  BP: 132/62 (05/27/23 0424)  SpO2: 97 % (05/27/23 0424) Vital Signs (24h Range):  Temp:  [98.3 °F (36.8 °C)-100.7 °F (38.2 °C)] 98.7 °F (37.1 °C)  Pulse:  [] 92  Resp:  [17-20] 17  SpO2:  [93 %-98 %] 97 %  BP: (124-147)/(53-76) 132/62     Weight: (!) 194.5 kg (428 lb 12.7 oz)  Body mass index is 69.21 kg/m².    Intake/Output Summary (Last 24 hours) at 5/27/2023 0816  Last data filed at 5/27/2023 0415  Gross per 24 hour   Intake 322 ml   Output 900 ml   Net -578 ml         Physical Exam  Constitutional:       Appearance: He is obese.   HENT:      Head: Normocephalic.      Right Ear: External ear normal.      Left Ear: External ear normal.      Nose: Nose normal.   Cardiovascular:      Rate and Rhythm: Normal rate.   Pulmonary:      Effort: Pulmonary effort is normal.   Genitourinary:     Comments: PureWick catheter  Musculoskeletal:      Comments: Chronic lymphedema to right lower. Patient's right thigh has significant swelling, with satellite wounds, partially healing and sloughing   Neurological:      Mental Status: He is alert and oriented to person, place, and time.   Psychiatric:         Mood and Affect: Mood normal.           Significant Labs: All pertinent labs within the past 24 hours have been reviewed.

## 2023-05-27 NOTE — ASSESSMENT & PLAN NOTE
Patient febrile with leukocytosis.  Hemodynamically stable.  Despite IV antibiotics, patient still has low-grade fevers and worsening leukocytosis, concerns for abscess of right thigh.  Spoke with general surgeon, CT of right thigh without contrast ordered, they will review patient today. We will continue with IV vancomycin and Zosyn, follow up on blood cultures.  Wound care consult.  IV fluids

## 2023-05-27 NOTE — CARE UPDATE
Spoke with General surgery.  They see no abscess on CT and recommends continuing antibiotics and wound care.  We will obtain Infectious Disease consult.

## 2023-05-28 PROBLEM — S81.801A WOUND OF RIGHT LEG: Status: ACTIVE | Noted: 2023-05-28

## 2023-05-28 LAB
ANION GAP SERPL CALC-SCNC: 8 MMOL/L (ref 8–16)
ANISOCYTOSIS BLD QL SMEAR: SLIGHT
BASOPHILS # BLD AUTO: 0.04 K/UL (ref 0–0.2)
BASOPHILS NFR BLD: 0.2 % (ref 0–1.9)
BUN SERPL-MCNC: 13 MG/DL (ref 8–23)
CALCIUM SERPL-MCNC: 8.5 MG/DL (ref 8.7–10.5)
CHLORIDE SERPL-SCNC: 104 MMOL/L (ref 95–110)
CO2 SERPL-SCNC: 24 MMOL/L (ref 23–29)
CREAT SERPL-MCNC: 1 MG/DL (ref 0.5–1.4)
DIFFERENTIAL METHOD: ABNORMAL
EOSINOPHIL # BLD AUTO: 0.1 K/UL (ref 0–0.5)
EOSINOPHIL NFR BLD: 0.5 % (ref 0–8)
ERYTHROCYTE [DISTWIDTH] IN BLOOD BY AUTOMATED COUNT: 15.2 % (ref 11.5–14.5)
EST. GFR  (NO RACE VARIABLE): >60 ML/MIN/1.73 M^2
GLUCOSE SERPL-MCNC: 95 MG/DL (ref 70–110)
HCT VFR BLD AUTO: 35 % (ref 40–54)
HGB BLD-MCNC: 11 G/DL (ref 14–18)
HYPOCHROMIA BLD QL SMEAR: ABNORMAL
IMM GRANULOCYTES # BLD AUTO: 0.26 K/UL (ref 0–0.04)
IMM GRANULOCYTES NFR BLD AUTO: 1.3 % (ref 0–0.5)
LYMPHOCYTES # BLD AUTO: 1.1 K/UL (ref 1–4.8)
LYMPHOCYTES NFR BLD: 5.8 % (ref 18–48)
MAGNESIUM SERPL-MCNC: 1.8 MG/DL (ref 1.6–2.6)
MCH RBC QN AUTO: 26.1 PG (ref 27–31)
MCHC RBC AUTO-ENTMCNC: 31.4 G/DL (ref 32–36)
MCV RBC AUTO: 83 FL (ref 82–98)
MONOCYTES # BLD AUTO: 1.3 K/UL (ref 0.3–1)
MONOCYTES NFR BLD: 6.7 % (ref 4–15)
NEUTROPHILS # BLD AUTO: 16.5 K/UL (ref 1.8–7.7)
NEUTROPHILS NFR BLD: 85.5 % (ref 38–73)
NRBC BLD-RTO: 0 /100 WBC
OVALOCYTES BLD QL SMEAR: ABNORMAL
PLATELET # BLD AUTO: 212 K/UL (ref 150–450)
PMV BLD AUTO: 11.6 FL (ref 9.2–12.9)
POIKILOCYTOSIS BLD QL SMEAR: SLIGHT
POLYCHROMASIA BLD QL SMEAR: ABNORMAL
POTASSIUM SERPL-SCNC: 3.2 MMOL/L (ref 3.5–5.1)
RBC # BLD AUTO: 4.22 M/UL (ref 4.6–6.2)
SODIUM SERPL-SCNC: 136 MMOL/L (ref 136–145)
SPHEROCYTES BLD QL SMEAR: ABNORMAL
VANCOMYCIN TROUGH SERPL-MCNC: 11.2 UG/ML (ref 10–22)
WBC # BLD AUTO: 19.28 K/UL (ref 3.9–12.7)

## 2023-05-28 PROCEDURE — 80048 BASIC METABOLIC PNL TOTAL CA: CPT | Performed by: INTERNAL MEDICINE

## 2023-05-28 PROCEDURE — 36415 COLL VENOUS BLD VENIPUNCTURE: CPT | Performed by: INTERNAL MEDICINE

## 2023-05-28 PROCEDURE — 99223 1ST HOSP IP/OBS HIGH 75: CPT | Mod: ,,, | Performed by: INTERNAL MEDICINE

## 2023-05-28 PROCEDURE — 85025 COMPLETE CBC W/AUTO DIFF WBC: CPT | Performed by: INTERNAL MEDICINE

## 2023-05-28 PROCEDURE — 25000003 PHARM REV CODE 250: Performed by: HOSPITALIST

## 2023-05-28 PROCEDURE — 97530 THERAPEUTIC ACTIVITIES: CPT

## 2023-05-28 PROCEDURE — 99499 UNLISTED E&M SERVICE: CPT | Mod: ,,, | Performed by: STUDENT IN AN ORGANIZED HEALTH CARE EDUCATION/TRAINING PROGRAM

## 2023-05-28 PROCEDURE — 97162 PT EVAL MOD COMPLEX 30 MIN: CPT

## 2023-05-28 PROCEDURE — 83735 ASSAY OF MAGNESIUM: CPT | Performed by: INTERNAL MEDICINE

## 2023-05-28 PROCEDURE — 25000003 PHARM REV CODE 250: Performed by: INTERNAL MEDICINE

## 2023-05-28 PROCEDURE — 21400001 HC TELEMETRY ROOM

## 2023-05-28 PROCEDURE — 99223 PR INITIAL HOSPITAL CARE,LEVL III: ICD-10-PCS | Mod: ,,, | Performed by: INTERNAL MEDICINE

## 2023-05-28 PROCEDURE — 94761 N-INVAS EAR/PLS OXIMETRY MLT: CPT

## 2023-05-28 PROCEDURE — 99232 PR SUBSEQUENT HOSPITAL CARE,LEVL II: ICD-10-PCS | Mod: ,,, | Performed by: INTERNAL MEDICINE

## 2023-05-28 PROCEDURE — 99232 SBSQ HOSP IP/OBS MODERATE 35: CPT | Mod: ,,, | Performed by: INTERNAL MEDICINE

## 2023-05-28 PROCEDURE — 63600175 PHARM REV CODE 636 W HCPCS: Performed by: INTERNAL MEDICINE

## 2023-05-28 PROCEDURE — 80202 ASSAY OF VANCOMYCIN: CPT | Performed by: INTERNAL MEDICINE

## 2023-05-28 PROCEDURE — 99499 NO LOS: ICD-10-PCS | Mod: ,,, | Performed by: STUDENT IN AN ORGANIZED HEALTH CARE EDUCATION/TRAINING PROGRAM

## 2023-05-28 RX ORDER — AMLODIPINE BESYLATE 5 MG/1
5 TABLET ORAL DAILY
Status: DISCONTINUED | OUTPATIENT
Start: 2023-05-28 | End: 2023-05-31 | Stop reason: HOSPADM

## 2023-05-28 RX ORDER — POTASSIUM CHLORIDE 20 MEQ/1
40 TABLET, EXTENDED RELEASE ORAL ONCE
Status: COMPLETED | OUTPATIENT
Start: 2023-05-28 | End: 2023-05-28

## 2023-05-28 RX ADMIN — ENOXAPARIN SODIUM 40 MG: 40 INJECTION SUBCUTANEOUS at 05:05

## 2023-05-28 RX ADMIN — ASPIRIN 81 MG: 81 TABLET, COATED ORAL at 09:05

## 2023-05-28 RX ADMIN — ATORVASTATIN CALCIUM 40 MG: 40 TABLET, FILM COATED ORAL at 09:05

## 2023-05-28 RX ADMIN — GABAPENTIN 600 MG: 300 CAPSULE ORAL at 09:05

## 2023-05-28 RX ADMIN — AMLODIPINE BESYLATE 5 MG: 5 TABLET ORAL at 12:05

## 2023-05-28 RX ADMIN — VANCOMYCIN HYDROCHLORIDE 1250 MG: 1.25 INJECTION, POWDER, LYOPHILIZED, FOR SOLUTION INTRAVENOUS at 01:05

## 2023-05-28 RX ADMIN — POTASSIUM CHLORIDE 40 MEQ: 1500 TABLET, EXTENDED RELEASE ORAL at 09:05

## 2023-05-28 RX ADMIN — VANCOMYCIN HYDROCHLORIDE 1250 MG: 1.25 INJECTION, POWDER, LYOPHILIZED, FOR SOLUTION INTRAVENOUS at 12:05

## 2023-05-28 RX ADMIN — PIPERACILLIN AND TAZOBACTAM 4.5 G: 4; .5 INJECTION, POWDER, LYOPHILIZED, FOR SOLUTION INTRAVENOUS; PARENTERAL at 09:05

## 2023-05-28 RX ADMIN — ACETAMINOPHEN 650 MG: 325 TABLET ORAL at 12:05

## 2023-05-28 RX ADMIN — OXYCODONE HYDROCHLORIDE 5 MG: 5 TABLET ORAL at 02:05

## 2023-05-28 RX ADMIN — PIPERACILLIN AND TAZOBACTAM 4.5 G: 4; .5 INJECTION, POWDER, LYOPHILIZED, FOR SOLUTION INTRAVENOUS; PARENTERAL at 12:05

## 2023-05-28 RX ADMIN — PIPERACILLIN AND TAZOBACTAM 4.5 G: 4; .5 INJECTION, POWDER, LYOPHILIZED, FOR SOLUTION INTRAVENOUS; PARENTERAL at 02:05

## 2023-05-28 NOTE — PLAN OF CARE
Problem: Adult Inpatient Plan of Care  Goal: Plan of Care Review  Outcome: Ongoing, Progressing     Problem: Adult Inpatient Plan of Care  Goal: Patient-Specific Goal (Individualized)  Outcome: Ongoing, Progressing     Problem: Adult Inpatient Plan of Care  Goal: Absence of Hospital-Acquired Illness or Injury  Outcome: Ongoing, Progressing     Problem: Adult Inpatient Plan of Care  Goal: Optimal Comfort and Wellbeing  Outcome: Ongoing, Progressing     Problem: Adult Inpatient Plan of Care  Goal: Readiness for Transition of Care  Outcome: Ongoing, Progressing     Problem: Bariatric Environmental Safety  Goal: Safety Maintained with Care  Outcome: Ongoing, Progressing     Problem: Impaired Wound Healing  Goal: Optimal Wound Healing  Outcome: Ongoing, Progressing     Problem: Adjustment to Illness (Sepsis/Septic Shock)  Goal: Optimal Coping  Outcome: Ongoing, Progressing     Problem: Bleeding (Sepsis/Septic Shock)  Goal: Absence of Bleeding  Outcome: Ongoing, Progressing     Problem: Glycemic Control Impaired (Sepsis/Septic Shock)  Goal: Blood Glucose Level Within Desired Range  Outcome: Ongoing, Progressing     Problem: Infection Progression (Sepsis/Septic Shock)  Goal: Absence of Infection Signs and Symptoms  Outcome: Ongoing, Progressing     Problem: Nutrition Impaired (Sepsis/Septic Shock)  Goal: Optimal Nutrition Intake  Outcome: Ongoing, Progressing     Problem: Skin Injury Risk Increased  Goal: Skin Health and Integrity  Outcome: Ongoing, Progressing   Patient AAOX4 no acute distress noted VSS RA denies any complaints of pain patient turned and repositioned q2hr and prn safety measures maintained will cont to monitor.
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
## 2023-05-28 NOTE — CONSULTS
General Surgery    CT does not show a drainable fluid collection.   No surgical intervention recommend for his chronic lymphedema.   Continue IV antibiotics and wound care
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
## 2023-05-28 NOTE — ASSESSMENT & PLAN NOTE
Patient presented with fever and leukocytosis.  Hemodynamically stable.  CT of right thigh was done, no drainable abscess determine or surgical intervention necessary by General surgery.  Continue with wound care.  Continue with vancomycin and Zosyn, Infectious Disease consulted.

## 2023-05-28 NOTE — SUBJECTIVE & OBJECTIVE
Interval History:  No complaints or acute issues, afebrile overnight.  No major change in white cell counts, potassium of 3.2    Review of Systems   Constitutional:  Negative for fever.   Skin:  Positive for wound.   Objective:     Vital Signs (Most Recent):  Temp: 98.9 °F (37.2 °C) (05/28/23 0823)  Pulse: 85 (05/28/23 0823)  Resp: 20 (05/28/23 0823)  BP: (!) 162/70 (05/28/23 0823)  SpO2: 99 % (05/28/23 0823) Vital Signs (24h Range):  Temp:  [98.7 °F (37.1 °C)-100 °F (37.8 °C)] 98.9 °F (37.2 °C)  Pulse:  [85-94] 85  Resp:  [16-28] 20  SpO2:  [93 %-99 %] 99 %  BP: (111-162)/(46-70) 162/70     Weight: (!) 194.5 kg (428 lb 12.7 oz)  Body mass index is 69.21 kg/m².    Intake/Output Summary (Last 24 hours) at 5/28/2023 1002  Last data filed at 5/28/2023 0630  Gross per 24 hour   Intake 940 ml   Output 1000 ml   Net -60 ml            Physical Exam  Constitutional:       Appearance: He is obese.   HENT:      Head: Normocephalic.      Right Ear: External ear normal.      Left Ear: External ear normal.      Nose: Nose normal.   Cardiovascular:      Rate and Rhythm: Normal rate.   Pulmonary:      Effort: Pulmonary effort is normal.   Genitourinary:     Comments: PureWick catheter  Musculoskeletal:      Comments: Chronic lymphedema to right lower. Patient's right thigh has significant swelling, with satellite wounds, partially healing and sloughing   Neurological:      Mental Status: He is alert and oriented to person, place, and time.   Psychiatric:         Mood and Affect: Mood normal.         Significant Labs: All pertinent labs within the past 24 hours have been reviewed.    Significant Imaging: I have reviewed all pertinent imaging results/findings within the past 24 hours.

## 2023-05-28 NOTE — ASSESSMENT & PLAN NOTE
Will carefully introduce home medications, starting with low-dose amlodipine. P.r.n antihypertensive.

## 2023-05-28 NOTE — PROGRESS NOTES
Pharmacokinetic Assessment Follow Up: IV Vancomycin    Vancomycin serum concentration assessment(s):    The trough level was drawn correctly and can be used to guide therapy at this time. The measurement is within the desired definitive target range of 10 to 20 mcg/mL.  - The trough level was drawn ~11 hours after previous dose and resulted at 11.2.    Vancomycin Regimen Plan:    Continue regimen to Vancomycin 1250 mg IV every 12 hours with next serum trough concentration measured at 1200 prior to 4th dose on 5/29.  - Mr. Wills's Scr jumped from 1.0 to 1.2 in ~24 hours. Watching closely, but will continue regimen for now given he seems to be clearing well. Will not increase dose now given he's in goal range, and I expect him to accumulate more at steady state.  - Please draw random level sooner than scheduled trough if renal function changes significantly.    Drug levels (last 3 results):  Recent Labs   Lab Result Units 05/28/23  0040   Vancomycin-Trough ug/mL 11.2       Pharmacy will continue to follow and monitor vancomycin.    Please contact pharmacy at extension [phone] for questions regarding this assessment.    Thank you for the consult,   Rosetta Ferreira       Patient brief summary:  Edd Wills is a 68 y.o. male initiated on antimicrobial therapy with IV Vancomycin for treatment of sepsis    Actual Body Weight:   194.5 kg    Renal Function:   Estimated Creatinine Clearance: 94.1 mL/min (based on SCr of 1.2 mg/dL).    Dialysis Method (if applicable):  N/A
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
## 2023-05-28 NOTE — ASSESSMENT & PLAN NOTE
68M with severe obesity (BMI 69) related chronic lymphedema R leg c/b recurrent / chronic wounds and SSTI. CT-leg negative for osteo or abscess, no indications for surgical intervention. Pt presents for weakness and worsening pain/progression of R leg wound; on arrival (05/25) pt febrile, Tmax 103.3F, with leukocytosis (19) improved; bld cxs NGTD. Chest and abdominal imaging unremarkable. Pt started on empiric vanc / zosyn; VSS, improving. ID consulted for abx recs / management. Wound care following.     Recommendations / Plan:  · Discontinue Iv-zosyn.  To start Iv-CTX.  · Continue Iv-vanc. Inpatient pharm to dose with target level of 15-20.  · Near anticipated discharge, anticipate transition to oral abx (Doxy and augmentin) for pt to continue outpt for 14d.  · Recommend continued wound care.   · Will follow up wbc to ensure down trend.    -- Discussed with ID staff and primary team   -- ID will continue to follow w/ further recs.

## 2023-05-28 NOTE — PLAN OF CARE
Problem: Adult Inpatient Plan of Care  Goal: Plan of Care Review  Outcome: Ongoing, Progressing     Problem: Adult Inpatient Plan of Care  Goal: Patient-Specific Goal (Individualized)  Outcome: Ongoing, Progressing     Problem: Adult Inpatient Plan of Care  Goal: Absence of Hospital-Acquired Illness or Injury  Outcome: Ongoing, Progressing     Problem: Adult Inpatient Plan of Care  Goal: Optimal Comfort and Wellbeing  Outcome: Ongoing, Progressing     Problem: Adult Inpatient Plan of Care  Goal: Readiness for Transition of Care  Outcome: Ongoing, Progressing     Problem: Bariatric Environmental Safety  Goal: Safety Maintained with Care  Outcome: Ongoing, Progressing     Problem: Adjustment to Illness (Sepsis/Septic Shock)  Goal: Optimal Coping  Outcome: Ongoing, Progressing     Problem: Bleeding (Sepsis/Septic Shock)  Goal: Absence of Bleeding  Outcome: Ongoing, Progressing     Problem: Infection Progression (Sepsis/Septic Shock)  Goal: Absence of Infection Signs and Symptoms  Outcome: Ongoing, Progressing     Problem: Skin Injury Risk Increased  Goal: Skin Health and Integrity  Outcome: Ongoing, Progressing  Patient AAOX4 no acute distress noted VSS RA  no complaints voiced at this time patient turned and repositioned throughout this shift safety measures maintained will cont to monitor.

## 2023-05-28 NOTE — PROGRESS NOTES
[facility] - [facility] ([address] Medicine  Progress Note    Patient Name: Edd Wills  MRN: [id number]  Patient Class: IP- Inpatient   Admission Date: 5/26/2023  Length of Stay: 2 days  Attending Physician: Mal Collier MD  Primary Care Provider: Zuleyka Sabillon MD        Subjective:     Principal Problem:Sepsis        HPI:  68-year-old gentleman past medical history of hypertension, chronic lymphedema to right lower extremity who has home health aide as an outpatient and history of aortic dissection was well until this morning around 1:00 a.m. he woke from sleep and went to the bathroom to defecate.  Patient said that after passing stool, he was weak and could not get up and therefore asked his nephew to call 911.  Patient was brought to hospital and was found to be febrile.  He denies any urinary symptoms, denies any abdominal pain nausea vomiting or diarrhea.  He denies any chest pain, cough or shortness of breath.  Denies any neck stiffness or headache or visual disturbances.  Did mentioned that his lower extremities has been hurting him more than normal in recent times.  He has a chronic wound to his right lower extremity which he says has according to him been worsening over the last couple of months, uncertain if there is any purulent discharge as he is unable to see.    In the emergency room patient was febrile T-max 103.3°, normal heart rate, mild hypertension.  Leukocytosis noted.  Lactic acid normal.  Patient had chest x-ray, CT chest,abdomen and pelvis, report noted.  Blood cultures obtained and he was started on vancomycin and Zosyn      Overview/Hospital Course:  No notes on file    Interval History:  No complaints or acute issues, afebrile overnight.  No major change in white cell counts, potassium of 3.2    Review of Systems   Constitutional:  Negative for fever.   Skin:  Positive for wound.   Objective:     Vital Signs (Most Recent):  Temp: 98.9 °F (37.2 °C) (05/28/23 0823)  Pulse:  85 (05/28/23 0823)  Resp: 20 (05/28/23 0823)  BP: (!) 162/70 (05/28/23 0823)  SpO2: 99 % (05/28/23 0823) Vital Signs (24h Range):  Temp:  [98.7 °F (37.1 °C)-100 °F (37.8 °C)] 98.9 °F (37.2 °C)  Pulse:  [85-94] 85  Resp:  [16-28] 20  SpO2:  [93 %-99 %] 99 %  BP: (111-162)/(46-70) 162/70     Weight: (!) 194.5 kg (428 lb 12.7 oz)  Body mass index is 69.21 kg/m².    Intake/Output Summary (Last 24 hours) at 5/28/2023 1002  Last data filed at 5/28/2023 0630  Gross per 24 hour   Intake 940 ml   Output 1000 ml   Net -60 ml            Physical Exam  Constitutional:       Appearance: He is obese.   HENT:      Head: Normocephalic.      Right Ear: External ear normal.      Left Ear: External ear normal.      Nose: Nose normal.   Cardiovascular:      Rate and Rhythm: Normal rate.   Pulmonary:      Effort: Pulmonary effort is normal.   Genitourinary:     Comments: PureWick catheter  Musculoskeletal:      Comments: Chronic lymphedema to right lower. Patient's right thigh has significant swelling, with satellite wounds, partially healing and sloughing   Neurological:      Mental Status: He is alert and oriented to person, place, and time.   Psychiatric:         Mood and Affect: Mood normal.         Significant Labs: All pertinent labs within the past 24 hours have been reviewed.    Significant Imaging: I have reviewed all pertinent imaging results/findings within the past 24 hours.      Assessment/Plan:      * Sepsis  Patient presented with fever and leukocytosis.  Hemodynamically stable.  CT of right thigh was done, no drainable abscess determine or surgical intervention necessary by General surgery.  Continue with wound care.  Continue with vancomycin and Zosyn, Infectious Disease consulted.    Hyperlipemia  Continue statin      HTN (hypertension)  Will carefully introduce home medications, starting with low-dose amlodipine. P.r.n antihypertensive.      Physical deconditioning  PT OT        VTE Risk Mitigation (From admission,  onward)         Ordered     enoxaparin injection 40 mg  Daily         05/26/23 1623     IP VTE HIGH RISK PATIENT  Once         05/26/23 1623     Place sequential compression device  Until discontinued         05/26/23 1623                Discharge Planning   BROWN:      Code Status: Full Code   Is the patient medically ready for discharge?:     Reason for patient still in hospital (select all that apply): Patient trending condition  Discharge Plan A: Home Health                  Mal Collier MD  Department of Hospital Medicine   [facility] - Intensive Care ([place])

## 2023-05-29 LAB
ANION GAP SERPL CALC-SCNC: 7 MMOL/L (ref 8–16)
BASOPHILS # BLD AUTO: 0.05 K/UL (ref 0–0.2)
BASOPHILS NFR BLD: 0.3 % (ref 0–1.9)
BUN SERPL-MCNC: 13 MG/DL (ref 8–23)
CALCIUM SERPL-MCNC: 8.4 MG/DL (ref 8.7–10.5)
CHLORIDE SERPL-SCNC: 104 MMOL/L (ref 95–110)
CO2 SERPL-SCNC: 23 MMOL/L (ref 23–29)
CREAT SERPL-MCNC: 0.9 MG/DL (ref 0.5–1.4)
DIFFERENTIAL METHOD: ABNORMAL
EOSINOPHIL # BLD AUTO: 0.3 K/UL (ref 0–0.5)
EOSINOPHIL NFR BLD: 2.1 % (ref 0–8)
ERYTHROCYTE [DISTWIDTH] IN BLOOD BY AUTOMATED COUNT: 15.1 % (ref 11.5–14.5)
EST. GFR  (NO RACE VARIABLE): >60 ML/MIN/1.73 M^2
GIANT PLATELETS BLD QL SMEAR: PRESENT
GLUCOSE SERPL-MCNC: 98 MG/DL (ref 70–110)
HCT VFR BLD AUTO: 34.1 % (ref 40–54)
HGB BLD-MCNC: 11 G/DL (ref 14–18)
IMM GRANULOCYTES # BLD AUTO: 0.11 K/UL (ref 0–0.04)
IMM GRANULOCYTES NFR BLD AUTO: 0.7 % (ref 0–0.5)
LYMPHOCYTES # BLD AUTO: 1.5 K/UL (ref 1–4.8)
LYMPHOCYTES NFR BLD: 9.3 % (ref 18–48)
MCH RBC QN AUTO: 26.3 PG (ref 27–31)
MCHC RBC AUTO-ENTMCNC: 32.3 G/DL (ref 32–36)
MCV RBC AUTO: 82 FL (ref 82–98)
MONOCYTES # BLD AUTO: 1.3 K/UL (ref 0.3–1)
MONOCYTES NFR BLD: 8.2 % (ref 4–15)
NEUTROPHILS # BLD AUTO: 13.1 K/UL (ref 1.8–7.7)
NEUTROPHILS NFR BLD: 79.4 % (ref 38–73)
NRBC BLD-RTO: 0 /100 WBC
PLATELET # BLD AUTO: 222 K/UL (ref 150–450)
PLATELET BLD QL SMEAR: ABNORMAL
PMV BLD AUTO: 11.2 FL (ref 9.2–12.9)
POTASSIUM SERPL-SCNC: 3.4 MMOL/L (ref 3.5–5.1)
RBC # BLD AUTO: 4.18 M/UL (ref 4.6–6.2)
SODIUM SERPL-SCNC: 134 MMOL/L (ref 136–145)
VANCOMYCIN TROUGH SERPL-MCNC: 12.7 UG/ML (ref 10–22)
WBC # BLD AUTO: 16.42 K/UL (ref 3.9–12.7)

## 2023-05-29 PROCEDURE — 97530 THERAPEUTIC ACTIVITIES: CPT

## 2023-05-29 PROCEDURE — 99232 SBSQ HOSP IP/OBS MODERATE 35: CPT | Mod: ,,, | Performed by: INTERNAL MEDICINE

## 2023-05-29 PROCEDURE — 97112 NEUROMUSCULAR REEDUCATION: CPT

## 2023-05-29 PROCEDURE — 85025 COMPLETE CBC W/AUTO DIFF WBC: CPT | Performed by: INTERNAL MEDICINE

## 2023-05-29 PROCEDURE — 80048 BASIC METABOLIC PNL TOTAL CA: CPT | Performed by: INTERNAL MEDICINE

## 2023-05-29 PROCEDURE — 63600175 PHARM REV CODE 636 W HCPCS: Performed by: INTERNAL MEDICINE

## 2023-05-29 PROCEDURE — 80202 ASSAY OF VANCOMYCIN: CPT | Performed by: INTERNAL MEDICINE

## 2023-05-29 PROCEDURE — 99232 PR SUBSEQUENT HOSPITAL CARE,LEVL II: ICD-10-PCS | Mod: ,,, | Performed by: INTERNAL MEDICINE

## 2023-05-29 PROCEDURE — 97110 THERAPEUTIC EXERCISES: CPT

## 2023-05-29 PROCEDURE — 36415 COLL VENOUS BLD VENIPUNCTURE: CPT | Performed by: INTERNAL MEDICINE

## 2023-05-29 PROCEDURE — 25000003 PHARM REV CODE 250: Performed by: INTERNAL MEDICINE

## 2023-05-29 PROCEDURE — 99233 PR SUBSEQUENT HOSPITAL CARE,LEVL III: ICD-10-PCS | Mod: ,,, | Performed by: REGISTERED NURSE

## 2023-05-29 PROCEDURE — 99233 SBSQ HOSP IP/OBS HIGH 50: CPT | Mod: ,,, | Performed by: REGISTERED NURSE

## 2023-05-29 PROCEDURE — 25000003 PHARM REV CODE 250: Performed by: HOSPITALIST

## 2023-05-29 PROCEDURE — 21400001 HC TELEMETRY ROOM

## 2023-05-29 RX ORDER — LISINOPRIL 20 MG/1
20 TABLET ORAL DAILY
Status: DISCONTINUED | OUTPATIENT
Start: 2023-05-29 | End: 2023-05-30

## 2023-05-29 RX ORDER — CARVEDILOL 3.12 MG/1
3.12 TABLET ORAL 2 TIMES DAILY
Status: DISCONTINUED | OUTPATIENT
Start: 2023-05-29 | End: 2023-05-31 | Stop reason: HOSPADM

## 2023-05-29 RX ORDER — POTASSIUM CHLORIDE 20 MEQ/1
40 TABLET, EXTENDED RELEASE ORAL ONCE
Status: COMPLETED | OUTPATIENT
Start: 2023-05-29 | End: 2023-05-29

## 2023-05-29 RX ADMIN — ATORVASTATIN CALCIUM 40 MG: 40 TABLET, FILM COATED ORAL at 08:05

## 2023-05-29 RX ADMIN — OXYCODONE HYDROCHLORIDE 5 MG: 5 TABLET ORAL at 01:05

## 2023-05-29 RX ADMIN — AMLODIPINE BESYLATE 5 MG: 5 TABLET ORAL at 08:05

## 2023-05-29 RX ADMIN — CARVEDILOL 3.12 MG: 3.12 TABLET, FILM COATED ORAL at 09:05

## 2023-05-29 RX ADMIN — ENOXAPARIN SODIUM 40 MG: 40 INJECTION SUBCUTANEOUS at 06:05

## 2023-05-29 RX ADMIN — VANCOMYCIN HYDROCHLORIDE 1250 MG: 1.25 INJECTION, POWDER, LYOPHILIZED, FOR SOLUTION INTRAVENOUS at 01:05

## 2023-05-29 RX ADMIN — GABAPENTIN 600 MG: 300 CAPSULE ORAL at 08:05

## 2023-05-29 RX ADMIN — POTASSIUM CHLORIDE 40 MEQ: 1500 TABLET, EXTENDED RELEASE ORAL at 11:05

## 2023-05-29 RX ADMIN — GABAPENTIN 600 MG: 300 CAPSULE ORAL at 09:05

## 2023-05-29 RX ADMIN — ASPIRIN 81 MG: 81 TABLET, COATED ORAL at 08:05

## 2023-05-29 RX ADMIN — VANCOMYCIN HYDROCHLORIDE 1250 MG: 1.25 INJECTION, POWDER, LYOPHILIZED, FOR SOLUTION INTRAVENOUS at 04:05

## 2023-05-29 RX ADMIN — CEFTRIAXONE 2 G: 2 INJECTION, POWDER, FOR SOLUTION INTRAMUSCULAR; INTRAVENOUS at 11:05

## 2023-05-29 RX ADMIN — CARVEDILOL 3.12 MG: 3.12 TABLET, FILM COATED ORAL at 11:05

## 2023-05-29 RX ADMIN — PIPERACILLIN AND TAZOBACTAM 4.5 G: 4; .5 INJECTION, POWDER, LYOPHILIZED, FOR SOLUTION INTRAVENOUS; PARENTERAL at 03:05

## 2023-05-29 RX ADMIN — LISINOPRIL 20 MG: 20 TABLET ORAL at 04:05

## 2023-05-29 NOTE — ASSESSMENT & PLAN NOTE
PT OT recommends rehab, although per discussion with , patient may be better suited for skilled nursing facility
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
## 2023-05-29 NOTE — PLAN OF CARE
Problem: Physical Therapy  Goal: Physical Therapy Goal  Description: Goals to be met by: 2023     Patient will increase functional independence with mobility by performin. Supine to sit with MInimal Assistance  2. Sit to supine with MInimal Assistance  3. Sit to stand transfer with Minimal Assistance  4. Bed to chair transfer with Minimal Assistance using Rolling Walker  5. Gait  x 50 feet with Contact Guard Assistance using RW.   6. Lower extremity exercise program x10 reps per handout, with supervision    Outcome: Ongoing, Progressing
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
## 2023-05-29 NOTE — PROGRESS NOTES
Pharmacokinetic Assessment Follow Up: IV Vancomycin    Vancomycin serum concentration assessment(s):    The trough level was drawn correctly and can be used to guide therapy at this time. The measurement is within the desired definitive target range of 10 to 20 mcg/mL.    Vancomycin Regimen Plan:    Continue regimen to Vancomycin 1250 mg IV every 12 hours with next serum trough concentration measured at 0000 midnight prior to 4th dose on 5/31/23    Drug levels (last 3 results):  Recent Labs   Lab Result Units 05/28/23  0040 05/29/23  1132   Vancomycin-Trough ug/mL 11.2 12.7       Pharmacy will continue to follow and monitor vancomycin.    Please contact pharmacy at extension [phone] for questions regarding this assessment.    Thank you for the consult,   Ainsley Mena       Patient brief summary:  Edd Wills is a 68 y.o. male initiated on antimicrobial therapy with IV Vancomycin for treatment of sepsis    The patient's current regimen is Vancomycin 1250mg IV Q12    Drug Allergies:   Review of patient's allergies indicates:  No Known Allergies    Actual Body Weight:   194.5 kg    Renal Function:   Estimated Creatinine Clearance: 125.4 mL/min (based on SCr of 0.9 mg/dL).,     Dialysis Method (if applicable):  N/A    CBC (last 72 hours):  Recent Labs   Lab Result Units 05/27/23  0506 05/28/23  0558 05/29/23  0331   WBC K/uL 21.50* 19.28* 16.42*   Hemoglobin g/dL 11.1* 11.0* 11.0*   Hematocrit % 34.6* 35.0* 34.1*   Platelets K/uL 235 212 222   Gran % % 87.2* 85.5* 79.4*   Lymph % % 4.3* 5.8* 9.3*   Mono % % 6.1 6.7 8.2   Eosinophil % % 0.0 0.5 2.1   Basophil % % 0.1 0.2 0.3   Differential Method  Automated Automated Automated       Metabolic Panel (last 72 hours):  Recent Labs   Lab Result Units 05/27/23  0506 05/28/23  0558 05/29/23  0331   Sodium mmol/L 135* 136 134*   Potassium mmol/L 3.2* 3.2* 3.4*   Chloride mmol/L 104 104 104   CO2 mmol/L 22* 24 23   Glucose mg/dL 80 95 98   BUN mg/dL 16 13 13   Creatinine mg/dL  1.2 1.0 0.9   Magnesium mg/dL  --  1.8  --        Vancomycin Administrations:  vancomycin given in the last 96 hours                     vancomycin 1,250 mg in dextrose 5 % (D5W) 250 mL IVPB (Vial-Mate) (mg) 1,250 mg New Bag 05/29/23 0141     1,250 mg New Bag 05/28/23 1247     1,250 mg New Bag  0123     1,250 mg New Bag 05/27/23 1309     1,250 mg New Bag 05/26/23 2323    vancomycin 2 g in dextrose 5 % 500 mL IVPB (mg) 2,000 mg New Bag 05/26/23 0839                    Microbiologic Results:  Microbiology Results (last 7 days)       Procedure Component Value Units Date/Time    Blood culture x two cultures. Draw prior to antibiotics. [[id number]] Collected: 05/26/23 0742    Order Status: Completed Specimen: Blood from Peripheral, Hand, Right Updated: 05/29/23 1012     Blood Culture, Routine No Growth to date      No Growth to date      No Growth to date      No Growth to date    Narrative:      Aerobic and anaerobic    Blood culture x two cultures. Draw prior to antibiotics. [[id number]] Collected: 05/26/23 0743    Order Status: Completed Specimen: Blood from Peripheral, Hand, Right Updated: 05/29/23 1012     Blood Culture, Routine No Growth to date      No Growth to date      No Growth to date      No Growth to date    Narrative:      Aerobic and anaerobic

## 2023-05-29 NOTE — PLAN OF CARE
Problem: Adult Inpatient Plan of Care  Goal: Plan of Care Review  Outcome: Ongoing, Progressing     Problem: Bariatric Environmental Safety  Goal: Safety Maintained with Care  Outcome: Ongoing, Progressing     Problem: Impaired Wound Healing  Goal: Optimal Wound Healing  Outcome: Ongoing, Progressing     Problem: Adjustment to Illness (Sepsis/Septic Shock)  Goal: Optimal Coping  Outcome: Ongoing, Progressing     Problem: Bleeding (Sepsis/Septic Shock)  Goal: Absence of Bleeding  Outcome: Ongoing, Progressing

## 2023-05-29 NOTE — PT/OT/SLP EVAL
Physical Therapy Evaluation    Patient Name:  Edd Wills   MRN:  [id number]    Recommendations:     Discharge Recommendations: rehabilitation facility   Discharge Equipment Recommendations: to be determined by next level of care   Barriers to discharge: Inaccessible home and Decreased caregiver support    Assessment:     Edd Wills is a 68 y.o. male admitted with a medical diagnosis of Sepsis.  He presents with the following impairments/functional limitations: weakness, impaired endurance, impaired self care skills, impaired functional mobility, gait instability, impaired balance, decreased lower extremity function, decreased upper extremity function, decreased safety awareness, pain. Pt lives alone and was independent - modified independent at baseline. Pt would benefit from intensive inpatient rehabilitation for: Dynamic/static standing/sitting balance through skilled balance training, strengthening with the use of skilled therapeutic exercises interventions, mobility and safety training to ensure safe discharge home, lymphedema treatment, and mobility through adaptive equipment training. Pt highly motivated to return to independent PLOF and can tolerate 3+hours of therapy. Pt continues to benefit from a collaborative multidisciplinary program to improve quality of life and focus on recovery of impairments.      Rehab Prognosis: Good; patient would benefit from acute skilled PT services to address these deficits and reach maximum level of function.    Recent Surgery: * No surgery found *      Plan:     During this hospitalization, patient to be seen 3 x/week to address the identified rehab impairments via gait training, therapeutic activities, therapeutic exercises, neuromuscular re-education and progress toward the following goals:    Plan of Care Expires:  06/28/23    Subjective     Chief Complaint: RLE pain  Patient/Family Comments/goals: return to PLOF  Pain/Comfort:  Pain Rating 1: 0/10 (not rated)  Location  - Side 1: Right  Location 1: leg  Pain Addressed 1: Reposition, Distraction  Pain Rating Post-Intervention 1: other (see comments) (not rated)    Patients cultural, spiritual, Yazidism conflicts given the current situation: no    Living Environment:  Living Environment: Pt lives alone in a townhome, but uses bedroom on 1st floor. Pt reports there are no steps to enter, but dose have a ramp. Pt has a tub with a SC and grab bars.  Previous level of function: independent with FM and ADLs and modified independent with rollator. Pt reports having previously been receiving lymphedema treatment for RLE.  Roles and Routines: pt enjoys watching TV  Equipment Used at Home: shower chair, rollator, walker, rolling, grab bar  Assistance upon Discharge: Nephew, son    Objective:     Communicated with RN prior to session.  Patient found HOB elevated with telemetry, peripheral IV, oxygen  upon PT entry to room.    General Precautions: Standard, fall  Orthopedic Precautions:N/A   Braces: N/A  Respiratory Status: Nasal cannula, flow 2 L/min    Exams:  Cognitive Exam:  Patient is oriented to Person, Place, Time, and Situation  Gross Motor Coordination:  WFL  Postural Exam:  Patient presented with the following abnormalities:    -       Rounded shoulders  Sensation:  denies numbness/tingling  RLE ROM: WFL  RLE Strength: Deficits: hip flexion 2/5, knee extension 4/5, knee flexion 3+/5, DF 4/5  LLE ROM: WFL  LLE Strength: Deficits: hip flexion 2+/5, knee extension 4/5, knee flexion 4/5, DF 4/5    Functional Mobility:  Bed Mobility:     Rolling Right: moderate assistance and of 2 persons  Scooting: minimum assistance  Supine to Sit: moderate assistance and of 2 persons, HOB elevated  Sit to Supine: minimum assistance and of 2 persons  Transfers:     Sit to Stand:  moderate assistance and of 2 persons with rollator  Gait: 4-5 small shuffled side steps R along EOB, minAx2, rollator, no LOBs, impaired BLE clearance, decreased gait speed  and step length  Balance:   Static Sitting: Supervision  Dynamic Sitting: CGA  Static standing: minAx2 with rollator  Dynamic Standing: minAx2 with rollator      AM-PAC 6 CLICK MOBILITY  Total Score:12       Treatment & Education:  Patient educated on role of therapy, goals of session, and benefits of mobilizing.   Discussed PT plan of care during hospitalization.   Patient educated on calling for assistance.   Patient educated on how their diagnosis impacts their mobility within PT scope of practice.   All questions answered within PT scope of practice.    Patient left HOB elevated with all lines intact, call button in reach, RN notified, and PCT and sister present.    GOALS:   Multidisciplinary Problems       Physical Therapy Goals          Problem: Physical Therapy    Goal Priority Disciplines Outcome Goal Variances Interventions   Physical Therapy Goal     PT, PT/OT Ongoing, Progressing     Description: Goals to be met by: 2023     Patient will increase functional independence with mobility by performin. Supine to sit with MInimal Assistance  2. Sit to supine with MInimal Assistance  3. Sit to stand transfer with Minimal Assistance  4. Bed to chair transfer with Minimal Assistance using Rolling Walker  5. Gait  x 50 feet with Contact Guard Assistance using RW.   6. Lower extremity exercise program x10 reps per handout, with supervision                         History:     Past Medical History:   Diagnosis Date    HTN (hypertension)     Hyperlipemia     Obesity     LLUVIA (obstructive sleep apnea)     Osteoarthritis of knee        Past Surgical History:   Procedure Laterality Date    CERVICAL FUSION  2016    LAMINECTOMY  2016       Time Tracking:     PT Received On: 23  PT Start Time: 1511     PT Stop Time: 1550  PT Total Time (min): 39 min     Billable Minutes: Evaluation 9 and Therapeutic Activity 30      2023

## 2023-05-29 NOTE — ASSESSMENT & PLAN NOTE
68M with severe obesity (BMI 69) related chronic lymphedema R leg c/b recurrent / chronic wounds and SSTI. Pt presented for weakness and worsening pain/progression of right leg wound. Pt was initially febrile and with leukocytosis.      CT-leg negative for osteo or abscess, no indications for surgical intervention.     Today, afebrile. Decreasing leukocytosis. Blood cultures NGTD.       ID consulted for abx recs / management. Pt is currently on CTX/Vancomycin     Recommendations / Plan:  · Continue CTX 2g IV q24hr.   · Continue Vancomycin IV. Inpatient pharm to dose with target level of 15-20.  · Plan to transition to oral abx (Doxy and augmentin) for pt to continue outpatient for total 14d.  · Recommend continued wound care.   · Consider lymphedema clinic if pt able   · Will need ID follow up. ID will schedule  · Pt stating transportation issues outpatient. Rec social work consult to assist.   · Plan reviewed with ID staff. ID will sign off. Please reconsult with infectious concerns.

## 2023-05-29 NOTE — PROGRESS NOTES
[facility] - [facility] ([address] Medicine  Progress Note    Patient Name: Edd Wills  MRN: [id number]  Patient Class: IP- Inpatient   Admission Date: 5/26/2023  Length of Stay: 3 days  Attending Physician: Mal Collier MD  Primary Care Provider: Zuleyka Sabillon MD        Subjective:     Principal Problem:Sepsis        HPI:  68-year-old gentleman past medical history of hypertension, chronic lymphedema to right lower extremity who has home health aide as an outpatient and history of aortic dissection was well until this morning around 1:00 a.m. he woke from sleep and went to the bathroom to defecate.  Patient said that after passing stool, he was weak and could not get up and therefore asked his nephew to call 911.  Patient was brought to hospital and was found to be febrile.  He denies any urinary symptoms, denies any abdominal pain nausea vomiting or diarrhea.  He denies any chest pain, cough or shortness of breath.  Denies any neck stiffness or headache or visual disturbances.  Did mentioned that his lower extremities has been hurting him more than normal in recent times.  He has a chronic wound to his right lower extremity which he says has according to him been worsening over the last couple of months, uncertain if there is any purulent discharge as he is unable to see.    In the emergency room patient was febrile T-max 103.3°, normal heart rate, mild hypertension.  Leukocytosis noted.  Lactic acid normal.  Patient had chest x-ray, CT chest,abdomen and pelvis, report noted.  Blood cultures obtained and he was started on vancomycin and Zosyn      Overview/Hospital Course:  No notes on file    Interval History:  Patient is afebrile.  Leukocytosis improved.  No complaints or acute event.  Seen by Wound Care.  Therapy recommends acute rehab.  Seen by Infectious disease    Review of Systems   Cardiovascular:  Negative for chest pain.   Skin:  Positive for wound.   Objective:     Vital Signs (Most  Recent):  Temp: 98.5 °F (36.9 °C) (05/29/23 0759)  Pulse: 85 (05/29/23 1105)  Resp: 18 (05/29/23 0759)  BP: (!) 148/70 (05/29/23 0759)  SpO2: 95 % (05/29/23 0759) Vital Signs (24h Range):  Temp:  [98.1 °F (36.7 °C)-99.9 °F (37.7 °C)] 98.5 °F (36.9 °C)  Pulse:  [78-93] 85  Resp:  [16-21] 18  SpO2:  [95 %-96 %] 95 %  BP: (127-148)/(58-70) 148/70     Weight: (!) 194.5 kg (428 lb 12.7 oz)  Body mass index is 69.21 kg/m².    Intake/Output Summary (Last 24 hours) at 5/29/2023 1113  Last data filed at 5/29/2023 0540  Gross per 24 hour   Intake 360 ml   Output 800 ml   Net -440 ml         Physical Exam  Constitutional:       Appearance: He is obese.   HENT:      Head: Normocephalic.      Right Ear: External ear normal.      Left Ear: External ear normal.      Nose: Nose normal.   Cardiovascular:      Rate and Rhythm: Normal rate.   Pulmonary:      Effort: Pulmonary effort is normal.   Genitourinary:     Comments: PureWick catheter  Musculoskeletal:      Comments: Chronic lymphedema to right lower. Patient's right thigh has significant swelling, with satellite wounds, partially healing and sloughing   Neurological:      Mental Status: He is alert and oriented to person, place, and time.   Psychiatric:         Mood and Affect: Mood normal.         Significant Labs: All pertinent labs within the past 24 hours have been reviewed.          Assessment/Plan:      * Sepsis  Patient presented with fever and leukocytosis.  Hemodynamically stable.  Patient afebrile and leukocytosis improving.  CT of right thigh was done, no drainable abscess determine or surgical intervention necessary by General surgery.  Continue with wound care.  Continue with vancomycin and ceftriaxone per ID, continue to f/u recommendations    Hyperlipemia  Continue statin      HTN (hypertension)  Will carefully introduce home medications.       Physical deconditioning  PT OT recommends rehab, although per discussion with , patient may be better suited  for skilled nursing facility        VTE Risk Mitigation (From admission, onward)         Ordered     enoxaparin injection 40 mg  Daily         05/26/23 1623     IP VTE HIGH RISK PATIENT  Once         05/26/23 1623     Place sequential compression device  Until discontinued         05/26/23 1623                Discharge Planning   BROWN: 5/31/2023     Code Status: Full Code   Is the patient medically ready for discharge?:     Reason for patient still in hospital (select all that apply): Patient trending condition  Discharge Plan A: Home Health                  Mal Collier MD  Department of Hospital Medicine   [facility] - Intensive Care ([place])

## 2023-05-29 NOTE — CONSULTS
Patient seen for wound care consultation.   Reviewed chart for this encounter.   See Flow Sheet for findings.    Pt sitting up in bed agreed to assessment.   Pt was able to turn self for assessment.   Right posterior upper leg wound cleansed with Vashe, applied Aquacel AG sheet. Sacral assessment revealed no skin breakdown.   Lorenzo-immerse ordered for pt    RECOMMENDATIONS:  Keep skin clean and dry  No diapers, no foam borders to sacral area.   Change leg dressing q2d    Discussed POC with patient and primary RN.   See EMR for orders & patient education.    Nursing to continue care.  Nursing to maintain pressure injury prevention interventions.        05/29/23 0930   WOCN Assessment   WOCN Total Time (mins) 30   Visit Date 05/29/23   Visit Time 0930   Consult Type New   WOCN Speciality Wound   Intervention assessed;changed;applied;chart review;coordination of care;consult other service;team conference;orders   Teaching on-going        Altered Skin Integrity 05/26/23 0828 Right anterior;medial Thigh #1 Abscess   Date First Assessed/Time First Assessed: 05/26/23 0828   Altered Skin Integrity Present on Admission - Did Patient arrive to the hospital with altered skin?: yes  Side: Right  Orientation: anterior;medial  Location: Thigh  Wound Number: #1  Is this in...   Wound Image      Dressing Appearance Moist drainage;No dressing   Drainage Amount Moderate   Drainage Characteristics/Odor Yellow;Malodorous   Appearance Pink;Red;Slough;Moist   Red (%), Wound Tissue Color 10 %   Yellow (%), Wound Tissue Color 90 %   Periwound Area Moist;Macerated   Wound Edges Irregular   Care Cleansed with:;Antimicrobial agent   Dressing Applied;Silver   Periwound Care Dry periwound area maintained   Dressing Change Due 05/31/23

## 2023-05-29 NOTE — PLAN OF CARE
Problem: Adult Inpatient Plan of Care  Goal: Patient-Specific Goal (Individualized)  Outcome: Ongoing, Progressing     Problem: Adult Inpatient Plan of Care  Goal: Optimal Comfort and Wellbeing  Outcome: Ongoing, Progressing   Patient AAOx4, denies any pain at this time. Patient c/o bleeding and nasal soreness. Notified Dr. Collier. Applied humidifier to patient NC. Will continue to observe for changes.

## 2023-05-29 NOTE — PLAN OF CARE
[facility] - [facility] ([facility])  Discharge Reassessment    Primary Care Provider: Zuleyka Sabillon MD    Expected Discharge Date: 5/31/2023    Reassessment (most recent)       Discharge Reassessment - 05/29/23 1136          Discharge Reassessment    Assessment Type Discharge Planning Reassessment (P)      Did the patient's condition or plan change since previous assessment? No (P)      Discharge Plan discussed with: Patient (P)      Communicated BROWN with patient/caregiver No (P)      Discharge Plan A Skilled Nursing Facility (P)      Discharge Plan B Home with family (P)      DME Needed Upon Discharge  none (P)    Pt states he would like a scooter. Inst that  we cannot pursue that here d/t many steps required to get a scooter and some need to be done after pt home.  Pt v/u.    Transition of Care Barriers None (P)      Why the patient remains in the hospital Requires continued medical care (P)         Post-Acute Status    Post-Acute Authorization Placement (P)      Coverage [facility] a/b (P)      Discharge Delays None known at this time (P)                    Spoke with pt in room.  Pt states he will go home upon d/c.  However, MD states PT recommending rehab, but no need to be seen by MD 3x/wk upon d/c.  He uses w/c van for transport to/from hospital.    JUAN C GainesN, BS, RN, CCM

## 2023-05-29 NOTE — PT/OT/SLP PROGRESS
Occupational Therapy   Treatment    Name: Edd Wills  MRN: [id number]  Admitting Diagnosis:  Sepsis       Recommendations:     Discharge Recommendations: rehabilitation facility  Discharge Equipment Recommendations:  wheelchair  Barriers to discharge:       Assessment:     Edd Wills is a 68 y.o. male with a medical diagnosis of Sepsis.  He presents with deficits listed below. Performance deficits affecting function are weakness, impaired endurance, impaired self care skills, impaired functional mobility, decreased coordination, impaired balance, gait instability, decreased upper extremity function, decreased lower extremity function, decreased ROM, edema, impaired skin.     Rehab Prognosis:  Good; patient would benefit from acute skilled OT services to address these deficits and reach maximum level of function.       Plan:     Patient to be seen 3 x/week to address the above listed problems via self-care/home management, therapeutic activities, therapeutic exercises  Plan of Care Expires: 06/27/23  Plan of Care Reviewed with: patient    Subjective     Chief Complaint: No complaints  Patient/Family Comments/goals: get out of bed  Pain/Comfort:  Pain Rating 1: 0/10    Objective:     Communicated with: ELIE Milton prior to session.  Patient found HOB elevated with telemetry, peripheral IV upon OT entry to room.    General Precautions: Standard, fall    Orthopedic Precautions:N/A  Braces: N/A  Respiratory Status: Room air     Occupational Performance:     Bed Mobility:    Patient completed Rolling/Turning to Right with moderate assistance  Patient completed Scooting/Bridging with maximal assistance  Patient completed Supine to Sit with maximal assistance  Patient completed Sit to Supine with moderate assistance     Functional Mobility/Transfers:  Patient completed Sit <> Stand Transfer with moderate assistance  with  rollator x 3 trials   Functional Mobility: ~6 lateral steps >R HOB, with Min A and RW.     Activities  of Daily Living:  Lower Body Dressing: total assistance to lani non  slip socks      [facility] 6 Click ADL: 16    Treatment & Education:  Role of OT and OT POC  BUE Exercises, provided with yellow therband. ( Punches, shoulder flex, and bicep curls)  Functional Mobility Training  Educated on OOB activity with staff A and impact on increasing functional independence.  Educated on importance of progressive mobilization to increase strength and endurance.      Patient left HOB elevated with all lines intact, call button in reach, and RN notified    GOALS:   Multidisciplinary Problems       Occupational Therapy Goals          Problem: Occupational Therapy    Goal Priority Disciplines Outcome Interventions   Occupational Therapy Goal     OT, PT/OT Ongoing, Progressing    Description: Goals to be met by: 6/27/2023     Patient will increase functional independence with ADLs by performing:    UE Dressing with Minimal Assistance.  LE Dressing with Moderate Assistance.  Grooming while EOB with Contact Guard Assistance.  Toileting from bedside commode with Moderate Assistance for hygiene and clothing management.   Rolling to Bilateral with Moderate Assistance.   Supine to sit with Moderate Assistance.  Step transfer with Moderate Assistance  Toilet transfer to bedside commode with Moderate Assistance.                         Time Tracking:     OT Date of Treatment: 05/29/23  OT Start Time: 1503  OT Stop Time: 1548  OT Total Time (min): 45 min    Billable Minutes:Therapeutic Activity 15  Therapeutic Exercise 15  Neuromuscular Re-education 15               5/29/2023

## 2023-05-29 NOTE — SUBJECTIVE & OBJECTIVE
Interval History:  Patient is afebrile.  Leukocytosis improved.  No complaints or acute event.  Seen by Wound Care.  Therapy recommends acute rehab.  Seen by Infectious disease    Review of Systems   Cardiovascular:  Negative for chest pain.   Skin:  Positive for wound.   Objective:     Vital Signs (Most Recent):  Temp: 98.5 °F (36.9 °C) (05/29/23 0759)  Pulse: 85 (05/29/23 1105)  Resp: 18 (05/29/23 0759)  BP: (!) 148/70 (05/29/23 0759)  SpO2: 95 % (05/29/23 0759) Vital Signs (24h Range):  Temp:  [98.1 °F (36.7 °C)-99.9 °F (37.7 °C)] 98.5 °F (36.9 °C)  Pulse:  [78-93] 85  Resp:  [16-21] 18  SpO2:  [95 %-96 %] 95 %  BP: (127-148)/(58-70) 148/70     Weight: (!) 194.5 kg (428 lb 12.7 oz)  Body mass index is 69.21 kg/m².    Intake/Output Summary (Last 24 hours) at 5/29/2023 1113  Last data filed at 5/29/2023 0540  Gross per 24 hour   Intake 360 ml   Output 800 ml   Net -440 ml         Physical Exam  Constitutional:       Appearance: He is obese.   HENT:      Head: Normocephalic.      Right Ear: External ear normal.      Left Ear: External ear normal.      Nose: Nose normal.   Cardiovascular:      Rate and Rhythm: Normal rate.   Pulmonary:      Effort: Pulmonary effort is normal.   Genitourinary:     Comments: PureWick catheter  Musculoskeletal:      Comments: Chronic lymphedema to right lower. Patient's right thigh has significant swelling, with satellite wounds, partially healing and sloughing   Neurological:      Mental Status: He is alert and oriented to person, place, and time.   Psychiatric:         Mood and Affect: Mood normal.         Significant Labs: All pertinent labs within the past 24 hours have been reviewed.

## 2023-05-29 NOTE — ASSESSMENT & PLAN NOTE
Patient presented with fever and leukocytosis.  Hemodynamically stable.  Patient afebrile and leukocytosis improving.  CT of right thigh was done, no drainable abscess determine or surgical intervention necessary by General surgery.  Continue with wound care.  Continue with vancomycin and ceftriaxone per ID, continue to f/u recommendations

## 2023-05-29 NOTE — PLAN OF CARE
Problem: Adult Inpatient Plan of Care  Goal: Plan of Care Review  5/28/2023 1907 by Trinity Jackson RN  Outcome: Ongoing, Progressing     Problem: Adult Inpatient Plan of Care  Goal: Patient-Specific Goal (Individualized)  5/28/2023 1907 by Trinity Jackson RN  Outcome: Ongoing, Progressing     Problem: Adult Inpatient Plan of Care  Goal: Absence of Hospital-Acquired Illness or Injury  5/28/2023 1907 by Trinity Jackson RN  Outcome: Ongoing, Progressing     Problem: Adult Inpatient Plan of Care  Goal: Optimal Comfort and Wellbeing  5/28/2023 1907 by Trinity Jackson RN  Outcome: Ongoing, Progressing     Problem: Adult Inpatient Plan of Care  Goal: Readiness for Transition of Care  5/28/2023 1907 by Trinity Jcakson RN  Outcome: Ongoing, Progressing     Problem: Bariatric Environmental Safety  Goal: Safety Maintained with Care  5/28/2023 1907 by Trinity Jackson RN  Outcome: Ongoing, Progressing     Problem: Impaired Wound Healing  Goal: Optimal Wound Healing  5/28/2023 1907 by Trinity Jackson RN  Outcome: Ongoing, Progressing     Problem: Adjustment to Illness (Sepsis/Septic Shock)  Goal: Optimal Coping  5/28/2023 1907 by Trinity Jackson RN  Outcome: Ongoing, Progressing     Problem: Bleeding (Sepsis/Septic Shock)  Goal: Absence of Bleeding  5/28/2023 1907 by Trinity Jackson RN  Outcome: Ongoing, Progressing     Problem: Glycemic Control Impaired (Sepsis/Septic Shock)  Goal: Blood Glucose Level Within Desired Range  5/28/2023 1907 by Trinity Jackson RN  Outcome: Ongoing, Progressing     Problem: Infection Progression (Sepsis/Septic Shock)  Goal: Absence of Infection Signs and Symptoms  5/28/2023 1907 by Trinity Jackson RN  Outcome: Ongoing, Progressing     Problem: Nutrition Impaired (Sepsis/Septic Shock)  Goal: Optimal Nutrition Intake  5/28/2023 1907 by Trinity Jackson RN  Outcome: Ongoing, Progressing   Patient AAOX4 no acute distress noted VSS RA denies any complaints of pain or  discomfort. Pt took a few steps with PT without discomfort no injuries noted throughout this shift.

## 2023-05-29 NOTE — PROGRESS NOTES
Pharmacokinetic Assessment Follow Up: IV Vancomycin    Vancomycin serum concentration assessment(s):    The trough level was drawn correctly and can be used to guide therapy at this time. The measurement is below the desired definitive target range of 15 to 20 mcg/mL.    Vancomycin Regimen Plan:    Change regimen to Vancomycin 1500 mg IV every 12 hours with next serum trough concentration measured at 1530 prior to 4th dose on 5/31    Drug levels (last 3 results):  Recent Labs   Lab Result Units 05/28/23  0040 05/29/23  1132   Vancomycin-Trough ug/mL 11.2 12.7       Pharmacy will continue to follow and monitor vancomycin.    Please contact pharmacy at extension [phone] for questions regarding this assessment.    Thank you for the consult,   Emilia Darby       Patient brief summary:  Edd Wills is a 68 y.o. male initiated on antimicrobial therapy with IV Vancomycin for treatment of sepsis        Drug Allergies:   Review of patient's allergies indicates:  No Known Allergies    Actual Body Weight:   194.5 kg    Renal Function:   Estimated Creatinine Clearance: 125.4 mL/min (based on SCr of 0.9 mg/dL).,     Dialysis Method (if applicable):  N/A    CBC (last 72 hours):  Recent Labs   Lab Result Units 05/27/23  0506 05/28/23  0558 05/29/23  0331   WBC K/uL 21.50* 19.28* 16.42*   Hemoglobin g/dL 11.1* 11.0* 11.0*   Hematocrit % 34.6* 35.0* 34.1*   Platelets K/uL 235 212 222   Gran % % 87.2* 85.5* 79.4*   Lymph % % 4.3* 5.8* 9.3*   Mono % % 6.1 6.7 8.2   Eosinophil % % 0.0 0.5 2.1   Basophil % % 0.1 0.2 0.3   Differential Method  Automated Automated Automated       Metabolic Panel (last 72 hours):  Recent Labs   Lab Result Units 05/27/23  0506 05/28/23  0558 05/29/23  0331   Sodium mmol/L 135* 136 134*   Potassium mmol/L 3.2* 3.2* 3.4*   Chloride mmol/L 104 104 104   CO2 mmol/L 22* 24 23   Glucose mg/dL 80 95 98   BUN mg/dL 16 13 13   Creatinine mg/dL 1.2 1.0 0.9   Magnesium mg/dL  --  1.8  --        Vancomycin  Administrations:  vancomycin given in the last 96 hours                     vancomycin 1,250 mg in dextrose 5 % (D5W) 250 mL IVPB (Vial-Mate) (mg) 1,250 mg New Bag 05/29/23 1634     1,250 mg New Bag  0141     1,250 mg New Bag 05/28/23 1247     1,250 mg New Bag  0123     1,250 mg New Bag 05/27/23 1309     1,250 mg New Bag 05/26/23 2323    vancomycin 2 g in dextrose 5 % 500 mL IVPB (mg) 2,000 mg New Bag 05/26/23 0839                    Microbiologic Results:  Microbiology Results (last 7 days)       Procedure Component Value Units Date/Time    Blood culture x two cultures. Draw prior to antibiotics. [[id number]] Collected: 05/26/23 0742    Order Status: Completed Specimen: Blood from Peripheral, Hand, Right Updated: 05/29/23 1012     Blood Culture, Routine No Growth to date      No Growth to date      No Growth to date      No Growth to date    Narrative:      Aerobic and anaerobic    Blood culture x two cultures. Draw prior to antibiotics. [[id number]] Collected: 05/26/23 0743    Order Status: Completed Specimen: Blood from Peripheral, Hand, Right Updated: 05/29/23 1012     Blood Culture, Routine No Growth to date      No Growth to date      No Growth to date      No Growth to date    Narrative:      Aerobic and anaerobic

## 2023-05-30 LAB
ANION GAP SERPL CALC-SCNC: 7 MMOL/L (ref 8–16)
BASOPHILS # BLD AUTO: 0.05 K/UL (ref 0–0.2)
BASOPHILS NFR BLD: 0.4 % (ref 0–1.9)
BUN SERPL-MCNC: 10 MG/DL (ref 8–23)
CALCIUM SERPL-MCNC: 8.2 MG/DL (ref 8.7–10.5)
CHLORIDE SERPL-SCNC: 106 MMOL/L (ref 95–110)
CO2 SERPL-SCNC: 22 MMOL/L (ref 23–29)
CREAT SERPL-MCNC: 0.7 MG/DL (ref 0.5–1.4)
DIFFERENTIAL METHOD: ABNORMAL
EOSINOPHIL # BLD AUTO: 0.4 K/UL (ref 0–0.5)
EOSINOPHIL NFR BLD: 3.1 % (ref 0–8)
ERYTHROCYTE [DISTWIDTH] IN BLOOD BY AUTOMATED COUNT: 15.3 % (ref 11.5–14.5)
EST. GFR  (NO RACE VARIABLE): >60 ML/MIN/1.73 M^2
GLUCOSE SERPL-MCNC: 89 MG/DL (ref 70–110)
HCT VFR BLD AUTO: 33.9 % (ref 40–54)
HGB BLD-MCNC: 10.8 G/DL (ref 14–18)
IMM GRANULOCYTES # BLD AUTO: 0.12 K/UL (ref 0–0.04)
IMM GRANULOCYTES NFR BLD AUTO: 1 % (ref 0–0.5)
LYMPHOCYTES # BLD AUTO: 1.7 K/UL (ref 1–4.8)
LYMPHOCYTES NFR BLD: 14 % (ref 18–48)
MCH RBC QN AUTO: 26.1 PG (ref 27–31)
MCHC RBC AUTO-ENTMCNC: 31.9 G/DL (ref 32–36)
MCV RBC AUTO: 82 FL (ref 82–98)
MONOCYTES # BLD AUTO: 1.5 K/UL (ref 0.3–1)
MONOCYTES NFR BLD: 12.6 % (ref 4–15)
NEUTROPHILS # BLD AUTO: 8.4 K/UL (ref 1.8–7.7)
NEUTROPHILS NFR BLD: 68.9 % (ref 38–73)
NRBC BLD-RTO: 0 /100 WBC
PLATELET # BLD AUTO: 264 K/UL (ref 150–450)
PMV BLD AUTO: 10.9 FL (ref 9.2–12.9)
POTASSIUM SERPL-SCNC: 3.4 MMOL/L (ref 3.5–5.1)
RBC # BLD AUTO: 4.14 M/UL (ref 4.6–6.2)
SODIUM SERPL-SCNC: 135 MMOL/L (ref 136–145)
WBC # BLD AUTO: 12.19 K/UL (ref 3.9–12.7)

## 2023-05-30 PROCEDURE — 80048 BASIC METABOLIC PNL TOTAL CA: CPT | Performed by: INTERNAL MEDICINE

## 2023-05-30 PROCEDURE — 99232 PR SUBSEQUENT HOSPITAL CARE,LEVL II: ICD-10-PCS | Mod: ,,, | Performed by: INTERNAL MEDICINE

## 2023-05-30 PROCEDURE — 97530 THERAPEUTIC ACTIVITIES: CPT

## 2023-05-30 PROCEDURE — 63600175 PHARM REV CODE 636 W HCPCS: Performed by: INTERNAL MEDICINE

## 2023-05-30 PROCEDURE — 25000003 PHARM REV CODE 250: Performed by: INTERNAL MEDICINE

## 2023-05-30 PROCEDURE — 21400001 HC TELEMETRY ROOM

## 2023-05-30 PROCEDURE — 99232 SBSQ HOSP IP/OBS MODERATE 35: CPT | Mod: ,,, | Performed by: INTERNAL MEDICINE

## 2023-05-30 PROCEDURE — 85025 COMPLETE CBC W/AUTO DIFF WBC: CPT | Performed by: INTERNAL MEDICINE

## 2023-05-30 PROCEDURE — 25000003 PHARM REV CODE 250: Performed by: HOSPITALIST

## 2023-05-30 PROCEDURE — 97535 SELF CARE MNGMENT TRAINING: CPT

## 2023-05-30 PROCEDURE — 36415 COLL VENOUS BLD VENIPUNCTURE: CPT | Performed by: INTERNAL MEDICINE

## 2023-05-30 RX ORDER — POLYETHYLENE GLYCOL 3350 17 G/17G
17 POWDER, FOR SOLUTION ORAL 2 TIMES DAILY PRN
Status: DISCONTINUED | OUTPATIENT
Start: 2023-05-30 | End: 2023-05-31 | Stop reason: HOSPADM

## 2023-05-30 RX ORDER — LISINOPRIL 20 MG/1
40 TABLET ORAL DAILY
Status: DISCONTINUED | OUTPATIENT
Start: 2023-05-31 | End: 2023-05-31 | Stop reason: HOSPADM

## 2023-05-30 RX ORDER — POTASSIUM CHLORIDE 750 MG/1
40 CAPSULE, EXTENDED RELEASE ORAL ONCE
Status: COMPLETED | OUTPATIENT
Start: 2023-05-30 | End: 2023-05-30

## 2023-05-30 RX ADMIN — ASPIRIN 81 MG: 81 TABLET, COATED ORAL at 09:05

## 2023-05-30 RX ADMIN — POLYETHYLENE GLYCOL 3350 17 G: 17 POWDER, FOR SOLUTION ORAL at 02:05

## 2023-05-30 RX ADMIN — GABAPENTIN 600 MG: 300 CAPSULE ORAL at 09:05

## 2023-05-30 RX ADMIN — OXYCODONE HYDROCHLORIDE 5 MG: 5 TABLET ORAL at 09:05

## 2023-05-30 RX ADMIN — ATORVASTATIN CALCIUM 40 MG: 40 TABLET, FILM COATED ORAL at 09:05

## 2023-05-30 RX ADMIN — CARVEDILOL 3.12 MG: 3.12 TABLET, FILM COATED ORAL at 09:05

## 2023-05-30 RX ADMIN — ENOXAPARIN SODIUM 40 MG: 40 INJECTION SUBCUTANEOUS at 06:05

## 2023-05-30 RX ADMIN — OXYCODONE HYDROCHLORIDE 5 MG: 5 TABLET ORAL at 03:05

## 2023-05-30 RX ADMIN — CEFTRIAXONE 2 G: 2 INJECTION, POWDER, FOR SOLUTION INTRAMUSCULAR; INTRAVENOUS at 11:05

## 2023-05-30 RX ADMIN — VANCOMYCIN HYDROCHLORIDE 1500 MG: 1.5 INJECTION, POWDER, LYOPHILIZED, FOR SOLUTION INTRAVENOUS at 05:05

## 2023-05-30 RX ADMIN — LISINOPRIL 20 MG: 20 TABLET ORAL at 09:05

## 2023-05-30 RX ADMIN — VANCOMYCIN HYDROCHLORIDE 1500 MG: 1.5 INJECTION, POWDER, LYOPHILIZED, FOR SOLUTION INTRAVENOUS at 03:05

## 2023-05-30 RX ADMIN — POTASSIUM CHLORIDE 40 MEQ: 10 CAPSULE, COATED, EXTENDED RELEASE ORAL at 02:05

## 2023-05-30 RX ADMIN — AMLODIPINE BESYLATE 5 MG: 5 TABLET ORAL at 09:05

## 2023-05-30 NOTE — SUBJECTIVE & OBJECTIVE
Interval History:  No complaints or acute events overnight.  Patient last bowel movement was 5 days ago.  Awaiting placement.  White cell count now within normal limits    Review of Systems   Unable to perform ROS: Other   Objective:     Vital Signs (Most Recent):  Temp: 99.5 °F (37.5 °C) (05/30/23 1132)  Pulse: 80 (05/30/23 1219)  Resp: 18 (05/30/23 1132)  BP: (!) 159/70 (05/30/23 1132)  SpO2: 97 % (05/30/23 1132) Vital Signs (24h Range):  Temp:  [98.4 °F (36.9 °C)-99.5 °F (37.5 °C)] 99.5 °F (37.5 °C)  Pulse:  [80-88] 80  Resp:  [18-21] 18  SpO2:  [89 %-97 %] 97 %  BP: (138-159)/(66-70) 159/70     Weight: (!) 194.5 kg (428 lb 12.7 oz)  Body mass index is 69.21 kg/m².    Intake/Output Summary (Last 24 hours) at 5/30/2023 1328  Last data filed at 5/30/2023 1153  Gross per 24 hour   Intake 860 ml   Output 500 ml   Net 360 ml         Physical Exam  Constitutional:       Appearance: He is obese.   HENT:      Head: Normocephalic.      Right Ear: External ear normal.      Left Ear: External ear normal.      Nose: Nose normal.   Cardiovascular:      Rate and Rhythm: Normal rate.   Pulmonary:      Effort: Pulmonary effort is normal.   Genitourinary:     Comments: PureWick catheter  Musculoskeletal:      Comments: Chronic lymphedema to right lower. Patient's right thigh has significant swelling, with satellite wounds, partially healing and sloughing   Neurological:      Mental Status: He is alert and oriented to person, place, and time.   Psychiatric:         Mood and Affect: Mood normal.         Significant Labs: All pertinent labs within the past 24 hours have been reviewed.

## 2023-05-30 NOTE — PLAN OF CARE
CM sent blast referrals via .  CM calling in [employer]. PasRR faxed to [facility], uploaded to . Pt choice list provided.  Pt states he wants to go to [facility].  Inst pt that we will refer to [facility]; if they can't take, we will pursue other SNF's.  Pt v/u.    2:11 PM  Per Cecily Morrison at [facility], rehab is recommended and they may be able to take this pt.  PMR consult ordered.  Rehab referrals sent to [facility] and [facility] per pt request.    JUAN C GainesN, BS, RN, CCM

## 2023-05-30 NOTE — PLAN OF CARE
Problem: Bariatric Environmental Safety  Goal: Safety Maintained with Care  Outcome: Ongoing, Progressing     Problem: Impaired Wound Healing  Goal: Optimal Wound Healing  Outcome: Ongoing, Progressing   Iv abx given. Wound care performed on leg. Pt ambulated with walker to chair. A/Ox4. Bed locked and in lowest position. Call light in reach.

## 2023-05-30 NOTE — ASSESSMENT & PLAN NOTE
Patient presented with fever and leukocytosis.  Hemodynamically stable.  Patient afebrile and leukocytosis resolved.  CT of right thigh was done, no drainable abscess determine or surgical intervention necessary by General surgery.  Continue with wound care.  Continue with vancomycin and ceftriaxone per ID, continue to f/u recommendations

## 2023-05-30 NOTE — PT/OT/SLP PROGRESS
Occupational Therapy   Treatment    Name: Edd Wills  MRN: [id number]  Admitting Diagnosis:  Sepsis       Recommendations:     Discharge Recommendations: rehabilitation facility  Discharge Equipment Recommendations:  to be determined by next level of care  Barriers to discharge:  None    Assessment:     Edd Wills is a 68 y.o. male with a medical diagnosis of Sepsis.  He presents with good participation and motivation. Pt remains limited in ADLs, functional mobility, and functional transfers and is currently not performing tasks at PLOF. Pt is at risk for falls. Goals remain appropriate. Performance deficits affecting function are weakness, impaired endurance, impaired self care skills, impaired functional mobility, gait instability, impaired balance, pain, decreased lower extremity function, decreased upper extremity function, impaired skin, edema, impaired cardiopulmonary response to activity.     Rehab Prognosis:  Good; patient would benefit from acute skilled OT services to address these deficits and reach maximum level of function.       Plan:     Patient to be seen 3 x/week to address the above listed problems via self-care/home management, therapeutic activities, therapeutic exercises  Plan of Care Expires: 06/27/23  Plan of Care Reviewed with: patient    Subjective     Chief Complaint: pain  Patient/Family Comments/goals: to get into chair  Pain/Comfort:  Pain Rating 1: 4/10  Location - Side 1: Left  Location - Orientation 1: generalized  Location 1: leg  Pain Addressed 1: Reposition, Distraction, Cessation of Activity, Nurse notified  Pain Rating Post-Intervention 1: 4/10    Objective:     Communicated with: Neida DELGADILLO prior to session.  Patient found HOB elevated with telemetry upon OT entry to room.    General Precautions: Standard, fall    Orthopedic Precautions:N/A  Braces: N/A  Respiratory Status: Room air     Occupational Performance:     Bed Mobility:    Patient completed Scooting/Bridging with  stand by assistance  Patient completed Supine to Sit with moderate assistance     Functional Mobility/Transfers:  Patient completed Sit <> Stand Transfer with minimum assistance  with  4 wheeled walker   1st trial: pt unable to clear hips and bottom from bed  2nd trial: able to achieve upright stance  Patient completed Bed <> Chair Transfer using Step Transfer technique with contact guard assistance with 4 wheeled walker  Functional Mobility: Pt engaged in functional mobility to simulate household/community distances ~6 ft to bedside chair with CGA and rollator in order to maximize functional endurance and standing balance required for engagement in occupations of choice   No LOB or SOB noted    Activities of Daily Living:  Grooming: stand by assistance to wash face with wash cloth  Bathing: moderate assistance required to bathe back region; pt able to sponge bathe while sitting on bedside chair  Upper Body Dressing: stand by assistance to don/doff hospital gown anteriorly  Lower Body Dressing: total assistance required to don b/l socks bed level (pt reports at baseline, he uses a sock aid)      [facility] 6 Click ADL: 16    Treatment & Education:  -Education on energy conservation and task modification to maximize safety and (I) during ADLs and mobility  -Education on importance of OOB activity to improve overall activity tolerance and promote recovery  -Pt educated to call for assistance and to transfer with hospital staff only  -Provided education regarding role of OT, POC, & discharge recommendations with pt verbalizing understanding.  Pt had no further questions & when asked whether there were any concerns pt reported none.     Patient left up in chair with all lines intact, call button in reach, and RN notified    GOALS:   Multidisciplinary Problems       Occupational Therapy Goals          Problem: Occupational Therapy    Goal Priority Disciplines Outcome Interventions   Occupational Therapy Goal     OT, PT/OT  Ongoing, Progressing    Description: Goals to be met by: 6/27/2023     Patient will increase functional independence with ADLs by performing:    UE Dressing with Minimal Assistance.  LE Dressing with Moderate Assistance.  Grooming while EOB with Contact Guard Assistance.  Toileting from bedside commode with Moderate Assistance for hygiene and clothing management.   Rolling to Bilateral with Moderate Assistance.   Supine to sit with Moderate Assistance.  Step transfer with Moderate Assistance  Toilet transfer to bedside commode with Moderate Assistance.                         Time Tracking:     OT Date of Treatment: 05/30/23  OT Start Time: 1310  OT Stop Time: 1348  OT Total Time (min): 38 min    Billable Minutes:Self Care/Home Management 25  Therapeutic Activity 13    OT/DEVIN: OT          5/30/2023

## 2023-05-30 NOTE — PROGRESS NOTES
[facility] ([address] Medicine  Progress Note    Patient Name: Edd Wills  MRN: [id number]  Patient Class: IP- Inpatient   Admission Date: 5/26/2023  Length of Stay: 4 days  Attending Physician: Mal Collier MD  Primary Care Provider: Zuleyka Sabillon MD        Subjective:     Principal Problem:Sepsis        HPI:  68-year-old gentleman past medical history of hypertension, chronic lymphedema to right lower extremity who has home health aide as an outpatient and history of aortic dissection was well until this morning around 1:00 a.m. he woke from sleep and went to the bathroom to defecate.  Patient said that after passing stool, he was weak and could not get up and therefore asked his nephew to call 911.  Patient was brought to hospital and was found to be febrile.  He denies any urinary symptoms, denies any abdominal pain nausea vomiting or diarrhea.  He denies any chest pain, cough or shortness of breath.  Denies any neck stiffness or headache or visual disturbances.  Did mentioned that his lower extremities has been hurting him more than normal in recent times.  He has a chronic wound to his right lower extremity which he says has according to him been worsening over the last couple of months, uncertain if there is any purulent discharge as he is unable to see.    In the emergency room patient was febrile T-max 103.3°, normal heart rate, mild hypertension.  Leukocytosis noted.  Lactic acid normal.  Patient had chest x-ray, CT chest,abdomen and pelvis, report noted.  Blood cultures obtained and he was started on vancomycin and Zosyn      Overview/Hospital Course:  No notes on file    Interval History:  No complaints or acute events overnight.  Patient last bowel movement was 5 days ago.  Awaiting placement.  White cell count now within normal limits    Review of Systems   Unable to perform ROS: Other   Objective:     Vital Signs (Most Recent):  Temp: 99.5 °F (37.5 °C) (05/30/23  1132)  Pulse: 80 (05/30/23 1219)  Resp: 18 (05/30/23 1132)  BP: (!) 159/70 (05/30/23 1132)  SpO2: 97 % (05/30/23 1132) Vital Signs (24h Range):  Temp:  [98.4 °F (36.9 °C)-99.5 °F (37.5 °C)] 99.5 °F (37.5 °C)  Pulse:  [80-88] 80  Resp:  [18-21] 18  SpO2:  [89 %-97 %] 97 %  BP: (138-159)/(66-70) 159/70     Weight: (!) 194.5 kg (428 lb 12.7 oz)  Body mass index is 69.21 kg/m².    Intake/Output Summary (Last 24 hours) at 5/30/2023 1328  Last data filed at 5/30/2023 1153  Gross per 24 hour   Intake 860 ml   Output 500 ml   Net 360 ml         Physical Exam  Constitutional:       Appearance: He is obese.   HENT:      Head: Normocephalic.      Right Ear: External ear normal.      Left Ear: External ear normal.      Nose: Nose normal.   Cardiovascular:      Rate and Rhythm: Normal rate.   Pulmonary:      Effort: Pulmonary effort is normal.   Genitourinary:     Comments: PureWick catheter  Musculoskeletal:      Comments: Chronic lymphedema to right lower. Patient's right thigh has significant swelling, with satellite wounds, partially healing and sloughing   Neurological:      Mental Status: He is alert and oriented to person, place, and time.   Psychiatric:         Mood and Affect: Mood normal.         Significant Labs: All pertinent labs within the past 24 hours have been reviewed.        Assessment/Plan:      * Sepsis  Patient presented with fever and leukocytosis.  Hemodynamically stable.  Patient afebrile and leukocytosis resolved.  CT of right thigh was done, no drainable abscess determine or surgical intervention necessary by General surgery.  Continue with wound care.  Continue with vancomycin and ceftriaxone per ID, continue to f/u recommendations    Hyperlipemia  Continue statin      HTN (hypertension)  Will carefully introduce home medications.       Physical deconditioning  PT OT recommends rehab, although per discussion with , patient may be better suited for skilled nursing facility        VTE Risk  Mitigation (From admission, onward)         Ordered     enoxaparin injection 40 mg  Daily         05/26/23 1623     IP VTE HIGH RISK PATIENT  Once         05/26/23 1623     Place sequential compression device  Until discontinued         05/26/23 1623                Discharge Planning   BROWN: 5/31/2023     Code Status: Full Code   Is the patient medically ready for discharge?:     Reason for patient still in hospital (select all that apply): Patient trending condition  Discharge Plan A: Skilled Nursing Facility   Discharge Delays: None known at this time              Mal Collier MD  Department of Hospital Medicine   [facility] - Intensive Care ([place])

## 2023-05-31 VITALS
RESPIRATION RATE: 18 BRPM | TEMPERATURE: 98 F | HEART RATE: 94 BPM | BODY MASS INDEX: 50.62 KG/M2 | DIASTOLIC BLOOD PRESSURE: 75 MMHG | SYSTOLIC BLOOD PRESSURE: 133 MMHG | OXYGEN SATURATION: 92 % | HEIGHT: 66 IN | WEIGHT: 315 LBS

## 2023-05-31 PROBLEM — A41.9 SEPSIS: Status: RESOLVED | Noted: 2023-05-26 | Resolved: 2023-05-31

## 2023-05-31 LAB
BACTERIA BLD CULT: NORMAL
BACTERIA BLD CULT: NORMAL
VANCOMYCIN TROUGH SERPL-MCNC: 13.2 UG/ML (ref 10–22)

## 2023-05-31 PROCEDURE — 99239 PR HOSPITAL DISCHARGE DAY,>30 MIN: ICD-10-PCS | Mod: ,,, | Performed by: INTERNAL MEDICINE

## 2023-05-31 PROCEDURE — 36415 COLL VENOUS BLD VENIPUNCTURE: CPT | Performed by: INTERNAL MEDICINE

## 2023-05-31 PROCEDURE — 99223 1ST HOSP IP/OBS HIGH 75: CPT | Mod: ,,, | Performed by: PAIN MEDICINE

## 2023-05-31 PROCEDURE — 80202 ASSAY OF VANCOMYCIN: CPT | Performed by: INTERNAL MEDICINE

## 2023-05-31 PROCEDURE — 63600175 PHARM REV CODE 636 W HCPCS: Performed by: INTERNAL MEDICINE

## 2023-05-31 PROCEDURE — 25000003 PHARM REV CODE 250: Performed by: HOSPITALIST

## 2023-05-31 PROCEDURE — 99239 HOSP IP/OBS DSCHRG MGMT >30: CPT | Mod: ,,, | Performed by: INTERNAL MEDICINE

## 2023-05-31 PROCEDURE — 99222 1ST HOSP IP/OBS MODERATE 55: CPT | Mod: ,,, | Performed by: NURSE PRACTITIONER

## 2023-05-31 PROCEDURE — 25000003 PHARM REV CODE 250: Performed by: INTERNAL MEDICINE

## 2023-05-31 PROCEDURE — 94761 N-INVAS EAR/PLS OXIMETRY MLT: CPT

## 2023-05-31 PROCEDURE — 99222 PR INITIAL HOSPITAL CARE,LEVL II: ICD-10-PCS | Mod: ,,, | Performed by: NURSE PRACTITIONER

## 2023-05-31 PROCEDURE — 99223 PR INITIAL HOSPITAL CARE,LEVL III: ICD-10-PCS | Mod: ,,, | Performed by: PAIN MEDICINE

## 2023-05-31 RX ORDER — DOXYCYCLINE HYCLATE 100 MG
100 TABLET ORAL EVERY 12 HOURS
Qty: 28 TABLET | Refills: 0 | Status: SHIPPED | OUTPATIENT
Start: 2023-05-31 | End: 2023-06-14

## 2023-05-31 RX ORDER — ACETAMINOPHEN 325 MG/1
650 TABLET ORAL EVERY 4 HOURS PRN
Refills: 0
Start: 2023-05-31 | End: 2023-06-10

## 2023-05-31 RX ORDER — POLYETHYLENE GLYCOL 3350 17 G/17G
17 POWDER, FOR SOLUTION ORAL 2 TIMES DAILY PRN
Refills: 0
Start: 2023-05-31

## 2023-05-31 RX ORDER — AMOXICILLIN AND CLAVULANATE POTASSIUM 875; 125 MG/1; MG/1
1 TABLET, FILM COATED ORAL EVERY 12 HOURS
Qty: 28 TABLET | Refills: 0 | Status: SHIPPED | OUTPATIENT
Start: 2023-05-31 | End: 2023-06-14

## 2023-05-31 RX ORDER — AMOXICILLIN AND CLAVULANATE POTASSIUM 875; 125 MG/1; MG/1
1 TABLET, FILM COATED ORAL EVERY 12 HOURS
Status: DISCONTINUED | OUTPATIENT
Start: 2023-05-31 | End: 2023-05-31 | Stop reason: HOSPADM

## 2023-05-31 RX ORDER — DOXYCYCLINE HYCLATE 100 MG
100 TABLET ORAL EVERY 12 HOURS
Status: DISCONTINUED | OUTPATIENT
Start: 2023-05-31 | End: 2023-05-31 | Stop reason: HOSPADM

## 2023-05-31 RX ADMIN — OXYCODONE HYDROCHLORIDE 5 MG: 5 TABLET ORAL at 06:05

## 2023-05-31 RX ADMIN — VANCOMYCIN HYDROCHLORIDE 1500 MG: 1.5 INJECTION, POWDER, LYOPHILIZED, FOR SOLUTION INTRAVENOUS at 06:05

## 2023-05-31 RX ADMIN — LISINOPRIL 40 MG: 20 TABLET ORAL at 08:05

## 2023-05-31 RX ADMIN — AMOXICILLIN AND CLAVULANATE POTASSIUM 1 TABLET: 875; 125 TABLET, FILM COATED ORAL at 01:05

## 2023-05-31 RX ADMIN — ATORVASTATIN CALCIUM 40 MG: 40 TABLET, FILM COATED ORAL at 08:05

## 2023-05-31 RX ADMIN — GABAPENTIN 600 MG: 300 CAPSULE ORAL at 08:05

## 2023-05-31 RX ADMIN — AMLODIPINE BESYLATE 5 MG: 5 TABLET ORAL at 08:05

## 2023-05-31 RX ADMIN — DOXYCYCLINE HYCLATE 100 MG: 100 TABLET, COATED ORAL at 01:05

## 2023-05-31 RX ADMIN — POLYETHYLENE GLYCOL 3350 17 G: 17 POWDER, FOR SOLUTION ORAL at 08:05

## 2023-05-31 RX ADMIN — CARVEDILOL 3.12 MG: 3.12 TABLET, FILM COATED ORAL at 08:05

## 2023-05-31 RX ADMIN — ASPIRIN 81 MG: 81 TABLET, COATED ORAL at 08:05

## 2023-05-31 NOTE — CONSULTS
Inpatient consult to Physical Medicine Rehab  Consult performed by: Carlie Mcgee NP  Consult ordered by: Mal Collier MD  Reason for consult: Assess rehab needs    Reviewed patient history and current admission.  Rehab team following.  Full consult to follow.    ADRIANNA Zhou, FNP-C  Physical Medicine & Rehabilitation   05/31/2023

## 2023-05-31 NOTE — CONSULTS
[facility] - [facility]ensive [facility] ([place] [address])  Physical Medicine & Rehab  Consult Note    Patient Name: Edd Wills  MRN: [id number]  Admission Date: 5/26/2023  Hospital Length of Stay: 5 days  Attending Physician: Mal Collier MD     Inpatient consult to Physical Medicine & Rehabilitation  Consult performed by: Carlie Mcgee NP  Consult requested by:  Mal Collier MD    Collaborating Physician: Alysha Genao MD  Reason for Consult:  Assess rehabilitation needs     Consults  Subjective:     Principal Problem: Sepsis    HPI: Edd Wills is a 68-year-old male with PMHx of obesity, HTN, aortic dissection, chronic lymphedema R leg complicated by chronic/recurrent wounds. Patient presented to [facility] on [facility]/26/23 for weakness and pain to R leg wound. On arrival, febrile 103.3 and leukocytosis noted. CT leg negative for osteo or abscess, no indications for surgical intervention. Blood cxs NGTD. ID consilted and recommend continue Rocephin and Vanc with plan to transition to oral Doxy and Augmentin outpatient x 14 days.     Functional History: Patient lives alone in a town home with no steps to enter.  Prior to admission, Phyllis. DME: rollator.     Hospital Course:   5/30/23: participated w/ OT. Pt engaged in functional mobility to simulate household/community distances ~6 ft to bedside chair with CGA and rollator in order to maximize functional endurance and standing balance required for engagement in occupations of choice      Past Medical History:   Diagnosis Date    HTN (hypertension)     Hyperlipemia     Obesity     LLUVIA (obstructive sleep apnea)     Osteoarthritis of knee      Past Surgical History:   Procedure Laterality Date    CERVICAL FUSION  06/14/2016    LAMINECTOMY  06/14/2016     Review of patient's allergies indicates:  No Known Allergies    Scheduled Medications:    amLODIPine  5 mg Oral Daily    aspirin  81 mg Oral Daily    atorvastatin  40 mg Oral Daily    carvediloL  3.125 mg Oral BID    cefTRIAXone  (ROCEPHIN) IVPB  2 g Intravenous Q24H    enoxparin  40 mg Subcutaneous Daily    gabapentin  600 mg Oral BID    lisinopriL  40 mg Oral Daily    vancomycin (VANCOCIN) IVPB  1,500 mg Intravenous Q12H       PRN Medications: acetaminophen, acetaminophen, dextrose 10%, dextrose 10%, dextrose, dextrose, glucagon (human recombinant), hydrALAZINE, ondansetron, oxyCODONE, polyethylene glycol, sodium chloride 0.9%, Pharmacy to dose Vancomycin consult **AND** vancomycin - pharmacy to dose    Family History       Problem Relation (Age of Onset)    Cancer Mother    Diabetes Father    Diabetes type II Mother    Hyperlipidemia Mother, Father    Hypertension Mother, Father    Stroke Maternal Grandmother          Tobacco Use    Smoking status: Never    Smokeless tobacco: Never   Substance and Sexual Activity    Alcohol use: Yes    Drug use: No     Comment: Has tried marijuana and crack in past, but no drug use since 2003    Sexual activity: Not on file     Review of Systems   Constitutional:  Positive for activity change. Negative for fatigue and fever.   HENT:  Negative for trouble swallowing and voice change.    Respiratory:  Negative for cough and shortness of breath.    Cardiovascular:  Negative for chest pain and leg swelling.   Gastrointestinal:  Negative for abdominal distention and abdominal pain.   Genitourinary:  Negative for difficulty urinating and flank pain.   Musculoskeletal:  Positive for gait problem. Negative for back pain.   Skin:  Negative for color change and rash.   Neurological:  Positive for weakness. Negative for speech difficulty and numbness.   Psychiatric/Behavioral:  Negative for agitation and confusion.      Objective:     Vital Signs (Most Recent):  Temp: 98.3 °F (36.8 °C) (05/31/23 1129)  Pulse: 94 (05/31/23 1134)  Resp: 18 (05/31/23 1129)  BP: 133/75 (05/31/23 1129)  SpO2: (!) 92 % (05/31/23 1129)    Vital Signs (24h Range):  Temp:  [98.3 °F (36.8 °C)-99.5 °F (37.5 °C)] 98.3 °F (36.8  °C)  Pulse:  [77-94] 94  Resp:  [17-19] 18  SpO2:  [91 %-95 %] 92 %  BP: (127-157)/(64-75) 133/75     Body mass index is 69.21 kg/m².     Physical Exam  Vitals and nursing note reviewed.   Constitutional:       Appearance: He is well-developed. He is obese.   HENT:      Head: Normocephalic and atraumatic.      Mouth/Throat:      Mouth: Mucous membranes are moist.   Eyes:      General:         Right eye: No discharge.         Left eye: No discharge.      Pupils: Pupils are equal, round, and reactive to light.   Pulmonary:      Effort: Pulmonary effort is normal. No respiratory distress.   Abdominal:      General: There is no distension.      Tenderness: There is no abdominal tenderness.   Musculoskeletal:         General: No deformity.      Cervical back: Neck supple.      Comments: RLE lymphedema present, satellite wounds, partially healing and sloughing   BLE weakness  Generalized weakness   Skin:     General: Skin is warm and dry.   Neurological:      Mental Status: He is oriented to person, place, and time. Mental status is at baseline.      Sensory: No sensory deficit.      Motor: Weakness present. No abnormal muscle tone.      Gait: Gait abnormal.   Psychiatric:         Mood and Affect: Mood normal.         Behavior: Behavior normal.     Diagnostic Results:   Labs: Reviewed  ECG: Reviewed  CT: Reviewed    Assessment/Plan:     * Sepsis  - ID consilted and recommend continue Rocephin and Vanc with plan to transition to oral Doxy and Augmentin outpatient x 14 days.     Physical deconditioning  - Related to prolonged/acute hospital course.     Recommendations  -  Encourage mobility, OOB in chair at least 3 hours per day, and early ambulation as appropriate  -  PT/OT evaluate and treat  -  Pain management  -  Monitor for and prevent skin breakdown and pressure ulcers  · Early mobility, repositioning/weight shifting every 20-30 minutes when sitting, turn patient every 2 hours, proper mattress/overlay and chair  cushioning, pressure relief/heel protector boots  -  DVT prophylaxis    -  Reviewed discharge options (IP rehab, SNF, HH therapy, and OP therapy)    PM&R Recommendation:     At this time, the PM&R team has reviewed this patient's ongoing medical case including inpatient diagnosis, medical history, clinical examination, labs, vitals, current social and functional history to provide the post-acute recommendation as follows:     RECOMMENDATIONS: inpatient rehabilitation due to good motivation/participation with therapies, has been determined to tolerate 3 hours of therapy and good potential for recovery.     The patient will be admitted for comprehensive interdisciplinary inpatient rehabilitation to address the impairments due to medical diagnosis of Debility and Sepsis. The patient will benefit from an inpatient rehabilitation program to promote functional recovery, implement compensatory strategies and will undergo assessment for needs for durable medical equipment for safe discharge to the community. This patient will benefit from a coordinated interdisciplinary rehabilitation program services that require close monitoring and treatment with 24-hour rehabilitative nursing and physical/occupational therapies for 3 hours/day for 5 days/week.This interdisciplinary program will be performed under the direction of a physiatrist.    MEDICAL STABILITY:     At this time, no barriers for post-acute rehab admission.    I will sign off with the transfer of the patient to [facility] liaison who will continue to follow going forward until admission to [facility].     Thank you for your consult.     Carlie Mcgee NP  Department of Physical Medicine & Rehab  [facility] - Intensive Care ([place])

## 2023-05-31 NOTE — PROGRESS NOTES
VANCOMYCIN DOSING BY PHARMACY DISCONTINUATION NOTE    Edd Wills is a 68 y.o. male who had been consulted for vancomycin dosing.    The pharmacy consult for vancomycin dosing has been discontinued.     Vancomycin Dosing by Pharmacy Consult will sign-off. Please reconsult if necessary. Thank you for allowing us to participate in this patient's care.     Jn Marshall, PharmD, BCPS  Ext. [phone]

## 2023-05-31 NOTE — PLAN OF CARE
Problem: Bariatric Environmental Safety  Goal: Safety Maintained with Care  Outcome: Ongoing, Progressing     Problem: Impaired Wound Healing  Goal: Optimal Wound Healing  Outcome: Ongoing, Progressing  Iv removed. Report given to [facility] nurse. Wound care performed on 5/30. Bed locked and in lowest position. Call light in reach.

## 2023-05-31 NOTE — PHYSICIAN QUERY
PT Name: Edd Wills                       Removing the query - pneumonia not noted by HM and not supported per documentation only per ED- CXR noted possible pneumonia but CT looked normal/ja  MR #: [id number]     DOCUMENTATION CLARIFICATION     CDS/: Tianna Romero RN              Contact information: [email or web address]  This form is a permanent document in the medical record.    Query Date: May 31, 2023    By submitting this query, we are merely seeking further clarification of documentation.  Please utilize your independent clinical judgment when addressing the question(s) below.  The Medical Record contains the following:   Indicators   Supporting Clinical Findings Location in Medical Record   x Pneumonia documented chest x-ray concerning for interstitial edema versus atypical pneumonia.    Patient's BNP is mildly elevated which would be consistent with a CHF exacerbation but does not explain patient's headache, fever.      Discussed patient's case with Hospital Medicine who agreed to admit patient to their service for sepsis-possible source pneumonia    Clinical impression:  Pneumonia of both lungs due to infectious organism 5/26 ED MD PN                   x Chest X-Ray/CT Scan  Poor inspiratory result.  Cannot exclude mild bilateral interstitial pulmonary changes, such as could possibly be seen with interstitial pulmonary edema or interstitial pneumonia    Bibasilar atelectasis.  No concerning nodules.  No evidence of focal consolidation, pneumothorax, or pleural fluid 5/26 CXR        5/26 CT Chest   x PaO2    PaCO2     O2 sat O2 sat 93% on 4L NC 5/26 Flowsheet     x WBC WBC= 15.55, 21.50, 19.28, 16.42 5/26-29 Lab     x Vital Signs Temp= 101.6, 103.3, 100.7  RR= 35, 27, 22, 20, 20  HR= 106, 104, 103, 101  BP= 144/49   5/26 Flowsheet   x Cultures  No growth to date 5/26 Blood culture     x Treatment  Piperacillin IVPB      Indication of use: skin and soft tissue  Vancomycin IVPB   Indication of  use:  Sepsis, unknown source  Ceftriaxone IVPB    Indication of use: skin and soft tissue   5/26-29 MAR  5/26-29 MAR  5/29-30 MAR   x Supplemental O2 O2 2-4L NC 5/26-28 Flowsheet      Dysphagia/Swallow study     x Other Presents to ED via EMS for SOB    Positive for shortness of breath   5/26 ED MD PN       Provider, please provide the diagnosis related to the above clinical indicators:    [   ] Pneumonia is ruled in  (specify type):    (   ) Bacteria Pneumonia                                                                 (   ) Unspecified Pneumonia                                                                 (   ) other (specify type): _________     [   ] Possible Pneumonia     [   ] Pneumonia is ruled out     [   ] Other respiratory diagnosis (please specify): _________     [  ] Clinically undetermined       Please document in your progress notes daily for the duration of treatment, until resolved, and include in your discharge summary.     Form No. [id number]

## 2023-05-31 NOTE — HOSPITAL COURSE
5/30/23: participated w/ OT. Pt engaged in functional mobility to simulate household/community distances ~6 ft to bedside chair with CGA and rollator in order to maximize functional endurance and standing balance required for engagement in occupations of choice

## 2023-05-31 NOTE — DISCHARGE SUMMARY
[facility] - Int[facility] ([place] [address])  [facility]  Discharge Summary      Patient Name: Edd Wills  MRN: [id number]  Admission Date: 5/26/2023  Hospital Length of Stay: 5 days  Discharge Date and Time:  05/31/2023 12:48 PM  Attending Physician: Mal Collier MD   Discharging Provider: Mal Collier MD  Primary Care Provider: Zuleyka Sabillon MD            * No surgery found *      Hospital Course: 68-year-old gentleman past medical history of hypertension, chronic lymphedema to right lower extremity who has home health aide as an outpatient and history of aortic dissection presented to hospital with fever and leukocytosis, he was assessed as sepsis secondary to infection of this right lower extremity, he was hemodynamically stable and started on IV antibiotics.  During hospital stay he remained afebrile and leukocytosis resolved.  He had CT of the right thigh done with no drainable abscess determined or surgical intervention necessary by General surgery.  Wound Care was consulted.  Id was consulted, He was continued on vancomycin and ceftriaxone and transitioned to oral Augmentin and doxycycline.  He was seen by therapy and accepeted to acute rehab.             Physical Exam  Constitutional:       Appearance: He is obese.   HENT:      Head: Normocephalic.      Right Ear: External ear normal.      Left Ear: External ear normal.      Nose: Nose normal.   Cardiovascular:      Rate and Rhythm: Normal rate.   Pulmonary:      Effort: Pulmonary effort is normal.   Musculoskeletal:      Comments: Chronic lymphedema to right lower. Patient's right thigh has significant swelling, with satellite wounds, partially healing and sloughing   Neurological:      Mental Status: He is alert and oriented to person, place, and time.   Psychiatric:         Mood and Affect: Mood normal.     Consults:   Consults (From admission, onward)          Status Ordering Provider     Inpatient consult to Physical Medicine Rehab  Once         Provider:  (Not yet assigned)    Completed CARLOS SERRATO     Inpatient consult to PICC team (KATHY)  Once        Provider:  (Not yet assigned)    Completed CARLOS SERRATO     Inpatient consult to Infectious Diseases  Once        Provider:  (Not yet assigned)    Completed CARLOS SERRATO     Inpatient consult to General Surgery  Once        Provider:  (Not yet assigned)    Completed CARLOS SERRATO            Final Active Diagnoses:    Diagnosis Date Noted POA    Wound of right leg [S81.801A] 05/28/2023 Yes    Hyperlipemia [E78.5]  Yes    History of aortic dissection [Z86.79] 07/09/2020 Not Applicable    HTN (hypertension) [I10]  Yes    Physical deconditioning [R53.81] 02/07/2013 Yes    Morbid obesity [E66.01] 02/03/2013 Yes      Problems Resolved During this Admission:    Diagnosis Date Noted Date Resolved POA    PRINCIPAL PROBLEM:  Sepsis [A41.9] 05/26/2023 05/31/2023 Unknown      Discharged Condition: fair    Disposition: Rehab Facility    Follow Up:    Patient Instructions:      Ambulatory referral/consult to Physical Medicine Rehab   Standing Status: Future   Referral Priority: Routine Referral Type: Rehabilitation   Referral Reason: Specialty Services Required   Requested Specialty: Physical Medicine and Rehabilitation   Number of Visits Requested: 1     Diet Adult Regular     Activity as tolerated     Medications:  Reconciled Home Medications:      Medication List        START taking these medications      acetaminophen 325 MG tablet  Commonly known as: TYLENOL  Take 2 tablets (650 mg total) by mouth every 4 (four) hours as needed for Temperature greater than or Pain.     amoxicillin-clavulanate 875-125mg 875-125 mg per tablet  Commonly known as: AUGMENTIN  Take 1 tablet by mouth every 12 (twelve) hours. for 14 days     doxycycline 100 MG tablet  Commonly known as: VIBRA-TABS  Take 1 tablet (100 mg total) by mouth every 12 (twelve) hours. for 14 days     polyethylene glycol 17 gram Pwpk  Commonly known as: GLYCOLAX  Take 17 g  by mouth 2 (two) times daily as needed.            CONTINUE taking these medications      amLODIPine 10 MG tablet  Commonly known as: NORVASC  Take 1 tablet (10 mg total) by mouth once daily.     aspirin 81 MG EC tablet  Commonly known as: ECOTRIN  Take 81 mg by mouth once daily.     atorvastatin 40 MG tablet  Commonly known as: LIPITOR  Take 1 tablet (40 mg total) by mouth once daily.     carvediloL 25 MG tablet  Commonly known as: COREG  Take 1 tablet (25 mg total) by mouth 2 (two) times a day.     diclofenac sodium 1 % Gel  Commonly known as: VOLTAREN  Apply 2 g topically once daily. Apply to affected area     furosemide 40 MG tablet  Commonly known as: LASIX  Take 1 tablet (40 mg total) by mouth once daily.     gabapentin 600 MG tablet  Commonly known as: NEURONTIN  Take 1 tablet (600 mg total) by mouth 2 (two) times daily.     lisinopriL 40 MG tablet  Commonly known as: PRINIVIL,ZESTRIL  Take 1 tablet (40 mg total) by mouth once daily.            STOP taking these medications      docusate sodium 100 MG capsule  Commonly known as: COLACE     ibuprofen 200 MG tablet  Commonly known as: ADVIL,MOTRIN     potassium chloride SA 20 MEQ tablet  Commonly known as: K-DUR,KLOR-CON     traMADoL 50 mg tablet  Commonly known as: ULTRAM                  Pending Diagnostic Studies:       None          Indwelling Lines/Drains at time of discharge:   Lines/Drains/Airways       None                   Time spent on the discharge of patient: 40 minutes         Mal Collier MD  Department of Hospital Medicine  [facility] - Intensive Care ([place])

## 2023-05-31 NOTE — PLAN OF CARE
Problem: Adult Inpatient Plan of Care  Goal: Plan of Care Review  Outcome: Ongoing, Progressing     Problem: Bariatric Environmental Safety  Goal: Safety Maintained with Care  Outcome: Ongoing, Progressing     Problem: Impaired Wound Healing  Goal: Optimal Wound Healing  Outcome: Ongoing, Progressing     Problem: Skin Injury Risk Increased  Goal: Skin Health and Integrity  Outcome: Ongoing, Progressing  Pt AAO X 4; able to express needs.  Pain controlled with PRN meds.  IV ABX.  No recent BM.  Possible discharge to [facility] this morning.  Potassium replacement yesterday.  AM labs drawn.  Results pending.  Safety maintained.  Bed in low position,  call  light in reach.

## 2023-05-31 NOTE — HPI
Edd Wills is a 68-year-old male with PMHx of obesity, HTN, aortic dissection, chronic lymphedema R leg complicated by chronic/recurrent wounds. [place] [facility] [place] for weakness and pain to R leg wound. On arrival, febrile 103.3 and leukocytosis noted. CT leg negative for osteo or abscess, no indications for surgical intervention. Blood cxs NGTD. ID consilted and recommend continue Rocephin and Vanc with plan to transition to oral Doxy and Augmentin outpatient x 14 days.     Functional History: Patient lives alone in a town home with no steps to enter.  Prior to admission, Phyllis. DME: rollator.

## 2023-05-31 NOTE — ASSESSMENT & PLAN NOTE
- ID consilted and recommend continue Rocephin and Vanc with plan to transition to oral Doxy and Augmentin outpatient x 14 days.

## 2023-05-31 NOTE — PROGRESS NOTES
Pharmacokinetic Assessment Follow Up: IV Vancomycin    Vancomycin serum concentration assessment(s):    -Vancomycin level was drawn ~ 12 hrs from the preceding dose and can be used to guide therapy.  -Level of 13.2 mcg/mL is below goal 15-20 mcg/mL for sepsis, leg wound.  -Dosing slightly off-schedule.  -Renal function improving.    Vancomycin Regimen Plan:    -Continue vancomycin 1500 mg IV Q12H.  -Monitor closely for accumulation.  -Re-time level for 0500, 6/1.     Drug levels (last 3 results):  Recent Labs   Lab Result Units 05/29/23  1132 05/31/23  0507   Vancomycin-Trough ug/mL 12.7 13.2       Pharmacy will continue to follow and monitor vancomycin.    Please contact pharmacy at extension [phone] for questions regarding this assessment.    Thank you for the consult,   Jn Marshall       Patient brief summary:  Edd Wills is a 68 y.o. male initiated on antimicrobial therapy with IV Vancomycin for treatment of sepsis    Drug Allergies:   Review of patient's allergies indicates:  No Known Allergies    Actual Body Weight:   194.5 kg    Renal Function:   Estimated Creatinine Clearance: 161.3 mL/min (based on SCr of 0.7 mg/dL).,     Dialysis Method (if applicable):  N/A    CBC (last 72 hours):  Recent Labs   Lab Result Units 05/29/23  0331 05/30/23  0455   WBC K/uL 16.42* 12.19   Hemoglobin g/dL 11.0* 10.8*   Hematocrit % 34.1* 33.9*   Platelets K/uL 222 264   Gran % % 79.4* 68.9   Lymph % % 9.3* 14.0*   Mono % % 8.2 12.6   Eosinophil % % 2.1 3.1   Basophil % % 0.3 0.4   Differential Method  Automated Automated       Metabolic Panel (last 72 hours):  Recent Labs   Lab Result Units 05/29/23  0331 05/30/23  0455   Sodium mmol/L 134* 135*   Potassium mmol/L 3.4* 3.4*   Chloride mmol/L 104 106   CO2 mmol/L 23 22*   Glucose mg/dL 98 89   BUN mg/dL 13 10   Creatinine mg/dL 0.9 0.7       Vancomycin Administrations:  vancomycin given in the last 96 hours                     vancomycin 1,500 mg in dextrose 5 % (D5W) 250 mL  IVPB (Vial-Mate) (mg) 1,500 mg New Bag 05/31/23 0614     1,500 mg New Bag 05/30/23 1749     1,500 mg New Bag  0344    vancomycin 1,250 mg in dextrose 5 % (D5W) 250 mL IVPB (Vial-Mate) (mg) 1,250 mg New Bag 05/29/23 1634     1,250 mg New Bag  0141     1,250 mg New Bag 05/28/23 1247     1,250 mg New Bag  0123     1,250 mg New Bag 05/27/23 1309                    Microbiologic Results:  Microbiology Results (last 7 days)       Procedure Component Value Units Date/Time    Blood culture x two cultures. Draw prior to antibiotics. [[id number]] Collected: 05/26/23 0743    Order Status: Completed Specimen: Blood from Peripheral, Hand, Right Updated: 05/30/23 1012     Blood Culture, Routine No Growth to date      No Growth to date      No Growth to date      No Growth to date      No Growth to date    Narrative:      Aerobic and anaerobic    Blood culture x two cultures. Draw prior to antibiotics. [[id number]] Collected: 05/26/23 0742    Order Status: Completed Specimen: Blood from Peripheral, Hand, Right Updated: 05/30/23 1012     Blood Culture, Routine No Growth to date      No Growth to date      No Growth to date      No Growth to date      No Growth to date    Narrative:      Aerobic and anaerobic

## 2023-05-31 NOTE — PLAN OF CARE
CM spoke with pt in room - he states he would like to go to [facility] upon d/c.    11:47 AM  CM set up ambulance transport for pt in Epic.  Spoke with Nikita at [facility] to verify that pt needs bariatric stretcher.  Nikita will add to comments in order.    JUAN C GainesN, BS, RN, CCM

## 2023-05-31 NOTE — TREATMENT PLAN
[facility]    FACILITY TRANSFER ORDERS      Patient Name: Edd Wills  YOB: 1955    PCP: Zuleyka Sabillon MD   PCP Address: [address] [address] / [place] [address]  PCP Phone Number: [phone]  PCP Fax: [phone]    Encounter Date: 05/31/2023    Admit to: rehab    Vital Signs:  Routine    Diagnoses:   Active Hospital Problems    Diagnosis  POA    *Sepsis [A41.9]  Unknown    Wound of right leg [S81.801A]  Yes    Hyperlipemia [E78.5]  Yes    History of aortic dissection [Z86.79]  Not Applicable    HTN (hypertension) [I10]  Yes    Physical deconditioning [R53.81]  Yes    Morbid obesity [E66.01]  Yes      Resolved Hospital Problems   No resolved problems to display.       Allergies:Review of patient's allergies indicates:  No Known Allergies    Diet: 2 gram sodium diet    Activities: Activity as tolerated    Goals of Care Treatment Preferences:  Code Status: Full Code      Nursing: per routine     Labs: per sejal     CONSULTS:    Physical Therapy to evaluate and treat.  and Occupational Therapy to evaluate and treat.      WOUND CARE ORDERS  Per wound care  Right posterior upper leg wound cleansed with Vashe, applied Aquacel AG sheet.   RECOMMENDATIONS:  Keep skin clean and dry  No diapers, no foam borders to sacral area.   Change leg dressing q2d      Medications: Review discharge medications with patient and family and provide education.      Current Discharge Medication List        CONTINUE these medications which have NOT CHANGED    Details   amLODIPine (NORVASC) 10 MG tablet Take 1 tablet (10 mg total) by mouth once daily.  Qty: 90 tablet, Refills: 3    Comments: .  Associated Diagnoses: Essential hypertension      aspirin (ECOTRIN) 81 MG EC tablet Take 81 mg by mouth once daily.      atorvastatin (LIPITOR) 40 MG tablet Take 1 tablet (40 mg total) by mouth once daily.  Qty: 90 tablet, Refills: 3      carvediloL (COREG) 25 MG tablet Take 1 tablet (25 mg total) by mouth  2 (two) times a day.  Qty: 180 tablet, Refills: 3    Comments: .  Associated Diagnoses: Essential hypertension      diclofenac sodium (VOLTAREN) 1 % Gel Apply 2 g topically once daily. Apply to affected area  Qty: 30 each, Refills: 3      docusate sodium (COLACE) 100 MG capsule Take 1 capsule (100 mg total) by mouth 2 (two) times daily as needed for Constipation.  Qty: 60 capsule, Refills: 0    Associated Diagnoses: Chronic idiopathic constipation      furosemide (LASIX) 40 MG tablet Take 1 tablet (40 mg total) by mouth once daily.  Qty: 90 tablet, Refills: 2      gabapentin (NEURONTIN) 600 MG tablet Take 1 tablet (600 mg total) by mouth 2 (two) times daily.  Qty: 180 tablet, Refills: 2    Associated Diagnoses: Neuropathy      ibuprofen (ADVIL,MOTRIN) 200 MG tablet Take 200 mg by mouth.      lisinopriL (PRINIVIL,ZESTRIL) 40 MG tablet Take 1 tablet (40 mg total) by mouth once daily.  Qty: 90 tablet, Refills: 3    Comments: .      potassium chloride SA (K-DUR,KLOR-CON) 20 MEQ tablet Take 1 tablet (20 mEq total) by mouth 2 (two) times daily.  Qty: 60 tablet, Refills: 3      traMADoL (ULTRAM) 50 mg tablet Take 50 mg by mouth every 8 (eight) hours as needed.                Immunizations Administered as of 5/31/2023       No immunizations on file.                Some patients may experience side effects after vaccination.  These may include fever, headache, muscle or joint aches.  Most symptoms resolve with 24-48 hours and do not require urgent medical evaluation unless they persist for more than 72 hours or symptoms are concerning for an unrelated medical condition.          _________________________________  Mal Collier MD  05/31/2023

## 2023-05-31 NOTE — SUBJECTIVE & OBJECTIVE
Past Medical History:   Diagnosis Date    HTN (hypertension)     Hyperlipemia     Obesity     LLUVIA (obstructive sleep apnea)     Osteoarthritis of knee      Past Surgical History:   Procedure Laterality Date    CERVICAL FUSION  06/14/2016    LAMINECTOMY  06/14/2016     Review of patient's allergies indicates:  No Known Allergies    Scheduled Medications:    amLODIPine  5 mg Oral Daily    aspirin  81 mg Oral Daily    atorvastatin  40 mg Oral Daily    carvediloL  3.125 mg Oral BID    cefTRIAXone (ROCEPHIN) IVPB  2 g Intravenous Q24H    enoxparin  40 mg Subcutaneous Daily    gabapentin  600 mg Oral BID    lisinopriL  40 mg Oral Daily    vancomycin (VANCOCIN) IVPB  1,500 mg Intravenous Q12H       PRN Medications: acetaminophen, acetaminophen, dextrose 10%, dextrose 10%, dextrose, dextrose, glucagon (human recombinant), hydrALAZINE, ondansetron, oxyCODONE, polyethylene glycol, sodium chloride 0.9%, Pharmacy to dose Vancomycin consult **AND** vancomycin - pharmacy to dose    Family History       Problem Relation (Age of Onset)    Cancer Mother    Diabetes Father    Diabetes type II Mother    Hyperlipidemia Mother, Father    Hypertension Mother, Father    Stroke Maternal Grandmother          Tobacco Use    Smoking status: Never    Smokeless tobacco: Never   Substance and Sexual Activity    Alcohol use: Yes    Drug use: No     Comment: Has tried marijuana and crack in past, but no drug use since 2003    Sexual activity: Not on file     Review of Systems   Constitutional:  Positive for activity change. Negative for fatigue and fever.   HENT:  Negative for trouble swallowing and voice change.    Respiratory:  Negative for cough and shortness of breath.    Cardiovascular:  Negative for chest pain and leg swelling.   Gastrointestinal:  Negative for abdominal distention and abdominal pain.   Genitourinary:  Negative for difficulty urinating and flank pain.   Musculoskeletal:  Positive for gait problem. Negative for back pain.    Skin:  Negative for color change and rash.   Neurological:  Positive for weakness. Negative for speech difficulty and numbness.   Psychiatric/Behavioral:  Negative for agitation and confusion.    Objective:     Vital Signs (Most Recent):  Temp: 98.3 °F (36.8 °C) (05/31/23 1129)  Pulse: 94 (05/31/23 1134)  Resp: 18 (05/31/23 1129)  BP: 133/75 (05/31/23 1129)  SpO2: (!) 92 % (05/31/23 1129)    Vital Signs (24h Range):  Temp:  [98.3 °F (36.8 °C)-99.5 °F (37.5 °C)] 98.3 °F (36.8 °C)  Pulse:  [77-94] 94  Resp:  [17-19] 18  SpO2:  [91 %-95 %] 92 %  BP: (127-157)/(64-75) 133/75     Body mass index is 69.21 kg/m².     Physical Exam  Vitals and nursing note reviewed.   Constitutional:       Appearance: He is well-developed. He is obese.   HENT:      Head: Normocephalic and atraumatic.      Mouth/Throat:      Mouth: Mucous membranes are moist.   Eyes:      General:         Right eye: No discharge.         Left eye: No discharge.      Pupils: Pupils are equal, round, and reactive to light.   Pulmonary:      Effort: Pulmonary effort is normal. No respiratory distress.   Abdominal:      General: There is no distension.      Tenderness: There is no abdominal tenderness.   Musculoskeletal:         General: No deformity.      Cervical back: Neck supple.      Comments: RLE lymphedema present, satellite wounds, partially healing and sloughing   BLE weakness  Generalized weakness   Skin:     General: Skin is warm and dry.   Neurological:      Mental Status: He is oriented to person, place, and time. Mental status is at baseline.      Sensory: No sensory deficit.      Motor: Weakness present. No abnormal muscle tone.      Gait: Gait abnormal.   Psychiatric:         Mood and Affect: Mood normal.         Behavior: Behavior normal.        NEUROLOGICAL EXAMINATION:     MENTAL STATUS   Oriented to person, place, and time.     CRANIAL NERVES     CN III, IV, VI   Pupils are equal, round, and reactive to light.    Diagnostic Results: Labs:  Reviewed  ECG: Reviewed  CT: Reviewed

## 2023-06-01 NOTE — PLAN OF CARE
[facility] - [facility] ([facility])  Discharge Final Note    Primary Care Provider: Zuleyka Sabillon MD    Expected Discharge Date: 5/31/2023    Final Discharge Note (most recent)       Final Note - 06/01/23 0816          Final Note    Assessment Type Final Discharge Note (P)      Anticipated Discharge Disposition Rehab Facility (P)         Post-Acute Status    Post-Acute Authorization Placement (P)      Coverage [employer] a/b (P)      Discharge Delays None known at this time (P)                      Important Message from Medicare    Pt d/c'd to [facility].    Carlie Alarcon, JUAN CN, BS, RN, CCM

## 2023-06-02 ENCOUNTER — HOSPITAL ENCOUNTER (OUTPATIENT)
Dept: RADIOLOGY | Facility: HOSPITAL | Age: 68
Discharge: HOME | End: 2023-06-02
Attending: PAIN MEDICINE
Payer: MEDICARE

## 2023-06-02 PROCEDURE — 93971 EXTREMITY STUDY: CPT | Mod: 26,RT,, | Performed by: RADIOLOGY

## 2023-06-02 PROCEDURE — 93971 US LOWER EXTREMITY VEINS RIGHT: ICD-10-PCS | Mod: 26,RT,, | Performed by: RADIOLOGY

## 2023-06-09 ENCOUNTER — HOSPITAL ENCOUNTER (OUTPATIENT)
Dept: RADIOLOGY | Facility: HOSPITAL | Age: 68
Discharge: HOME | End: 2023-06-09
Attending: FAMILY MEDICINE
Payer: MEDICARE

## 2023-06-09 PROCEDURE — 76770 US EXAM ABDO BACK WALL COMP: CPT | Mod: 26,,, | Performed by: RADIOLOGY

## 2023-06-09 PROCEDURE — 76770 US ABDOMEN LIMITED_KIDNEY: ICD-10-PCS | Mod: 26,,, | Performed by: RADIOLOGY

## 2023-06-16 ENCOUNTER — PATIENT MESSAGE (OUTPATIENT)
Dept: INFECTIOUS DISEASES | Facility: CLINIC | Age: 68
End: 2023-06-16
Payer: MEDICARE

## 2023-07-19 ENCOUNTER — EXTERNAL HOME HEALTH (OUTPATIENT)
Dept: HOME HEALTH SERVICES | Facility: HOSPITAL | Age: 68
End: 2023-07-19
Payer: MEDICARE

## 2023-07-25 ENCOUNTER — PES CALL (OUTPATIENT)
Dept: ADMINISTRATIVE | Facility: CLINIC | Age: 68
End: 2023-07-25
Payer: MEDICARE

## 2023-08-22 ENCOUNTER — PES CALL (OUTPATIENT)
Dept: ADMINISTRATIVE | Facility: CLINIC | Age: 68
End: 2023-08-22
Payer: MEDICARE

## 2023-09-07 ENCOUNTER — TELEPHONE (OUTPATIENT)
Dept: HOME HEALTH SERVICES | Facility: CLINIC | Age: 68
End: 2023-09-07
Payer: MEDICARE

## 2023-09-07 DIAGNOSIS — G95.9 CERVICAL MYELOPATHY: Primary | ICD-10-CM

## 2023-09-07 DIAGNOSIS — I89.0 LYMPHEDEMA: ICD-10-CM

## 2023-09-12 ENCOUNTER — CARE AT HOME (OUTPATIENT)
Dept: HOME HEALTH SERVICES | Facility: CLINIC | Age: 68
End: 2023-09-12
Payer: MEDICARE

## 2023-09-12 DIAGNOSIS — I89.0 LYMPHEDEMA: ICD-10-CM

## 2023-09-12 DIAGNOSIS — I10 PRIMARY HYPERTENSION: ICD-10-CM

## 2023-09-12 DIAGNOSIS — I51.89 GRADE I DIASTOLIC DYSFUNCTION: ICD-10-CM

## 2023-09-12 DIAGNOSIS — E78.2 MIXED HYPERLIPIDEMIA: ICD-10-CM

## 2023-09-12 DIAGNOSIS — G57.93 NEUROPATHY OF BOTH FEET: ICD-10-CM

## 2023-09-12 DIAGNOSIS — G95.9 CERVICAL MYELOPATHY: Primary | ICD-10-CM

## 2023-09-12 PROCEDURE — 99497 PR ADVNCD CARE PLAN 30 MIN: ICD-10-PCS | Mod: S$GLB,,, | Performed by: NURSE PRACTITIONER

## 2023-09-12 PROCEDURE — 99350 PR HOME VISIT,ESTAB PATIENT,LEVEL IV: ICD-10-PCS | Mod: S$GLB,,, | Performed by: NURSE PRACTITIONER

## 2023-09-12 PROCEDURE — 99497 ADVNCD CARE PLAN 30 MIN: CPT | Mod: S$GLB,,, | Performed by: NURSE PRACTITIONER

## 2023-09-12 PROCEDURE — 99350 HOME/RES VST EST HIGH MDM 60: CPT | Mod: S$GLB,,, | Performed by: NURSE PRACTITIONER

## 2023-09-20 VITALS
OXYGEN SATURATION: 98 % | RESPIRATION RATE: 20 BRPM | DIASTOLIC BLOOD PRESSURE: 100 MMHG | TEMPERATURE: 98 F | SYSTOLIC BLOOD PRESSURE: 179 MMHG | HEART RATE: 79 BPM

## 2023-09-20 NOTE — ASSESSMENT & PLAN NOTE
1. Denies chest pain, SOB  2. Peripheral edema noted on exam, improved   3. Continue medication as prescribed  4. Keep scheduled follow up appts  5. Activity and Diet restrictions:   ? Recommend 2-3 gram sodium restriction and 1500cc- 2000cc fluid restriction.  ? Encourage physical activity with graded exercise program.  ? Requested patient to weigh themselves daily, and to notify us if their weight increases by more than 3 lbs in 1 day or 5 lbs in 3 days.  ? Elevate lower extremities while sitting/lying

## 2023-09-20 NOTE — PROGRESS NOTES
"  [facility]  Medical Home Visit    Visit Date: 9/12/23  Encounter Provider: Reynold Smith NP  PCP:  Zuleyka Sabillon MD    Subjective:      Patient ID: Edd Wills is a 68 y.o. male.    Consult Requested By:  Cleo Dueñas  Reason for Consult: Medical Visit by Home Care Provider    The patient is being seen at home due to a physical debility that presents a taxing effort to leave the home, to mitigate high risk of hospital readmission or due to the limited availability of reliable or safe options for transportation to the point of access to the provider. The visit meets the criteria for medical necessity as defined by CMS as "health-care services needed to prevent, diagnose, or treat an illness, injury, condition, disease, or its symptoms and that meet accepted standards of medicine." Prior to treatment on this visit the chart was reviewed and patient consent was obtained.    Chief Complaint: Follow-up for chronic medical conditions and medication review/peripheral edema/lymphedema.     HPI: Edd Wills is a 67 y.o. male with HTN, HLD, morbid obesity.     Today:  Mr. Edd Wills is a 68 y.o. male. He present without any acute distress.  Lymphedema to bilateral lower extremities significantly improved.  He is currently receiving home lymphedema therapy with [facility].  BP elevated on exam, not taking lisinopril as prescribed.  Discussed the importance of taking medications as prescribed and Low Na diet. He reports good bm pattern, sleep, appetite. Denies chest pain, signs of distress, SOB, fever, chills, cough, congestion. No other needs identified at this time.      Review of Systems   Constitutional:  Negative for chills and fatigue.   HENT:  Negative for congestion and postnasal drip.    Eyes:  Negative for visual disturbance.   Respiratory:  Negative for chest tightness and shortness of breath.    Cardiovascular:  Positive for leg swelling.   Gastrointestinal:  Negative for abdominal pain, " constipation, diarrhea, nausea and vomiting.   Genitourinary:  Negative for difficulty urinating.   Musculoskeletal:  Positive for gait problem.   Skin:  Negative for color change.   Neurological:  Negative for dizziness and headaches.   Hematological:  Does not bruise/bleed easily.   Psychiatric/Behavioral:  Negative for agitation.      Assessments:  Environmental: adequate lighting and temperature control  Functional Status: Assistance with ADL's/IADL's, ambulates with assistance of a cane/walker, continent of bowel and bladder  Safety: Fall Precautions, COVID Precautions/Social Distancing/Mask Use  Nutritional: Adequate  Home Health: [facility]  DME/Supplies: [employer]    Palliative Care/Goals of care:  We discussed the philosophy of palliative care.  His goals of care include maintaining decreased lymphedema, improved strengthening. Advanced Care Directives,  [place] forms left in the home for patient to review, discussion and signing with instructions to return upon their next provider encounter for inclusion to the medical record. Discussed ACP for 20 minutes.           Objective:     Vitals:    09/12/23 1600   BP: (!) 179/100   Pulse: 79   Resp: 20   Temp: 98.3 °F (36.8 °C)   SpO2: 98%     There is no height or weight on file to calculate BMI.    Physical Exam  HENT:      Head: Normocephalic.      Nose: No congestion.      Mouth/Throat:      Mouth: Mucous membranes are moist.   Cardiovascular:      Rate and Rhythm: Normal rate.      Pulses: Normal pulses.   Pulmonary:      Effort: Pulmonary effort is normal. No respiratory distress.      Breath sounds: Normal breath sounds.   Abdominal:      General: Bowel sounds are normal.      Palpations: Abdomen is soft.   Musculoskeletal:      Cervical back: Normal range of motion and neck supple.      Right lower leg: Edema present.      Left lower leg: Edema present.   Skin:     General: Skin is warm and dry.      Comments: No wounds noted    Neurological:      Mental  Status: He is alert. Mental status is at baseline.   Psychiatric:         Mood and Affect: Mood normal.       Assessment:     1. Primary hypertension    2. Mixed hyperlipidemia    3. Grade I diastolic dysfunction    4. Lymphedema    5. Neuropathy of both feet          Plan:          1. Primary hypertension  Assessment & Plan:  bp elevated  He is not taking lisinopril  Instructed to take amlodipine, lisinopril, coreg and furosemide as prescribed       2. Mixed hyperlipidemia  Assessment & Plan:  denies chest pain  Continue statin       3. Grade I diastolic dysfunction  Assessment & Plan:  Denies chest pain, SOB  Peripheral edema noted on exam, improved   Continue medication as prescribed  Keep scheduled follow up appts  Activity and Diet restrictions:   Recommend 2-3 gram sodium restriction and 1500cc- 2000cc fluid restriction.  Encourage physical activity with graded exercise program.  Requested patient to weigh themselves daily, and to notify us if their weight increases by more than 3 lbs in 1 day or 5 lbs in 3 days.  Elevate lower extremities while sitting/lying         4. Lymphedema  Assessment & Plan:  Significantly improved  Continue lymphedema therapy  Continue furosemide as prescribed       5. Neuropathy of both feet  Assessment & Plan:  Continue gabapentin                  Were controlled substances prescribed? No    Instructions:  - Continue all medications, treatments and therapies as ordered.   - Follow all instructions, recommendations as discussed.  - Maintain Safety Precautions at all times.  - Attend all medical appointments as scheduled.  - For worsening symptoms: call Primary Care Physician or Nurse Practitioner.  - For emergencies, call 911 or immediately report to the nearest emergency room.  - Limit Risks of environmental exposure to coronavirus/COVID-19 as discussed including: social distancing, hand hygiene, and limiting departures from the home for necessities only.          Follow Up  Appointments:   Future Appointments   Date Time Provider Department Center   10/20/2023  9:00 AM Soraida Bruner NP [address]       Patient consent was obtained prior to treatment on this visit.    Signature:       Reynold Smith, MSN, APRN, FNP-C  [facility]    Total face-to-face time was 60 min, >50% of this was spent on counseling and coordination of care. The following issues were discussed: primary and secondary diagnoses, co-morbidities, prescribed medications, treatment modalities, importance of compliance with medical advice and directives for follow-up care

## 2023-09-20 NOTE — ASSESSMENT & PLAN NOTE
bp elevated  He is not taking lisinopril  Instructed to take amlodipine, lisinopril, coreg and furosemide as prescribed

## 2023-09-21 RX ORDER — FUROSEMIDE 40 MG/1
40 TABLET ORAL DAILY
Qty: 90 TABLET | Refills: 2 | Status: SHIPPED | OUTPATIENT
Start: 2023-09-21 | End: 2024-09-20

## 2023-10-16 DIAGNOSIS — I89.0 LYMPHEDEMA: Primary | ICD-10-CM

## 2023-10-20 ENCOUNTER — OFFICE VISIT (OUTPATIENT)
Dept: HOME HEALTH SERVICES | Facility: CLINIC | Age: 68
End: 2023-10-20
Payer: MEDICARE

## 2023-10-20 VITALS
DIASTOLIC BLOOD PRESSURE: 98 MMHG | HEART RATE: 65 BPM | OXYGEN SATURATION: 100 % | BODY MASS INDEX: 46.65 KG/M2 | SYSTOLIC BLOOD PRESSURE: 168 MMHG | RESPIRATION RATE: 16 BRPM | HEIGHT: 69 IN | TEMPERATURE: 97 F | WEIGHT: 315 LBS

## 2023-10-20 DIAGNOSIS — E66.01 MORBID OBESITY: ICD-10-CM

## 2023-10-20 DIAGNOSIS — R26.9 ABNORMALITY OF GAIT AND MOBILITY: ICD-10-CM

## 2023-10-20 DIAGNOSIS — Z00.00 ENCOUNTER FOR PREVENTIVE HEALTH EXAMINATION: Primary | ICD-10-CM

## 2023-10-20 DIAGNOSIS — Z99.89 DEPENDENCE ON OTHER ENABLING MACHINES AND DEVICES: ICD-10-CM

## 2023-10-20 DIAGNOSIS — S14.109D INJURY OF CERVICAL SPINAL CORD, SUBSEQUENT ENCOUNTER: ICD-10-CM

## 2023-10-20 DIAGNOSIS — L97.112 NON-PRESSURE CHRONIC ULCER OF RIGHT THIGH WITH FAT LAYER EXPOSED: ICD-10-CM

## 2023-10-20 DIAGNOSIS — I10 BENIGN ESSENTIAL HTN: ICD-10-CM

## 2023-10-20 DIAGNOSIS — I71.019 AORTIC DISSECTION DISTAL TO LEFT SUBCLAVIAN: ICD-10-CM

## 2023-10-20 DIAGNOSIS — I70.0 AORTIC ATHEROSCLEROSIS: ICD-10-CM

## 2023-10-20 DIAGNOSIS — G95.9 CERVICAL MYELOPATHY: ICD-10-CM

## 2023-10-20 DIAGNOSIS — G47.33 OSA (OBSTRUCTIVE SLEEP APNEA): ICD-10-CM

## 2023-10-20 PROCEDURE — G0439 PR MEDICARE ANNUAL WELLNESS SUBSEQUENT VISIT: ICD-10-PCS | Mod: S$GLB,,,

## 2023-10-20 PROCEDURE — G0439 PPPS, SUBSEQ VISIT: HCPCS | Mod: S$GLB,,,

## 2023-10-20 NOTE — PROGRESS NOTES
"Edd Wills presented for a  Medicare AWV and comprehensive Health Risk Assessment today. The following components were reviewed and updated:    Medical history  Family History  Social history  Allergies and Current Medications  Health Risk Assessment  Health Maintenance  Care Team         ** See Completed Assessments for Annual Wellness Visit within the encounter summary.**         The following assessments were completed:  Living Situation  CAGE  Depression Screening  Timed Get Up and Go: Deferred, impaired mobility  Whisper Test  Cognitive Function Screening    Nutrition Screening  ADL Screening  PAQ Screening        Vitals:    10/20/23 1043   BP: (!) 168/98   BP Location: Left forearm   Patient Position: Sitting   Pulse: 65   Resp: 16   Temp: 97 °F (36.1 °C)   SpO2: 100%   Weight: (!) 172.4 kg (380 lb)   Height: 5' 9" (1.753 m)     Body mass index is 56.12 kg/m².    Physical Exam  Vitals reviewed.   Constitutional:       General: He is not in acute distress.     Appearance: Normal appearance. He is obese.   Cardiovascular:      Rate and Rhythm: Normal rate and regular rhythm.      Pulses: Normal pulses.      Heart sounds: No murmur heard.  Pulmonary:      Effort: Pulmonary effort is normal. No respiratory distress.      Breath sounds: Normal breath sounds.   Abdominal:      General: Bowel sounds are normal.   Musculoskeletal:      Right lower leg: No edema.      Left lower leg: No edema.   Neurological:      General: No focal deficit present.      Mental Status: He is alert and oriented to person, place, and time.      Gait: Gait abnormal.   Psychiatric:         Mood and Affect: Mood normal.         Behavior: Behavior normal.               Diagnoses and health risks identified today and associated recommendations/orders:    1. Encounter for preventive health examination  Assessments completed.  HM recommendations reviewed.  F/u with PCP as instructed.     Patient not on chronic opioids.   Risk factors reviewed " for any potential opioid use disorder   Pain evaluated during visit.  Current treatment plan documented.  Will refer to specialist, as appropriate.      2. Non-pressure chronic ulcer of right thigh with fat layer exposed  Chronic, stable on current regimen. Followed by PCP.     3. Aortic atherosclerosis -- CT 2013  Chronic, stable on current statin regimen. Followed by PCP.     4. Aortic dissection distal to left subclavian  Chronic, stable on current regimen. Followed by PCP.     5. Injury of cervical spinal cord, subsequent encounter  Chronic, stable on current regimen. Followed by PCP.     6. Cervical myelopathy  Chronic, stable on current regimen. Followed by PCP.     7. Morbid obesity  Chronic, stable on current regimen. Followed by PCP.     8. Benign essential HTN  Chronic, stable on current Amlodipine, Coreg, Lisinopril regimen. Followed by PCP.     9. LLUVIA (obstructive sleep apnea)  Chronic, stable on current regimen. Followed by PCP.     10. Abnormality of gait and mobility  Unable to safely walk without assistance.     11. Dependence on other enabling machines and devices  Uses wheeled walker.       Provided Edd with a 5-10 year written screening schedule and personal prevention plan. Recommendations were developed using the USPSTF age appropriate recommendations. Education, counseling, and referrals were provided as needed. After Visit Summary printed and given to patient which includes a list of additional screenings\tests needed.    Follow up in about 1 year (around 10/20/2024) for Medicare AWV.    Soraida Bruner NP    I offered to discuss advanced care planning, including how to pick a person who would make decisions for you if you were unable to make them for yourself, called a health care power of , and what kind of decisions you might make such as use of life sustaining treatments such as ventilators and tube feeding when faced with a life limiting illness recorded on a living will  that they will need to know. (How you want to be cared for as you near the end of your natural life)     X Patient is interested in learning more about how to make advanced directives.  I provided them paperwork and offered to discuss this with them.

## 2023-10-20 NOTE — PATIENT INSTRUCTIONS
Counseling and Referral of Other Preventative  (Italic type indicates deductible and co-insurance are waived)    Patient Name: Edd Wills  Today's Date: 10/20/2023    Health Maintenance       Date Due Completion Date    COVID-19 Vaccine (1) Never done ---    Pneumococcal Vaccines (Age 65+) (1 - PCV) Never done ---    Shingles Vaccine (1 of 2) Never done ---    Colorectal Cancer Screening 09/23/2017 9/22/2017    Hemoglobin A1c (Prediabetes) 10/19/2022 10/19/2021    Influenza Vaccine (1) 09/01/2023 1/12/2017    Lipid Panel 10/19/2026 10/19/2021    TETANUS VACCINE 01/12/2027 1/12/2017        No orders of the defined types were placed in this encounter.      The following information is provided to all patients.  This information is to help you find resources for any of the problems found today that may be affecting your health:                Living healthy guide: www.[facility].[place].[facility]      Understanding Diabetes: www.diabetes.org      Eating healthy: www.cdc.gov/healthyweight      CDC home safety checklist: www.cdc.gov/steadi/patient.html      Agency on Aging: www.goea.[place].[facility]      Alcoholics anonymous (AA): www.aa.org      Physical Activity: www.linh.nih.gov/uq7aquq      Tobacco use: www.quitwithusla.org

## 2023-10-25 ENCOUNTER — PATIENT MESSAGE (OUTPATIENT)
Dept: INTERNAL MEDICINE | Facility: CLINIC | Age: 68
End: 2023-10-25
Payer: MEDICARE

## 2023-11-21 ENCOUNTER — OFFICE VISIT (OUTPATIENT)
Dept: INTERNAL MEDICINE | Facility: CLINIC | Age: 68
End: 2023-11-21
Payer: MEDICARE

## 2023-11-21 VITALS
HEIGHT: 69 IN | SYSTOLIC BLOOD PRESSURE: 132 MMHG | BODY MASS INDEX: 46.65 KG/M2 | WEIGHT: 315 LBS | HEART RATE: 74 BPM | DIASTOLIC BLOOD PRESSURE: 70 MMHG | OXYGEN SATURATION: 97 %

## 2023-11-21 DIAGNOSIS — E66.01 MORBID OBESITY: ICD-10-CM

## 2023-11-21 DIAGNOSIS — Z00.00 ANNUAL PHYSICAL EXAM: Primary | ICD-10-CM

## 2023-11-21 DIAGNOSIS — E78.2 MIXED HYPERLIPIDEMIA: ICD-10-CM

## 2023-11-21 DIAGNOSIS — R79.9 ABNORMAL FINDING OF BLOOD CHEMISTRY, UNSPECIFIED: ICD-10-CM

## 2023-11-21 DIAGNOSIS — Z12.5 ENCOUNTER FOR SCREENING FOR MALIGNANT NEOPLASM OF PROSTATE: ICD-10-CM

## 2023-11-21 DIAGNOSIS — I51.89 GRADE I DIASTOLIC DYSFUNCTION: ICD-10-CM

## 2023-11-21 DIAGNOSIS — I70.0 AORTIC ATHEROSCLEROSIS: ICD-10-CM

## 2023-11-21 DIAGNOSIS — M54.41 RIGHT-SIDED LOW BACK PAIN WITH RIGHT-SIDED SCIATICA, UNSPECIFIED CHRONICITY: ICD-10-CM

## 2023-11-21 PROCEDURE — 99215 OFFICE O/P EST HI 40 MIN: CPT | Mod: S$PBB,,, | Performed by: PHYSICIAN ASSISTANT

## 2023-11-21 PROCEDURE — 99999 PR PBB SHADOW E&M-EST. PATIENT-LVL III: CPT | Mod: PBBFAC,,, | Performed by: PHYSICIAN ASSISTANT

## 2023-11-21 PROCEDURE — 99215 PR OFFICE/OUTPT VISIT, EST, LEVL V, 40-54 MIN: ICD-10-PCS | Mod: S$PBB,,, | Performed by: PHYSICIAN ASSISTANT

## 2023-11-21 PROCEDURE — 99213 OFFICE O/P EST LOW 20 MIN: CPT | Mod: PBBFAC | Performed by: PHYSICIAN ASSISTANT

## 2023-11-21 PROCEDURE — 99999 PR PBB SHADOW E&M-EST. PATIENT-LVL III: ICD-10-PCS | Mod: PBBFAC,,, | Performed by: PHYSICIAN ASSISTANT

## 2023-11-21 RX ORDER — METHOCARBAMOL 500 MG/1
500 TABLET, FILM COATED ORAL NIGHTLY
Qty: 15 TABLET | Refills: 0 | Status: SHIPPED | OUTPATIENT
Start: 2023-11-21 | End: 2023-12-06

## 2023-11-21 RX ORDER — IBUPROFEN 200 MG
200 TABLET ORAL EVERY 6 HOURS PRN
COMMUNITY

## 2023-11-21 NOTE — PROGRESS NOTES
"INTERNAL MEDICINE ANNUAL VISIT NOTE    CHIEF COMPLAINT     Chief Complaint   Patient presents with    right leg     pain    Knee Pain     bilat    Foot Pain     Bilat swelling       HPI     Edd Wills is a 68 y.o. male who presents for an urgent visit today.    PCP is Zuleyka Sabillon MD, patient is new to me.     [place]      Health maintenance -   Never had colonoscopy - still politely declines   FOBT positive in 2017.  Never followed up after positive FOBT, was referred for colonoscopy  Denies current rectal bleeding or melena  Denies family history of colorectal cancer.   Niece with breast cancer  Denies significant family history of cardiac disease.  PSA was 0.57 in 2020. Denies family history of prostate cancer. Amenable to updating PSA   Due for COVID19, Influenza, shingles, and PPSV23 vaccinations. -politely declines   UTD on Tdap.   Never smoker.  Denies alcohol or drug use.  Hep C screen negative.     Aortic Aneurysm -   Last CTA in 5/2023  Showed stable type B dissection that begins around left subclavian and extends into abdominal aorta and to left common iliac  Has not seen cardiothoracic in >4 years  Once during SNF admission in 2013  Unsure if saw Dr. Kerr in 2017       Sleep apnea -   Not using CPAP >6 years  Has not followed with sleep medicine in many years  Reports that he is not sleeping well.      HTN -   Currently prescribed coreg, amlodipine, and lisinopril. -takes regularly   Reports that BP at home is "up and down"   Patient endorses taking medication as directed.  Denies side effects or concerns while taking medication.  Patient's endorses BP at home 110's-140's systolic and 60's-80's diastolic.   Denies headaches, vision changes, CP, palpitations, or other concerning symptoms.    HLD -   Not currently taking statin, pt unsure why.  Due for lipid panel      Aortic valve sclerosis -   Last echo in 2016  EF " 55-60%, no DD, mild aortic valve sclerosis   No sob on exertion      Morbid obesity -   Venous stasis - with lymphedema, in right lower extremity has large skin panniculus to the right thigh.   Panniculus -  Taking lasix 20mg daily PRN  Not using compression stockings  Significant pain and discomfort  Has lost about ~20 lbs in 2 years  Not currently on weight loss therapy  Previously has seen general surgery regarding panniculus when had episode of panniculitis      Wt Readings from Last 10 Encounters:   11/21/23 (!) 181.5 kg (400 lb 2.2 oz)   10/20/23 (!) 172.4 kg (380 lb)   05/26/23 (!) 194.5 kg (428 lb 12.7 oz)   12/16/21 (!) 191.9 kg (423 lb)   10/28/21 (!) 191.9 kg (423 lb)   10/26/21 (!) 191.9 kg (423 lb)   10/19/21 (!) 192.3 kg (423 lb 15.1 oz)   08/20/21 (!) 191 kg (421 lb)   08/09/21 (!) 191 kg (421 lb)   08/02/21 (!) 191.2 kg (421 lb 8.3 oz)      He has new complaint of right sided scaitic pain that comes and goes with movement. No weakness to the leg. No fever or chills. No bladder or bowel changes.        Past Medical History:  Past Medical History:   Diagnosis Date    HTN (hypertension)     Hyperlipemia     Obesity     LLUVIA (obstructive sleep apnea)     Osteoarthritis of knee        Home Medications:  Prior to Admission medications    Medication Sig Start Date End Date Taking? Authorizing Provider   amLODIPine (NORVASC) 10 MG tablet Take 1 tablet (10 mg total) by mouth once daily. 9/21/22   Edie Smith NP   aspirin (ECOTRIN) 81 MG EC tablet Take 81 mg by mouth once daily.    Provider, Historical   atorvastatin (LIPITOR) 40 MG tablet Take 1 tablet (40 mg total) by mouth once daily. 7/20/22 10/20/23  Edie Smith NP   carvediloL (COREG) 25 MG tablet Take 1 tablet (25 mg total) by mouth 2 (two) times a day. 9/21/22 10/20/23  Edie Smith NP   diclofenac sodium (VOLTAREN) 1 % Gel Apply 2 g topically once daily. Apply to affected area 9/21/22   Edie Smith, NP   furosemide (LASIX) 40 MG  "tablet Take 1 tablet (40 mg total) by mouth once daily. 9/21/23 9/20/24  Edie Smith, TALYA   gabapentin (NEURONTIN) 600 MG tablet Take 1 tablet (600 mg total) by mouth 2 (two) times daily. 7/20/22 10/20/23  Edie Smith, TALYA   lisinopriL (PRINIVIL,ZESTRIL) 40 MG tablet Take 1 tablet (40 mg total) by mouth once daily. 2/28/23 2/28/24  Edie Smith, TALYA   polyethylene glycol (GLYCOLAX) 17 gram PwPk Take 17 g by mouth 2 (two) times daily as needed. 5/31/23   Mal Collier MD       Review of Systems:  Review of Systems   Constitutional:  Negative for chills and fever.   HENT:  Negative for sore throat and trouble swallowing.    Eyes:  Negative for visual disturbance.   Respiratory:  Negative for cough and shortness of breath.    Cardiovascular:  Negative for chest pain.   Gastrointestinal:  Negative for abdominal pain, constipation, diarrhea, nausea and vomiting.   Genitourinary:  Negative for dysuria and flank pain.   Musculoskeletal:  Negative for back pain, neck pain and neck stiffness.   Skin:  Negative for rash.   Neurological:  Negative for dizziness, syncope, weakness and headaches.   Psychiatric/Behavioral:  Negative for confusion.        Health Maintainence:   Immunizations:  Health Maintenance         Date Due Completion Date    COVID-19 Vaccine (1) Never done ---    Pneumococcal Vaccines (Age 65+) (1 - PCV) Never done ---    High Dose Statin Never done ---    Shingles Vaccine (1 of 2) Never done ---    RSV Vaccine (Age 60+ and Pregnant patients) (1 - 1-dose 60+ series) Never done ---    Colorectal Cancer Screening 09/23/2017 9/22/2017    Hemoglobin A1c (Prediabetes) 10/19/2022 10/19/2021    Influenza Vaccine (1) 09/01/2023 1/12/2017    Lipid Panel 10/19/2026 10/19/2021    TETANUS VACCINE 01/12/2027 1/12/2017             PHYSICAL EXAM     /70   Pulse 74   Ht 5' 9" (1.753 m)   Wt (!) 181.5 kg (400 lb 2.2 oz)   SpO2 97%   BMI 59.09 kg/m²     Physical Exam  Vitals and nursing note reviewed. "   Constitutional:       Appearance: Normal appearance.      Comments: Morbidly obese male in NAD or apparent pain. He makes good eye contact, speaks in clear full sentences and ambulates with walker from home.   HENT:      Head: Normocephalic and atraumatic.      Nose: Nose normal.      Mouth/Throat:      Pharynx: Oropharynx is clear.   Eyes:      Conjunctiva/sclera: Conjunctivae normal.   Cardiovascular:      Rate and Rhythm: Normal rate and regular rhythm.      Pulses: Normal pulses.   Pulmonary:      Effort: No respiratory distress.   Abdominal:      Tenderness: There is no abdominal tenderness.   Musculoskeletal:         General: Normal range of motion.      Cervical back: No rigidity.   Skin:     General: Skin is warm and dry.      Capillary Refill: Capillary refill takes less than 2 seconds.      Findings: No rash.      Comments: Thorough visual inspection of lower extremity skin shows no open wounds. Skin appears well cared for.    Neurological:      General: No focal deficit present.      Mental Status: He is alert.      Gait: Gait normal.   Psychiatric:         Mood and Affect: Mood normal.         LABS     Lab Results   Component Value Date    HGBA1C 5.2 10/19/2021     CMP  Sodium   Date Value Ref Range Status   05/30/2023 135 (L) 136 - 145 mmol/L Final     Potassium   Date Value Ref Range Status   05/30/2023 3.4 (L) 3.5 - 5.1 mmol/L Final     Chloride   Date Value Ref Range Status   05/30/2023 106 95 - 110 mmol/L Final     CO2   Date Value Ref Range Status   05/30/2023 22 (L) 23 - 29 mmol/L Final     Glucose   Date Value Ref Range Status   05/30/2023 89 70 - 110 mg/dL Final     BUN   Date Value Ref Range Status   05/30/2023 10 8 - 23 mg/dL Final     Creatinine   Date Value Ref Range Status   05/30/2023 0.7 0.5 - 1.4 mg/dL Final     Calcium   Date Value Ref Range Status   05/30/2023 8.2 (L) 8.7 - 10.5 mg/dL Final     Total Protein   Date Value Ref Range Status   05/26/2023 7.2 6.0 - 8.4 g/dL Final      Albumin   Date Value Ref Range Status   05/26/2023 3.2 (L) 3.5 - 5.2 g/dL Final     Total Bilirubin   Date Value Ref Range Status   05/26/2023 0.5 0.1 - 1.0 mg/dL Final     Comment:     For infants and newborns, interpretation of results should be based  on gestational age, weight and in agreement with clinical  observations.    Premature Infant recommended reference ranges:  Up to 24 hours.............<8.0 mg/dL  Up to 48 hours............<12.0 mg/dL  3-5 days..................<15.0 mg/dL  6-29 days.................<15.0 mg/dL       Alkaline Phosphatase   Date Value Ref Range Status   05/26/2023 87 55 - 135 U/L Final     AST   Date Value Ref Range Status   05/26/2023 22 10 - 40 U/L Final     Comment:     *Result may be interfered by visible hemolysis     ALT   Date Value Ref Range Status   05/26/2023 10 10 - 44 U/L Final     Anion Gap   Date Value Ref Range Status   05/30/2023 7 (L) 8 - 16 mmol/L Final     eGFR if    Date Value Ref Range Status   10/19/2021 >60 >60 mL/min/1.73 m^2 Final     eGFR    Date Value Ref Range Status   01/31/2023 75 (L) >=90 mL/min Final     Comment:     Calculation based on the Chronic Kidney Disease Epidemiology Collaboration (CKD-EPI) equation refit without adjustment for race.     eGFR if non    Date Value Ref Range Status   10/19/2021 >60 >60 mL/min/1.73 m^2 Final     Comment:     Calculation used to obtain the estimated glomerular filtration  rate (eGFR) is the CKD-EPI equation.        Lab Results   Component Value Date    WBC 12.19 05/30/2023    HGB 10.8 (L) 05/30/2023    HCT 33.9 (L) 05/30/2023    MCV 82 05/30/2023     05/30/2023     Lab Results   Component Value Date    CHOL 148 10/19/2021    CHOL 163 07/22/2020    CHOL 164 09/13/2017     Lab Results   Component Value Date    HDL 41 10/19/2021    HDL 43 07/22/2020    HDL 43 09/13/2017     Lab Results   Component Value Date    LDLCALC 94.6 10/19/2021    LDLCALC 109.4  07/22/2020    LDLCALC 107.0 09/13/2017     Lab Results   Component Value Date    TRIG 62 10/19/2021    TRIG 53 07/22/2020    TRIG 70 09/13/2017     Lab Results   Component Value Date    CHOLHDL 27.7 10/19/2021    CHOLHDL 26.4 07/22/2020    CHOLHDL 26.2 09/13/2017     Lab Results   Component Value Date    TSH 1.508 10/19/2021       ASSESSMENT/PLAN     Edd Wills is a 68 y.o. male     Edd was seen today for right leg, knee pain and foot pain.    Diagnoses and all orders for this visit:    Annual physical exam  -     Comprehensive Metabolic Panel; Future  -     CBC Auto Differential; Future  -     Hemoglobin A1C; Future  -     Lipid Panel; Future  -     PSA, Screening; Future  -     Ambulatory referral/consult to [facility]; Future    Abnormal finding of blood chemistry, unspecified  -     CBC Auto Differential; Future  -     Hemoglobin A1C; Future    Morbid obesity  -     Comprehensive Metabolic Panel; Future  -     CBC Auto Differential; Future  -     Hemoglobin A1C; Future    Aortic atherosclerosis -- CT 2013  -     Comprehensive Metabolic Panel; Future  -     Hemoglobin A1C; Future    Grade I diastolic dysfunction    Mixed hyperlipidemia  -     Lipid Panel; Future    Encounter for screening for malignant neoplasm of prostate  -     PSA, Screening; Future    Right-sided low back pain with right-sided sciatica, unspecified chronicity  -     methocarbamoL (ROBAXIN) 500 MG Tab; Take 1 tablet (500 mg total) by mouth every evening. for 15 days        Patient was counseled on when and how to seek emergent care.       Carlie Mena PA-C   Department of Internal Medicine - [facility]   1:46 PM

## 2023-11-24 ENCOUNTER — PES CALL (OUTPATIENT)
Dept: HOME HEALTH SERVICES | Facility: CLINIC | Age: 68
End: 2023-11-24
Payer: MEDICARE

## 2023-12-20 ENCOUNTER — CARE AT HOME (OUTPATIENT)
Dept: HOME HEALTH SERVICES | Facility: CLINIC | Age: 68
End: 2023-12-20
Payer: MEDICARE

## 2023-12-20 DIAGNOSIS — I89.0 LYMPHEDEMA: Primary | ICD-10-CM

## 2023-12-20 DIAGNOSIS — R23.8 BLISTERS OF MULTIPLE SITES: ICD-10-CM

## 2023-12-20 DIAGNOSIS — Z00.00 ANNUAL PHYSICAL EXAM: ICD-10-CM

## 2023-12-20 DIAGNOSIS — I10 BENIGN ESSENTIAL HTN: ICD-10-CM

## 2023-12-20 DIAGNOSIS — E78.2 MIXED HYPERLIPIDEMIA: ICD-10-CM

## 2023-12-20 DIAGNOSIS — G62.9 NEUROPATHY: Primary | ICD-10-CM

## 2023-12-20 DIAGNOSIS — G57.93 NEUROPATHY OF BOTH FEET: ICD-10-CM

## 2023-12-20 DIAGNOSIS — R53.81 PHYSICAL DECONDITIONING: ICD-10-CM

## 2023-12-20 DIAGNOSIS — T07.XXXA MULTIPLE WOUNDS: ICD-10-CM

## 2023-12-20 DIAGNOSIS — I10 HYPERTENSION, UNSPECIFIED TYPE: ICD-10-CM

## 2023-12-20 DIAGNOSIS — I51.89 GRADE I DIASTOLIC DYSFUNCTION: ICD-10-CM

## 2023-12-20 DIAGNOSIS — E66.01 MORBID OBESITY: ICD-10-CM

## 2023-12-20 PROCEDURE — 99497 ADVNCD CARE PLAN 30 MIN: CPT | Mod: S$GLB,,, | Performed by: NURSE PRACTITIONER

## 2023-12-20 PROCEDURE — 99350 HOME/RES VST EST HIGH MDM 60: CPT | Mod: S$GLB,,, | Performed by: NURSE PRACTITIONER

## 2023-12-20 NOTE — PROGRESS NOTES
Patient reports worsening lymphedema and some wound development.  Will see patient today in home, will order home health to assist with wound care.

## 2023-12-22 RX ORDER — AMLODIPINE BESYLATE 10 MG/1
10 TABLET ORAL DAILY
Qty: 30 TABLET | Refills: 11 | Status: SHIPPED | OUTPATIENT
Start: 2023-12-22

## 2023-12-22 RX ORDER — ATORVASTATIN CALCIUM 40 MG/1
40 TABLET, FILM COATED ORAL DAILY
Qty: 30 TABLET | Refills: 11 | Status: SHIPPED | OUTPATIENT
Start: 2023-12-22

## 2023-12-22 RX ORDER — LISINOPRIL 40 MG/1
40 TABLET ORAL DAILY
Qty: 30 TABLET | Refills: 11 | Status: SHIPPED | OUTPATIENT
Start: 2023-12-22

## 2023-12-22 RX ORDER — GABAPENTIN 600 MG/1
600 TABLET ORAL 3 TIMES DAILY
Qty: 180 TABLET | Refills: 3 | Status: SHIPPED | OUTPATIENT
Start: 2023-12-22

## 2023-12-22 RX ORDER — CARVEDILOL 25 MG/1
25 TABLET ORAL 2 TIMES DAILY WITH MEALS
Qty: 60 TABLET | Refills: 11 | Status: SHIPPED | OUTPATIENT
Start: 2023-12-22

## 2024-01-01 VITALS
RESPIRATION RATE: 20 BRPM | SYSTOLIC BLOOD PRESSURE: 145 MMHG | TEMPERATURE: 98 F | DIASTOLIC BLOOD PRESSURE: 77 MMHG | OXYGEN SATURATION: 98 % | HEART RATE: 73 BPM
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
## 2024-01-02 NOTE — ASSESSMENT & PLAN NOTE
Denies chest pain, SOB  Continue medication as prescribed  Keep scheduled follow up appts  Activity and Diet restrictions:   Recommend 2-3 gram sodium restriction and 1500cc- 2000cc fluid restriction.  Encourage physical activity with graded exercise program.  Requested patient to weigh themselves daily, and to notify us if their weight increases by more than 3 lbs in 1 day or 5 lbs in 3 days.  Elevated lower extremities while sitting/lying, compression stockings

## 2024-01-11 ENCOUNTER — LAB VISIT (OUTPATIENT)
Dept: LAB | Facility: HOSPITAL | Age: 69
End: 2024-01-11
Attending: NURSE PRACTITIONER
Payer: MEDICARE

## 2024-01-11 DIAGNOSIS — I89.0 OBLITERATION OF LYMPHATIC VESSEL: Primary | ICD-10-CM

## 2024-01-11 LAB
ALBUMIN SERPL BCP-MCNC: 3.6 G/DL (ref 3.5–5.2)
ALP SERPL-CCNC: 81 U/L (ref 55–135)
ALT SERPL W/O P-5'-P-CCNC: 13 U/L (ref 10–44)
ANION GAP SERPL CALC-SCNC: 9 MMOL/L (ref 8–16)
AST SERPL-CCNC: 31 U/L (ref 10–40)
BASOPHILS # BLD AUTO: 0.05 K/UL (ref 0–0.2)
BASOPHILS NFR BLD: 0.8 % (ref 0–1.9)
BILIRUB SERPL-MCNC: 0.6 MG/DL (ref 0.1–1)
BUN SERPL-MCNC: 16 MG/DL (ref 8–23)
CALCIUM SERPL-MCNC: 9.2 MG/DL (ref 8.7–10.5)
CHLORIDE SERPL-SCNC: 105 MMOL/L (ref 95–110)
CHOLEST SERPL-MCNC: 102 MG/DL (ref 120–199)
CHOLEST/HDLC SERPL: 2.8 {RATIO} (ref 2–5)
CO2 SERPL-SCNC: 25 MMOL/L (ref 23–29)
CREAT SERPL-MCNC: 0.9 MG/DL (ref 0.5–1.4)
DIFFERENTIAL METHOD BLD: ABNORMAL
EOSINOPHIL # BLD AUTO: 0.2 K/UL (ref 0–0.5)
EOSINOPHIL NFR BLD: 3.5 % (ref 0–8)
ERYTHROCYTE [DISTWIDTH] IN BLOOD BY AUTOMATED COUNT: 14.8 % (ref 11.5–14.5)
EST. GFR  (NO RACE VARIABLE): >60 ML/MIN/1.73 M^2
ESTIMATED AVG GLUCOSE: 105 MG/DL (ref 68–131)
GLUCOSE SERPL-MCNC: 63 MG/DL (ref 70–110)
HBA1C MFR BLD: 5.3 % (ref 4–5.6)
HCT VFR BLD AUTO: 42.1 % (ref 40–54)
HDLC SERPL-MCNC: 36 MG/DL (ref 40–75)
HDLC SERPL: 35.3 % (ref 20–50)
HGB BLD-MCNC: 13.6 G/DL (ref 14–18)
IMM GRANULOCYTES # BLD AUTO: 0.01 K/UL (ref 0–0.04)
IMM GRANULOCYTES NFR BLD AUTO: 0.2 % (ref 0–0.5)
LDLC SERPL CALC-MCNC: 55.4 MG/DL (ref 63–159)
LYMPHOCYTES # BLD AUTO: 2.3 K/UL (ref 1–4.8)
LYMPHOCYTES NFR BLD: 35.1 % (ref 18–48)
MAGNESIUM SERPL-MCNC: 2.1 MG/DL (ref 1.6–2.6)
MCH RBC QN AUTO: 27.6 PG (ref 27–31)
MCHC RBC AUTO-ENTMCNC: 32.3 G/DL (ref 32–36)
MCV RBC AUTO: 85 FL (ref 82–98)
MONOCYTES # BLD AUTO: 1 K/UL (ref 0.3–1)
MONOCYTES NFR BLD: 14.5 % (ref 4–15)
NEUTROPHILS # BLD AUTO: 3 K/UL (ref 1.8–7.7)
NEUTROPHILS NFR BLD: 45.9 % (ref 38–73)
NONHDLC SERPL-MCNC: 66 MG/DL
NRBC BLD-RTO: 0 /100 WBC
PHOSPHATE SERPL-MCNC: 3.1 MG/DL (ref 2.7–4.5)
PLATELET # BLD AUTO: 204 K/UL (ref 150–450)
PMV BLD AUTO: 11.3 FL (ref 9.2–12.9)
POTASSIUM SERPL-SCNC: 4 MMOL/L (ref 3.5–5.1)
PROT SERPL-MCNC: 8.1 G/DL (ref 6–8.4)
RBC # BLD AUTO: 4.93 M/UL (ref 4.6–6.2)
SODIUM SERPL-SCNC: 139 MMOL/L (ref 136–145)
TRIGL SERPL-MCNC: 53 MG/DL (ref 30–150)
WBC # BLD AUTO: 6.55 K/UL (ref 3.9–12.7)

## 2024-01-11 PROCEDURE — 83735 ASSAY OF MAGNESIUM: CPT | Performed by: NURSE PRACTITIONER

## 2024-01-11 PROCEDURE — 83036 HEMOGLOBIN GLYCOSYLATED A1C: CPT | Performed by: NURSE PRACTITIONER

## 2024-01-11 PROCEDURE — 85025 COMPLETE CBC W/AUTO DIFF WBC: CPT | Performed by: NURSE PRACTITIONER

## 2024-01-11 PROCEDURE — 84100 ASSAY OF PHOSPHORUS: CPT | Performed by: NURSE PRACTITIONER

## 2024-01-11 PROCEDURE — 80061 LIPID PANEL: CPT | Performed by: NURSE PRACTITIONER

## 2024-01-11 PROCEDURE — 80053 COMPREHEN METABOLIC PANEL: CPT | Performed by: NURSE PRACTITIONER

## 2024-01-29 ENCOUNTER — CARE AT HOME (OUTPATIENT)
Dept: HOME HEALTH SERVICES | Facility: CLINIC | Age: 69
End: 2024-01-29
Payer: MEDICARE

## 2024-01-29 DIAGNOSIS — M17.0 PRIMARY OSTEOARTHRITIS OF BOTH KNEES: ICD-10-CM

## 2024-01-29 DIAGNOSIS — G57.93 NEUROPATHY OF BOTH FEET: ICD-10-CM

## 2024-01-29 DIAGNOSIS — I10 BENIGN ESSENTIAL HTN: ICD-10-CM

## 2024-01-29 DIAGNOSIS — E78.2 MIXED HYPERLIPIDEMIA: ICD-10-CM

## 2024-01-29 DIAGNOSIS — E66.01 MORBID OBESITY: Primary | ICD-10-CM

## 2024-01-29 DIAGNOSIS — R53.81 PHYSICAL DECONDITIONING: ICD-10-CM

## 2024-01-29 DIAGNOSIS — I87.8 VENOUS STASIS: ICD-10-CM

## 2024-01-29 PROCEDURE — 99497 ADVNCD CARE PLAN 30 MIN: CPT | Mod: S$GLB,,, | Performed by: NURSE PRACTITIONER

## 2024-01-29 PROCEDURE — 99348 HOME/RES VST EST LOW MDM 30: CPT | Mod: S$GLB,,, | Performed by: NURSE PRACTITIONER

## 2024-01-31 ENCOUNTER — TELEPHONE (OUTPATIENT)
Dept: HOME HEALTH SERVICES | Facility: CLINIC | Age: 69
End: 2024-01-31
Payer: MEDICARE

## 2024-01-31 NOTE — TELEPHONE ENCOUNTER
Faxed orders, notes, and demographics to [facility] for lymphedema treatment per NP request.  Fax confirmation received.

## 2024-02-03 VITALS
HEART RATE: 73 BPM | RESPIRATION RATE: 20 BRPM | TEMPERATURE: 98 F | OXYGEN SATURATION: 98 % | SYSTOLIC BLOOD PRESSURE: 142 MMHG | DIASTOLIC BLOOD PRESSURE: 81 MMHG

## 2024-02-03 NOTE — ASSESSMENT & PLAN NOTE
Lymphedema    Continue home health wound care  Ordered home lymphedema therapy and wound care through [employer] [facility]  Elevated low extremities while sitting/lying  Low Na diet   Continue furosemide

## 2024-02-03 NOTE — ASSESSMENT & PLAN NOTE
No acute issues  Continue prn pain control  He has been educated on the negative effects of obesity to one's health and encouraged to consider lifestyle diet modifications and exercise.
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
## 2024-02-09 ENCOUNTER — DOCUMENT SCAN (OUTPATIENT)
Dept: HOME HEALTH SERVICES | Facility: HOSPITAL | Age: 69
End: 2024-02-09
Payer: MEDICARE

## 2024-02-18 DIAGNOSIS — R53.81 DEBILITY: Primary | ICD-10-CM

## 2024-02-18 DIAGNOSIS — I89.0 LYMPHEDEMA: ICD-10-CM

## 2024-02-19 ENCOUNTER — PATIENT OUTREACH (OUTPATIENT)
Dept: ADMINISTRATIVE | Facility: HOSPITAL | Age: 69
End: 2024-02-19
Payer: MEDICARE

## 2024-02-19 ENCOUNTER — TELEPHONE (OUTPATIENT)
Dept: HOME HEALTH SERVICES | Facility: CLINIC | Age: 69
End: 2024-02-19
Payer: MEDICARE

## 2024-02-19 PROCEDURE — G0179 MD RECERTIFICATION HHA PT: HCPCS | Mod: ,,, | Performed by: STUDENT IN AN ORGANIZED HEALTH CARE EDUCATION/TRAINING PROGRAM

## 2024-02-19 NOTE — TELEPHONE ENCOUNTER
Faxed orders, notes, and demographics to [facility] for Lymphedema treatment per NP request.  Fax confirmation received.

## 2024-02-19 NOTE — PROGRESS NOTES
Placed home health orders to be sent to [facility] physical therapy for lymphedema management.

## 2024-02-20 ENCOUNTER — TELEPHONE (OUTPATIENT)
Dept: HOME HEALTH SERVICES | Facility: CLINIC | Age: 69
End: 2024-02-20
Payer: MEDICARE

## 2024-02-20 ENCOUNTER — TELEPHONE (OUTPATIENT)
Dept: INTERNAL MEDICINE | Facility: CLINIC | Age: 69
End: 2024-02-20
Payer: MEDICARE

## 2024-02-20 NOTE — TELEPHONE ENCOUNTER
----- Message from Tianna Álvarez sent at 2/20/2024 10:13 AM CST -----  Regarding: call back  Name of caller: andreia jimenez ( [facility] physical therapy)       What is the requesting detail: Requesting a call back . Need to clarify some things before pt can be taken on as a pt there.please give her a call back to further discuss.       Can the clinic reply by [facility]:       What number to call back:[phone]

## 2024-02-21 ENCOUNTER — TELEPHONE (OUTPATIENT)
Dept: HOME HEALTH SERVICES | Facility: CLINIC | Age: 69
End: 2024-02-21
Payer: MEDICARE

## 2024-02-21 NOTE — TELEPHONE ENCOUNTER
[facility] PT is unable to staff patient at this time for in home PT services.  Faxed orders, notes, and demographics to [facility] for PT services per NP request.  Fax confirmation received.

## 2024-03-06 ENCOUNTER — LAB VISIT (OUTPATIENT)
Dept: LAB | Facility: HOSPITAL | Age: 69
End: 2024-03-06
Attending: NURSE PRACTITIONER
Payer: MEDICARE

## 2024-03-06 DIAGNOSIS — I11.9 MALIGNANT HYPERTENSIVE HEART DISEASE WITHOUT HEART FAILURE: ICD-10-CM

## 2024-03-06 DIAGNOSIS — I89.0 OBLITERATION OF LYMPHATIC VESSEL: Primary | ICD-10-CM

## 2024-03-06 DIAGNOSIS — I70.90 ARTERIOSCLEROTIC VASCULAR DISEASE: ICD-10-CM

## 2024-03-06 LAB
ALBUMIN SERPL BCP-MCNC: 3.8 G/DL (ref 3.5–5.2)
ALP SERPL-CCNC: 94 U/L (ref 55–135)
ALT SERPL W/O P-5'-P-CCNC: 10 U/L (ref 10–44)
ANION GAP SERPL CALC-SCNC: 12 MMOL/L (ref 8–16)
AST SERPL-CCNC: 17 U/L (ref 10–40)
BASOPHILS # BLD AUTO: 0.04 K/UL (ref 0–0.2)
BASOPHILS NFR BLD: 0.7 % (ref 0–1.9)
BILIRUB SERPL-MCNC: 0.5 MG/DL (ref 0.1–1)
BUN SERPL-MCNC: 14 MG/DL (ref 8–23)
CALCIUM SERPL-MCNC: 9.2 MG/DL (ref 8.7–10.5)
CHLORIDE SERPL-SCNC: 106 MMOL/L (ref 95–110)
CHOLEST SERPL-MCNC: 100 MG/DL (ref 120–199)
CHOLEST/HDLC SERPL: 2.9 {RATIO} (ref 2–5)
CO2 SERPL-SCNC: 23 MMOL/L (ref 23–29)
CREAT SERPL-MCNC: 0.9 MG/DL (ref 0.5–1.4)
CRP SERPL-MCNC: 3.1 MG/L (ref 0–8.2)
DIFFERENTIAL METHOD BLD: NORMAL
EOSINOPHIL # BLD AUTO: 0.2 K/UL (ref 0–0.5)
EOSINOPHIL NFR BLD: 4.1 % (ref 0–8)
ERYTHROCYTE [DISTWIDTH] IN BLOOD BY AUTOMATED COUNT: 13.6 % (ref 11.5–14.5)
ERYTHROCYTE [SEDIMENTATION RATE] IN BLOOD BY PHOTOMETRIC METHOD: 35 MM/HR (ref 0–23)
EST. GFR  (NO RACE VARIABLE): >60 ML/MIN/1.73 M^2
GLUCOSE SERPL-MCNC: 65 MG/DL (ref 70–110)
HCT VFR BLD AUTO: 44.1 % (ref 40–54)
HDLC SERPL-MCNC: 35 MG/DL (ref 40–75)
HDLC SERPL: 35 % (ref 20–50)
HGB BLD-MCNC: 14.1 G/DL (ref 14–18)
IMM GRANULOCYTES # BLD AUTO: 0.01 K/UL (ref 0–0.04)
IMM GRANULOCYTES NFR BLD AUTO: 0.2 % (ref 0–0.5)
LDLC SERPL CALC-MCNC: 57.2 MG/DL (ref 63–159)
LYMPHOCYTES # BLD AUTO: 2.1 K/UL (ref 1–4.8)
LYMPHOCYTES NFR BLD: 37.7 % (ref 18–48)
MAGNESIUM SERPL-MCNC: 2.1 MG/DL (ref 1.6–2.6)
MCH RBC QN AUTO: 27.5 PG (ref 27–31)
MCHC RBC AUTO-ENTMCNC: 32 G/DL (ref 32–36)
MCV RBC AUTO: 86 FL (ref 82–98)
MONOCYTES # BLD AUTO: 0.8 K/UL (ref 0.3–1)
MONOCYTES NFR BLD: 13.5 % (ref 4–15)
NEUTROPHILS # BLD AUTO: 2.5 K/UL (ref 1.8–7.7)
NEUTROPHILS NFR BLD: 43.8 % (ref 38–73)
NONHDLC SERPL-MCNC: 65 MG/DL
NRBC BLD-RTO: 0 /100 WBC
PHOSPHATE SERPL-MCNC: 3.4 MG/DL (ref 2.7–4.5)
PLATELET # BLD AUTO: 219 K/UL (ref 150–450)
PMV BLD AUTO: 11.9 FL (ref 9.2–12.9)
POTASSIUM SERPL-SCNC: 3.8 MMOL/L (ref 3.5–5.1)
PROT SERPL-MCNC: 7.4 G/DL (ref 6–8.4)
RBC # BLD AUTO: 5.12 M/UL (ref 4.6–6.2)
SODIUM SERPL-SCNC: 141 MMOL/L (ref 136–145)
TRIGL SERPL-MCNC: 39 MG/DL (ref 30–150)
TSH SERPL DL<=0.005 MIU/L-ACNC: 1.84 UIU/ML (ref 0.4–4)
WBC # BLD AUTO: 5.65 K/UL (ref 3.9–12.7)

## 2024-03-06 PROCEDURE — 86140 C-REACTIVE PROTEIN: CPT | Performed by: NURSE PRACTITIONER

## 2024-03-06 PROCEDURE — 84134 ASSAY OF PREALBUMIN: CPT | Performed by: NURSE PRACTITIONER

## 2024-03-06 PROCEDURE — 85025 COMPLETE CBC W/AUTO DIFF WBC: CPT | Performed by: NURSE PRACTITIONER

## 2024-03-06 PROCEDURE — 80053 COMPREHEN METABOLIC PANEL: CPT | Performed by: NURSE PRACTITIONER

## 2024-03-06 PROCEDURE — 84100 ASSAY OF PHOSPHORUS: CPT | Performed by: NURSE PRACTITIONER

## 2024-03-06 PROCEDURE — 85652 RBC SED RATE AUTOMATED: CPT | Performed by: NURSE PRACTITIONER

## 2024-03-06 PROCEDURE — 80061 LIPID PANEL: CPT | Performed by: NURSE PRACTITIONER

## 2024-03-06 PROCEDURE — 84443 ASSAY THYROID STIM HORMONE: CPT | Performed by: NURSE PRACTITIONER

## 2024-03-06 PROCEDURE — 83735 ASSAY OF MAGNESIUM: CPT | Performed by: NURSE PRACTITIONER

## 2024-03-06 PROCEDURE — 83036 HEMOGLOBIN GLYCOSYLATED A1C: CPT | Performed by: NURSE PRACTITIONER

## 2024-03-07 LAB
ESTIMATED AVG GLUCOSE: 105 MG/DL (ref 68–131)
HBA1C MFR BLD: 5.3 % (ref 4–5.6)
PREALB SERPL-MCNC: 17 MG/DL (ref 20–43)

## 2024-04-01 ENCOUNTER — LAB VISIT (OUTPATIENT)
Dept: LAB | Facility: HOSPITAL | Age: 69
End: 2024-04-01
Payer: MEDICARE

## 2024-04-01 DIAGNOSIS — I87.2 PERIPHERAL VENOUS INSUFFICIENCY: ICD-10-CM

## 2024-04-01 DIAGNOSIS — I89.0 OBLITERATION OF LYMPHATIC VESSEL: Primary | ICD-10-CM

## 2024-04-01 DIAGNOSIS — I11.9 MALIGNANT HYPERTENSIVE HEART DISEASE WITHOUT HEART FAILURE: ICD-10-CM

## 2024-04-01 LAB
ALBUMIN SERPL BCP-MCNC: 3.6 G/DL (ref 3.5–5.2)
ALP SERPL-CCNC: 86 U/L (ref 55–135)
ALT SERPL W/O P-5'-P-CCNC: 12 U/L (ref 10–44)
ANION GAP SERPL CALC-SCNC: 10 MMOL/L (ref 8–16)
AST SERPL-CCNC: 20 U/L (ref 10–40)
BASOPHILS # BLD AUTO: 0.05 K/UL (ref 0–0.2)
BASOPHILS NFR BLD: 0.9 % (ref 0–1.9)
BILIRUB SERPL-MCNC: 0.4 MG/DL (ref 0.1–1)
BUN SERPL-MCNC: 16 MG/DL (ref 8–23)
CALCIUM SERPL-MCNC: 9 MG/DL (ref 8.7–10.5)
CHLORIDE SERPL-SCNC: 104 MMOL/L (ref 95–110)
CO2 SERPL-SCNC: 24 MMOL/L (ref 23–29)
CREAT SERPL-MCNC: 1 MG/DL (ref 0.5–1.4)
CRP SERPL-MCNC: 3.6 MG/L (ref 0–8.2)
DIFFERENTIAL METHOD BLD: ABNORMAL
EOSINOPHIL # BLD AUTO: 0.2 K/UL (ref 0–0.5)
EOSINOPHIL NFR BLD: 4.1 % (ref 0–8)
ERYTHROCYTE [DISTWIDTH] IN BLOOD BY AUTOMATED COUNT: 13.9 % (ref 11.5–14.5)
ERYTHROCYTE [SEDIMENTATION RATE] IN BLOOD BY PHOTOMETRIC METHOD: 27 MM/HR (ref 0–23)
EST. GFR  (NO RACE VARIABLE): >60 ML/MIN/1.73 M^2
GLUCOSE SERPL-MCNC: 82 MG/DL (ref 70–110)
HCT VFR BLD AUTO: 42 % (ref 40–54)
HGB BLD-MCNC: 13.7 G/DL (ref 14–18)
IMM GRANULOCYTES # BLD AUTO: 0.02 K/UL (ref 0–0.04)
IMM GRANULOCYTES NFR BLD AUTO: 0.4 % (ref 0–0.5)
LYMPHOCYTES # BLD AUTO: 2.1 K/UL (ref 1–4.8)
LYMPHOCYTES NFR BLD: 37.7 % (ref 18–48)
MCH RBC QN AUTO: 27.8 PG (ref 27–31)
MCHC RBC AUTO-ENTMCNC: 32.6 G/DL (ref 32–36)
MCV RBC AUTO: 85 FL (ref 82–98)
MONOCYTES # BLD AUTO: 0.6 K/UL (ref 0.3–1)
MONOCYTES NFR BLD: 11.4 % (ref 4–15)
NEUTROPHILS # BLD AUTO: 2.6 K/UL (ref 1.8–7.7)
NEUTROPHILS NFR BLD: 45.5 % (ref 38–73)
NRBC BLD-RTO: 0 /100 WBC
PLATELET # BLD AUTO: 208 K/UL (ref 150–450)
PMV BLD AUTO: 11.8 FL (ref 9.2–12.9)
POTASSIUM SERPL-SCNC: 3.7 MMOL/L (ref 3.5–5.1)
PROT SERPL-MCNC: 7.5 G/DL (ref 6–8.4)
RBC # BLD AUTO: 4.93 M/UL (ref 4.6–6.2)
SODIUM SERPL-SCNC: 138 MMOL/L (ref 136–145)
WBC # BLD AUTO: 5.62 K/UL (ref 3.9–12.7)

## 2024-04-01 PROCEDURE — 80053 COMPREHEN METABOLIC PANEL: CPT | Performed by: NURSE PRACTITIONER

## 2024-04-01 PROCEDURE — 86140 C-REACTIVE PROTEIN: CPT | Performed by: NURSE PRACTITIONER

## 2024-04-01 PROCEDURE — 85025 COMPLETE CBC W/AUTO DIFF WBC: CPT | Performed by: NURSE PRACTITIONER

## 2024-04-01 PROCEDURE — 85652 RBC SED RATE AUTOMATED: CPT | Performed by: NURSE PRACTITIONER

## 2024-04-01 PROCEDURE — 36415 COLL VENOUS BLD VENIPUNCTURE: CPT | Performed by: NURSE PRACTITIONER

## 2024-04-04 DIAGNOSIS — R53.81 DEBILITY: ICD-10-CM

## 2024-04-04 DIAGNOSIS — I89.0 CHRONIC ACQUIRED LYMPHEDEMA: Primary | ICD-10-CM

## 2024-04-04 NOTE — PROGRESS NOTES
Patient requesting referral to outpatient lymphedema clinic do to lower extremity lymphedema.  Will place order.

## 2024-04-05 ENCOUNTER — EXTERNAL HOME HEALTH (OUTPATIENT)
Dept: HOME HEALTH SERVICES | Facility: HOSPITAL | Age: 69
End: 2024-04-05
Payer: MEDICARE

## 2024-04-09 ENCOUNTER — EXTERNAL HOME HEALTH (OUTPATIENT)
Dept: HOME HEALTH SERVICES | Facility: HOSPITAL | Age: 69
End: 2024-04-09
Payer: MEDICARE

## 2024-04-19 PROCEDURE — G0179 MD RECERTIFICATION HHA PT: HCPCS | Mod: ,,, | Performed by: STUDENT IN AN ORGANIZED HEALTH CARE EDUCATION/TRAINING PROGRAM

## 2024-04-23 ENCOUNTER — CLINICAL SUPPORT (OUTPATIENT)
Dept: REHABILITATION | Facility: HOSPITAL | Age: 69
End: 2024-04-23
Payer: MEDICARE

## 2024-04-23 DIAGNOSIS — I89.0 CHRONIC ACQUIRED LYMPHEDEMA: ICD-10-CM

## 2024-04-23 DIAGNOSIS — I89.0 LYMPHEDEMA: Primary | ICD-10-CM

## 2024-04-23 DIAGNOSIS — S81.801D WOUND OF RIGHT LOWER EXTREMITY, SUBSEQUENT ENCOUNTER: ICD-10-CM

## 2024-04-23 DIAGNOSIS — R26.81 GAIT INSTABILITY: ICD-10-CM

## 2024-04-23 DIAGNOSIS — R53.81 DEBILITY: ICD-10-CM

## 2024-04-23 PROCEDURE — 97535 SELF CARE MNGMENT TRAINING: CPT

## 2024-04-23 PROCEDURE — 97163 PT EVAL HIGH COMPLEX 45 MIN: CPT

## 2024-04-23 NOTE — PROGRESS NOTES
See evaluation in POC for goals and assessment     Eval Date: 05/01/2024    Tatum Weinstein, PT, DPT, CLT

## 2024-05-01 NOTE — PLAN OF CARE
[facility]  Physical Therapy Initial Evaluation    Name: Edd Wills  Clinic Number: [id number]    Therapy Diagnosis:   Encounter Diagnoses   Name Primary?    Chronic acquired lymphedema     Debility     Lymphedema Yes    Gait instability     Wound of right lower extremity, subsequent encounter      Physician: Edie Smith, NP    Physician Orders: PT Eval and Treat - lymphedema  Medical Diagnosis from Referral: I89.0 (ICD-10-CM) - Lymphedema, not elsewhere classified R53.81 (ICD-10-CM) - Other malaise  Evaluation Date: 4/23/2024  Authorization Period Expiration: 4/4/2025  Plan of Care Expiration: 6/23/2024  Visit # / Visits authorized: 1/ 1    Time In: 9:00am  Time Out: 10:00am  Total Billable Time: 60 minutes    Precautions: Standard and Fall    Subjective   Date of onset: 1 year ago   History of current condition - Edd reports: His R thigh started out as a small bump but gradually got bigger and bigger. He had HH lymph PT but they had to stop due to staffing. He now receives home health nursing which tries to help his wound but doesn't do anything for the wrapping.     Pt denies CHF, KF, DM and CA.   Fluid pill: Yes  Blood thinner: No     Medical History:   Past Medical History:   Diagnosis Date    HTN (hypertension)     Hyperlipemia     Obesity     LLUVIA (obstructive sleep apnea)     Osteoarthritis of knee        Surgical History:   Edd Wills  has a past surgical history that includes Cervical fusion (06/14/2016) and Laminectomy (06/14/2016).    Medications:   Edd has a current medication list which includes the following prescription(s): amlodipine, aspirin, atorvastatin, carvedilol, diclofenac sodium, furosemide, gabapentin, ibuprofen, lisinopril, and polyethylene glycol.    Allergies:   Review of patient's allergies indicates:  No Known Allergies     Imaging,     Impression:     No evidence of deep venous thrombosis in the right lower extremity.     Abundant right lower leg  edema.    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 [employer]   Prior Level of Function: Independent   Current Level of Function: Can't get into shower, can't  leg, harder with standing, harder with dressing   Gait: has a walker but mainly uses his rollator    Transfers:Min A   Bed Mobility: pt stayed in      Pain and Swelling:   Current 4/10, worst 5/10, best 3/10   Location: both knees down   Description: Aching  Aggravating Factors: unsure   Easing Factors: ibuprofen     Pts goals: would like to get around more     Objective     Pt assisted back in WC   Pt;s BLEs wrapped in WC bandages- left on   Amount of Swelling/Location of Swelling: Signifcant swelling RLE, significant lobule of R thigh, mod swelling LLE    Skin Integrity: wound on lobule of R thigh   Palpation/Texture: lobule- increased skin thickness   + B Stemmer Sign      Posture: FHP          Strength: functional screen of AROM against gravity and sit to stand transfers         Girth Measurements (in centimeters)                    CMS Impairment/Limitation/Restriction for FOTO LE edema  Survey  Limitation: 60%    Therapist reviewed FOTO scores for Edd Wills on 4/23/2024.   FOTO documents entered into [employer] - see Media section.       TREATMENT     Total Treatment time separate from Evaluation: 40 minutes  Self Care/Home Management training for 40 minutes including:    Pt was educated in potential compression  needs.  Demo of products including socks, garments, and Inelastic Velcro wraps.   Discussed cost/coverage and authorization per insurance with Durable Medical Equipment(DME) provider.  Compression require orders from referring provider and coverage or purchase of products from DME or self order.      Discussed wear schedule, don/doff, wash and management of products.  Size and compression class and AM/PM needs.    Consideration for tubigrip as form of temporary compression use until securing compression needs.   Consideration for shorts/tights or capris style compression to compliment lower leg compression to support size/shape of LE.       Informed insurance coverage of compression is per DME provider and typically Medicare and Medicare group plans may not cover cost beyond pair of standard sized knee high garments.   Commitment to attendance as well as commitment to securing compression needs is critical to edema management.      Home Exercises and Patient Education Provided  Education provided:   - lymph booklwt   - Pt was educated in lymphedema etiology and management plans.  Pt was provided with written risk reductions and precautions for managing lymphedema.      This patient is in agreement to participate in Lymphedema treatment.    Written Home Exercises Provided: yes.  Exercises were reviewed and Edd was able to demonstrate them prior to the end of the session.  Edd demonstrated good  understanding of the education provided.     See EMR under Patient Instructions for exercises provided 4/23/2024.    Assessment   Edd is a 69 y.o. male referred to outpatient Physical Therapy with a medical diagnosis of I89.0 (ICD-10-CM) - Lymphedema, not elsewhere classified R53.81 (ICD-10-CM) - Other malaise. This patient presents s/p unknown cause   resulting in: lymphedema of the BLEs, increased pain, as well as difficulty performing shower, can't  leg, harder with standing, harder with dressing  , compression  needs, and placing the pt at higher risk of infection.       Pt presents with significant RLE lymphedema with very large lobule on medial L thigh. Swelling is present in LLE but to much smaller degree. Significant R ankle and foot swelling present with deformity that limits pt's standing and walking. Pt is currently seeing HH and was educated that often insurance does not cover both. Pt's provider was messaged about this and if wound on lobule is small, PT may be able to assist while also increasing wound healing with edema reduction       Pt prognosis is Fair.   Pt will benefit from skilled outpatient Physical Therapy to address the deficits stated above and in the chart below, provide pt/family education, and to maximize pt's level of independence.     Plan of care discussed with patient: Yes  Pt's spiritual, cultural and educational needs considered and patient is agreeable to the plan of care and goals as stated below:     Medical Necessity is demonstrated by the following  History  Co-morbidities and personal factors that may impact the plan of care [] LOW: no personal factors / co-morbidities  [] MODERATE: 1-2 personal factors / co-morbidities  [x] HIGH: 3+ personal factors / co-morbidities    Moderate / High Support Documentation: BMI, HTN, wound      Examination  Body Structures and Functions, activity limitations and participation restrictions that may impact the plan of care [] LOW: addressing 1-2 elements  [] MODERATE: 3+ elements  [x] HIGH: 4+ elements (please support below)    Moderate / High Support Documentation:      shower, can't  leg, harder with standing, harder with dressing     Clinical Presentation [] LOW: stable  [] MODERATE: Evolving  [x] HIGH: Unstable     Decision Making/ Complexity Score: high       Anticipated Barriers for therapy: wound, HH care     The following goals were discussed with the patient and patient is in agreement with them as to be addressed in the treatment plan.      Short Term Goals: (6 weeks)  1. Patient will show decreased girth in R LE by up to 1 cm to allow for LE symmetry, shoe and clothing choice, and ability to apply needed compression.  (progressing, not met)   2. Patient will demonstrate 100% knowledge of lymphedema precautions and signs of infection to allow for reduced lymphedema risk, infection risk, and/or exacerbation of condition.  (progressing, not met)  3. Patient or caregiver will perform self-bandaging techniques and/or wearing of compression garments to allow for lymphatic drainage support, skin elasticity, and reduction in shape and size of limb. (progressing, not met)  4. Patient will perform self lymph drainage techniques to areas within reach to enhance lymphatic drainage and skin condition.  (progressing, not met)  5. Patient will tolerate daily activities with multilayered bandaging to allow for lymphatic and venous support.  (progressing, not met)    Long Term Goals: (12  weeks)  1. Patient will show decreased girth in R LE by up to 2 cm  to allow for LE symmetry, shoe and clothing choice, and ability to apply needed compression daily.  (progressing, not met)  2. Patient will show reduction in density to mild or less with improved contour of limb to allow for cosmesis, LE symmetry, infection risk reduction, and clothing and compression choice.   (progressing, not met)  3. Patient to lani/doff compression garment with daily compliance to assist in lymphedema management, skin elasticity, and tissue density.  (progressing, not met)  4. Pt to show improved postural awareness and alignment.  (progressing, not met)  5. Pt to be I and compliant with HEP to allow for increased function in affected limb.   (progressing, not met)  Plan   Plan of care Certification: 4/23/2024 to 6/23/2024.    Outpatient Physical Therapy 3 times weekly for 8 weeks to include the following interventions: Gait Training, Manual Therapy, Neuromuscular Re-ed, Patient Education,  Self Care, Therapeutic Activities, and Therapeutic Exercise. Complete Decongestive Therapy- compression and home equipment needs to be addressed and assisted.    Pt may be seen by a PTA as part of the Rehab treatment team.  Plan of Care was discussed with LULÚ Seymour, PT

## 2024-05-03 ENCOUNTER — TELEPHONE (OUTPATIENT)
Dept: HOME HEALTH SERVICES | Facility: CLINIC | Age: 69
End: 2024-05-03
Payer: MEDICARE

## 2024-05-03 NOTE — TELEPHONE ENCOUNTER
Patient expected to start outpatient lymphedema therapy on 5/29/24, will notify home health to discontinue services.

## 2024-05-20 ENCOUNTER — EXTERNAL HOME HEALTH (OUTPATIENT)
Dept: HOME HEALTH SERVICES | Facility: HOSPITAL | Age: 69
End: 2024-05-20
Payer: MEDICARE

## 2024-06-03 ENCOUNTER — LAB VISIT (OUTPATIENT)
Dept: LAB | Facility: HOSPITAL | Age: 69
End: 2024-06-03
Payer: MEDICARE

## 2024-06-03 DIAGNOSIS — L97.112 NON-PRESSURE CHRONIC ULCER OF RIGHT THIGH WITH FAT LAYER EXPOSED: ICD-10-CM

## 2024-06-03 DIAGNOSIS — I70.0 ATHEROSCLEROSIS OF AORTA: ICD-10-CM

## 2024-06-03 DIAGNOSIS — E78.5 HYPERLIPEMIA: ICD-10-CM

## 2024-06-03 DIAGNOSIS — I89.0 OBLITERATION OF LYMPHATIC VESSEL: ICD-10-CM

## 2024-06-03 DIAGNOSIS — E66.01 MORBID OBESITY: ICD-10-CM

## 2024-06-03 DIAGNOSIS — I87.2 PERIPHERAL VENOUS INSUFFICIENCY: Primary | ICD-10-CM

## 2024-06-03 LAB
ALBUMIN SERPL BCP-MCNC: 3.7 G/DL (ref 3.5–5.2)
ALP SERPL-CCNC: 96 U/L (ref 55–135)
ALT SERPL W/O P-5'-P-CCNC: 11 U/L (ref 10–44)
ANION GAP SERPL CALC-SCNC: 9 MMOL/L (ref 8–16)
AST SERPL-CCNC: 18 U/L (ref 10–40)
BASOPHILS # BLD AUTO: 0.04 K/UL (ref 0–0.2)
BASOPHILS NFR BLD: 0.6 % (ref 0–1.9)
BILIRUB SERPL-MCNC: 0.4 MG/DL (ref 0.1–1)
BUN SERPL-MCNC: 16 MG/DL (ref 8–23)
CALCIUM SERPL-MCNC: 9 MG/DL (ref 8.7–10.5)
CHLORIDE SERPL-SCNC: 104 MMOL/L (ref 95–110)
CO2 SERPL-SCNC: 23 MMOL/L (ref 23–29)
CREAT SERPL-MCNC: 1 MG/DL (ref 0.5–1.4)
CRP SERPL-MCNC: 4 MG/L (ref 0–8.2)
DIFFERENTIAL METHOD BLD: ABNORMAL
EOSINOPHIL # BLD AUTO: 0.3 K/UL (ref 0–0.5)
EOSINOPHIL NFR BLD: 4.7 % (ref 0–8)
ERYTHROCYTE [DISTWIDTH] IN BLOOD BY AUTOMATED COUNT: 15 % (ref 11.5–14.5)
ERYTHROCYTE [SEDIMENTATION RATE] IN BLOOD BY PHOTOMETRIC METHOD: 32 MM/HR (ref 0–23)
EST. GFR  (NO RACE VARIABLE): >60 ML/MIN/1.73 M^2
GLUCOSE SERPL-MCNC: 74 MG/DL (ref 70–110)
HCT VFR BLD AUTO: 42.1 % (ref 40–54)
HGB BLD-MCNC: 13.6 G/DL (ref 14–18)
IMM GRANULOCYTES # BLD AUTO: 0.02 K/UL (ref 0–0.04)
IMM GRANULOCYTES NFR BLD AUTO: 0.3 % (ref 0–0.5)
LYMPHOCYTES # BLD AUTO: 2.1 K/UL (ref 1–4.8)
LYMPHOCYTES NFR BLD: 34.5 % (ref 18–48)
MCH RBC QN AUTO: 27.6 PG (ref 27–31)
MCHC RBC AUTO-ENTMCNC: 32.3 G/DL (ref 32–36)
MCV RBC AUTO: 86 FL (ref 82–98)
MONOCYTES # BLD AUTO: 1 K/UL (ref 0.3–1)
MONOCYTES NFR BLD: 15.7 % (ref 4–15)
NEUTROPHILS # BLD AUTO: 2.7 K/UL (ref 1.8–7.7)
NEUTROPHILS NFR BLD: 44.2 % (ref 38–73)
NRBC BLD-RTO: 0 /100 WBC
PLATELET # BLD AUTO: 197 K/UL (ref 150–450)
PMV BLD AUTO: 12.1 FL (ref 9.2–12.9)
POTASSIUM SERPL-SCNC: 4.4 MMOL/L (ref 3.5–5.1)
PROT SERPL-MCNC: 7.3 G/DL (ref 6–8.4)
RBC # BLD AUTO: 4.92 M/UL (ref 4.6–6.2)
SODIUM SERPL-SCNC: 136 MMOL/L (ref 136–145)
WBC # BLD AUTO: 6.18 K/UL (ref 3.9–12.7)

## 2024-06-03 PROCEDURE — 85652 RBC SED RATE AUTOMATED: CPT

## 2024-06-03 PROCEDURE — 84134 ASSAY OF PREALBUMIN: CPT

## 2024-06-03 PROCEDURE — 85025 COMPLETE CBC W/AUTO DIFF WBC: CPT

## 2024-06-03 PROCEDURE — 86140 C-REACTIVE PROTEIN: CPT

## 2024-06-03 PROCEDURE — 80053 COMPREHEN METABOLIC PANEL: CPT

## 2024-06-04 LAB — PREALB SERPL-MCNC: 19 MG/DL (ref 20–43)

## 2024-06-05 ENCOUNTER — CLINICAL SUPPORT (OUTPATIENT)
Dept: REHABILITATION | Facility: HOSPITAL | Age: 69
End: 2024-06-05
Payer: MEDICARE

## 2024-06-05 DIAGNOSIS — I89.0 LYMPHEDEMA: ICD-10-CM

## 2024-06-05 DIAGNOSIS — S81.801D WOUND OF RIGHT LOWER EXTREMITY, SUBSEQUENT ENCOUNTER: ICD-10-CM

## 2024-06-05 DIAGNOSIS — R26.81 GAIT INSTABILITY: Primary | ICD-10-CM

## 2024-06-05 PROCEDURE — 97140 MANUAL THERAPY 1/> REGIONS: CPT

## 2024-06-10 ENCOUNTER — CLINICAL SUPPORT (OUTPATIENT)
Dept: REHABILITATION | Facility: HOSPITAL | Age: 69
End: 2024-06-10
Payer: MEDICARE

## 2024-06-10 DIAGNOSIS — R26.81 GAIT INSTABILITY: Primary | ICD-10-CM

## 2024-06-10 DIAGNOSIS — I89.0 LYMPHEDEMA: ICD-10-CM

## 2024-06-10 DIAGNOSIS — S81.801D WOUND OF RIGHT LOWER EXTREMITY, SUBSEQUENT ENCOUNTER: ICD-10-CM

## 2024-06-10 PROCEDURE — 97140 MANUAL THERAPY 1/> REGIONS: CPT

## 2024-06-13 NOTE — PROGRESS NOTES
Physical Therapy Daily Treatment Note     Name: Edd Wills  Clinic Number: [id number]    Therapy Diagnosis:   Encounter Diagnoses   Name Primary?    Gait instability Yes    Lymphedema     Wound of right lower extremity, subsequent encounter      Physician: Edie Smith NP    Visit Date: 6/5/2024    Physician Orders: PT Eval and Treat - lymphedema  Medical Diagnosis from Referral: I89.0 (ICD-10-CM) - Lymphedema, not elsewhere classified R53.81 (ICD-10-CM) - Other malaise  Evaluation Date: 4/23/2024  Authorization Period Expiration: 4/4/2025  Plan of Care Expiration: 6/23/2024  Visit # / Visits authorized: 1/ 20     Time In: 12:30pm   Time Out: 1:30pm   Total Billable Time: 60 minutes     Precautions: Standard and Fall    Subjective     Pt reports: He is doing ok, ready to begin treatment .  He was compliant with home exercise program.  Response to previous treatment: fine, eval only   Functional change: none     Pain: 0/10  Location: bilateral lower legs     Objective     Pt presents to therapy in WC  Significant swelling of the RLE with large medial thigh lobule  Wound where knee crease/ lobule meet, red and granulated tissue in wound bed   Pt assisted from chair to table for treatment, Mod A     Treatment:   Edd received the following manual therapy techniques:- Manual Lymphatic Drainage were applied to the: RLE for 60 minutes, including: MLD and short stretch compression bandaging       MULTILAYERED BANDAGING:  issued supplies and bandaged R LE with cotton stockinette, 3 rolls cellona 15-cm, komprex section dorsum of foot, 4 komprex rolls ankle to mid thigh, 2-8cm,  2- 10cm, and 2 12  Rosidal K rolls foot to knee, to leave intact 12-24 hrs as tolerated, discontinue with any problems, return rolled bandages next session. Wash and wear schedules confirmed.       Pt's wound in knee crease cleaned with sea cleanse, dried with sterile gauze, applied Hydrofera blue ready on wound bed, Mextra superabsorbent  overtop, secured to lobule with sterile gauze and tape over sterile gauze    Edemawear size L used to secure lobule up and toward inner thigh    Cellona used on lower leg to even out shape of lower leg below lobule, significant sized padding used on medial lower leg  Komprex used in sling style to lift lobule to allow for better wound healing and drainage      Home Exercises Provided and Patient Education Provided     Education provided:    Remove bandages if painful   Allow wound care clinician to remove bandages to change dressing   Wear edemawear size L on lobule and thigh to aid in drainage/ support     PATIENT/FAMILY Education: bandaging wear schedule,  HEP,  Beginning of self massage,  Self or assisted bandaging, compression options, and Risk reduction    Written Home Exercises Provided: Patient instructed to cont prior HEP.  Exercises were reviewed and Edd was able to demonstrate them prior to the end of the session.  Edd demonstrated good  understanding of the education provided.       Assessment     Patient presents to clinic with significant swelling of entire RLE with large medial upper thigh lobule with wound. Placement of pt's wound on knee crease suggests weight of lobule is pulling on skin and decreasing skin healing. Patient's lower leg presents with medial and lateral deformity due to edema. Patient is able to have functional ROM of ankle that is pain free in sitting despite initial appearance of ankle. Patient was given edemawear size L to encompass his lobule and thigh. Pt's wound was washed and covered with hydrofera blue ready before wrapping with sterile gauze. Patient's LE was bandaged to thigh with compression bandages to reduce edema and aid in wound healing. Pt educated in removing bandages if pain or discomfort occur. Will continue to progress       Edd Is progressing well towards his goals.   Pt prognosis is Fair.     Pt will continue to benefit from skilled outpatient physical  therapy to address the deficits listed in the problem list box on initial evaluation, provide pt/family education and to maximize pt's level of independence in the home and community environment.     Pt's spiritual, cultural and educational needs considered and pt agreeable to plan of care and goals.     Anticipated barriers to physical therapy: transportation, skin integrity     Goals:     Short Term Goals: (6 weeks)  1. Patient will show decreased girth in R LE by up to 1 cm to allow for LE symmetry, shoe and clothing choice, and ability to apply needed compression.  (progressing, not met)   2. Patient will demonstrate 100% knowledge of lymphedema precautions and signs of infection to allow for reduced lymphedema risk, infection risk, and/or exacerbation of condition.  (progressing, not met)  3. Patient or caregiver will perform self-bandaging techniques and/or wearing of compression garments to allow for lymphatic drainage support, skin elasticity, and reduction in shape and size of limb. (progressing, not met)  4. Patient will perform self lymph drainage techniques to areas within reach to enhance lymphatic drainage and skin condition.  (progressing, not met)  5. Patient will tolerate daily activities with multilayered bandaging to allow for lymphatic and venous support.  (progressing, not met)     Long Term Goals: (12  weeks)  1. Patient will show decreased girth in R LE by up to 2 cm  to allow for LE symmetry, shoe and clothing choice, and ability to apply needed compression daily.  (progressing, not met)  2. Patient will show reduction in density to mild or less with improved contour of limb to allow for cosmesis, LE symmetry, infection risk reduction, and clothing and compression choice.   (progressing, not met)  3. Patient to lani/doff compression garment with daily compliance to assist in lymphedema management, skin elasticity, and tissue density.  (progressing, not met)  4. Pt to show improved postural  awareness and alignment.  (progressing, not met)  5. Pt to be I and compliant with HEP to allow for increased function in affected limb.   (progressing, not met)    Plan   Continue PT  2x   weekly for Complete Decongestive Therapy:  Manual lymphatic drainage, Multilayered short stretch bandaging, Pneumatic compression, Therapeutic exercises, Patient education as deemed necessary to achieve stated goals.      Tatum Weinstein, PT

## 2024-06-19 RX ORDER — FUROSEMIDE 40 MG/1
40 TABLET ORAL DAILY
Qty: 90 TABLET | Refills: 3 | Status: SHIPPED | OUTPATIENT
Start: 2024-06-19

## 2024-06-20 ENCOUNTER — DOCUMENTATION ONLY (OUTPATIENT)
Dept: REHABILITATION | Facility: HOSPITAL | Age: 69
End: 2024-06-20
Payer: MEDICARE

## 2024-06-20 NOTE — PROGRESS NOTES
6/20/2024  2:59pm     Pt called about cancelled visits. Pt reports he is moving. Pt encouraged to call to get back into treatment once he is settled. Will message pt clinic number for questions or concerns.     Tatum Weinstein, PT, DPT, CLT

## 2024-06-24 NOTE — PROGRESS NOTES
Physical Therapy Daily Treatment Note     Name: Edd Wills  Clinic Number: [id number]    Therapy Diagnosis:   Encounter Diagnoses   Name Primary?    Gait instability Yes    Lymphedema     Wound of right lower extremity, subsequent encounter      Physician: Edie Smith NP    Visit Date: 6/10/2024    Physician Orders: PT Eval and Treat - lymphedema  Medical Diagnosis from Referral: I89.0 (ICD-10-CM) - Lymphedema, not elsewhere classified R53.81 (ICD-10-CM) - Other malaise  Evaluation Date: 4/23/2024  Authorization Period Expiration: 4/4/2025  Plan of Care Expiration: 6/23/2024  Visit # / Visits authorized: 2/ 20     Time In: 12:30pm   Time Out: 1:30pm   Total Billable Time: 60 minutes     Precautions: Standard and Fall    Subjective     Pt reports: He did ok with bandages, saw a difference when removing bandages, would like toe bandaging today  He was compliant with home exercise program.  Response to previous treatment: fine, eval only   Functional change: none     Pain: 0/10  Location: bilateral lower legs     Objective     Pt presents to therapy in WC  Significant swelling of the RLE with large medial thigh lobule  Wound where knee crease/ lobule meet, red and granulated tissue in wound bed   Pt assisted from chair to table for treatment, Mod A     Treatment:   Edd received the following manual therapy techniques:- Manual Lymphatic Drainage were applied to the: RLE for 60 minutes, including: MLD and short stretch compression bandaging       MULTILAYERED BANDAGING:  issued supplies and bandaged R LE with cotton stockinette, 3 rolls cellona 15-cm, komprex section dorsum of foot, 4 komprex rolls ankle to mid thigh, 2-8cm,  2- 10cm, and 2 12  Rosidal K rolls foot to knee, to leave intact 12-24 hrs as tolerated, discontinue with any problems, return rolled bandages next session. Wash and wear schedules confirmed.       Pt's wound in knee crease cleaned with sea cleanse, dried with sterile gauze, applied  Hydrofera blue ready on wound bed, Mextra superabsorbent overtop, secured to lobule with sterile gauze and tape over sterile gauze    Edemawear size L used to secure lobule up and toward inner thigh    Cellona used on lower leg to even out shape of lower leg below lobule, significant sized padding used on medial lower leg  Komprex used in sling style to lift lobule to allow for better wound healing and drainage    Molleast used on toes, 2 rolls       Home Exercises Provided and Patient Education Provided     Education provided:    Remove bandages if painful   Allow wound care clinician to remove bandages to change dressing   Wear edemawear size L on lobule and thigh to aid in drainage/ support     PATIENT/FAMILY Education: bandaging wear schedule,  HEP,  Beginning of self massage,  Self or assisted bandaging, compression options, and Risk reduction    Written Home Exercises Provided: Patient instructed to cont prior HEP.  Exercises were reviewed and Edd was able to demonstrate them prior to the end of the session.  Edd demonstrated good  understanding of the education provided.       Assessment     Patient presents to clinic with significant swelling of entire RLE with large medial upper thigh lobule with wound. Placement of pt's wound on knee crease suggests weight of lobule is pulling on skin and decreasing skin healing. Patient's lower leg presents with medial and lateral deformity due to edema. Patient is able to have functional ROM of ankle that is pain free in sitting despite initial appearance of ankle. Patient was given edemawear size L to encompass his lobule and thigh. Pt's wound was washed and covered with hydrofera blue ready before wrapping with sterile gauze. Patient's LE was bandaged to thigh with compression bandages to reduce edema and aid in wound healing. Pt educated in removing bandages if pain or discomfort occur. Toe bandages applied today. Will continue to progress       Edd Prateek  progressing well towards his goals.   Pt prognosis is Fair.     Pt will continue to benefit from skilled outpatient physical therapy to address the deficits listed in the problem list box on initial evaluation, provide pt/family education and to maximize pt's level of independence in the home and community environment.     Pt's spiritual, cultural and educational needs considered and pt agreeable to plan of care and goals.     Anticipated barriers to physical therapy: transportation, skin integrity     Goals:     Short Term Goals: (6 weeks)  1. Patient will show decreased girth in R LE by up to 1 cm to allow for LE symmetry, shoe and clothing choice, and ability to apply needed compression.  (progressing, not met)   2. Patient will demonstrate 100% knowledge of lymphedema precautions and signs of infection to allow for reduced lymphedema risk, infection risk, and/or exacerbation of condition.  (progressing, not met)  3. Patient or caregiver will perform self-bandaging techniques and/or wearing of compression garments to allow for lymphatic drainage support, skin elasticity, and reduction in shape and size of limb. (progressing, not met)  4. Patient will perform self lymph drainage techniques to areas within reach to enhance lymphatic drainage and skin condition.  (progressing, not met)  5. Patient will tolerate daily activities with multilayered bandaging to allow for lymphatic and venous support.  (progressing, not met)     Long Term Goals: (12  weeks)  1. Patient will show decreased girth in R LE by up to 2 cm  to allow for LE symmetry, shoe and clothing choice, and ability to apply needed compression daily.  (progressing, not met)  2. Patient will show reduction in density to mild or less with improved contour of limb to allow for cosmesis, LE symmetry, infection risk reduction, and clothing and compression choice.   (progressing, not met)  3. Patient to lani/doff compression garment with daily compliance to  assist in lymphedema management, skin elasticity, and tissue density.  (progressing, not met)  4. Pt to show improved postural awareness and alignment.  (progressing, not met)  5. Pt to be I and compliant with HEP to allow for increased function in affected limb.   (progressing, not met)    Plan   Continue PT  2x   weekly for Complete Decongestive Therapy:  Manual lymphatic drainage, Multilayered short stretch bandaging, Pneumatic compression, Therapeutic exercises, Patient education as deemed necessary to achieve stated goals.      Tatum Weinstein, PT

## 2024-07-01 ENCOUNTER — LAB VISIT (OUTPATIENT)
Dept: LAB | Facility: HOSPITAL | Age: 69
End: 2024-07-01
Payer: MEDICARE

## 2024-07-01 DIAGNOSIS — I87.2 PERIPHERAL VENOUS INSUFFICIENCY: Primary | ICD-10-CM

## 2024-07-01 DIAGNOSIS — I11.9 MALIGNANT HYPERTENSIVE HEART DISEASE WITHOUT HEART FAILURE: ICD-10-CM

## 2024-07-01 LAB
ALBUMIN SERPL BCP-MCNC: 3.5 G/DL (ref 3.5–5.2)
ALP SERPL-CCNC: 84 U/L (ref 55–135)
ALT SERPL W/O P-5'-P-CCNC: 11 U/L (ref 10–44)
ANION GAP SERPL CALC-SCNC: 8 MMOL/L (ref 8–16)
AST SERPL-CCNC: 18 U/L (ref 10–40)
BASOPHILS # BLD AUTO: 0.03 K/UL (ref 0–0.2)
BASOPHILS NFR BLD: 0.5 % (ref 0–1.9)
BILIRUB SERPL-MCNC: 0.4 MG/DL (ref 0.1–1)
BUN SERPL-MCNC: 17 MG/DL (ref 8–23)
CALCIUM SERPL-MCNC: 9.1 MG/DL (ref 8.7–10.5)
CHLORIDE SERPL-SCNC: 105 MMOL/L (ref 95–110)
CO2 SERPL-SCNC: 25 MMOL/L (ref 23–29)
CREAT SERPL-MCNC: 1.1 MG/DL (ref 0.5–1.4)
CRP SERPL-MCNC: 3.6 MG/L (ref 0–8.2)
DIFFERENTIAL METHOD BLD: ABNORMAL
EOSINOPHIL # BLD AUTO: 0.3 K/UL (ref 0–0.5)
EOSINOPHIL NFR BLD: 4.1 % (ref 0–8)
ERYTHROCYTE [DISTWIDTH] IN BLOOD BY AUTOMATED COUNT: 14.9 % (ref 11.5–14.5)
ERYTHROCYTE [SEDIMENTATION RATE] IN BLOOD BY PHOTOMETRIC METHOD: 22 MM/HR (ref 0–23)
EST. GFR  (NO RACE VARIABLE): >60 ML/MIN/1.73 M^2
GLUCOSE SERPL-MCNC: 67 MG/DL (ref 70–110)
HCT VFR BLD AUTO: 44.2 % (ref 40–54)
HGB BLD-MCNC: 14.2 G/DL (ref 14–18)
IMM GRANULOCYTES # BLD AUTO: 0.02 K/UL (ref 0–0.04)
IMM GRANULOCYTES NFR BLD AUTO: 0.3 % (ref 0–0.5)
LYMPHOCYTES # BLD AUTO: 2.4 K/UL (ref 1–4.8)
LYMPHOCYTES NFR BLD: 36.4 % (ref 18–48)
MCH RBC QN AUTO: 27.6 PG (ref 27–31)
MCHC RBC AUTO-ENTMCNC: 32.1 G/DL (ref 32–36)
MCV RBC AUTO: 86 FL (ref 82–98)
MONOCYTES # BLD AUTO: 0.8 K/UL (ref 0.3–1)
MONOCYTES NFR BLD: 12.1 % (ref 4–15)
NEUTROPHILS # BLD AUTO: 3 K/UL (ref 1.8–7.7)
NEUTROPHILS NFR BLD: 46.6 % (ref 38–73)
NRBC BLD-RTO: 0 /100 WBC
PLATELET # BLD AUTO: 180 K/UL (ref 150–450)
PMV BLD AUTO: 12.3 FL (ref 9.2–12.9)
POTASSIUM SERPL-SCNC: 3.5 MMOL/L (ref 3.5–5.1)
PREALB SERPL-MCNC: 17 MG/DL (ref 20–43)
PROT SERPL-MCNC: 7.3 G/DL (ref 6–8.4)
RBC # BLD AUTO: 5.15 M/UL (ref 4.6–6.2)
SODIUM SERPL-SCNC: 138 MMOL/L (ref 136–145)
WBC # BLD AUTO: 6.51 K/UL (ref 3.9–12.7)

## 2024-07-01 PROCEDURE — 84134 ASSAY OF PREALBUMIN: CPT

## 2024-07-01 PROCEDURE — 86140 C-REACTIVE PROTEIN: CPT

## 2024-07-01 PROCEDURE — 80053 COMPREHEN METABOLIC PANEL: CPT

## 2024-07-01 PROCEDURE — 85025 COMPLETE CBC W/AUTO DIFF WBC: CPT

## 2024-07-01 PROCEDURE — 85652 RBC SED RATE AUTOMATED: CPT

## 2024-07-23 ENCOUNTER — EXTERNAL HOME HEALTH (OUTPATIENT)
Dept: HOME HEALTH SERVICES | Facility: HOSPITAL | Age: 69
End: 2024-07-23
Payer: MEDICARE

## 2024-08-01 ENCOUNTER — PATIENT OUTREACH (OUTPATIENT)
Dept: ADMINISTRATIVE | Facility: HOSPITAL | Age: 69
End: 2024-08-01
Payer: MEDICARE

## 2024-08-02 ENCOUNTER — LAB VISIT (OUTPATIENT)
Dept: LAB | Facility: HOSPITAL | Age: 69
End: 2024-08-02
Attending: STUDENT IN AN ORGANIZED HEALTH CARE EDUCATION/TRAINING PROGRAM
Payer: MEDICARE

## 2024-08-02 DIAGNOSIS — E78.5 HYPERLIPEMIA: Primary | ICD-10-CM

## 2024-08-02 LAB
ALBUMIN SERPL BCP-MCNC: 3.6 G/DL (ref 3.5–5.2)
ALP SERPL-CCNC: 83 U/L (ref 55–135)
ALT SERPL W/O P-5'-P-CCNC: 12 U/L (ref 10–44)
ANION GAP SERPL CALC-SCNC: 8 MMOL/L (ref 8–16)
AST SERPL-CCNC: 22 U/L (ref 10–40)
BASOPHILS # BLD AUTO: 0.04 K/UL (ref 0–0.2)
BASOPHILS NFR BLD: 0.6 % (ref 0–1.9)
BILIRUB SERPL-MCNC: 0.3 MG/DL (ref 0.1–1)
BUN SERPL-MCNC: 18 MG/DL (ref 8–23)
CALCIUM SERPL-MCNC: 9.2 MG/DL (ref 8.7–10.5)
CHLORIDE SERPL-SCNC: 106 MMOL/L (ref 95–110)
CO2 SERPL-SCNC: 23 MMOL/L (ref 23–29)
CREAT SERPL-MCNC: 0.9 MG/DL (ref 0.5–1.4)
CRP SERPL-MCNC: 4.7 MG/L (ref 0–8.2)
DIFFERENTIAL METHOD BLD: ABNORMAL
EOSINOPHIL # BLD AUTO: 0.2 K/UL (ref 0–0.5)
EOSINOPHIL NFR BLD: 3.8 % (ref 0–8)
ERYTHROCYTE [DISTWIDTH] IN BLOOD BY AUTOMATED COUNT: 15 % (ref 11.5–14.5)
ERYTHROCYTE [SEDIMENTATION RATE] IN BLOOD BY PHOTOMETRIC METHOD: 32 MM/HR (ref 0–23)
EST. GFR  (NO RACE VARIABLE): >60 ML/MIN/1.73 M^2
GLUCOSE SERPL-MCNC: 79 MG/DL (ref 70–110)
HCT VFR BLD AUTO: 43 % (ref 40–54)
HGB BLD-MCNC: 14.2 G/DL (ref 14–18)
IMM GRANULOCYTES # BLD AUTO: 0.02 K/UL (ref 0–0.04)
IMM GRANULOCYTES NFR BLD AUTO: 0.3 % (ref 0–0.5)
LYMPHOCYTES # BLD AUTO: 1.9 K/UL (ref 1–4.8)
LYMPHOCYTES NFR BLD: 30.3 % (ref 18–48)
MCH RBC QN AUTO: 28.2 PG (ref 27–31)
MCHC RBC AUTO-ENTMCNC: 33 G/DL (ref 32–36)
MCV RBC AUTO: 86 FL (ref 82–98)
MONOCYTES # BLD AUTO: 0.7 K/UL (ref 0.3–1)
MONOCYTES NFR BLD: 11.7 % (ref 4–15)
NEUTROPHILS # BLD AUTO: 3.4 K/UL (ref 1.8–7.7)
NEUTROPHILS NFR BLD: 53.3 % (ref 38–73)
NRBC BLD-RTO: 0 /100 WBC
PLATELET # BLD AUTO: 210 K/UL (ref 150–450)
PMV BLD AUTO: 12.1 FL (ref 9.2–12.9)
POTASSIUM SERPL-SCNC: 3.8 MMOL/L (ref 3.5–5.1)
PROT SERPL-MCNC: 7.5 G/DL (ref 6–8.4)
RBC # BLD AUTO: 5.03 M/UL (ref 4.6–6.2)
SODIUM SERPL-SCNC: 137 MMOL/L (ref 136–145)
WBC # BLD AUTO: 6.33 K/UL (ref 3.9–12.7)

## 2024-08-02 PROCEDURE — 80053 COMPREHEN METABOLIC PANEL: CPT | Performed by: STUDENT IN AN ORGANIZED HEALTH CARE EDUCATION/TRAINING PROGRAM

## 2024-08-02 PROCEDURE — 86140 C-REACTIVE PROTEIN: CPT | Performed by: STUDENT IN AN ORGANIZED HEALTH CARE EDUCATION/TRAINING PROGRAM

## 2024-08-02 PROCEDURE — 84134 ASSAY OF PREALBUMIN: CPT | Performed by: STUDENT IN AN ORGANIZED HEALTH CARE EDUCATION/TRAINING PROGRAM

## 2024-08-02 PROCEDURE — 85652 RBC SED RATE AUTOMATED: CPT | Performed by: STUDENT IN AN ORGANIZED HEALTH CARE EDUCATION/TRAINING PROGRAM

## 2024-08-02 PROCEDURE — 85025 COMPLETE CBC W/AUTO DIFF WBC: CPT | Performed by: STUDENT IN AN ORGANIZED HEALTH CARE EDUCATION/TRAINING PROGRAM

## 2024-08-05 LAB — PREALB SERPL-MCNC: 17 MG/DL (ref 20–43)

## 2024-08-26 ENCOUNTER — CARE AT HOME (OUTPATIENT)
Dept: HOME HEALTH SERVICES | Facility: CLINIC | Age: 69
End: 2024-08-26
Payer: MEDICARE

## 2024-08-26 DIAGNOSIS — I10 BENIGN ESSENTIAL HTN: ICD-10-CM

## 2024-08-26 DIAGNOSIS — M17.0 PRIMARY OSTEOARTHRITIS OF BOTH KNEES: ICD-10-CM

## 2024-08-26 DIAGNOSIS — R23.8 BLISTERS OF MULTIPLE SITES: ICD-10-CM

## 2024-08-26 DIAGNOSIS — I51.89 GRADE I DIASTOLIC DYSFUNCTION: ICD-10-CM

## 2024-08-26 DIAGNOSIS — I89.0 LYMPHEDEMA: ICD-10-CM

## 2024-08-26 DIAGNOSIS — E66.01 MORBID OBESITY: Primary | ICD-10-CM

## 2024-08-26 PROCEDURE — 99497 ADVNCD CARE PLAN 30 MIN: CPT | Mod: S$GLB,,, | Performed by: NURSE PRACTITIONER

## 2024-08-26 PROCEDURE — 99348 HOME/RES VST EST LOW MDM 30: CPT | Mod: 25,S$GLB,, | Performed by: NURSE PRACTITIONER

## 2024-08-27 VITALS
DIASTOLIC BLOOD PRESSURE: 74 MMHG | SYSTOLIC BLOOD PRESSURE: 154 MMHG | HEART RATE: 73 BPM | TEMPERATURE: 98 F | RESPIRATION RATE: 18 BRPM | OXYGEN SATURATION: 98 %

## 2024-08-28 NOTE — ASSESSMENT & PLAN NOTE
BP elevated  Continue meds as prescribed  Non-compliant with low Na diet  Educated on importance of following Low Na diet
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
## 2024-08-28 NOTE — ASSESSMENT & PLAN NOTE
Peripheral edema improved  Denies chest pain, SOB  Continue medication as prescribed  Keep scheduled follow up appts  Activity and Diet restrictions:   Recommend 2-3 gram sodium restriction and 1500cc- 2000cc fluid restriction.  Encourage physical activity with graded exercise program.  Requested patient to weigh themselves daily, and to notify us if their weight increases by more than 3 lbs in 1 day or 5 lbs in 3 days.  Elevated lower extremities while sitting/lying, compression stockings

## 2024-08-28 NOTE — ASSESSMENT & PLAN NOTE
improved  [facility]   Continue furosemide as prescribed   Low Na diet

## 2024-08-28 NOTE — ASSESSMENT & PLAN NOTE
Wounds to lower extremities, [facility]

## 2024-09-06 ENCOUNTER — LAB VISIT (OUTPATIENT)
Dept: LAB | Facility: HOSPITAL | Age: 69
End: 2024-09-06
Payer: MEDICARE

## 2024-09-06 DIAGNOSIS — I87.2 PERIPHERAL VENOUS INSUFFICIENCY: Primary | ICD-10-CM

## 2024-09-06 LAB
ALBUMIN SERPL BCP-MCNC: 3.7 G/DL (ref 3.5–5.2)
ALP SERPL-CCNC: 86 U/L (ref 55–135)
ALT SERPL W/O P-5'-P-CCNC: 10 U/L (ref 10–44)
ANION GAP SERPL CALC-SCNC: 11 MMOL/L (ref 8–16)
AST SERPL-CCNC: 20 U/L (ref 10–40)
BASOPHILS # BLD AUTO: 0.04 K/UL (ref 0–0.2)
BASOPHILS NFR BLD: 0.7 % (ref 0–1.9)
BILIRUB SERPL-MCNC: 0.6 MG/DL (ref 0.1–1)
BUN SERPL-MCNC: 18 MG/DL (ref 8–23)
CALCIUM SERPL-MCNC: 9.2 MG/DL (ref 8.7–10.5)
CHLORIDE SERPL-SCNC: 104 MMOL/L (ref 95–110)
CO2 SERPL-SCNC: 23 MMOL/L (ref 23–29)
CREAT SERPL-MCNC: 0.9 MG/DL (ref 0.5–1.4)
CRP SERPL-MCNC: 4.2 MG/L (ref 0–8.2)
DIFFERENTIAL METHOD BLD: ABNORMAL
EOSINOPHIL # BLD AUTO: 0.2 K/UL (ref 0–0.5)
EOSINOPHIL NFR BLD: 3.3 % (ref 0–8)
ERYTHROCYTE [DISTWIDTH] IN BLOOD BY AUTOMATED COUNT: 14.4 % (ref 11.5–14.5)
ERYTHROCYTE [SEDIMENTATION RATE] IN BLOOD BY PHOTOMETRIC METHOD: 18 MM/HR (ref 0–23)
EST. GFR  (NO RACE VARIABLE): >60 ML/MIN/1.73 M^2
GLUCOSE SERPL-MCNC: 60 MG/DL (ref 70–110)
HCT VFR BLD AUTO: 45.2 % (ref 40–54)
HGB BLD-MCNC: 14 G/DL (ref 14–18)
IMM GRANULOCYTES # BLD AUTO: 0.02 K/UL (ref 0–0.04)
IMM GRANULOCYTES NFR BLD AUTO: 0.3 % (ref 0–0.5)
LYMPHOCYTES # BLD AUTO: 2.1 K/UL (ref 1–4.8)
LYMPHOCYTES NFR BLD: 34.9 % (ref 18–48)
MCH RBC QN AUTO: 27.5 PG (ref 27–31)
MCHC RBC AUTO-ENTMCNC: 31 G/DL (ref 32–36)
MCV RBC AUTO: 89 FL (ref 82–98)
MONOCYTES # BLD AUTO: 0.8 K/UL (ref 0.3–1)
MONOCYTES NFR BLD: 13 % (ref 4–15)
NEUTROPHILS # BLD AUTO: 2.9 K/UL (ref 1.8–7.7)
NEUTROPHILS NFR BLD: 47.8 % (ref 38–73)
NRBC BLD-RTO: 0 /100 WBC
PLATELET # BLD AUTO: 209 K/UL (ref 150–450)
PMV BLD AUTO: 11.7 FL (ref 9.2–12.9)
POTASSIUM SERPL-SCNC: 3.4 MMOL/L (ref 3.5–5.1)
PREALB SERPL-MCNC: 19 MG/DL (ref 20–43)
PROT SERPL-MCNC: 7.5 G/DL (ref 6–8.4)
RBC # BLD AUTO: 5.09 M/UL (ref 4.6–6.2)
SODIUM SERPL-SCNC: 138 MMOL/L (ref 136–145)
WBC # BLD AUTO: 6.02 K/UL (ref 3.9–12.7)

## 2024-09-06 PROCEDURE — 80053 COMPREHEN METABOLIC PANEL: CPT

## 2024-09-06 PROCEDURE — 84134 ASSAY OF PREALBUMIN: CPT

## 2024-09-06 PROCEDURE — 85025 COMPLETE CBC W/AUTO DIFF WBC: CPT

## 2024-09-06 PROCEDURE — 85652 RBC SED RATE AUTOMATED: CPT

## 2024-09-06 PROCEDURE — 86140 C-REACTIVE PROTEIN: CPT

## 2024-09-07 DIAGNOSIS — G62.9 NEUROPATHY: ICD-10-CM

## 2024-09-07 RX ORDER — GABAPENTIN 600 MG/1
600 TABLET ORAL 3 TIMES DAILY
Qty: 180 TABLET | Refills: 3 | Status: SHIPPED | OUTPATIENT
Start: 2024-09-07

## 2024-09-07 RX ORDER — GABAPENTIN 600 MG/1
600 TABLET ORAL 3 TIMES DAILY
Qty: 180 TABLET | Refills: 3 | Status: SHIPPED | OUTPATIENT
Start: 2024-09-07 | End: 2024-09-07 | Stop reason: SDUPTHER

## 2024-09-17 ENCOUNTER — TELEPHONE (OUTPATIENT)
Dept: INTERNAL MEDICINE | Facility: CLINIC | Age: 69
End: 2024-09-17
Payer: MEDICARE

## 2024-09-17 ENCOUNTER — EXTERNAL HOME HEALTH (OUTPATIENT)
Dept: HOME HEALTH SERVICES | Facility: HOSPITAL | Age: 69
End: 2024-09-17
Payer: MEDICARE

## 2024-09-18 NOTE — TELEPHONE ENCOUNTER
Patient has not been seen in clinic by myself since 2021. Needs to schedule appointment asap to follow up, thank you!

## 2024-09-23 ENCOUNTER — TELEPHONE (OUTPATIENT)
Dept: HOME HEALTH SERVICES | Facility: CLINIC | Age: 69
End: 2024-09-23
Payer: MEDICARE

## 2024-09-24 NOTE — TELEPHONE ENCOUNTER
Home health called to report patient wound has increased moisture.  Patient is current with [facility] home wound care provider.  I instructed home health to notify [facility] of any wound concerns.

## 2024-09-28 ENCOUNTER — TELEPHONE (OUTPATIENT)
Dept: HOME HEALTH SERVICES | Facility: CLINIC | Age: 69
End: 2024-09-28
Payer: MEDICARE

## 2024-09-28 NOTE — TELEPHONE ENCOUNTER
I have received messages from Gabriela with [facility].  I have reached out to Gabriela and the [facility] supervisor, Ketan,  to make them aware [facility] and to consult with them. Thanks

## 2024-09-30 ENCOUNTER — LAB VISIT (OUTPATIENT)
Dept: LAB | Facility: HOSPITAL | Age: 69
End: 2024-09-30
Payer: MEDICARE

## 2024-09-30 DIAGNOSIS — N18.9 CHRONIC KIDNEY DISEASE, UNSPECIFIED: ICD-10-CM

## 2024-09-30 DIAGNOSIS — E66.01 MORBID OBESITY: ICD-10-CM

## 2024-09-30 DIAGNOSIS — I89.0 OBLITERATION OF LYMPHATIC VESSEL: ICD-10-CM

## 2024-09-30 DIAGNOSIS — L89.150 UNSTAGEABLE PRESSURE ULCER OF SACRAL REGION: ICD-10-CM

## 2024-09-30 DIAGNOSIS — I87.2 PERIPHERAL VENOUS INSUFFICIENCY: Primary | ICD-10-CM

## 2024-09-30 LAB
ALBUMIN SERPL BCP-MCNC: 3.7 G/DL (ref 3.5–5.2)
ALP SERPL-CCNC: 81 U/L (ref 55–135)
ALT SERPL W/O P-5'-P-CCNC: 14 U/L (ref 10–44)
ANION GAP SERPL CALC-SCNC: 8 MMOL/L (ref 8–16)
AST SERPL-CCNC: 20 U/L (ref 10–40)
BASOPHILS # BLD AUTO: 0.04 K/UL (ref 0–0.2)
BASOPHILS NFR BLD: 0.7 % (ref 0–1.9)
BILIRUB SERPL-MCNC: 0.6 MG/DL (ref 0.1–1)
BUN SERPL-MCNC: 16 MG/DL (ref 8–23)
CALCIUM SERPL-MCNC: 9.2 MG/DL (ref 8.7–10.5)
CHLORIDE SERPL-SCNC: 105 MMOL/L (ref 95–110)
CO2 SERPL-SCNC: 24 MMOL/L (ref 23–29)
CREAT SERPL-MCNC: 0.9 MG/DL (ref 0.5–1.4)
CRP SERPL-MCNC: 4.4 MG/L (ref 0–8.2)
DIFFERENTIAL METHOD BLD: ABNORMAL
EOSINOPHIL # BLD AUTO: 0.2 K/UL (ref 0–0.5)
EOSINOPHIL NFR BLD: 3.4 % (ref 0–8)
ERYTHROCYTE [DISTWIDTH] IN BLOOD BY AUTOMATED COUNT: 14.3 % (ref 11.5–14.5)
EST. GFR  (NO RACE VARIABLE): >60 ML/MIN/1.73 M^2
GLUCOSE SERPL-MCNC: 67 MG/DL (ref 70–110)
HCT VFR BLD AUTO: 45.5 % (ref 40–54)
HGB BLD-MCNC: 14.5 G/DL (ref 14–18)
IMM GRANULOCYTES # BLD AUTO: 0.01 K/UL (ref 0–0.04)
IMM GRANULOCYTES NFR BLD AUTO: 0.2 % (ref 0–0.5)
LYMPHOCYTES # BLD AUTO: 2.1 K/UL (ref 1–4.8)
LYMPHOCYTES NFR BLD: 36 % (ref 18–48)
MCH RBC QN AUTO: 27.7 PG (ref 27–31)
MCHC RBC AUTO-ENTMCNC: 31.9 G/DL (ref 32–36)
MCV RBC AUTO: 87 FL (ref 82–98)
MONOCYTES # BLD AUTO: 0.8 K/UL (ref 0.3–1)
MONOCYTES NFR BLD: 12.9 % (ref 4–15)
NEUTROPHILS # BLD AUTO: 2.7 K/UL (ref 1.8–7.7)
NEUTROPHILS NFR BLD: 46.8 % (ref 38–73)
NRBC BLD-RTO: 0 /100 WBC
PLATELET # BLD AUTO: 244 K/UL (ref 150–450)
PMV BLD AUTO: 11.5 FL (ref 9.2–12.9)
POTASSIUM SERPL-SCNC: 3.9 MMOL/L (ref 3.5–5.1)
PROT SERPL-MCNC: 7.9 G/DL (ref 6–8.4)
RBC # BLD AUTO: 5.23 M/UL (ref 4.6–6.2)
SODIUM SERPL-SCNC: 137 MMOL/L (ref 136–145)
WBC # BLD AUTO: 5.83 K/UL (ref 3.9–12.7)

## 2024-09-30 PROCEDURE — 85025 COMPLETE CBC W/AUTO DIFF WBC: CPT | Performed by: STUDENT IN AN ORGANIZED HEALTH CARE EDUCATION/TRAINING PROGRAM

## 2024-09-30 PROCEDURE — 85652 RBC SED RATE AUTOMATED: CPT | Performed by: STUDENT IN AN ORGANIZED HEALTH CARE EDUCATION/TRAINING PROGRAM

## 2024-09-30 PROCEDURE — 84134 ASSAY OF PREALBUMIN: CPT | Performed by: STUDENT IN AN ORGANIZED HEALTH CARE EDUCATION/TRAINING PROGRAM

## 2024-09-30 PROCEDURE — 86140 C-REACTIVE PROTEIN: CPT | Performed by: STUDENT IN AN ORGANIZED HEALTH CARE EDUCATION/TRAINING PROGRAM

## 2024-09-30 PROCEDURE — 80053 COMPREHEN METABOLIC PANEL: CPT | Performed by: STUDENT IN AN ORGANIZED HEALTH CARE EDUCATION/TRAINING PROGRAM

## 2024-10-01 LAB
ERYTHROCYTE [SEDIMENTATION RATE] IN BLOOD BY PHOTOMETRIC METHOD: 25 MM/HR (ref 0–23)
PREALB SERPL-MCNC: 21 MG/DL (ref 20–43)

## 2024-10-03 ENCOUNTER — CARE AT HOME (OUTPATIENT)
Dept: HOME HEALTH SERVICES | Facility: CLINIC | Age: 69
End: 2024-10-03
Payer: MEDICARE

## 2024-10-03 DIAGNOSIS — R53.81 PHYSICAL DECONDITIONING: ICD-10-CM

## 2024-10-03 DIAGNOSIS — E66.01 MORBID OBESITY: Primary | ICD-10-CM

## 2024-10-03 DIAGNOSIS — I51.89 GRADE I DIASTOLIC DYSFUNCTION: ICD-10-CM

## 2024-10-03 DIAGNOSIS — I10 BENIGN ESSENTIAL HTN: ICD-10-CM

## 2024-10-03 DIAGNOSIS — R23.8 BLISTERS OF MULTIPLE SITES: ICD-10-CM

## 2024-10-03 PROCEDURE — 99348 HOME/RES VST EST LOW MDM 30: CPT | Mod: S$GLB,,, | Performed by: NURSE PRACTITIONER

## 2024-10-06 VITALS
RESPIRATION RATE: 20 BRPM | OXYGEN SATURATION: 98 % | SYSTOLIC BLOOD PRESSURE: 171 MMHG | HEART RATE: 79 BPM | DIASTOLIC BLOOD PRESSURE: 84 MMHG | TEMPERATURE: 98 F

## 2024-10-06 RX ORDER — HYDRALAZINE HYDROCHLORIDE 25 MG/1
25 TABLET, FILM COATED ORAL EVERY 12 HOURS
COMMUNITY

## 2024-10-06 RX ORDER — CARVEDILOL 25 MG/1
25 TABLET ORAL 2 TIMES DAILY WITH MEALS
Qty: 60 TABLET | Refills: 11 | Status: SHIPPED | OUTPATIENT
Start: 2024-10-06

## 2024-10-07 NOTE — ASSESSMENT & PLAN NOTE
Wounds to lower extremities, [facility]

## 2024-10-07 NOTE — ASSESSMENT & PLAN NOTE
BP elevated  Continue amlodipine, coreg, lisinopril, furosemide  Added Hydralazine 25mg bid  He endorses high Na diet, discussed the importance of following low Na diet
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
## 2024-10-16 PROCEDURE — G0179 MD RECERTIFICATION HHA PT: HCPCS | Mod: ,,, | Performed by: STUDENT IN AN ORGANIZED HEALTH CARE EDUCATION/TRAINING PROGRAM

## 2024-10-31 ENCOUNTER — EXTERNAL HOME HEALTH (OUTPATIENT)
Dept: HOME HEALTH SERVICES | Facility: HOSPITAL | Age: 69
End: 2024-10-31
Payer: MEDICARE

## 2024-11-06 ENCOUNTER — LAB VISIT (OUTPATIENT)
Dept: LAB | Facility: HOSPITAL | Age: 69
End: 2024-11-06
Payer: MEDICARE

## 2024-11-06 DIAGNOSIS — I89.0 OBLITERATION OF LYMPHATIC VESSEL: ICD-10-CM

## 2024-11-06 DIAGNOSIS — E66.01 MORBID OBESITY: ICD-10-CM

## 2024-11-06 DIAGNOSIS — I12.9 PARENCHYMAL RENAL HYPERTENSION: ICD-10-CM

## 2024-11-06 DIAGNOSIS — L97.112 NON-PRESSURE CHRONIC ULCER OF RIGHT THIGH WITH FAT LAYER EXPOSED: ICD-10-CM

## 2024-11-06 DIAGNOSIS — I87.2 PERIPHERAL VENOUS INSUFFICIENCY: Primary | ICD-10-CM

## 2024-11-06 LAB
ALBUMIN SERPL BCP-MCNC: 3.7 G/DL (ref 3.5–5.2)
ALP SERPL-CCNC: 88 U/L (ref 40–150)
ALT SERPL W/O P-5'-P-CCNC: 16 U/L (ref 10–44)
ANION GAP SERPL CALC-SCNC: 12 MMOL/L (ref 8–16)
AST SERPL-CCNC: 23 U/L (ref 10–40)
BILIRUB SERPL-MCNC: 0.5 MG/DL (ref 0.1–1)
BUN SERPL-MCNC: 20 MG/DL (ref 8–23)
CALCIUM SERPL-MCNC: 9.1 MG/DL (ref 8.7–10.5)
CHLORIDE SERPL-SCNC: 105 MMOL/L (ref 95–110)
CO2 SERPL-SCNC: 22 MMOL/L (ref 23–29)
CREAT SERPL-MCNC: 0.9 MG/DL (ref 0.5–1.4)
CRP SERPL-MCNC: 4.3 MG/L (ref 0–8.2)
EST. GFR  (NO RACE VARIABLE): >60 ML/MIN/1.73 M^2
GLUCOSE SERPL-MCNC: 69 MG/DL (ref 70–110)
POTASSIUM SERPL-SCNC: 3.5 MMOL/L (ref 3.5–5.1)
PREALB SERPL-MCNC: 20 MG/DL (ref 20–43)
PROT SERPL-MCNC: 7.8 G/DL (ref 6–8.4)
SODIUM SERPL-SCNC: 139 MMOL/L (ref 136–145)

## 2024-11-06 PROCEDURE — 84134 ASSAY OF PREALBUMIN: CPT

## 2024-11-06 PROCEDURE — 80053 COMPREHEN METABOLIC PANEL: CPT

## 2024-11-06 PROCEDURE — 86140 C-REACTIVE PROTEIN: CPT

## 2024-12-01 RX ORDER — AMLODIPINE BESYLATE 10 MG/1
10 TABLET ORAL
Qty: 30 TABLET | Refills: 11 | Status: SHIPPED | OUTPATIENT
Start: 2024-12-01

## 2024-12-01 RX ORDER — ATORVASTATIN CALCIUM 40 MG/1
40 TABLET, FILM COATED ORAL
Qty: 30 TABLET | Refills: 11 | Status: SHIPPED | OUTPATIENT
Start: 2024-12-01

## 2024-12-02 ENCOUNTER — LAB VISIT (OUTPATIENT)
Dept: LAB | Facility: HOSPITAL | Age: 69
End: 2024-12-02
Payer: MEDICARE

## 2024-12-02 DIAGNOSIS — E66.01 MORBID OBESITY: ICD-10-CM

## 2024-12-02 DIAGNOSIS — I87.2 PERIPHERAL VENOUS INSUFFICIENCY: ICD-10-CM

## 2024-12-02 DIAGNOSIS — L97.112 NON-PRESSURE CHRONIC ULCER OF RIGHT THIGH WITH FAT LAYER EXPOSED: ICD-10-CM

## 2024-12-02 DIAGNOSIS — I89.0 OBLITERATION OF LYMPHATIC VESSEL: ICD-10-CM

## 2024-12-02 DIAGNOSIS — N18.9 CHRONIC KIDNEY DISEASE, UNSPECIFIED: Primary | ICD-10-CM

## 2024-12-02 LAB
ALBUMIN SERPL BCP-MCNC: 3.7 G/DL (ref 3.5–5.2)
ALP SERPL-CCNC: 85 U/L (ref 40–150)
ALT SERPL W/O P-5'-P-CCNC: 13 U/L (ref 10–44)
ANION GAP SERPL CALC-SCNC: 14 MMOL/L (ref 8–16)
AST SERPL-CCNC: 22 U/L (ref 10–40)
BASOPHILS # BLD AUTO: 0.05 K/UL (ref 0–0.2)
BASOPHILS NFR BLD: 0.9 % (ref 0–1.9)
BILIRUB SERPL-MCNC: 0.5 MG/DL (ref 0.1–1)
BUN SERPL-MCNC: 20 MG/DL (ref 8–23)
CALCIUM SERPL-MCNC: 9.3 MG/DL (ref 8.7–10.5)
CHLORIDE SERPL-SCNC: 105 MMOL/L (ref 95–110)
CO2 SERPL-SCNC: 19 MMOL/L (ref 23–29)
CREAT SERPL-MCNC: 0.9 MG/DL (ref 0.5–1.4)
CRP SERPL-MCNC: 5.9 MG/L (ref 0–8.2)
DIFFERENTIAL METHOD BLD: ABNORMAL
EOSINOPHIL # BLD AUTO: 0.3 K/UL (ref 0–0.5)
EOSINOPHIL NFR BLD: 4.4 % (ref 0–8)
ERYTHROCYTE [DISTWIDTH] IN BLOOD BY AUTOMATED COUNT: 14.8 % (ref 11.5–14.5)
ERYTHROCYTE [SEDIMENTATION RATE] IN BLOOD BY PHOTOMETRIC METHOD: 43 MM/HR (ref 0–23)
EST. GFR  (NO RACE VARIABLE): >60 ML/MIN/1.73 M^2
GLUCOSE SERPL-MCNC: 53 MG/DL (ref 70–110)
HCT VFR BLD AUTO: 46.6 % (ref 40–54)
HGB BLD-MCNC: 14.6 G/DL (ref 14–18)
IMM GRANULOCYTES # BLD AUTO: 0.01 K/UL (ref 0–0.04)
IMM GRANULOCYTES NFR BLD AUTO: 0.2 % (ref 0–0.5)
LYMPHOCYTES # BLD AUTO: 1.8 K/UL (ref 1–4.8)
LYMPHOCYTES NFR BLD: 32.5 % (ref 18–48)
MCH RBC QN AUTO: 27.5 PG (ref 27–31)
MCHC RBC AUTO-ENTMCNC: 31.3 G/DL (ref 32–36)
MCV RBC AUTO: 88 FL (ref 82–98)
MONOCYTES # BLD AUTO: 0.8 K/UL (ref 0.3–1)
MONOCYTES NFR BLD: 13.5 % (ref 4–15)
NEUTROPHILS # BLD AUTO: 2.7 K/UL (ref 1.8–7.7)
NEUTROPHILS NFR BLD: 48.5 % (ref 38–73)
NRBC BLD-RTO: 0 /100 WBC
PLATELET # BLD AUTO: 215 K/UL (ref 150–450)
PMV BLD AUTO: 11.8 FL (ref 9.2–12.9)
POTASSIUM SERPL-SCNC: 3.5 MMOL/L (ref 3.5–5.1)
PROT SERPL-MCNC: 7.7 G/DL (ref 6–8.4)
RBC # BLD AUTO: 5.3 M/UL (ref 4.6–6.2)
SODIUM SERPL-SCNC: 138 MMOL/L (ref 136–145)
WBC # BLD AUTO: 5.63 K/UL (ref 3.9–12.7)

## 2024-12-02 PROCEDURE — 85025 COMPLETE CBC W/AUTO DIFF WBC: CPT

## 2024-12-02 PROCEDURE — 84134 ASSAY OF PREALBUMIN: CPT

## 2024-12-02 PROCEDURE — 80053 COMPREHEN METABOLIC PANEL: CPT

## 2024-12-02 PROCEDURE — 85652 RBC SED RATE AUTOMATED: CPT

## 2024-12-02 PROCEDURE — 86140 C-REACTIVE PROTEIN: CPT

## 2024-12-03 LAB — PREALB SERPL-MCNC: 19 MG/DL (ref 20–43)

## 2024-12-11 ENCOUNTER — CARE AT HOME (OUTPATIENT)
Dept: HOME HEALTH SERVICES | Facility: CLINIC | Age: 69
End: 2024-12-11
Payer: MEDICARE

## 2024-12-11 DIAGNOSIS — I89.0 LYMPHEDEMA: ICD-10-CM

## 2024-12-11 DIAGNOSIS — I51.89 GRADE I DIASTOLIC DYSFUNCTION: ICD-10-CM

## 2024-12-11 DIAGNOSIS — I10 BENIGN ESSENTIAL HTN: ICD-10-CM

## 2024-12-11 DIAGNOSIS — E66.01 MORBID OBESITY: Primary | ICD-10-CM

## 2024-12-11 DIAGNOSIS — E78.2 MIXED HYPERLIPIDEMIA: ICD-10-CM

## 2024-12-11 PROCEDURE — 99348 HOME/RES VST EST LOW MDM 30: CPT | Mod: S$GLB,,, | Performed by: NURSE PRACTITIONER

## 2024-12-15 PROCEDURE — G0179 MD RECERTIFICATION HHA PT: HCPCS | Mod: ,,, | Performed by: STUDENT IN AN ORGANIZED HEALTH CARE EDUCATION/TRAINING PROGRAM

## 2024-12-17 VITALS
DIASTOLIC BLOOD PRESSURE: 72 MMHG | RESPIRATION RATE: 20 BRPM | SYSTOLIC BLOOD PRESSURE: 137 MMHG | OXYGEN SATURATION: 98 % | TEMPERATURE: 98 F | HEART RATE: 70 BPM
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
## 2024-12-17 NOTE — ASSESSMENT & PLAN NOTE
improved  Continue amlodipine, coreg, lisinopril, furosemide, hydralazine   He endorses high Na diet, discussed the importance of following low Na diet

## 2024-12-17 NOTE — ASSESSMENT & PLAN NOTE
Continue [facility] wound and lymphedema care   Continue furosemide as prescribed   Low Na diet

## 2025-01-10 ENCOUNTER — LAB VISIT (OUTPATIENT)
Dept: LAB | Facility: HOSPITAL | Age: 70
End: 2025-01-10
Attending: NURSE PRACTITIONER
Payer: MEDICARE

## 2025-01-10 DIAGNOSIS — I87.2 PERIPHERAL VENOUS INSUFFICIENCY: Primary | ICD-10-CM

## 2025-01-10 LAB
ALBUMIN SERPL BCP-MCNC: 3.2 G/DL (ref 3.5–5.2)
ALP SERPL-CCNC: 81 U/L (ref 40–150)
ALT SERPL W/O P-5'-P-CCNC: 19 U/L (ref 10–44)
ANION GAP SERPL CALC-SCNC: 9 MMOL/L (ref 8–16)
AST SERPL-CCNC: 23 U/L (ref 10–40)
BASOPHILS # BLD AUTO: 0.05 K/UL (ref 0–0.2)
BASOPHILS NFR BLD: 0.5 % (ref 0–1.9)
BILIRUB SERPL-MCNC: 0.7 MG/DL (ref 0.1–1)
BUN SERPL-MCNC: 15 MG/DL (ref 8–23)
CALCIUM SERPL-MCNC: 9.1 MG/DL (ref 8.7–10.5)
CHLORIDE SERPL-SCNC: 106 MMOL/L (ref 95–110)
CHOLEST SERPL-MCNC: 85 MG/DL (ref 120–199)
CHOLEST/HDLC SERPL: 2.8 {RATIO} (ref 2–5)
CO2 SERPL-SCNC: 22 MMOL/L (ref 23–29)
CREAT SERPL-MCNC: 0.9 MG/DL (ref 0.5–1.4)
DIFFERENTIAL METHOD BLD: ABNORMAL
EOSINOPHIL # BLD AUTO: 0.2 K/UL (ref 0–0.5)
EOSINOPHIL NFR BLD: 2.4 % (ref 0–8)
ERYTHROCYTE [DISTWIDTH] IN BLOOD BY AUTOMATED COUNT: 14.6 % (ref 11.5–14.5)
EST. GFR  (NO RACE VARIABLE): >60 ML/MIN/1.73 M^2
ESTIMATED AVG GLUCOSE: 103 MG/DL (ref 68–131)
GLUCOSE SERPL-MCNC: 55 MG/DL (ref 70–110)
HBA1C MFR BLD: 5.2 % (ref 4–5.6)
HCT VFR BLD AUTO: 40.8 % (ref 40–54)
HDLC SERPL-MCNC: 30 MG/DL (ref 40–75)
HDLC SERPL: 35.3 % (ref 20–50)
HGB BLD-MCNC: 13 G/DL (ref 14–18)
IMM GRANULOCYTES # BLD AUTO: 0.03 K/UL (ref 0–0.04)
IMM GRANULOCYTES NFR BLD AUTO: 0.3 % (ref 0–0.5)
LDLC SERPL CALC-MCNC: 48 MG/DL (ref 63–159)
LYMPHOCYTES # BLD AUTO: 2.1 K/UL (ref 1–4.8)
LYMPHOCYTES NFR BLD: 21.9 % (ref 18–48)
MAGNESIUM SERPL-MCNC: 2 MG/DL (ref 1.6–2.6)
MCH RBC QN AUTO: 27.8 PG (ref 27–31)
MCHC RBC AUTO-ENTMCNC: 31.9 G/DL (ref 32–36)
MCV RBC AUTO: 87 FL (ref 82–98)
MONOCYTES # BLD AUTO: 1.4 K/UL (ref 0.3–1)
MONOCYTES NFR BLD: 14.8 % (ref 4–15)
NEUTROPHILS # BLD AUTO: 5.7 K/UL (ref 1.8–7.7)
NEUTROPHILS NFR BLD: 60.1 % (ref 38–73)
NONHDLC SERPL-MCNC: 55 MG/DL
NRBC BLD-RTO: 0 /100 WBC
PHOSPHATE SERPL-MCNC: 2.7 MG/DL (ref 2.7–4.5)
PLATELET # BLD AUTO: 306 K/UL (ref 150–450)
PMV BLD AUTO: 11.1 FL (ref 9.2–12.9)
POTASSIUM SERPL-SCNC: 3.8 MMOL/L (ref 3.5–5.1)
PROT SERPL-MCNC: 8 G/DL (ref 6–8.4)
RBC # BLD AUTO: 4.68 M/UL (ref 4.6–6.2)
SODIUM SERPL-SCNC: 137 MMOL/L (ref 136–145)
TRIGL SERPL-MCNC: 35 MG/DL (ref 30–150)
TSH SERPL DL<=0.005 MIU/L-ACNC: 1.22 UIU/ML (ref 0.4–4)
WBC # BLD AUTO: 9.42 K/UL (ref 3.9–12.7)

## 2025-01-10 PROCEDURE — 83735 ASSAY OF MAGNESIUM: CPT | Performed by: NURSE PRACTITIONER

## 2025-01-10 PROCEDURE — 84100 ASSAY OF PHOSPHORUS: CPT | Performed by: NURSE PRACTITIONER

## 2025-01-10 PROCEDURE — 85025 COMPLETE CBC W/AUTO DIFF WBC: CPT | Performed by: NURSE PRACTITIONER

## 2025-01-10 PROCEDURE — 83036 HEMOGLOBIN GLYCOSYLATED A1C: CPT | Performed by: NURSE PRACTITIONER

## 2025-01-10 PROCEDURE — 80053 COMPREHEN METABOLIC PANEL: CPT | Performed by: NURSE PRACTITIONER

## 2025-01-10 PROCEDURE — 80061 LIPID PANEL: CPT | Performed by: NURSE PRACTITIONER

## 2025-01-10 PROCEDURE — 84443 ASSAY THYROID STIM HORMONE: CPT | Performed by: NURSE PRACTITIONER

## 2025-01-14 ENCOUNTER — CARE AT HOME (OUTPATIENT)
Dept: HOME HEALTH SERVICES | Facility: CLINIC | Age: 70
End: 2025-01-14
Payer: MEDICARE

## 2025-01-14 DIAGNOSIS — I51.89 GRADE I DIASTOLIC DYSFUNCTION: ICD-10-CM

## 2025-01-14 DIAGNOSIS — M17.0 PRIMARY OSTEOARTHRITIS OF BOTH KNEES: ICD-10-CM

## 2025-01-14 DIAGNOSIS — I10 HYPERTENSION, UNSPECIFIED TYPE: ICD-10-CM

## 2025-01-14 DIAGNOSIS — R07.9 CHEST PAIN, UNSPECIFIED TYPE: Primary | ICD-10-CM

## 2025-01-14 DIAGNOSIS — I87.8 VENOUS STASIS: ICD-10-CM

## 2025-01-14 PROCEDURE — 99350 HOME/RES VST EST HIGH MDM 60: CPT | Mod: S$GLB,,, | Performed by: NURSE PRACTITIONER

## 2025-01-14 RX ORDER — LISINOPRIL 40 MG/1
40 TABLET ORAL NIGHTLY
Status: ON HOLD
Start: 2025-01-14 | End: 2025-01-28 | Stop reason: HOSPADM

## 2025-01-15 ENCOUNTER — HOSPITAL ENCOUNTER (INPATIENT)
Facility: HOSPITAL | Age: 70
LOS: 13 days | Discharge: NURSING, REHAB OR LONG-TERM CARE | DRG: 602 | End: 2025-01-29
Attending: EMERGENCY MEDICINE | Admitting: INTERNAL MEDICINE
Payer: MEDICARE

## 2025-01-15 DIAGNOSIS — L02.91 ABSCESS: ICD-10-CM

## 2025-01-15 DIAGNOSIS — L03.115 CELLULITIS OF RIGHT THIGH: ICD-10-CM

## 2025-01-15 DIAGNOSIS — R53.81 DEBILITY: ICD-10-CM

## 2025-01-15 DIAGNOSIS — Z51.89 WOUND CHECK, ABSCESS: Primary | ICD-10-CM

## 2025-01-15 DIAGNOSIS — R40.20 LOC (LOSS OF CONSCIOUSNESS): ICD-10-CM

## 2025-01-15 LAB
ALBUMIN SERPL BCP-MCNC: 3 G/DL (ref 3.5–5.2)
ALLENS TEST: NORMAL
ALP SERPL-CCNC: 80 U/L (ref 40–150)
ALT SERPL W/O P-5'-P-CCNC: 25 U/L (ref 10–44)
ANION GAP SERPL CALC-SCNC: 8 MMOL/L (ref 8–16)
AST SERPL-CCNC: 29 U/L (ref 10–40)
BASOPHILS # BLD AUTO: 0.03 K/UL (ref 0–0.2)
BASOPHILS NFR BLD: 0.3 % (ref 0–1.9)
BILIRUB SERPL-MCNC: 0.4 MG/DL (ref 0.1–1)
BNP SERPL-MCNC: 82 PG/ML (ref 0–99)
BUN SERPL-MCNC: 15 MG/DL (ref 8–23)
CALCIUM SERPL-MCNC: 8.9 MG/DL (ref 8.7–10.5)
CHLORIDE SERPL-SCNC: 103 MMOL/L (ref 95–110)
CO2 SERPL-SCNC: 24 MMOL/L (ref 23–29)
CREAT SERPL-MCNC: 0.9 MG/DL (ref 0.5–1.4)
DIFFERENTIAL METHOD BLD: ABNORMAL
EOSINOPHIL # BLD AUTO: 0.1 K/UL (ref 0–0.5)
EOSINOPHIL NFR BLD: 1.4 % (ref 0–8)
ERYTHROCYTE [DISTWIDTH] IN BLOOD BY AUTOMATED COUNT: 13.9 % (ref 11.5–14.5)
EST. GFR  (NO RACE VARIABLE): >60 ML/MIN/1.73 M^2
GLUCOSE SERPL-MCNC: 85 MG/DL (ref 70–110)
HCT VFR BLD AUTO: 39.5 % (ref 40–54)
HCV AB SERPL QL IA: NORMAL
HGB BLD-MCNC: 13.2 G/DL (ref 14–18)
HIV 1+2 AB+HIV1 P24 AG SERPL QL IA: NORMAL
IMM GRANULOCYTES # BLD AUTO: 0.02 K/UL (ref 0–0.04)
IMM GRANULOCYTES NFR BLD AUTO: 0.2 % (ref 0–0.5)
LDH SERPL L TO P-CCNC: 1.31 MMOL/L (ref 0.5–2.2)
LYMPHOCYTES # BLD AUTO: 1.4 K/UL (ref 1–4.8)
LYMPHOCYTES NFR BLD: 14.1 % (ref 18–48)
MCH RBC QN AUTO: 28.1 PG (ref 27–31)
MCHC RBC AUTO-ENTMCNC: 33.4 G/DL (ref 32–36)
MCV RBC AUTO: 84 FL (ref 82–98)
MONOCYTES # BLD AUTO: 0.9 K/UL (ref 0.3–1)
MONOCYTES NFR BLD: 9.2 % (ref 4–15)
NEUTROPHILS # BLD AUTO: 7.5 K/UL (ref 1.8–7.7)
NEUTROPHILS NFR BLD: 74.8 % (ref 38–73)
NRBC BLD-RTO: 0 /100 WBC
PLATELET # BLD AUTO: 374 K/UL (ref 150–450)
PMV BLD AUTO: 10.1 FL (ref 9.2–12.9)
POTASSIUM SERPL-SCNC: 4 MMOL/L (ref 3.5–5.1)
PROT SERPL-MCNC: 8.4 G/DL (ref 6–8.4)
RBC # BLD AUTO: 4.7 M/UL (ref 4.6–6.2)
SAMPLE: NORMAL
SITE: NORMAL
SODIUM SERPL-SCNC: 135 MMOL/L (ref 136–145)
WBC # BLD AUTO: 9.99 K/UL (ref 3.9–12.7)

## 2025-01-15 PROCEDURE — 83605 ASSAY OF LACTIC ACID: CPT

## 2025-01-15 PROCEDURE — 99900035 HC TECH TIME PER 15 MIN (STAT)

## 2025-01-15 PROCEDURE — 25000003 PHARM REV CODE 250: Performed by: PHYSICIAN ASSISTANT

## 2025-01-15 PROCEDURE — 63600175 PHARM REV CODE 636 W HCPCS: Performed by: PHYSICIAN ASSISTANT

## 2025-01-15 PROCEDURE — 96365 THER/PROPH/DIAG IV INF INIT: CPT

## 2025-01-15 PROCEDURE — 87040 BLOOD CULTURE FOR BACTERIA: CPT

## 2025-01-15 PROCEDURE — 85025 COMPLETE CBC W/AUTO DIFF WBC: CPT

## 2025-01-15 PROCEDURE — 80053 COMPREHEN METABOLIC PANEL: CPT

## 2025-01-15 PROCEDURE — 83880 ASSAY OF NATRIURETIC PEPTIDE: CPT

## 2025-01-15 PROCEDURE — 99285 EMERGENCY DEPT VISIT HI MDM: CPT

## 2025-01-15 PROCEDURE — G0378 HOSPITAL OBSERVATION PER HR: HCPCS

## 2025-01-15 PROCEDURE — 87389 HIV-1 AG W/HIV-1&-2 AB AG IA: CPT | Performed by: PHYSICIAN ASSISTANT

## 2025-01-15 PROCEDURE — 86803 HEPATITIS C AB TEST: CPT | Performed by: PHYSICIAN ASSISTANT

## 2025-01-15 RX ORDER — FUROSEMIDE 40 MG/1
40 TABLET ORAL DAILY
Status: DISCONTINUED | OUTPATIENT
Start: 2025-01-16 | End: 2025-01-24

## 2025-01-15 RX ORDER — ONDANSETRON 8 MG/1
8 TABLET, ORALLY DISINTEGRATING ORAL EVERY 6 HOURS PRN
Status: DISCONTINUED | OUTPATIENT
Start: 2025-01-15 | End: 2025-01-29 | Stop reason: HOSPADM

## 2025-01-15 RX ORDER — CLINDAMYCIN PHOSPHATE 600 MG/50ML
600 INJECTION, SOLUTION INTRAVENOUS
Status: COMPLETED | OUTPATIENT
Start: 2025-01-15 | End: 2025-01-15

## 2025-01-15 RX ORDER — CARVEDILOL 25 MG/1
25 TABLET ORAL 2 TIMES DAILY
Status: DISCONTINUED | OUTPATIENT
Start: 2025-01-15 | End: 2025-01-24

## 2025-01-15 RX ORDER — ATORVASTATIN CALCIUM 40 MG/1
40 TABLET, FILM COATED ORAL DAILY
Status: DISCONTINUED | OUTPATIENT
Start: 2025-01-16 | End: 2025-01-29 | Stop reason: HOSPADM

## 2025-01-15 RX ORDER — GABAPENTIN 300 MG/1
600 CAPSULE ORAL 2 TIMES DAILY
Status: DISCONTINUED | OUTPATIENT
Start: 2025-01-15 | End: 2025-01-29 | Stop reason: HOSPADM

## 2025-01-15 RX ORDER — AMLODIPINE BESYLATE 10 MG/1
10 TABLET ORAL DAILY
Status: DISCONTINUED | OUTPATIENT
Start: 2025-01-16 | End: 2025-01-17

## 2025-01-15 RX ORDER — OXYCODONE HYDROCHLORIDE 5 MG/1
5 TABLET ORAL EVERY 4 HOURS PRN
Status: DISCONTINUED | OUTPATIENT
Start: 2025-01-15 | End: 2025-01-29 | Stop reason: HOSPADM

## 2025-01-15 RX ORDER — SODIUM CHLORIDE 0.9 % (FLUSH) 0.9 %
10 SYRINGE (ML) INJECTION
Status: DISCONTINUED | OUTPATIENT
Start: 2025-01-15 | End: 2025-01-29 | Stop reason: HOSPADM

## 2025-01-15 RX ORDER — TALC
6 POWDER (GRAM) TOPICAL NIGHTLY PRN
Status: DISCONTINUED | OUTPATIENT
Start: 2025-01-15 | End: 2025-01-29 | Stop reason: HOSPADM

## 2025-01-15 RX ORDER — ASPIRIN 81 MG/1
81 TABLET ORAL DAILY
Status: DISCONTINUED | OUTPATIENT
Start: 2025-01-16 | End: 2025-01-29 | Stop reason: HOSPADM

## 2025-01-15 RX ORDER — LISINOPRIL 20 MG/1
40 TABLET ORAL NIGHTLY
Status: DISCONTINUED | OUTPATIENT
Start: 2025-01-15 | End: 2025-01-24

## 2025-01-15 RX ORDER — CLINDAMYCIN PHOSPHATE 600 MG/50ML
600 INJECTION, SOLUTION INTRAVENOUS
Status: DISCONTINUED | OUTPATIENT
Start: 2025-01-16 | End: 2025-01-17 | Stop reason: ALTCHOICE

## 2025-01-15 RX ADMIN — OXYCODONE 5 MG: 5 TABLET ORAL at 11:01

## 2025-01-15 RX ADMIN — CLINDAMYCIN IN 5 PERCENT DEXTROSE 600 MG: 12 INJECTION, SOLUTION INTRAVENOUS at 07:01

## 2025-01-15 RX ADMIN — GABAPENTIN 600 MG: 300 CAPSULE ORAL at 11:01

## 2025-01-15 RX ADMIN — LISINOPRIL 40 MG: 20 TABLET ORAL at 11:01

## 2025-01-15 RX ADMIN — CARVEDILOL 25 MG: 12.5 TABLET, FILM COATED ORAL at 11:01

## 2025-01-15 NOTE — ED NOTES
"Pt presents to ED via EMS from home w/ c/o wound check. Pt reports wound on RLE + lymphedema present to bilateral lower extremities. Reports "a lot of bleeding" to wound on RLE at home + serosanguineous drainage present at this time. Denies pain to site but endorses a "stinging" sensation. Pt also endorsing intermittent chills at home. Pt reports living at home by himself + uses walker for ambulation; reports home health visitations. Reports compliance w/ all daily medications. Denies headache, chest pain, shortness of breath, abdominal pain, dysuria, fever + feels okay otherwise.    Patient identifiers for Edd Wills 69 y.o. male checked and correct.  Chief Complaint   Patient presents with    Wound Check     Pt. Is a 69 yr old -American male presenting to the ED with a wound to the LRE.  Started as a boil last week and has become inflamed and starting to ooze today.     Past Medical History:   Diagnosis Date    HTN (hypertension)     Hyperlipemia     Obesity     LLUVIA (obstructive sleep apnea)     Osteoarthritis of knee      Allergies reported: Review of patient's allergies indicates:  No Known Allergies    Most recent vital signs:  Vitals:    01/15/25 1335   BP: (!) 135/92   Pulse: 84   Resp: 18   Temp: 98.7 °F (37.1 °C)     "

## 2025-01-15 NOTE — ED PROVIDER NOTES
"Encounter Date: 1/15/2025       History     Chief Complaint   Patient presents with    Wound Check     Pt. Is a 69 yr old -American male presenting to the ED with a wound to the LRE.  Started as a boil last week and has become inflamed and starting to ooze today.     Mr Wills is a 69 y.o. male with HTN, HLD, Type B aortic dissection, chronic lymphedema, morbid obesity. He presents to the ED via ambulance for an abscess wound. Patient reports that he first noticed the abscess on his RLE on Monday but this morning it opened up and started draining and his home nurse helped to drain and clean but advised him to go the ED. Per Chart review wound care has been visiting but reports that the patient will need more  He reports that his lymphedema feels that it has been worsening recently and feels more fluid overloaded despite taking his Lasix 40 daily at home.   He denies chest pain, SOB, palpitation, N, V, diarrhea, and abdominal pain. Off note, per chart review a note per SW saying HH and wound care cannot continue to see pateint he cannot care for himself and he lives alone. His wound care is extensive and requires more intensive management than outpatient wound care."       The history is provided by the patient.     Review of patient's allergies indicates:  No Known Allergies  Past Medical History:   Diagnosis Date    HTN (hypertension)     Hyperlipemia     Obesity     LLUVIA (obstructive sleep apnea)     Osteoarthritis of knee      Past Surgical History:   Procedure Laterality Date    CERVICAL FUSION  06/14/2016    LAMINECTOMY  06/14/2016     Family History   Problem Relation Name Age of Onset    Diabetes type II Mother      Cancer Mother          unknown type    Hypertension Mother      Hyperlipidemia Mother      Diabetes Father      Hypertension Father      Hyperlipidemia Father      Stroke Maternal Grandmother      Heart disease Neg Hx      Prostate cancer Neg Hx       Social History     Tobacco Use    " Smoking status: Never     Passive exposure: Never    Smokeless tobacco: Never   Substance Use Topics    Alcohol use: Yes    Drug use: No     Comment: Has tried marijuana and crack in past, but no drug use since 2003     Review of Systems   Constitutional:  Negative for fever.   HENT:  Negative for sore throat.    Respiratory:  Negative for cough, shortness of breath and wheezing.    Cardiovascular:  Positive for leg swelling. Negative for chest pain and palpitations.   Gastrointestinal:  Negative for abdominal pain, blood in stool, constipation, diarrhea, nausea and vomiting.   Genitourinary:  Negative for difficulty urinating and dysuria.   Skin:  Positive for wound.       Physical Exam     Initial Vitals [01/15/25 1335]   BP Pulse Resp Temp SpO2   (!) 135/92 84 18 98.7 °F (37.1 °C) 97 %      MAP       --         Physical Exam    Constitutional: He appears well-developed. No distress.   HENT:   Nose: Nose normal.   Eyes: Conjunctivae are normal. No scleral icterus.   Neck:   Normal range of motion.  Cardiovascular:  Normal rate and regular rhythm.     Exam reveals no friction rub.       No murmur heard.  Pulmonary/Chest: Breath sounds normal. No respiratory distress. He has no wheezes. He has no rales.   Abdominal: Abdomen is soft. Bowel sounds are normal. He exhibits distension. There is no abdominal tenderness. There is no rebound and no guarding.   Musculoskeletal:         General: Edema present. No tenderness.      Cervical back: Normal range of motion.     Neurological: He is alert and oriented to person, place, and time. Gait abnormal.   Skin: Skin is warm. Abscess noted.   Open abscess with a wound 3-5 cm in the RLE draining           ED Course   Procedures  Labs Reviewed   CBC W/ AUTO DIFFERENTIAL - Abnormal       Result Value    WBC 9.99      RBC 4.70      Hemoglobin 13.2 (*)     Hematocrit 39.5 (*)     MCV 84      MCH 28.1      MCHC 33.4      RDW 13.9      Platelets 374      MPV 10.1      Immature  Granulocytes 0.2      Gran # (ANC) 7.5      Immature Grans (Abs) 0.02      Lymph # 1.4      Mono # 0.9      Eos # 0.1      Baso # 0.03      nRBC 0      Gran % 74.8 (*)     Lymph % 14.1 (*)     Mono % 9.2      Eosinophil % 1.4      Basophil % 0.3      Differential Method Automated     COMPREHENSIVE METABOLIC PANEL - Abnormal    Sodium 135 (*)     Potassium 4.0      Chloride 103      CO2 24      Glucose 85      BUN 15      Creatinine 0.9      Calcium 8.9      Total Protein 8.4      Albumin 3.0 (*)     Total Bilirubin 0.4      Alkaline Phosphatase 80      AST 29      ALT 25      eGFR >60.0      Anion Gap 8     CULTURE, BLOOD   CULTURE, BLOOD   HEPATITIS C ANTIBODY    Hepatitis C Ab Non-reactive      Narrative:     Release to patient->Immediate   HIV 1 / 2 ANTIBODY    HIV 1/2 Ag/Ab Non-reactive      Narrative:     Release to patient->Immediate   B-TYPE NATRIURETIC PEPTIDE    BNP 82     ISTAT LACTATE    POC Lactate 1.31      Sample VENOUS      Site Other      Allens Test N/A            Imaging Results    None          Medications   clindamycin in D5W 600 mg/50 mL IVPB 600 mg (has no administration in time range)     Medical Decision Making  68 yo M presents to the ED for worsening lymphedema and abscess wound present in his RLE. Wounds looks ulcerated and was drained, it was cleaned and appropriate dressing was placed. Will admit to [facility] for further monitoring and management for wound care consult, PT/OT, SW and antibiotics.       Amount and/or Complexity of Data Reviewed  Labs: ordered.    Risk  Prescription drug management.               ED Course as of 01/15/25 1830   Wed Alexander 15, 2025   1728 Patient is a 69-year-old male with past medical history hypertension, hyperlipidemia, type B aortic dissection, chronic lymphedema that is presenting for evaluation for bleeding from possible abscess/wound.  Patient has significant bilateral lower extremity chronic edema.  Patient first noticed possible abscess 1 and 1/2 weeks  "ago.  Patient states that home health help drain it.  Patient states that he then had worsening swelling, then abscess area started bleeding.  Patient states that it looked like a "boil" to him.  Patient otherwise denies any chest pains, shortness of breath, nausea, vomiting, diarrhea, abdominal pain.    Record show that  was contacted, wound care unable to see patient as outpatient as they are unable to care for his extensive wound/swelling and requires more intensive management.    Physical exam: Well-appearing 69-year-old male, no distress, appropriately conversational.  Benign cardiac, respiratory, abdominal exam.  Patient has significant swelling bilateral lower extremity, appears chronic.  Patient has chronic skin change to large lower extremity swelling, with drainage to right lower extremity.  Minimal tenderness to palpation    Plan:  Admit patient for further evaluation of open possible abscess, arrange for wound care.  Patient agrees with treatment and plan [RT]   8948 Attestation of Dr. Vargas (Attending Physician):      I have reviewed the record and the patient care provided by the Resident provider and agree with the documented HPI, ROS, PE.  I also agree with the treatment plan and work up and I have performed an independent history / physical exam and MDM.   [RT]      ED Course User Index  [RT] Roberto Vargas MD                           Clinical Impression:  Final diagnoses:  [Z51.89] Wound check, abscess (Primary)          ED Disposition Condition    Observation Stable                Neva Batres MD  Resident  01/15/25 1804       Roberto Vargas MD  01/15/25 1830    "

## 2025-01-15 NOTE — Clinical Note
Diagnosis: Wound check, abscess [259861]  Future Attending Provider: FARIDEH BELTRÁN [[id number]]  Is the patient being sent to ED Observation?: Yes  Special Needs:: No Special Needs [1]

## 2025-01-15 NOTE — PLAN OF CARE
SW received call from Celi with [facility] *[phone].  As per Celi they cannot continue to see patient as he cannot care for himself and he lives alone.  As per Celi his wound care is extensive and requires more intensive management than outpatient wound care.      Caridad Prajapati CD, MSW, LMSW, RSW   Case Management  [facility]  Email: [email or web address]

## 2025-01-16 PROBLEM — G89.29 CHRONIC KNEE PAIN: Status: ACTIVE | Noted: 2025-01-16

## 2025-01-16 PROBLEM — S81.801A WOUND OF RIGHT LEG: Status: RESOLVED | Noted: 2023-05-28 | Resolved: 2025-01-16

## 2025-01-16 PROBLEM — R53.81 DEBILITY: Status: ACTIVE | Noted: 2025-01-16

## 2025-01-16 PROBLEM — L03.115 CELLULITIS OF RIGHT THIGH: Status: ACTIVE | Noted: 2025-01-16

## 2025-01-16 PROBLEM — M25.569 CHRONIC KNEE PAIN: Status: ACTIVE | Noted: 2025-01-16

## 2025-01-16 PROCEDURE — 63600175 PHARM REV CODE 636 W HCPCS: Performed by: PHYSICIAN ASSISTANT

## 2025-01-16 PROCEDURE — 87186 SC STD MICRODIL/AGAR DIL: CPT | Mod: 59

## 2025-01-16 PROCEDURE — 97165 OT EVAL LOW COMPLEX 30 MIN: CPT

## 2025-01-16 PROCEDURE — 11000001 HC ACUTE MED/SURG PRIVATE ROOM

## 2025-01-16 PROCEDURE — 25000003 PHARM REV CODE 250: Performed by: PHYSICIAN ASSISTANT

## 2025-01-16 PROCEDURE — 25000003 PHARM REV CODE 250

## 2025-01-16 PROCEDURE — 87070 CULTURE OTHR SPECIMN AEROBIC: CPT

## 2025-01-16 PROCEDURE — 87075 CULTR BACTERIA EXCEPT BLOOD: CPT

## 2025-01-16 PROCEDURE — 25500020 PHARM REV CODE 255: Performed by: INTERNAL MEDICINE

## 2025-01-16 PROCEDURE — 97535 SELF CARE MNGMENT TRAINING: CPT

## 2025-01-16 PROCEDURE — 97162 PT EVAL MOD COMPLEX 30 MIN: CPT

## 2025-01-16 PROCEDURE — 97112 NEUROMUSCULAR REEDUCATION: CPT

## 2025-01-16 PROCEDURE — 21400001 HC TELEMETRY ROOM

## 2025-01-16 PROCEDURE — 63600175 PHARM REV CODE 636 W HCPCS

## 2025-01-16 RX ORDER — ENOXAPARIN SODIUM 100 MG/ML
40 INJECTION SUBCUTANEOUS EVERY 24 HOURS
Status: DISCONTINUED | OUTPATIENT
Start: 2025-01-17 | End: 2025-01-16

## 2025-01-16 RX ORDER — HEPARIN SODIUM 5000 [USP'U]/ML
7500 INJECTION, SOLUTION INTRAVENOUS; SUBCUTANEOUS EVERY 8 HOURS
Status: DISCONTINUED | OUTPATIENT
Start: 2025-01-16 | End: 2025-01-29 | Stop reason: HOSPADM

## 2025-01-16 RX ORDER — LABETALOL 100 MG/1
100 TABLET, FILM COATED ORAL DAILY PRN
Status: DISCONTINUED | OUTPATIENT
Start: 2025-01-16 | End: 2025-01-21

## 2025-01-16 RX ORDER — LABETALOL HCL 20 MG/4 ML
10 SYRINGE (ML) INTRAVENOUS ONCE
Status: COMPLETED | OUTPATIENT
Start: 2025-01-17 | End: 2025-01-17

## 2025-01-16 RX ADMIN — ASPIRIN 81 MG: 81 TABLET, COATED ORAL at 08:01

## 2025-01-16 RX ADMIN — OXYCODONE 5 MG: 5 TABLET ORAL at 08:01

## 2025-01-16 RX ADMIN — LISINOPRIL 40 MG: 20 TABLET ORAL at 08:01

## 2025-01-16 RX ADMIN — CLINDAMYCIN IN 5 PERCENT DEXTROSE 600 MG: 12 INJECTION, SOLUTION INTRAVENOUS at 11:01

## 2025-01-16 RX ADMIN — HEPARIN SODIUM 7500 UNITS: 5000 INJECTION INTRAVENOUS; SUBCUTANEOUS at 10:01

## 2025-01-16 RX ADMIN — CLINDAMYCIN IN 5 PERCENT DEXTROSE 600 MG: 12 INJECTION, SOLUTION INTRAVENOUS at 06:01

## 2025-01-16 RX ADMIN — CARVEDILOL 25 MG: 12.5 TABLET, FILM COATED ORAL at 08:01

## 2025-01-16 RX ADMIN — CLINDAMYCIN IN 5 PERCENT DEXTROSE 600 MG: 12 INJECTION, SOLUTION INTRAVENOUS at 02:01

## 2025-01-16 RX ADMIN — GABAPENTIN 600 MG: 300 CAPSULE ORAL at 08:01

## 2025-01-16 RX ADMIN — ATORVASTATIN CALCIUM 40 MG: 40 TABLET, FILM COATED ORAL at 08:01

## 2025-01-16 RX ADMIN — AMLODIPINE BESYLATE 10 MG: 10 TABLET ORAL at 08:01

## 2025-01-16 RX ADMIN — FUROSEMIDE 40 MG: 40 TABLET ORAL at 08:01

## 2025-01-16 RX ADMIN — IOHEXOL 100 ML: 350 INJECTION, SOLUTION INTRAVENOUS at 08:01

## 2025-01-16 RX ADMIN — LABETALOL HYDROCHLORIDE 100 MG: 100 TABLET, FILM COATED ORAL at 07:01

## 2025-01-16 NOTE — PLAN OF CARE
Inpatient Upgrade Note    Edd Wills has warranted treatment spanning two or more midnights of hospital level care for the management of   69 y.o. male with malignant HTN, HLD, morbid obesity, unsteady gait, Type B aortic dissection, chronic lymphedema, LLUVIA, chronic neck pain, chronic bilateral knee pain, grade 1 diastolic dysfunction, and CKD. He presents to the ED via ambulance for an abscess/wound. Patient reports that he first noticed the abscess on his RLE on Monday but this morning it opened up and started draining and his home nurse helped to drain and clean but advised him to go the ED. Per Chart review wound care has been visiting but reports that the patient will need more  He reports that his lymphedema feels that it has been worsening recently and feels more fluid overloaded despite taking his Lasix 40 daily at home.  . He continues to require daily labs, further testing/imaging, and monitoring of vital signs. His condition is also complicated by the following comorbidities: Hypertension and Chronic kidney disease. he's not seeking placement. Per ED, he needs further care for his leg wounds and workup with CT prior to discharge

## 2025-01-16 NOTE — PLAN OF CARE
OT eval complete. OT POC and goals established.   Problem: Occupational Therapy  Goal: Occupational Therapy Goal  Description: Goals to be met by: 2/15/25     Patient will increase functional independence with ADLs by performing:    UE Dressing with Modified [place].  LE Dressing with Modified [place].  Grooming while standing at sink with Modified [place].  Toileting from toilet/bedside commode with Modified [place] for hygiene and clothing management.   Supine to sit with Modified [place].  Toilet transfer to toilet/bedside commode with Modified [place].    Outcome: Progressing
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
## 2025-01-16 NOTE — PT/OT/SLP EVAL
Physical Therapy  Co-Evaluation (OT) and Treatment    Edd Wills   [id number]    Time Tracking:     PT Received On: 01/16/25   PT Start Time: 1306   PT Stop Time: 1331   PT Total Time (min): 25 min    Billable Minutes: Evaluation 10 and Neuromuscular Re-education 15  minutes    *This session was completed as a co-evaluation with OT secondary to clustering care, first time up out of bed this admission*    Recommendations:     Therapy Intensity Recommendations at Discharge: Moderate Intensity Therapy     Equipment Needed After Discharge: rollator    Barriers to Discharge: Not ready to discharge home from a mobility standpoint, needs further therapy.    Justification for Rollator HME:  Patient demonstrates a mobility limitation that significantly impairs their ability to participate in one or more mobility related activities of daily living. Patient's mobility limitation cannot be sufficiently resolved with the use of a cane, but can be sufficiently resolved with the use of a rollator.The use of a rollator will considerably improve their ability to participate in MRADLs. Patient will use the rollator on a regular basis at home.     Patient Information:     Recent Surgery: * No surgery found *      Diagnosis: RLE wound abscess    Length of Stay: 1 day    General Precautions: Standard, fall  Orthopedic Precautions: N/A  Brace: N/A    Assessment:     Edd Wills is a 69 y.o. male admitted to [facility] on 1/15/2025 for RLE wound abscess. Edd Wills tolerated evaluation well today. He is very pleasant and willing to participate, A/Ox4, chronic LE lymphedema. Patient has had increased difficulty mobilizing lately due to increased RLE pain from wound. Today he is able to stand from edge of bed to a bariatric rolling walker 2x with minimal (A) of PT. Once up with walker, he can stand for 1-2 minutes with CGA. He was able to take 4 small, shuffled side-steps to the right along EOB with bariatric RW and CGA of therapist; significant  "loss of balance on final side-step (pt stepped onto bottom of one of the walker legs) resulting in immediate need to sit back on edge of bed. Patient currently demonstrates a need for moderate intensity therapy on a daily basis post acute secondary to a decline in functional status. Discussed PT role, POC, and goals with patient; verbalized understanding. Edd Wills would benefit from acute PT services to promote mobility during this admission and improve return to PLOF.    Problem List: weakness, impaired endurance, impaired self care skills, impaired functional mobility, gait instability, impaired balance, pain, decreased lower extremity function, decreased ROM, impaired skin, edema, impaired cardiopulmonary response to activity    Rehab Prognosis: Good; patient would benefit from acute skilled PT services to address these deficits and reach maximum level of function.    Plan:     Patient to be seen 4 x/week to address the above listed problems via gait training, therapeutic activities, therapeutic exercises, neuromuscular re-education    Plan of Care Expires: 02/15/25  Plan of Care reviewed with: patient    Subjective:     Communicated with RN prior to evaluation, appropriate to see for evaluation.    Pt found supine in bed (HOB elevated) upon PT entry to room, agreeable to evaluation.    Patient commenting: "I used to have a rollator and I moved pretty good with it. Then I moved from my town home to an apartment, and in the shuffle of the move I lost the rollator. Now I just have an aluminum walker with no seat."    Does this patient have any cultural, spiritual, Jehovah's witness conflicts given the current situation? Patient has no barriers to learning. Patient verbalizes understanding of his/her program and goals and demonstrates them correctly. No cultural, spiritual, or educational needs identified.    Past Medical History:   Diagnosis Date    Aortic dissection distal to left subclavian     Aortic valve sclerosis "     Cervical myelopathy     Descending thoracic aortic aneurysm     Gait instability     Grade I diastolic dysfunction     HTN (hypertension), malignant     Hyperlipemia     Lymphedema     Morbid obesity     Neuropathy     LLUVIA (obstructive sleep apnea)     Osteoarthritis of knee     Bilateral    Peripheral vascular insufficiency     Pressure ulcer     right foot/ankle    Staph skin infection     Cellulitis      Past Surgical History:   Procedure Laterality Date    CERVICAL FUSION  06/14/2016    LAMINECTOMY  06/14/2016       Living Environment:  Pt lives alone in a 1st story apartment with 0 HEIDE, ramp into apartment; long walk from car to apartment (per patient). He has a tub/shower with a shower chair and grab bar but he has been unable to get into the shower for last 2 years due to wound + weakness.    PLOF:  Prior to admission, patient was ambulatory for household distances with his rolling walker when feeling well. Has difficulty with lower body dressing due to baseline LE lymphedema. Unable to drive or work.    DME:  Patient owns or has access to the following DME: walker, rolling, shower chair, grab bar    Upon discharge, patient will have assistance from: unknown.    Objective:     Patient found with: telemetry    Pain:  Pain Rating 1:  (unrated)  Location - Side 1: Right  Location - Orientation 1: medial  Location 1: thigh  Pain Addressed 1: Reposition, Distraction  Pain Rating Post-Intervention 1:  (unrated)    Cognitive Exam:  Patient is oriented to Person, Place, Time, and Situation.  Patient follows 100% of single-step commands.    Sensation:   Intact at BLE to light touch despite lymphedema    Edema:  Baseline, chronic LE lymphedema    Lower Extremity Range of Motion:  Right Lower Extremity: WFL actively except hip flex limited by pain  Left Lower Extremity: WFL actively    Lower Extremity Strength:  Right Lower Extremity: grossly 3/5 via MMT  Left Lower Extremity: grossly 3+/5 via MMT    Functional  Mobility:    Bed Mobility:  Supine to Sitting: Stand-By Assist  Sitting to Supine: Min Assist for RLE into bed  Scooting towards EOB in sitting: Stand-By Assist    Transfers:  Sit to Stand: Min Assist from edge of bed with bariatric rolling walker x 2 trial(s)    Gait:  4 small, shuffled side-steps to the right along EOB with bariatric RW and CGA of therapist; significant loss of balance on final side-step (pt stepped onto bottom of one of the walker legs) resulting in immediate need to sit back on edge of bed.    Assist level: Contact-Guard Assist  Device:  Bariatric Rolling Walker    Balance:  Static Sit: Supervision at EOB    Static Stand: Contact-Guard Assist with  bariatric RW    Additional Therapeutic Activity/Exercises:     1. He is very pleasant and willing to participate, A/Ox4, chronic LE lymphedema. Patient has had increased difficulty mobilizing lately due to increased RLE pain from wound.     2. katina he is able to stand from edge of bed to a bariatric rolling walker 2x with minimal (A) of PT. Once up with walker, he can stand for 1-2 minutes with CGA. He was able to take 4 small, shuffled side-steps to the right along EOB with bariatric RW and CGA of therapist; significant loss of balance on final side-step (pt stepped onto bottom of one of the walker legs) resulting in immediate need to sit back on edge of bed.    3. Patient currently demonstrates a need for moderate intensity therapy on a daily basis post acute secondary to a decline in functional status. Discussed PT role, POC, and goals with patient; verbalized understanding.    AM-PAC 6 CLICK MOBILITY  Turning over in bed (including adjusting bedclothes, sheets and blankets)?: 2  Sitting down on and standing up from a chair with arms (e.g., wheelchair, bedside commode, etc.): 3  Moving from lying on back to sitting on the side of the bed?: 3  Moving to and from a bed to a chair (including a wheelchair)?: 2  Need to walk in hospital room?:  2  Climbing 3-5 steps with a railing?: 1  Basic Mobility Total Score: 13    Patient was left supine in bed (HOB elevated) with all lines intact, call button in reach, and RN notified.    Clinical Decision Making for Evaluation Complexity:  1. Body System(s) Examination: 3  2. Clinical Presentation: Evolving  3. Evaluation Complexity: Moderate    GOALS:   Multidisciplinary Problems       Physical Therapy Goals          Problem: Physical Therapy    Goal Priority Disciplines Outcome Interventions   Physical Therapy Goal     PT, PT/OT     Description: Goals to be met by: 25     Patient will increase functional independence with mobility by performin. Supine to sit with Modified [place] - Not met  2. Sit to stand transfer with Stand-by Assistance using bariatric RW - Not met  3. Gait  x 100 feet with Contact Guard Assistance using bariatric Rolling Walker.   4. Pt will demo ability to stand for 5 minutes with either bariatric RW and sink support (for self-care) with SBA - Not met                     Nabeel Huang, PT  2025

## 2025-01-16 NOTE — PLAN OF CARE
"[facility] - Emergency Dept  Initial Discharge Assessment       Primary Care Provider: Zuleyka Sabillon MD    Admission Diagnosis: Wound check, abscess [Z51.89]    Admission Date: 1/15/2025  Expected Discharge Date:     Pt is independent with their ADL's. Pt uses a rollator, walker and cane to assist with their ambulation.     Pt receives HH services through [facility]. Per [facility] note, "SW received call from Celi with [facility] *[phone].  As per Celi they cannot continue to see patient as he cannot care for himself and he lives alone.  As per Celi his wound care is extensive and requires more intensive management than outpatient wound care."    Post acute was discussed with pt. Pt d/c home with no needs at the moment.     Transition of Care Barriers: (P) None    Payor: MEDICARE / Plan: MEDICARE PART A & B / Product Type: Government /     Extended Emergency Contact Information  Primary Emergency Contact: [place] [address] [facility]  Home Phone: [phone]  Relation: Son    Discharge Plan A: (P) Home, Home Health  Discharge Plan B: (P) Home      [id number] - [place], [place] - [address] AT [facility] [address]  [address]  [facility] [address]  Phone: [phone] Fax: [phone]    [facility] - [place], [place] - [address]  [address]  [facility] [address]  Phone: [phone] Fax: [phone]    [facility]  [address]  [facility] [address]  Phone: [phone] Fax: [phone]    [facility] - [place], [place] - [address]  [address]  [facility] [address]  Phone: [phone] Fax: [phone]      Initial Assessment (most recent)       Adult Discharge Assessment - 01/16/25 1300          Discharge Assessment    Assessment Type Discharge Planning Assessment     Confirmed/corrected address, phone number and insurance Yes     " Confirmed Demographics Correct on Facesheet     Source of Information patient     Does patient/caregiver understand observation status Yes     People in Home alone     Do you expect to return to your current living situation? Yes     Do you have help at home or someone to help you manage your care at home? No     Prior to hospitilization cognitive status: Alert/Oriented     Current cognitive status: Alert/Oriented     Walking or Climbing Stairs Difficulty no (P)      Dressing/Bathing Difficulty no (P)      Home Accessibility wheelchair accessible (P)      Home Layout Able to live on 1st floor (P)      Equipment Currently Used at Home rollator;walker, rolling;cane, quad (P)      Readmission within 30 days? No (P)      Patient currently being followed by outpatient case management? No (P)      Do you currently have service(s) that help you manage your care at home? Yes (P)      Name and Contact number of agency [facility] [phone] (P)      Do you take prescription medications? Yes (P)      Do you have prescription coverage? Yes (P)      Coverage MEDICARE - MEDICARE PART A & B (P)      Do you have any problems affording any of your prescribed medications? No (P)      Is the patient taking medications as prescribed? yes (P)      Who is going to help you get home at discharge? family/friend (P)      How do you get to doctors appointments? other (see comments) (P)    Pt fabby has no transportation.    Are you on dialysis? No (P)      Do you take coumadin? No (P)      Discharge Plan A Home;Home Health (P)      Discharge Plan B Home (P)      DME Needed Upon Discharge  none (P)      Discharge Plan discussed with: Patient (P)      Transition of Care Barriers None (P)         Physical Activity    On average, how many days per week do you engage in moderate to strenuous exercise (like a brisk walk)? 0 days (P)      On average, how many minutes do you engage in exercise at this level? 0 min (P)         Financial Resource Strain     How hard is it for you to pay for the very basics like food, housing, medical care, and heating? Not hard at all (P)         Housing Stability    In the last 12 months, was there a time when you were not able to pay the mortgage or rent on time? No (P)      At any time in the past 12 months, were you homeless or living in a shelter (including now)? No (P)         Transportation Needs    Has the lack of transportation kept you from medical appointments, meetings, work or from getting things needed for daily living? Yes, it has kept me from non-medical meetings, appointments, work or from getting things that I need. (P)         Food Insecurity    Within the past 12 months, you worried that your food would run out before you got the money to buy more. Never true (P)      Within the past 12 months, the food you bought just didn't last and you didn't have money to get more. Never true (P)         Stress    Do you feel stress - tense, restless, nervous, or anxious, or unable to sleep at night because your mind is troubled all the time - these days? Only a little (P)         Social Isolation    How often do you feel lonely or isolated from those around you?  Never (P)         Alcohol Use    Q1: How often do you have a drink containing alcohol? Never (P)      Q2: How many drinks containing alcohol do you have on a typical day when you are drinking? Patient does not drink (P)      Q3: How often do you have six or more drinks on one occasion? Never (P)         Utilities    In the past 12 months has the electric, gas, oil, or water company threatened to shut off services in your home? No (P)         Health Literacy    How often do you need to have someone help you when you read instructions, pamphlets, or other written material from your doctor or pharmacy? Rarely (P)                    LOREN Marin, SATISH.   Case Management  [facility]  Email: [email or web address]

## 2025-01-16 NOTE — PLAN OF CARE
SW spoke with pt regarding his ability to be independent with his ADL's. Pt stated he is independent with his ADL's and depend on his walker for ambulation. Pt also stated he does not have a ride to receive outpatient wound care. Pt stated he would like to try other home health agencies to see if they can assist with his wound. Pt is confident he is able to care for hisself at home besides the caring of his wound.     [facility] to send referrals to Home Health agencies.     LOREN Marin, CSW.   Case Management  [facility]  Email: [email or web address]

## 2025-01-16 NOTE — PLAN OF CARE
Edd Wills is a 69 y.o. male admitted to [facility] on 1/15/2025 for RLE wound abscess. Edd Wills tolerated evaluation well today. He is very pleasant and willing to participate, A/Ox4, chronic LE lymphedema. Patient has had increased difficulty mobilizing lately due to increased RLE pain from wound. Today he is able to stand from edge of bed to a bariatric rolling walker 2x with minimal (A) of PT. Once up with walker, he can stand for 1-2 minutes with CGA. He was able to take 4 small, shuffled side-steps to the right along EOB with bariatric RW and CGA of therapist; significant loss of balance on final side-step (pt stepped onto bottom of one of the walker legs) resulting in immediate need to sit back on edge of bed. Patient currently demonstrates a need for moderate intensity therapy on a daily basis post acute secondary to a decline in functional status. Discussed PT role, POC, and goals with patient; verbalized understanding. Edd Wills would benefit from acute PT services to promote mobility during this admission and improve return to PLOF.    Problem: Physical Therapy  Goal: Physical Therapy Goal  Description: Goals to be met by: 25     Patient will increase functional independence with mobility by performin. Supine to sit with Modified [place] - Not met  2. Sit to stand transfer with Stand-by Assistance using bariatric RW - Not met  3. Gait  x 100 feet with Contact Guard Assistance using bariatric Rolling Walker.   4. Pt will demo ability to stand for 5 minutes with either bariatric RW and sink support (for self-care) with SBA - Not met  Outcome: Progressing    Nabeel Huang, PT  2025
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
## 2025-01-16 NOTE — PLAN OF CARE
"   01/16/25 1609   Post-Acute Status   Post-Acute Authorization Home Health   Home Health Status Set-up Complete/Auth obtained   Coverage MEDICARE - MEDICARE PART A & B   Discharge Delays None known at this time   Discharge Plan   Discharge Plan A Home;Home Health   Discharge Plan B Home Health;Home     SW sent hard fax referral to [facility] (P) [phone], (F) [phone]. Pt was accepted. SW/CM to send updated referrals when pt is d/c.    Discharge Plan A and Plan B have been determined by review of patient's clinical status, future medical and therapeutic needs, and coverage/benefits for post-acute care in coordination with multidisciplinary team members.      Your fax has been successfully sent to [phone] at [phone].  ------------------------------------------------------------  From: [id number]  ------------------------------------------------------------  1/16/2025 3:58:09 PM Transmission Record          Sent to [phone] with remote ID "[id number]"          Result: (0/339;0/0) Success          Page record: 1 - 17          Elapsed time: 05:57 on channel 4      LOREN Marin, SATISH.   Case Management  [facility]  Email: [email or web address]    "

## 2025-01-16 NOTE — PROVIDER PROGRESS NOTES - EMERGENCY DEPT.
Encounter Date: 1/15/2025    ED Physician Progress Notes          Case discussed with ED  Caridad, patient has exceeded 24 hour observation ligament and is still waiting on wound care and thigh imaging.  He will be upgraded to Hospital Medicine for further management.

## 2025-01-17 PROBLEM — R41.82 AMS (ALTERED MENTAL STATUS): Status: ACTIVE | Noted: 2025-01-17

## 2025-01-17 LAB
ALBUMIN SERPL BCP-MCNC: 2.5 G/DL (ref 3.5–5.2)
ALLENS TEST: ABNORMAL
ALP SERPL-CCNC: 71 U/L (ref 40–150)
ALT SERPL W/O P-5'-P-CCNC: 23 U/L (ref 10–44)
ANION GAP SERPL CALC-SCNC: 10 MMOL/L (ref 8–16)
AST SERPL-CCNC: 25 U/L (ref 10–40)
BASOPHILS # BLD AUTO: 0.05 K/UL (ref 0–0.2)
BASOPHILS NFR BLD: 0.6 % (ref 0–1.9)
BILIRUB SERPL-MCNC: 0.5 MG/DL (ref 0.1–1)
BUN SERPL-MCNC: 15 MG/DL (ref 8–23)
CALCIUM SERPL-MCNC: 8.3 MG/DL (ref 8.7–10.5)
CHLORIDE SERPL-SCNC: 105 MMOL/L (ref 95–110)
CK SERPL-CCNC: 119 U/L (ref 20–200)
CO2 SERPL-SCNC: 22 MMOL/L (ref 23–29)
CREAT SERPL-MCNC: 0.8 MG/DL (ref 0.5–1.4)
DELSYS: ABNORMAL
DIFFERENTIAL METHOD BLD: ABNORMAL
EOSINOPHIL # BLD AUTO: 0.3 K/UL (ref 0–0.5)
EOSINOPHIL NFR BLD: 3.1 % (ref 0–8)
ERYTHROCYTE [DISTWIDTH] IN BLOOD BY AUTOMATED COUNT: 13.6 % (ref 11.5–14.5)
EST. GFR  (NO RACE VARIABLE): >60 ML/MIN/1.73 M^2
FIO2: 97
GLUCOSE SERPL-MCNC: 83 MG/DL (ref 70–110)
HCO3 UR-SCNC: 23.7 MMOL/L (ref 24–28)
HCT VFR BLD AUTO: 36.3 % (ref 40–54)
HCT VFR BLD CALC: 41 %PCV (ref 36–54)
HGB BLD-MCNC: 12.1 G/DL (ref 14–18)
IMM GRANULOCYTES # BLD AUTO: 0.02 K/UL (ref 0–0.04)
IMM GRANULOCYTES NFR BLD AUTO: 0.2 % (ref 0–0.5)
LYMPHOCYTES # BLD AUTO: 2.3 K/UL (ref 1–4.8)
LYMPHOCYTES NFR BLD: 27.7 % (ref 18–48)
MAGNESIUM SERPL-MCNC: 2 MG/DL (ref 1.6–2.6)
MCH RBC QN AUTO: 28 PG (ref 27–31)
MCHC RBC AUTO-ENTMCNC: 33.3 G/DL (ref 32–36)
MCV RBC AUTO: 84 FL (ref 82–98)
MODE: ABNORMAL
MONOCYTES # BLD AUTO: 1 K/UL (ref 0.3–1)
MONOCYTES NFR BLD: 11.9 % (ref 4–15)
NEUTROPHILS # BLD AUTO: 4.7 K/UL (ref 1.8–7.7)
NEUTROPHILS NFR BLD: 56.5 % (ref 38–73)
NRBC BLD-RTO: 0 /100 WBC
OHS QRS DURATION: 114 MS
OHS QTC CALCULATION: 440 MS
PCO2 BLDA: 37 MMHG (ref 35–45)
PH SMN: 7.41 [PH] (ref 7.35–7.45)
PHOSPHATE SERPL-MCNC: 3.2 MG/DL (ref 2.7–4.5)
PLATELET # BLD AUTO: 394 K/UL (ref 150–450)
PMV BLD AUTO: 9.8 FL (ref 9.2–12.9)
PO2 BLDA: 69 MMHG (ref 80–100)
POC BE: -1 MMOL/L
POC IONIZED CALCIUM: 1.21 MMOL/L (ref 1.06–1.42)
POC SATURATED O2: 94 % (ref 95–100)
POC TCO2: 25 MMOL/L (ref 23–27)
POCT GLUCOSE: 137 MG/DL (ref 70–110)
POTASSIUM BLD-SCNC: 4.1 MMOL/L (ref 3.5–5.1)
POTASSIUM SERPL-SCNC: 3.6 MMOL/L (ref 3.5–5.1)
PROT SERPL-MCNC: 7 G/DL (ref 6–8.4)
RBC # BLD AUTO: 4.32 M/UL (ref 4.6–6.2)
SAMPLE: ABNORMAL
SITE: ABNORMAL
SODIUM BLD-SCNC: 137 MMOL/L (ref 136–145)
SODIUM SERPL-SCNC: 137 MMOL/L (ref 136–145)
WBC # BLD AUTO: 8.38 K/UL (ref 3.9–12.7)

## 2025-01-17 PROCEDURE — 97116 GAIT TRAINING THERAPY: CPT | Mod: CQ

## 2025-01-17 PROCEDURE — 20600001 HC STEP DOWN PRIVATE ROOM

## 2025-01-17 PROCEDURE — 97530 THERAPEUTIC ACTIVITIES: CPT

## 2025-01-17 PROCEDURE — 25000003 PHARM REV CODE 250

## 2025-01-17 PROCEDURE — 84100 ASSAY OF PHOSPHORUS: CPT

## 2025-01-17 PROCEDURE — 63600175 PHARM REV CODE 636 W HCPCS

## 2025-01-17 PROCEDURE — 93010 ELECTROCARDIOGRAM REPORT: CPT | Mod: ,,, | Performed by: INTERNAL MEDICINE

## 2025-01-17 PROCEDURE — 25500020 PHARM REV CODE 255: Performed by: INTERNAL MEDICINE

## 2025-01-17 PROCEDURE — 63600175 PHARM REV CODE 636 W HCPCS: Performed by: PHYSICIAN ASSISTANT

## 2025-01-17 PROCEDURE — 83735 ASSAY OF MAGNESIUM: CPT

## 2025-01-17 PROCEDURE — 25000003 PHARM REV CODE 250: Performed by: PHYSICIAN ASSISTANT

## 2025-01-17 PROCEDURE — 85025 COMPLETE CBC W/AUTO DIFF WBC: CPT

## 2025-01-17 PROCEDURE — 83605 ASSAY OF LACTIC ACID: CPT

## 2025-01-17 PROCEDURE — 80053 COMPREHEN METABOLIC PANEL: CPT

## 2025-01-17 PROCEDURE — 36415 COLL VENOUS BLD VENIPUNCTURE: CPT

## 2025-01-17 PROCEDURE — 36600 WITHDRAWAL OF ARTERIAL BLOOD: CPT

## 2025-01-17 PROCEDURE — 97535 SELF CARE MNGMENT TRAINING: CPT

## 2025-01-17 PROCEDURE — 97530 THERAPEUTIC ACTIVITIES: CPT | Mod: CQ

## 2025-01-17 PROCEDURE — 99900035 HC TECH TIME PER 15 MIN (STAT)

## 2025-01-17 PROCEDURE — 36415 COLL VENOUS BLD VENIPUNCTURE: CPT | Performed by: INTERNAL MEDICINE

## 2025-01-17 PROCEDURE — 82550 ASSAY OF CK (CPK): CPT | Performed by: INTERNAL MEDICINE

## 2025-01-17 PROCEDURE — 82803 BLOOD GASES ANY COMBINATION: CPT

## 2025-01-17 PROCEDURE — 93005 ELECTROCARDIOGRAM TRACING: CPT

## 2025-01-17 RX ORDER — LABETALOL HCL 20 MG/4 ML
10 SYRINGE (ML) INTRAVENOUS ONCE
Status: COMPLETED | OUTPATIENT
Start: 2025-01-17 | End: 2025-01-17

## 2025-01-17 RX ORDER — MICONAZOLE NITRATE 2 G/100G
POWDER TOPICAL DAILY
Status: DISCONTINUED | OUTPATIENT
Start: 2025-01-18 | End: 2025-01-29 | Stop reason: HOSPADM

## 2025-01-17 RX ORDER — POTASSIUM CHLORIDE 20 MEQ/1
40 TABLET, EXTENDED RELEASE ORAL ONCE
Status: COMPLETED | OUTPATIENT
Start: 2025-01-17 | End: 2025-01-17

## 2025-01-17 RX ORDER — CEFTRIAXONE 2 G/1
2 INJECTION, POWDER, FOR SOLUTION INTRAMUSCULAR; INTRAVENOUS
Status: DISCONTINUED | OUTPATIENT
Start: 2025-01-17 | End: 2025-01-18

## 2025-01-17 RX ORDER — NIFEDIPINE 60 MG/1
60 TABLET, EXTENDED RELEASE ORAL DAILY
Status: DISCONTINUED | OUTPATIENT
Start: 2025-01-17 | End: 2025-01-19

## 2025-01-17 RX ADMIN — NIFEDIPINE 60 MG: 60 TABLET, FILM COATED, EXTENDED RELEASE ORAL at 09:01

## 2025-01-17 RX ADMIN — FUROSEMIDE 40 MG: 40 TABLET ORAL at 09:01

## 2025-01-17 RX ADMIN — ATORVASTATIN CALCIUM 40 MG: 40 TABLET, FILM COATED ORAL at 09:01

## 2025-01-17 RX ADMIN — CLINDAMYCIN IN 5 PERCENT DEXTROSE 600 MG: 12 INJECTION, SOLUTION INTRAVENOUS at 03:01

## 2025-01-17 RX ADMIN — LABETALOL HYDROCHLORIDE 10 MG: 5 INJECTION, SOLUTION INTRAVENOUS at 12:01

## 2025-01-17 RX ADMIN — LISINOPRIL 40 MG: 20 TABLET ORAL at 07:01

## 2025-01-17 RX ADMIN — CARVEDILOL 25 MG: 12.5 TABLET, FILM COATED ORAL at 09:01

## 2025-01-17 RX ADMIN — CEFTRIAXONE 2 G: 2 INJECTION, POWDER, FOR SOLUTION INTRAMUSCULAR; INTRAVENOUS at 12:01

## 2025-01-17 RX ADMIN — ASPIRIN 81 MG: 81 TABLET, COATED ORAL at 09:01

## 2025-01-17 RX ADMIN — IOHEXOL 100 ML: 350 INJECTION, SOLUTION INTRAVENOUS at 06:01

## 2025-01-17 RX ADMIN — GABAPENTIN 600 MG: 300 CAPSULE ORAL at 07:01

## 2025-01-17 RX ADMIN — CARVEDILOL 25 MG: 12.5 TABLET, FILM COATED ORAL at 07:01

## 2025-01-17 RX ADMIN — OXYCODONE 5 MG: 5 TABLET ORAL at 09:01

## 2025-01-17 RX ADMIN — HEPARIN SODIUM 7500 UNITS: 5000 INJECTION INTRAVENOUS; SUBCUTANEOUS at 02:01

## 2025-01-17 RX ADMIN — CLINDAMYCIN IN 5 PERCENT DEXTROSE 600 MG: 12 INJECTION, SOLUTION INTRAVENOUS at 11:01

## 2025-01-17 RX ADMIN — GABAPENTIN 600 MG: 300 CAPSULE ORAL at 09:01

## 2025-01-17 RX ADMIN — POTASSIUM CHLORIDE 40 MEQ: 1500 TABLET, EXTENDED RELEASE ORAL at 09:01

## 2025-01-17 RX ADMIN — LABETALOL HYDROCHLORIDE 10 MG: 5 INJECTION, SOLUTION INTRAVENOUS at 04:01

## 2025-01-17 RX ADMIN — HEPARIN SODIUM 7500 UNITS: 5000 INJECTION INTRAVENOUS; SUBCUTANEOUS at 05:01

## 2025-01-17 NOTE — ASSESSMENT & PLAN NOTE
Patient with Acute on chronic debility due to  chronic lymphedema . The patient's latest AMPAC (Activity Measure for Post Acute Care) Score is listed below.    AM-PAC Score - How much help does the patient need for each activity listed  Basic Mobility Total Score: 13  Turning over in bed (including adjusting bedclothes, sheets and blankets)?: A lot  Sitting down on and standing up from a chair with arms (e.g., wheelchair, bedside commode, etc.): A little  Moving from lying on back to sitting on the side of the bed?: A little  Moving to and from a bed to a chair (including a wheelchair)?: A lot  Need to walk in hospital room?: A lot  Climbing 3-5 steps with a railing?: Unable    Plan  - PT/OT consulted  - Fall precautions in place  - PT and OT rec moderate intensity therapy.   He reportedly does not have transport to go to daily outpatient therapy. Can consider Home Health vs SNF for PT and further wound care

## 2025-01-17 NOTE — PROGRESS NOTES
"Pt seen for wound care f/u after being seen in the ED yesterday. Per pt chart, no fluid collection noted, so no drainage done on wound. Pt sitting up in recliner- pt needs a bariatric recliner that fits him better- with wound to right inner thigh open to air. Wound appears to have created 3 holes- ulcerations- only 2 were noted yesterday. Pt agreeable to wound assessment and care; primary nursing at bedside, who assisted with removing pt's bilateral leg wraps. See assessment below of thigh wound- packed with Iodoform gauze 1/4" and covered with hydrofera blue foam and a island border gauze. Wound care will need to be done daily. No pus, no foul odor, no suspicious drainage noted- only bloody/serosanguinous. Pt tolerated wound packing. Area to right inner thigh is edematous from chronic lymphedema and has areas of dried skin. Pt's right leg shows signs of lymphedema with malformations to the skin/tissue. Cleaned the crevices and skin folds to ensure no wounds, no yeast/fungal concerns. Applied antifungal cream to skin folds on right and left legs and left open to air. Explained to pt that we do not apply lymphedema leg wraps and rarely apply compression wraps while pts are in hospital- this is mainly an outpatient therapy. Pt has no open wounds to BLE and no edema to left lower leg. Suggested pt elevate legs when he can and when he is in the bed. Pt will need assistance with cleaning BLE daily and applying antifungal cream. Wound care will continue to follow and check in with pt next week. Pt is hoping to go to a rehab upon discharge. He did have home health but it seems they have discharged him.     Wound care orders in chart, wound care supplies at bedside.     ASAD Lima, RN,WON  [facility] [place] [facility]/Ostomy  1/17/25 01/17/25 1030   WOCN Assessment   WOCN Total Time (mins) 60   Visit Date 01/17/25   Visit Time 1030   Consult Type Follow Up   WOCN Speciality Wound   Wound other  (abscess/boil) "   Number of Wounds 1   Intervention assessed;changed;applied;chart review;coordination of care   Teaching on-going        Wound 01/15/25 1514 Right lower Leg   Date First Assessed/Time First Assessed: 01/15/25 1514   Present on Original Admission: Yes  Side: Right  Orientation: lower  Location: Leg   Wound Image         Wound 01/16/25 1257 Abscess Right proximal Leg   Date First Assessed/Time First Assessed: 01/16/25 1257   Present on Original Admission: Yes  Primary Wound Type: Abscess  Side: Right  Orientation: proximal  Location: Leg   Wound Image    Dressing Appearance Open to air   Drainage Amount Small   Drainage Characteristics/Odor Serosanguineous;No odor   Appearance Red;Maroon;Tan;White;Moist   Periwound Area Dry;Edematous;Other (see comments)  (chronic lymphedema)   Wound Edges Irregular   Care Cleansed with:;Soap and water;Wound cleanser   Dressing Applied;Island/border   Packing packed with;gauze, iodoform   Packing Inserted  1   Periwound Care Skin barrier film applied   Dressing Change Due 01/18/25

## 2025-01-17 NOTE — PROGRESS NOTES
"[facility] [address]  [facility]  Progress Note    Patient Name: Edd Wills  MRN: [id number]  Patient Class: IP- Inpatient   Admission Date: 1/15/2025  Length of Stay: 1 days  Attending Physician: Chio Crabtree MD  Primary Care Provider: Zuleyka Sabillon MD        Subjective     Principal Problem:Cellulitis of right thigh        HPI:  Edd Wills is a 69 y.o. male with past medical history of malignant HTN, HLD, morbid obesity, Type B aortic dissection, chronic lymphedema, LLUVIA, chronic neck pain s/p cervical fusion and laminectomy, chronic bilateral knee pain, grade 1 diastolic dysfunction, and CKD who presented for abscess on his R medial thigh. He has a 6-7 year history of a lymphedematous collection to his R medial thigh, however noticed a week ago that there was mild purulent drainage coming from a new wound. He also reports the collection has increased in size over the past week. Wound care visits him 3 times a week to manage this collection, but a couple days before his presentation, while trying to express some of the drainage, drainage evolved to "blood clots" and a large amount of purulent drainage which wound care felt was too extensive to care for at home. He was then recommended to present to the ED. He denies fever, chills, SOB, chest pain, N/V, diarrhea, abdominal pain, and dysuria.      In ED, vitals were hypertensive to 144, but otherwise hemodynamically stable. Labs were significant for Hgb 13.2 and hematocrit 39.5 with no leukocytosis. Na 135 and albumin 3.0. He was started on clindamycin in D5W 600mg/50mL Q8 for his cellulitis.       Overview/Hospital Course:  Patient with chronic lymphedema and hx Type B aortic dissection admitted with R medial thigh collection. Placed on Clindamycin. CT of RLE with scattered areas of subcutaneous dense infiltration of the subcutaneous fat in the posterior and medial upper thigh, the lower calf and dorsal foot c/f cellulitis. " However no drainable fluid collection or abscess seen. Wound culture positive for gram negative rods, susceptibilities pending. Transitioned from clindamycin to ceftriaxone. Patient had rapid called on 1/17 when he was found unresponsive. ICU team came to room to assess. Patient was altered. CTH with no acute abnormality. Pt has been hypertensive, added Nifedipine for further BP contol in setting of known type B aortic dissection. CTA chest ordered.    Interval History: Hypertensive overnight. Given IV labetol x 2. BP remained in 140/150s. Discontinued home amlodipine and started nifedipine 60 mg daily. This afternoon around 2pm Rapid was called when he was found unresponsive. Nursing reported that he became altered when standing up. CT Head was normal. CTA Chest/Abdomen/Pelvis pending.     Review of Systems  Objective:     Vital Signs (Most Recent):  Temp: 98.5 °F (36.9 °C) (01/17/25 1745)  Pulse: 72 (01/17/25 1745)  Resp: (!) 22 (01/17/25 1745)  BP: 139/65 (01/17/25 1745)  SpO2: (!) 93 % (01/17/25 1745) Vital Signs (24h Range):  Temp:  [97.6 °F (36.4 °C)-98.9 °F (37.2 °C)] 98.5 °F (36.9 °C)  Pulse:  [58-76] 72  Resp:  [18-22] 22  SpO2:  [92 %-100 %] 93 %  BP: ()/(62-82) 139/65     Weight: (!) 181.4 kg (400 lb)  Body mass index is 54.25 kg/m².    Intake/Output Summary (Last 24 hours) at 1/17/2025 1840  Last data filed at 1/17/2025 1751  Gross per 24 hour   Intake 720 ml   Output 800 ml   Net -80 ml         Physical Exam  Constitutional:       General: He is not in acute distress.     Appearance: He is not ill-appearing or diaphoretic.   HENT:      Head: Normocephalic and atraumatic.      Nose: Nose normal.      Mouth/Throat:      Mouth: Mucous membranes are moist.   Eyes:      Extraocular Movements: Extraocular movements intact.   Cardiovascular:      Rate and Rhythm: Normal rate and regular rhythm.      Heart sounds: No murmur heard.  Pulmonary:      Effort: Pulmonary effort is normal. No respiratory  "distress.      Breath sounds: Rhonchi (b/l lower lung fields) present.   Abdominal:      General: Abdomen is flat. There is no distension.      Tenderness: There is no abdominal tenderness.   Musculoskeletal:      Cervical back: Normal range of motion.      Right lower leg: Edema present.      Left lower leg: Edema present.      Comments: Lymphedema. Right medial thigh with large collection and overlying cellulitis with purulent draining wound on medial side.   Neurological:      Mental Status: He is alert.             Significant Labs: All pertinent labs within the past 24 hours have been reviewed.    Significant Imaging: I have reviewed all pertinent imaging results/findings within the past 24 hours.      Assessment and Plan     * Cellulitis of right thigh  Right medial thigh collection with overlying cellulitis and draining wound. In setting of chronic lymphedema.  Worsening over past 2 weeks with increased size and drainage of blood and pus.   Reports pain is well controlled with current regimen.  CT R thigh reported scattered areas of subcutaneous dense infiltration of the subcutaneous fat in the posterior and medial upper thigh, the lower calf and dorsal foot.  Correlation for cellulitis. No drainable fluid collection or abscess.     - Wound cultures positive for gram negative rods, susceptibilities pending.   - Discontinued clindamycin 600mg q 8 hr  - Started Ceftriaxone 2g q 24hr   - Oxycodone 5 mg PO q4 hr prn moderate to severe pain.   - Wound care consulted, recs below:  -Clean right inner thigh with CGH soap/water, pat dry. Loosely pack 1/4" Iodoform or AMD antimicrobial packing gauze into open "holes" to ulcerated wound. Cover with ABD and secure with cloth tape, daily and prn. Apply a zinc barrier to periwound to protect skin from moisture/drainage.   - Remove bilateral lower leg wraps, once.     AMS (altered mental status)  Rapid called 1/17 after pt found unresponsive by nursing.   Reportedly " altered. Possibly vasovagal vs hypotension related to changes in antihypertensive regimen.   CT Head with no acute abnormality    -CTA Chest/Abdomen/Pelvis ordered, f/u results      Chronic knee pain  Reported pain 5/10 BLE (from knees down).     - Continue home gabapentin  - Continue oxy 5 mg q 4 prn    Debility  Patient with Acute on chronic debility due to  chronic lymphedema . The patient's latest AMPAC (Activity Measure for Post Acute Care) Score is listed below.    AM-PAC Score - How much help does the patient need for each activity listed  Basic Mobility Total Score: 13  Turning over in bed (including adjusting bedclothes, sheets and blankets)?: A lot  Sitting down on and standing up from a chair with arms (e.g., wheelchair, bedside commode, etc.): A little  Moving from lying on back to sitting on the side of the bed?: A little  Moving to and from a bed to a chair (including a wheelchair)?: A lot  Need to walk in hospital room?: A lot  Climbing 3-5 steps with a railing?: Unable    Plan  - PT/OT consulted  - Fall precautions in place  - PT and OT rec moderate intensity therapy.   He reportedly does not have transport to go to daily outpatient therapy. Can consider Home Health vs SNF for PT and further wound care          Lymphedema  Chronic issue, pt reported ~ 7 yrs.  -Continue home lasix 40 mg daily     History of aortic dissection  Hx Type B aortic dissection.   Continues to be over goal. Started Nifedipine 60 mg daily.     - Goal SBP <120  - Goal HR <60   - Continue home antihypertensives  - PRN labetalol     Hypertension  Patient's blood pressure range in the last 24 hours was: BP  Min: 94/62  Max: 161/74.The patient's inpatient anti-hypertensive regimen is listed below:  Current Antihypertensives  carvediloL tablet 25 mg, 2 times daily, Oral  furosemide tablet 40 mg, Daily, Oral  lisinopriL tablet 40 mg, Nightly, Oral  labetaloL tablet 100 mg, Daily PRN, Oral  NIFEdipine 24 hr tablet 60 mg, Daily,  Oral    Plan  - BP is controlled, no changes needed to their regimen  - Continue home  carvdilol, and lisinopril  - Started Nifedipine on 1/17 and discontinued home amlodipine  - Continue home lasix 40 mg daily       VTE Risk Mitigation (From admission, onward)           Ordered     heparin (porcine) injection 7,500 Units  Every 8 hours         01/16/25 1857     IP VTE HIGH RISK PATIENT  Once         01/15/25 2233                    Discharge Planning   BROWN:      Code Status: Full Code   Medical Readiness for Discharge Date:   Discharge Plan A: Home, Home Health   Discharge Delays: None known at this time            Please place Justification for DME        Chasidy Arguelles MD  Department of Hospital Medicine   [facility] ([place])

## 2025-01-17 NOTE — PLAN OF CARE
Problem: Bariatric Environmental Safety  Goal: Safety Maintained with Care  Outcome: Progressing     Problem: Wound  Goal: Optimal Coping  Outcome: Progressing  Goal: Optimal Functional Ability  Outcome: Progressing  Goal: Absence of Infection Signs and Symptoms  Outcome: Progressing  Goal: Improved Oral Intake  Outcome: Progressing  Goal: Optimal Pain Control and Function  Outcome: Progressing  Goal: Skin Health and Integrity  Outcome: Progressing  Goal: Optimal Wound Healing  Outcome: Progressing       Pt A&Ox4, on RA. Sbp max to 145. Once-time labetalol given x2. Sbp still>120, Ted RIVERA notified. No c/o pain/N/V. VVS.

## 2025-01-17 NOTE — SUBJECTIVE & OBJECTIVE
Past Medical History:   Diagnosis Date    Aortic dissection distal to left subclavian     Aortic valve sclerosis     Cervical myelopathy     Descending thoracic aortic aneurysm     Gait instability     Grade I diastolic dysfunction     HTN (hypertension), malignant     Hyperlipemia     Lymphedema     Morbid obesity     Neuropathy     LLUVIA (obstructive sleep apnea)     Osteoarthritis of knee     Bilateral    Peripheral vascular insufficiency     Pressure ulcer     right foot/ankle    Staph skin infection     Cellulitis       Past Surgical History:   Procedure Laterality Date    CERVICAL FUSION  06/14/2016    LAMINECTOMY  06/14/2016       Review of patient's allergies indicates:  No Known Allergies    No current facility-administered medications on file prior to encounter.     Current Outpatient Medications on File Prior to Encounter   Medication Sig    amLODIPine (NORVASC) 10 MG tablet TAKE ONE TABLET BY MOUTH EVERY DAY    aspirin (ECOTRIN) 81 MG EC tablet Take 81 mg by mouth once daily.    atorvastatin (LIPITOR) 40 MG tablet TAKE ONE TABLET BY MOUTH EVERY DAY    carvediloL (COREG) 25 MG tablet TAKE ONE TABLET BY MOUTH TWICE DAILY WITH FOOD    diclofenac sodium (VOLTAREN) 1 % Gel Apply 2 g topically once daily. Apply to affected area    furosemide (LASIX) 40 MG tablet Take 1 tablet (40 mg total) by mouth once daily.    gabapentin (NEURONTIN) 600 MG tablet Take 1 tablet (600 mg total) by mouth 3 (three) times daily.    lisinopriL (PRINIVIL,ZESTRIL) 40 MG tablet Take 1 tablet (40 mg total) by mouth every evening.    polyethylene glycol (GLYCOLAX) 17 gram PwPk Take 17 g by mouth 2 (two) times daily as needed.     Family History       Problem Relation (Age of Onset)    Cancer Mother    Diabetes Father    Diabetes type II Mother    Hyperlipidemia Mother, Father    Hypertension Mother, Father    Stroke Maternal Grandmother          Tobacco Use    Smoking status: Never     Passive exposure: Never    Smokeless tobacco: Never    Substance and Sexual Activity    Alcohol use: Yes    Drug use: No     Comment: Has tried marijuana and crack in past, but no drug use since 2003    Sexual activity: Not on file     Review of Systems  Objective:     Vital Signs (Most Recent):  Temp: 98.8 °F (37.1 °C) (01/16/25 1219)  Pulse: 72 (01/16/25 1219)  Resp: 20 (01/16/25 1219)  BP: 129/72 (01/16/25 1219)  SpO2: (!) 93 % (01/16/25 1219) Vital Signs (24h Range):  Temp:  [98.6 °F (37 °C)-99.1 °F (37.3 °C)] 98.8 °F (37.1 °C)  Pulse:  [72-81] 72  Resp:  [16-21] 20  SpO2:  [93 %-94 %] 93 %  BP: (125-144)/(64-78) 129/72     Weight: (!) 181.4 kg (400 lb)  Body mass index is 54.25 kg/m².     Physical Exam  Constitutional:       General: He is not in acute distress.     Appearance: He is not ill-appearing or diaphoretic.   HENT:      Head: Normocephalic and atraumatic.      Nose: Nose normal.      Mouth/Throat:      Mouth: Mucous membranes are moist.   Eyes:      Extraocular Movements: Extraocular movements intact.   Cardiovascular:      Rate and Rhythm: Normal rate and regular rhythm.      Heart sounds: No murmur heard.  Pulmonary:      Effort: Pulmonary effort is normal. No respiratory distress.      Breath sounds: Rhonchi (b/l lower lung fields) present.   Abdominal:      General: Abdomen is flat. There is no distension.      Tenderness: There is no abdominal tenderness.   Musculoskeletal:      Cervical back: Normal range of motion.      Right lower leg: Edema present.      Left lower leg: Edema present.      Comments: Lymphedema. Right medial thigh with large collection and overlying cellulitis with purulent draining wound on medial side.   Neurological:      Mental Status: He is alert.                Significant Labs: All pertinent labs within the past 24 hours have been reviewed.    Significant Imaging: I have reviewed all pertinent imaging results/findings within the past 24 hours.

## 2025-01-17 NOTE — CONSULTS
"Pt seen in ED OBS for wound consult- right inner thigh abscess.Pt AAOx4, agreeable to wound assessment. Pt said area to his thigh developed recently and is painful. He did have HH 3x a week to wrap lower legs in compression wraps. Lower leg wraps currently in place; these can be removed while pt is in hospital. Pt's wound noted to have yellow/ss drainage to chuk pad but no drainage was able to be expressed. When wound was measured for depth, small amount of bloody drainage only from 2 small open areas within the ulcerated wound. Waiting if pt will have I & D of area; skin cleaned and left DEVIN with leg wrapped in chuk pad. Discussed wound care plan with pt- depending on further treatment, wound can be packed with Iodosorb 1/4" packing strips. It will be difficult to wrap this portion of pt's leg- he understood. Education provided on skin care, ie: use of antibacterial soap to leg, antichaffing products. Wound care can f/u w/ pt tomorrow. Pt said he may go to SNF or Rehab following d/c.    Recs:  Clean right inner thigh with CGH soap/water, pat dry. Loosely pack 1/4" Iodoform or AMD antimicrobial packing gauze into open "holes" to ulcerated wound. Cover with ABD and secure with cloth tape, daily and prn. Apply a zinc barrier to periwound to protect skin from moisture/drainage.   Remove bilateral lower leg wraps, once.     ASAD Lima, RN,WON  [facility] Wound/Ostomy  1/16/25 01/16/25 1800   WOCN Assessment   WOCN Total Time (mins) 30   Visit Date 01/16/25   Visit Time 1800   Consult Type New   WOCN Speciality Wound   Wound other;cellulitis  (abscess)   Number of Wounds 1   Intervention assessed;chart review;coordination of care   Teaching on-going        Wound 01/16/25 1257 Abscess Right proximal Leg   Date First Assessed/Time First Assessed: 01/16/25 1257   Present on Original Admission: Yes  Primary Wound Type: Abscess  Side: Right  Orientation: proximal  Location: Leg   Wound Image    Dressing " Appearance Open to air   Drainage Amount Small   Drainage Characteristics/Odor No odor;Serosanguineous;Yellow   Appearance Red;Wet   Periwound Area Edematous;Dry   Wound Edges Irregular   Wound Length (cm) 4 cm   Wound Width (cm) 4.3 cm   Wound Depth (cm) 3 cm   Wound Volume (cm^3) 51.6 cm^3   Wound Surface Area (cm^2) 17.2 cm^2   Care Cleansed with:;Sterile normal saline   Dressing Other (comment)  (left open to air, wrapped in chuk pad)

## 2025-01-17 NOTE — ASSESSMENT & PLAN NOTE
Hx Type B aortic dissection.     - Goal SBP <120  - Goal HR <60   - Continue home antihypertensives  - PRN labetalol

## 2025-01-17 NOTE — ASSESSMENT & PLAN NOTE
"Right medial thigh collection with overlying cellulitis and draining wound. In setting of chronic lymphedema.  Worsening over past 2 weeks with increased size and drainage of blood and pus.   Reports pain is well controlled with current regimen.  CT R thigh reported scattered areas of subcutaneous dense infiltration of the subcutaneous fat in the posterior and medial upper thigh, the lower calf and dorsal foot.  Correlation for cellulitis. No drainable fluid collection or abscess.     - Wound cultures positive for gram negative rods, susceptibilities pending.   - Discontinued clindamycin 600mg q 8 hr  - Started Ceftriaxone 2g q 24hr   - Oxycodone 5 mg PO q4 hr prn moderate to severe pain.   - Wound care consulted, recs below:  -Clean right inner thigh with CGH soap/water, pat dry. Loosely pack 1/4" Iodoform or AMD antimicrobial packing gauze into open "holes" to ulcerated wound. Cover with ABD and secure with cloth tape, daily and prn. Apply a zinc barrier to periwound to protect skin from moisture/drainage.   - Remove bilateral lower leg wraps, once.   "

## 2025-01-17 NOTE — CODE/ RAPID DOCUMENTATION
RAPID RESPONSE NURSE NOTE        Admit Date: 1/15/2025  LOS: 1  Code Status: Full Code   Date of Consult: 2025  : 1955  Age: 69 y.o.  Weight:   Wt Readings from Last 1 Encounters:   01/15/25 (!) 181.4 kg (400 lb)     Sex: male  Race: Black or    Bed: [address] [place] [address] A:   MRN: [id number]  Time Rapid Response Team page Received: 2:05 PM  Time Rapid Response Team at Bedside: 2:07 PM  Time Rapid Response Team left Bedside: 3:10 PM  Was the patient discharged from an ICU this admission? No   Was the patient discharged from a PACU within last 24 hours? No   Did the patient receive conscious sedation/general anesthesia in last 24 hours? No  Was the patient in the ED within the past 24 hours? Yes  Was the patient on NIPPV within the past 24 hours? No   Did this progress into an ARC or CPA: No  Attending Physician: Chio Crabtree MD  Primary Service: [facility] MED 1       SITUATION    Notified by pager.  Reason for alert: AMS  Called to evaluate the patient for Neuro, Circulatory.    BACKGROUND     Why is the patient in the hospital?: Cellulitis of right thigh    Patient has a past medical history of Aortic dissection distal to left subclavian, Aortic valve sclerosis, Cervical myelopathy, Descending thoracic aortic aneurysm, Gait instability, Grade I diastolic dysfunction, HTN (hypertension), malignant, Hyperlipemia, Lymphedema, Morbid obesity, Neuropathy, LLUVIA (obstructive sleep apnea), Osteoarthritis of knee, Peripheral vascular insufficiency, Pressure ulcer, and Staph skin infection.    Last Vitals:  Temp: 98.4 °F (36.9 °C) ( 1124)  Pulse: 58 ( 1430)  Resp: 20 ( 1430)  BP: 156/80 ( 1430)  SpO2: 95 % ( 1430)    24 Hours Vitals Range:  Temp:  [97.9 °F (36.6 °C)-98.9 °F (37.2 °C)]   Pulse:  [58-77]   Resp:  [16-20]   BP: ()/(62-81)   SpO2:  [92 %-97 %]     Labs:  Recent Labs     01/15/25  1608 25  0442 25  1427   WBC 9.99 8.38  --    HGB 13.2* 12.1*  --    HCT  "39.5* 36.3* 41    394  --        Recent Labs     01/15/25  1608 01/17/25  0442   * 137   K 4.0 3.6    105   CO2 24 22*   BUN 15 15   CREATININE 0.9 0.8   GLU 85 83   PHOS  --  3.2   MG  --  2.0        Recent Labs     01/17/25  1427   PH 7.414   PCO2 37.0   PO2 69*   HCO3 23.7*   POCSATURATED 94   BE -1        ASSESSMENT    Physical Exam  Constitutional:       Appearance: He is morbidly obese. He is ill-appearing and diaphoretic.   HENT:      Mouth/Throat:      Mouth: Mucous membranes are moist.   Eyes:      Pupils: Pupils are equal, round, and reactive to light.   Cardiovascular:      Rate and Rhythm: Normal rate and regular rhythm.   Pulmonary:      Effort: Pulmonary effort is normal. No respiratory distress.   Abdominal:      Palpations: Abdomen is soft.   Genitourinary:     Comments: Incontinent of urine.   Musculoskeletal:      Right lower leg: Edema present.      Left lower leg: Edema present.   Skin:     General: Skin is warm and moist.      Findings: Wound present.      Comments: Wound to left leg - moist, purulent, malodorous drainage noted on dressing.    Neurological:      General: No focal deficit present.      Mental Status: He is easily aroused. He is lethargic.      GCS: GCS eye subscore is 4. GCS verbal subscore is 5. GCS motor subscore is 6.      Motor: Weakness present.   Psychiatric:         Attention and Perception: He is inattentive.         Speech: Speech is delayed.         Behavior: Behavior is slowed.     Per primary RN, pt called RN to bedside stating that he felt like his BP was low. RN stated she was unable to obtain BP via automatic NIBP cuff. At this time, pt had an episode of "staring off into the distance," and became minimally responsive to verbal cues. RN reports this was a significant change in neuro status since AM assessment, thus prompting Rapid Response page.     Upon RRT arrival, pt sitting up in chair, diaphoretic, incontinent of urine. HR 48, BP 90s/60s, RR " 14, and SpO2 95% on room air. Pt opening eyes spontaneously. Verbal responses delayed, slurred, and spoken in low volume. He was oriented to self, but otherwise not answering additional orientation questions. POCT . Pt transferred back into bed. HR improved to NSR in the 60s. BP improved to 150s/70s. Oriented x 3. Verbal responses remained delayed and difficult to understand. He reported remembering sitting in chair, experiencing visual disturbances that resolved after transitioning to the bed. No focal deficits appreciated on neuro exam. Pupils equal, brisk, and reactive to light. SEJAL Murphy called to bedside. Pt taken to STAT head CT by RRT. Increased alertness appreciated upon return to 11th floor from CT scanner. Pt hemodynamically stable. POCT VBG and lactic acid unremarkable. Orders placed to transfer pt to stepdown unit. Continuous telemetry monitoring in place. Primary team aware of event and came to bedside to assess pt.     INTERVENTIONS    The patient was seen for Neurological problem. Staff concerns included mental status change and decreased responsiveness. The following interventions were performed: POCT glucose, seizure precautions, CT scan ordered, and CK, POCT VBG and lactic.  Cardiac problem. Staff concerns included bradycardia and hypotension. The following interventions were performed: EKG, continuous cardiac monitoring continued, and continuous pulse ox monitoring .  RECOMMENDATIONS    We recommend: Continuous telemetry monitoring, Continuous SpO2 monitoring, Maintain IV access, Strict I/O, Aspiration precautions, Seizure precautions, Implement fall precautions, ensure sitter if necessary, Delirium prevention, Monitor for signs of hemodynamic instability and changes in neurological status, Obtain repeat labs and monitor trends, Notify Rapid Response of decline in patient status, and consider Echo and EEG. Additionally, after CTA chest/abdomen, consider reassessing SBP goals as pt's  SBP baseline ~150s-170s .    PROVIDER ESCALATION    Orders received and case discussed with  SEJAL Reza.    Primary team arrival time: 2:45 PM     Disposition: Tx to [facility], bed TBD.    FOLLOW UP    Charge RNSienna  updated on plan of care. Instructed to call the Rapid Response Nurse, Debra Oneal RN at [phone] for additional questions or concerns.

## 2025-01-17 NOTE — ASSESSMENT & PLAN NOTE
Hx Type B aortic dissection.   Continues to be over goal. Started Nifedipine 60 mg daily.     - Goal SBP <120  - Goal HR <60   - Continue home antihypertensives  - PRN labetalol

## 2025-01-17 NOTE — ASSESSMENT & PLAN NOTE
Rapid called 1/17 after pt found unresponsive by nursing.   Reportedly altered. Possibly vasovagal vs hypotension related to changes in antihypertensive regimen.   CT Head with no acute abnormality    -CTA Chest/Abdomen/Pelvis ordered, f/u results

## 2025-01-17 NOTE — PT/OT/SLP PROGRESS
Physical Therapy Treatment    Patient Name:  Edd Wills   MRN:  [id number]    Recommendations:     Discharge Recommendations: Moderate Intensity Therapy  Discharge Equipment Recommendations: rollator  Barriers to discharge: Decreased caregiver support    Assessment:     Edd Wills is a 69 y.o. male admitted with a medical diagnosis of Cellulitis of right thigh.  He presents with the following impairments/functional limitations: weakness, impaired endurance, impaired self care skills, impaired functional mobility, gait instability, impaired balance, pain, decreased lower extremity function, decreased ROM, impaired skin, edema, impaired cardiopulmonary response to activity .  Progressing with PT Intervention. Pt Progressing with improving gait distance with rest breaks . Pt would continue to benefit from skilled PT to address overall function al mobility, goals , and to return to functional baseline.  Goals remain appropriate.     Rehab Prognosis: Good; patient would benefit from acute skilled PT services to address these deficits and reach maximum level of function.    Recent Surgery: * No surgery found *      Plan:     During this hospitalization, patient to be seen 4 x/week to address the identified rehab impairments via gait training, therapeutic activities, therapeutic exercises, neuromuscular re-education and progress toward the following goals:    Plan of Care Expires:  02/15/25    Subjective     Patient/Family Comments/goals: I want to get out of the bed  Pain/Comfort:  Pain Rating 1: 0/10      Objective:     Communicated with RN prior to session.  Patient found HOB elevated with telemetry upon PT entry to room.     General Precautions: Standard, fall  Orthopedic Precautions: N/A  Braces: N/A  Respiratory Status: Room air     Functional Mobility:  Bed Mobility:     Scooting: stand by assistance  Supine to Sit: stand by assistance  Transfers:     Sit to Stand:  minimum assistance with rolling walker  Bed to  Chair: contact guard assistance with  rolling walker  using  Step Transfer  Toilet Transfer: contact guard assistance with  rolling walker and bedside commode  using  Step Transfer  Gait: pt amb with bariatric RW x 10 ft x 2 trials with SBA/CGA for safety with chair follow . Standing rest breaks prn and seated rest break  between trials  Patient required cues for upright posture, gluteal activation, sequencing, rolling walker management, obstacle navigation, safe rolling walker usage, to ambulate within BRIELLE of RW, increased step size, and increased foot clearance          AM-PAC 6 CLICK MOBILITY  Turning over in bed (including adjusting bedclothes, sheets and blankets)?: 2  Sitting down on and standing up from a chair with arms (e.g., wheelchair, bedside commode, etc.): 3  Moving from lying on back to sitting on the side of the bed?: 3  Moving to and from a bed to a chair (including a wheelchair)?: 2  Need to walk in hospital room?: 2  Climbing 3-5 steps with a railing?: 1  Basic Mobility Total Score: 13       Treatment & Education:  Therapist provided instruction and educated of patient on progress, safety, d/c, PT POC,   proper body mechanics, energy conservation, and fall prevention strategies during tasks listed above, on the effects of prolonged immobility and the importance of performing OOB activity and exercises to promote healing and reduce recovery time     Updated white board with appropriate PT mobility information for medical team notification   Donned an extra gown   Pt safe to ambulate in hallway with RN or PCT assistance  Call nursing/pct to transfer to chair/use bathroom. Pt stated understanding  Bedside table in front of patient and area set up for function, convenience, and safety. RN aware of patient's mobility needs and status. Questions/concerns addressed within PTA scope of practice, patient with no further questions. Time was provided for active listening, discussion of health disposition,  and discussion of safe discharge. Pt?verbalized?agreement .       Patient left up in chair with all lines intact, call button in reach, and nsg notified..    GOALS:   Multidisciplinary Problems       Physical Therapy Goals          Problem: Physical Therapy    Goal Priority Disciplines Outcome Interventions   Physical Therapy Goal     PT, PT/OT Progressing    Description: Goals to be met by: 25     Patient will increase functional independence with mobility by performin. Supine to sit with Modified [place] - Not met  2. Sit to stand transfer with Stand-by Assistance using bariatric RW - Not met  3. Gait  x 100 feet with Contact Guard Assistance using bariatric Rolling Walker.   4. Pt will demo ability to stand for 5 minutes with either bariatric RW and sink support (for self-care) with SBA - Not met                       Time Tracking:     PT Received On: 25  PT Start Time: 830     PT Stop Time: 923  PT Total Time (min): 53 min     Billable Minutes: Gait Training 30 and Therapeutic Activity 23    Treatment Type: Treatment  PT/PTA: PTA     Number of PTA visits since last PT visit: 2025

## 2025-01-17 NOTE — HOSPITAL COURSE
Known hx of of aortic dissection, admitted d/o right medial thigh collection started on Clinamycin CT of RLE with scattered areas of subcutaneous dense infiltration of the subcutaneous fat in the posterior and medial upper thigh, the lower calf and dorsal foot c/f cellulitis. However no drainable fluid collection or abscess seen. Patient's wound culture grew serratia and pseudomonas. Consulted ID who recommended to continue IV cefepime 2g Q8h to complete abx duration of at-least 2 weeks, SOHAN 02/03/25. Patient had rapid called on 1/17 when he was found unresponsive.  ICU team came to room to assess. CTH and CTA chest showed no acute abnormalities. Likely related to titration of antihypertensives to meet BP goal <120 given hx aortic dissection. Pt also had minor elevation in creatinine but no change in his urine output. Patient had second presyncopal episode on 1/26, likely related to strict BP control. Holding antihypertensives currently and plan to add back as tolerated. Patient's blood pressure control can be slightly more lenient on discharge to avoid any hypotensive episodes. CM is working on placement. SNF placement attained, patient is fit for discharge from medical point of view.

## 2025-01-17 NOTE — ED NOTES
Telemetry Verification   Patient placed on Telemetry Box  Verified with War Room  Box # [id number]   Monitor Tech    Rate 67    Rhythm Ns

## 2025-01-17 NOTE — H&P
"  [facility] - Emergency Dept  [facility] Medicine  History & Physical    Patient Name: Edd Wills  MRN: [id number]  Patient Class: IP- Inpatient  Admission Date: 1/15/2025  Attending Physician: Chio Crabtree MD   Primary Care Provider: Zuleyka Sabillon MD         Patient information was obtained from patient and ER records.     Subjective:     Principal Problem:Cellulitis of right thigh    Chief Complaint:   Chief Complaint   Patient presents with    Wound Check     Pt. Is a 69 yr old -American male presenting to the ED with a wound to the LRE.  Started as a boil last week and has become inflamed and starting to ooze today.        HPI: Edd Wills is a 69 y.o. male with past medical history of malignant HTN, HLD, morbid obesity, Type B aortic dissection, chronic lymphedema, LLUVIA, chronic neck pain s/p cervical fusion and laminectomy, chronic bilateral knee pain, grade 1 diastolic dysfunction, and CKD who presented for abscess on his R medial thigh. He has a 6-7 year history of a lymphedematous collection to his R medial thigh, however noticed a week ago that there was mild purulent drainage coming from a new wound. He also reports the collection has increased in size over the past week. Wound care visits him 3 times a week to manage this collection, but a couple days before his presentation, while trying to express some of the drainage, drainage evolved to "blood clots" and a large amount of purulent drainage which wound care felt was too extensive to care for at home. He was then recommended to present to the ED. He denies fever, chills, SOB, chest pain, N/V, diarrhea, abdominal pain, and dysuria.      In ED, vitals were hypertensive to 144, but otherwise hemodynamically stable. Labs were significant for Hgb 13.2 and hematocrit 39.5 with no leukocytosis. Na 135 and albumin 3.0. He was started on clindamycin in D5W 600mg/50mL Q8 for his cellulitis.       Past Medical History:   Diagnosis Date    Aortic " dissection distal to left subclavian     Aortic valve sclerosis     Cervical myelopathy     Descending thoracic aortic aneurysm     Gait instability     Grade I diastolic dysfunction     HTN (hypertension), malignant     Hyperlipemia     Lymphedema     Morbid obesity     Neuropathy     LLUVIA (obstructive sleep apnea)     Osteoarthritis of knee     Bilateral    Peripheral vascular insufficiency     Pressure ulcer     right foot/ankle    Staph skin infection     Cellulitis       Past Surgical History:   Procedure Laterality Date    CERVICAL FUSION  06/14/2016    LAMINECTOMY  06/14/2016       Review of patient's allergies indicates:  No Known Allergies    No current facility-administered medications on file prior to encounter.     Current Outpatient Medications on File Prior to Encounter   Medication Sig    amLODIPine (NORVASC) 10 MG tablet TAKE ONE TABLET BY MOUTH EVERY DAY    aspirin (ECOTRIN) 81 MG EC tablet Take 81 mg by mouth once daily.    atorvastatin (LIPITOR) 40 MG tablet TAKE ONE TABLET BY MOUTH EVERY DAY    carvediloL (COREG) 25 MG tablet TAKE ONE TABLET BY MOUTH TWICE DAILY WITH FOOD    diclofenac sodium (VOLTAREN) 1 % Gel Apply 2 g topically once daily. Apply to affected area    furosemide (LASIX) 40 MG tablet Take 1 tablet (40 mg total) by mouth once daily.    gabapentin (NEURONTIN) 600 MG tablet Take 1 tablet (600 mg total) by mouth 3 (three) times daily.    lisinopriL (PRINIVIL,ZESTRIL) 40 MG tablet Take 1 tablet (40 mg total) by mouth every evening.    polyethylene glycol (GLYCOLAX) 17 gram PwPk Take 17 g by mouth 2 (two) times daily as needed.     Family History       Problem Relation (Age of Onset)    Cancer Mother    Diabetes Father    Diabetes type II Mother    Hyperlipidemia Mother, Father    Hypertension Mother, Father    Stroke Maternal Grandmother          Tobacco Use    Smoking status: Never     Passive exposure: Never    Smokeless tobacco: Never   Substance and Sexual Activity    Alcohol use:  Yes    Drug use: No     Comment: Has tried marijuana and crack in past, but no drug use since 2003    Sexual activity: Not on file     Review of Systems  Objective:     Vital Signs (Most Recent):  Temp: 98.8 °F (37.1 °C) (01/16/25 1219)  Pulse: 72 (01/16/25 1219)  Resp: 20 (01/16/25 1219)  BP: 129/72 (01/16/25 1219)  SpO2: (!) 93 % (01/16/25 1219) Vital Signs (24h Range):  Temp:  [98.6 °F (37 °C)-99.1 °F (37.3 °C)] 98.8 °F (37.1 °C)  Pulse:  [72-81] 72  Resp:  [16-21] 20  SpO2:  [93 %-94 %] 93 %  BP: (125-144)/(64-78) 129/72     Weight: (!) 181.4 kg (400 lb)  Body mass index is 54.25 kg/m².     Physical Exam  Constitutional:       General: He is not in acute distress.     Appearance: He is not ill-appearing or diaphoretic.   HENT:      Head: Normocephalic and atraumatic.      Nose: Nose normal.      Mouth/Throat:      Mouth: Mucous membranes are moist.   Eyes:      Extraocular Movements: Extraocular movements intact.   Cardiovascular:      Rate and Rhythm: Normal rate and regular rhythm.      Heart sounds: No murmur heard.  Pulmonary:      Effort: Pulmonary effort is normal. No respiratory distress.      Breath sounds: Rhonchi (b/l lower lung fields) present.   Abdominal:      General: Abdomen is flat. There is no distension.      Tenderness: There is no abdominal tenderness.   Musculoskeletal:      Cervical back: Normal range of motion.      Right lower leg: Edema present.      Left lower leg: Edema present.      Comments: Lymphedema. Right medial thigh with large collection and overlying cellulitis with purulent draining wound on medial side.   Neurological:      Mental Status: He is alert.                Significant Labs: All pertinent labs within the past 24 hours have been reviewed.    Significant Imaging: I have reviewed all pertinent imaging results/findings within the past 24 hours.  Assessment/Plan:     * Cellulitis of right thigh  Right medial thigh collection with overlying cellulitis and draining wound.  "In setting of chronic lymphedema.  Worsening over past 2 weeks with increased size and drainage of blood and pus.   Reports pain is well controlled with current regimen.    - f/u US RLE  - f/u CT R thigh   - Continue clindamycin 600mg q 8 hr  - Oxycodone 5 mg PO q4 hr prn moderate to severe pain.   - Wound care consulted, recs below:  -Clean right inner thigh with CGH soap/water, pat dry. Loosely pack 1/4" Iodoform or AMD antimicrobial packing gauze into open "holes" to ulcerated wound. Cover with ABD and secure with cloth tape, daily and prn. Apply a zinc barrier to periwound to protect skin from moisture/drainage.   - Remove bilateral lower leg wraps, once.     Chronic knee pain  Reported pain 5/10 BLE (from knees down).     - Continue home gabapentin  - Continue oxy 5 mg q 4 prn    Debility  Patient with Acute on chronic debility due to  chronic lymphedema . The patient's latest AMPAC (Activity Measure for Post Acute Care) Score is listed below.    AM-PAC Score - How much help does the patient need for each activity listed  Basic Mobility Total Score: 13  Turning over in bed (including adjusting bedclothes, sheets and blankets)?: A lot  Sitting down on and standing up from a chair with arms (e.g., wheelchair, bedside commode, etc.): A little  Moving from lying on back to sitting on the side of the bed?: A little  Moving to and from a bed to a chair (including a wheelchair)?: A lot  Need to walk in hospital room?: A lot  Climbing 3-5 steps with a railing?: Unable    Plan  - PT/OT consulted  - Fall precautions in place  - PT and OT rec moderate intensity therapy.   He reportedly does not have transport to go to daily outpatient therapy. Can consider Home Health vs SNF for PT and further wound care          Lymphedema  Chronic issue, pt reported ~ 7 yrs.  -Continue home lasix 40 mg daily     History of aortic dissection  Hx Type B aortic dissection.     - Goal SBP <120  - Goal HR <60   - Continue home " antihypertensives  - PRN labetalol     Hypertension  Patient's blood pressure range in the last 24 hours was: BP  Min: 125/74  Max: 144/78.The patient's inpatient anti-hypertensive regimen is listed below:  Current Antihypertensives  amLODIPine tablet 10 mg, Daily, Oral  carvediloL tablet 25 mg, 2 times daily, Oral  furosemide tablet 40 mg, Daily, Oral  lisinopriL tablet 40 mg, Nightly, Oral    Plan  - BP is controlled, no changes needed to their regimen  - Continue home amlodipine, carvdilol, and lisinopril  - Continue home lasix 40 mg daily       VTE Risk Mitigation (From admission, onward)           Ordered     IP VTE HIGH RISK PATIENT  Once         01/15/25 3051                       Chasidy Arguelles MD  Department of Hospital Medicine  [facility] - Emergency Dept

## 2025-01-17 NOTE — SUBJECTIVE & OBJECTIVE
Interval History:  Hypertensive overnight. Given IV labetol x 2. BP remained in 140/150s. Discontinued home amlodipine and start nifedipine 60 mg daily. This afternoon around 2pm Rapid was called when he was found unresponsive. Nursing reported that he became altered when standing up.  CT Head was normal. CTA Chest/Abdomen/Pelvis pending.     Review of Systems  Objective:     Vital Signs (Most Recent):  Temp: 97.6 °F (36.4 °C) (01/17/25 1545)  Pulse: (!) 59 (01/17/25 1602)  Resp: 18 (01/17/25 1545)  BP: (!) 143/82 (01/17/25 1545)  SpO2: 100 % (01/17/25 1545) Vital Signs (24h Range):  Temp:  [97.6 °F (36.4 °C)-98.9 °F (37.2 °C)] 97.6 °F (36.4 °C)  Pulse:  [58-77] 59  Resp:  [16-20] 18  SpO2:  [92 %-100 %] 100 %  BP: ()/(62-82) 143/82     Weight: (!) 181.4 kg (400 lb)  Body mass index is 54.25 kg/m².    Intake/Output Summary (Last 24 hours) at 1/17/2025 1738  Last data filed at 1/16/2025 1840  Gross per 24 hour   Intake 420 ml   Output 700 ml   Net -280 ml         Physical Exam  Constitutional:       General: He is not in acute distress.     Appearance: He is not ill-appearing or diaphoretic.   HENT:      Head: Normocephalic and atraumatic.      Nose: Nose normal.      Mouth/Throat:      Mouth: Mucous membranes are moist.   Eyes:      Extraocular Movements: Extraocular movements intact.   Cardiovascular:      Rate and Rhythm: Normal rate and regular rhythm.      Heart sounds: No murmur heard.  Pulmonary:      Effort: Pulmonary effort is normal. No respiratory distress.      Breath sounds: Rhonchi (b/l lower lung fields) present.   Abdominal:      General: Abdomen is flat. There is no distension.      Tenderness: There is no abdominal tenderness.   Musculoskeletal:      Cervical back: Normal range of motion.      Right lower leg: Edema present.      Left lower leg: Edema present.      Comments: Lymphedema. Right medial thigh with large collection and overlying cellulitis with purulent draining wound on medial  side.   Neurological:      Mental Status: He is alert.             Significant Labs: All pertinent labs within the past 24 hours have been reviewed.    Significant Imaging: I have reviewed all pertinent imaging results/findings within the past 24 hours.

## 2025-01-17 NOTE — HPI
"Edd Wills is a 69 y.o. male with past medical history of malignant HTN, HLD, morbid obesity, Type B aortic dissection, chronic lymphedema, LLUVIA, chronic neck pain s/p cervical fusion and laminectomy, chronic bilateral knee pain, grade 1 diastolic dysfunction, and CKD who presented for abscess on his R medial thigh. He has a 6-7 year history of a lymphedematous collection to his R medial thigh, however noticed a week ago that there was mild purulent drainage coming from a new wound. He also reports the collection has increased in size over the past week. Wound care visits him 3 times a week to manage this collection, but a couple days before his presentation, while trying to express some of the drainage, drainage evolved to "blood clots" and a large amount of purulent drainage which wound care felt was too extensive to care for at home. He was then recommended to present to the ED. He denies fever, chills, SOB, chest pain, N/V, diarrhea, abdominal pain, and dysuria.      In ED, vitals were hypertensive to 144, but otherwise hemodynamically stable. Labs were significant for Hgb 13.2 and hematocrit 39.5 with no leukocytosis. Na 135 and albumin 3.0. He was started on clindamycin in D5W 600mg/50mL Q8 for his cellulitis.     "

## 2025-01-17 NOTE — PT/OT/SLP PROGRESS
"Occupational Therapy   Treatment    Name: Edd Wills  MRN: [id number]  Admitting Diagnosis:  Cellulitis of right thigh       Recommendations:     Discharge Recommendations: Moderate Intensity Therapy  Discharge Equipment Recommendations:  to be determined by next level of care  Barriers to discharge:  Decreased caregiver support    Assessment:     Edd Wills is a 69 y.o. male with a medical diagnosis of Cellulitis of right thigh. Performance deficits affecting function are weakness, impaired endurance, decreased coordination, decreased upper extremity function, decreased lower extremity function, impaired functional mobility, gait instability, impaired balance, edema, impaired self care skills, impaired skin.     Rehab Prognosis:  Good; patient would benefit from acute skilled OT services to address these deficits and reach maximum level of function.       Plan:     Patient to be seen 4 x/week to address the above listed problems via self-care/home management, therapeutic activities, therapeutic exercises  Plan of Care Expires: 02/15/25  Plan of Care Reviewed with: patient    Subjective     Patient/Family Comments/goals: "It feels good to get out of that bed."    Pain/Comfort:  Pain Rating 1: 0/10    Objective:     Communicated with: rn prior to session.  Patient found supine with telemetry upon OT entry to room.    General Precautions: Standard, fall    Orthopedic Precautions:N/A  Braces: N/A  Respiratory Status: Room air     Occupational Performance:     Bed Mobility:    Patient completed Scooting/Bridging with stand by assistance  Patient completed Supine to Sit with stand by assistance     Functional Mobility/Transfers:  Patient completed Sit <> Stand Transfer with minimum assistance  with  rolling walker   Patient completed Bed <> Chair Transfer using Step Transfer technique with contact guard assistance with rolling walker  Patient completed Toilet Transfer Step Transfer technique with contact guard " assistance with  bedside commode  Functional Mobility: Pt walked from the bed>bathroom for ADLs>chair with SBA using a RW.    Activities of Daily Living:  Grooming: supervision seated at the sink.  Bathing: supervision seated.    [facility] 6 Click ADL: 17    Treatment & Education:  Discussed OT POC and progress.  Encouraged activity / OOB with staff over the weekend.    Patient left up in chair with all lines intact and call button in reach    GOALS:   Multidisciplinary Problems       Occupational Therapy Goals          Problem: Occupational Therapy    Goal Priority Disciplines Outcome Interventions   Occupational Therapy Goal     OT, PT/OT Progressing    Description: Goals to be met by: 2/15/25     Patient will increase functional independence with ADLs by performing:    UE Dressing with Modified [place].  LE Dressing with Modified [place].  Grooming while standing at sink with Modified [place].  Toileting from toilet/bedside commode with Modified [place] for hygiene and clothing management.   Supine to sit with Modified [place].  Toilet transfer to toilet/bedside commode with Modified [place].                         DME Justifications:  No DME recommended requiring DME justifications    Time Tracking:     OT Date of Treatment: 01/17/25  OT Start Time: 0827  OT Stop Time: 0922  OT Total Time (min): 55 min    Billable Minutes:Self Care/Home Management 25  Therapeutic Activity 30    OT/DEVIN: OT          1/17/2025

## 2025-01-17 NOTE — ASSESSMENT & PLAN NOTE
"Right medial thigh collection with overlying cellulitis and draining wound. In setting of chronic lymphedema.  Worsening over past 2 weeks with increased size and drainage of blood and pus.   Reports pain is well controlled with current regimen.    - f/u US RLE  - f/u CT R thigh   - Continue clindamycin 600mg q 8 hr  - Oxycodone 5 mg PO q4 hr prn moderate to severe pain.   - Wound care consulted, recs below:  -Clean right inner thigh with CGH soap/water, pat dry. Loosely pack 1/4" Iodoform or AMD antimicrobial packing gauze into open "holes" to ulcerated wound. Cover with ABD and secure with cloth tape, daily and prn. Apply a zinc barrier to periwound to protect skin from moisture/drainage.   - Remove bilateral lower leg wraps, once.   "

## 2025-01-17 NOTE — ASSESSMENT & PLAN NOTE
Patient's blood pressure range in the last 24 hours was: BP  Min: 94/62  Max: 161/74.The patient's inpatient anti-hypertensive regimen is listed below:  Current Antihypertensives  carvediloL tablet 25 mg, 2 times daily, Oral  furosemide tablet 40 mg, Daily, Oral  lisinopriL tablet 40 mg, Nightly, Oral  labetaloL tablet 100 mg, Daily PRN, Oral  NIFEdipine 24 hr tablet 60 mg, Daily, Oral    Plan  - BP is controlled, no changes needed to their regimen  - Continue home  carvdilol, and lisinopril  - Started Nifedipine on 1/17 and discontinued home amlodipine  - Continue home lasix 40 mg daily

## 2025-01-17 NOTE — ASSESSMENT & PLAN NOTE
Reported pain 5/10 BLE (from knees down).     - Continue home gabapentin  - Continue oxy 5 mg q 4 prn

## 2025-01-17 NOTE — ASSESSMENT & PLAN NOTE
Patient's blood pressure range in the last 24 hours was: BP  Min: 125/74  Max: 144/78.The patient's inpatient anti-hypertensive regimen is listed below:  Current Antihypertensives  amLODIPine tablet 10 mg, Daily, Oral  carvediloL tablet 25 mg, 2 times daily, Oral  furosemide tablet 40 mg, Daily, Oral  lisinopriL tablet 40 mg, Nightly, Oral    Plan  - BP is controlled, no changes needed to their regimen  - Continue home amlodipine, carvdilol, and lisinopril  - Continue home lasix 40 mg daily

## 2025-01-17 NOTE — RESPIRATORY THERAPY
RAPID RESPONSE RESPIRATORY THERAPY NOTE             Code Status: Full Code   : 1955  Bed: 1102/1102 A:   MRN: [id number]  Time page Received: 1405  Time Rapid Response RT at Bedside: 1407  Time Rapid Response RT left Bedside: 1510    SITUATION    Evaluated patient for: AMS    BACKGROUND    Why is the patient in the hospital?: Cellulitis of right thigh    Patient has a past medical history of Aortic dissection distal to left subclavian, Aortic valve sclerosis, Cervical myelopathy, Descending thoracic aortic aneurysm, Gait instability, Grade I diastolic dysfunction, HTN (hypertension), malignant, Hyperlipemia, Lymphedema, Morbid obesity, Neuropathy, LLUVIA (obstructive sleep apnea), Osteoarthritis of knee, Peripheral vascular insufficiency, Pressure ulcer, and Staph skin infection.    24 Hours Vitals Range:  Temp:  [97.6 °F (36.4 °C)-98.9 °F (37.2 °C)]   Pulse:  [58-77]   Resp:  [16-20]   BP: ()/(62-82)   SpO2:  [92 %-100 %]     Labs:    Recent Labs     01/15/25  1608 25  0442   * 137   K 4.0 3.6    105   CO2 24 22*   BUN 15 15   CREATININE 0.9 0.8   GLU 85 83   PHOS  --  3.2   MG  --  2.0        Recent Labs     25  1427   PH 7.414   PCO2 37.0   PO2 69*   HCO3 23.7*   POCSATURATED 94   BE -1       ASSESSMENT/INTERVENTIONS  Pt has no respiratory interventions required at this time.    Last Vitals: Temp: 97.6 °F (36.4 °C) (1545)  Pulse: 59 ( 160)  Resp: 18 (1545)  BP: 143/82 (1545)  SpO2: 100 % (1545)  Level of Consciousness: Level of Consciousness (AVPU): alert  Respiratory Effort:    Expansion/Accessory Muscle Usage: Expansion/Accessory Muscles/Retractions: expansion symmetric, no retractions, no use of accessory muscles  All Lung Field Breath Sounds:    O2 Device/Concentration: R.A.  NIPPV: No Surgical airway: No Vent: No  ETCO2 monitored:    Ambu at bedside: y    Active Orders   Respiratory Care    Pulse Oximetry Continuous     Frequency: Continuous      Number of Occurrences: Until Specified       RECOMMENDATIONS  ?  We recommend: RRT Recs: CT    ESCALATION        FOLLOW-UP    Disposition:  Pt to stay in room 1102    Please call back the Rapid Response RT, Dex Jaquez, RRT at x [phone] for any questions or concerns.

## 2025-01-17 NOTE — ED NOTES
Received report from Sharyn DELGADILLO. Assumed care of patient. Patient is alert and resting comfortably in bed in NAD. BP, cardiac, and O2 monitoring continued. Patient updated on plan of care, pt denies any needs or complaints at this time. Bed locked in lowest position, side rails up x2, call light within reach. VSS.

## 2025-01-18 LAB
ALBUMIN SERPL BCP-MCNC: 2.5 G/DL (ref 3.5–5.2)
ALP SERPL-CCNC: 72 U/L (ref 40–150)
ALT SERPL W/O P-5'-P-CCNC: 23 U/L (ref 10–44)
ANION GAP SERPL CALC-SCNC: 8 MMOL/L (ref 8–16)
AST SERPL-CCNC: 24 U/L (ref 10–40)
BASOPHILS # BLD AUTO: 0.05 K/UL (ref 0–0.2)
BASOPHILS NFR BLD: 0.6 % (ref 0–1.9)
BILIRUB SERPL-MCNC: 0.3 MG/DL (ref 0.1–1)
BUN SERPL-MCNC: 15 MG/DL (ref 8–23)
CALCIUM SERPL-MCNC: 8.5 MG/DL (ref 8.7–10.5)
CHLORIDE SERPL-SCNC: 105 MMOL/L (ref 95–110)
CO2 SERPL-SCNC: 21 MMOL/L (ref 23–29)
CREAT SERPL-MCNC: 0.9 MG/DL (ref 0.5–1.4)
DIFFERENTIAL METHOD BLD: ABNORMAL
EOSINOPHIL # BLD AUTO: 0.3 K/UL (ref 0–0.5)
EOSINOPHIL NFR BLD: 3.3 % (ref 0–8)
ERYTHROCYTE [DISTWIDTH] IN BLOOD BY AUTOMATED COUNT: 13.7 % (ref 11.5–14.5)
EST. GFR  (NO RACE VARIABLE): >60 ML/MIN/1.73 M^2
GLUCOSE SERPL-MCNC: 99 MG/DL (ref 70–110)
HCT VFR BLD AUTO: 35.8 % (ref 40–54)
HGB BLD-MCNC: 11.8 G/DL (ref 14–18)
IMM GRANULOCYTES # BLD AUTO: 0.03 K/UL (ref 0–0.04)
IMM GRANULOCYTES NFR BLD AUTO: 0.4 % (ref 0–0.5)
LYMPHOCYTES # BLD AUTO: 1.9 K/UL (ref 1–4.8)
LYMPHOCYTES NFR BLD: 21.9 % (ref 18–48)
MAGNESIUM SERPL-MCNC: 2 MG/DL (ref 1.6–2.6)
MCH RBC QN AUTO: 27.4 PG (ref 27–31)
MCHC RBC AUTO-ENTMCNC: 33 G/DL (ref 32–36)
MCV RBC AUTO: 83 FL (ref 82–98)
MONOCYTES # BLD AUTO: 1.2 K/UL (ref 0.3–1)
MONOCYTES NFR BLD: 13.5 % (ref 4–15)
NEUTROPHILS # BLD AUTO: 5.1 K/UL (ref 1.8–7.7)
NEUTROPHILS NFR BLD: 60.3 % (ref 38–73)
NRBC BLD-RTO: 0 /100 WBC
PHOSPHATE SERPL-MCNC: 3.4 MG/DL (ref 2.7–4.5)
PLATELET # BLD AUTO: 392 K/UL (ref 150–450)
PMV BLD AUTO: 9.7 FL (ref 9.2–12.9)
POTASSIUM SERPL-SCNC: 3.8 MMOL/L (ref 3.5–5.1)
PROT SERPL-MCNC: 6.8 G/DL (ref 6–8.4)
RBC # BLD AUTO: 4.3 M/UL (ref 4.6–6.2)
SODIUM SERPL-SCNC: 134 MMOL/L (ref 136–145)
WBC # BLD AUTO: 8.49 K/UL (ref 3.9–12.7)

## 2025-01-18 PROCEDURE — 36415 COLL VENOUS BLD VENIPUNCTURE: CPT

## 2025-01-18 PROCEDURE — 25000003 PHARM REV CODE 250: Performed by: PHYSICIAN ASSISTANT

## 2025-01-18 PROCEDURE — 83735 ASSAY OF MAGNESIUM: CPT

## 2025-01-18 PROCEDURE — 63600175 PHARM REV CODE 636 W HCPCS

## 2025-01-18 PROCEDURE — 25000003 PHARM REV CODE 250

## 2025-01-18 PROCEDURE — 20600001 HC STEP DOWN PRIVATE ROOM

## 2025-01-18 PROCEDURE — 80053 COMPREHEN METABOLIC PANEL: CPT

## 2025-01-18 PROCEDURE — 84100 ASSAY OF PHOSPHORUS: CPT

## 2025-01-18 PROCEDURE — 25000003 PHARM REV CODE 250: Performed by: INTERNAL MEDICINE

## 2025-01-18 PROCEDURE — 85025 COMPLETE CBC W/AUTO DIFF WBC: CPT

## 2025-01-18 RX ORDER — POLYETHYLENE GLYCOL 3350 17 G/17G
17 POWDER, FOR SOLUTION ORAL DAILY
Status: DISCONTINUED | OUTPATIENT
Start: 2025-01-18 | End: 2025-01-19

## 2025-01-18 RX ORDER — CEFEPIME HYDROCHLORIDE 1 G/1
1 INJECTION, POWDER, FOR SOLUTION INTRAMUSCULAR; INTRAVENOUS
Status: DISCONTINUED | OUTPATIENT
Start: 2025-01-18 | End: 2025-01-19

## 2025-01-18 RX ADMIN — ATORVASTATIN CALCIUM 40 MG: 40 TABLET, FILM COATED ORAL at 09:01

## 2025-01-18 RX ADMIN — CEFEPIME 1 G: 1 INJECTION, POWDER, FOR SOLUTION INTRAMUSCULAR; INTRAVENOUS at 03:01

## 2025-01-18 RX ADMIN — GABAPENTIN 600 MG: 300 CAPSULE ORAL at 09:01

## 2025-01-18 RX ADMIN — CEFEPIME 1 G: 1 INJECTION, POWDER, FOR SOLUTION INTRAMUSCULAR; INTRAVENOUS at 08:01

## 2025-01-18 RX ADMIN — CARVEDILOL 25 MG: 12.5 TABLET, FILM COATED ORAL at 09:01

## 2025-01-18 RX ADMIN — ASPIRIN 81 MG: 81 TABLET, COATED ORAL at 09:01

## 2025-01-18 RX ADMIN — CARVEDILOL 25 MG: 12.5 TABLET, FILM COATED ORAL at 08:01

## 2025-01-18 RX ADMIN — OXYCODONE 5 MG: 5 TABLET ORAL at 09:01

## 2025-01-18 RX ADMIN — GABAPENTIN 600 MG: 300 CAPSULE ORAL at 08:01

## 2025-01-18 RX ADMIN — LISINOPRIL 40 MG: 20 TABLET ORAL at 08:01

## 2025-01-18 RX ADMIN — HEPARIN SODIUM 7500 UNITS: 5000 INJECTION INTRAVENOUS; SUBCUTANEOUS at 11:01

## 2025-01-18 RX ADMIN — FUROSEMIDE 40 MG: 40 TABLET ORAL at 09:01

## 2025-01-18 RX ADMIN — HEPARIN SODIUM 7500 UNITS: 5000 INJECTION INTRAVENOUS; SUBCUTANEOUS at 01:01

## 2025-01-18 RX ADMIN — HEPARIN SODIUM 7500 UNITS: 5000 INJECTION INTRAVENOUS; SUBCUTANEOUS at 05:01

## 2025-01-18 RX ADMIN — OXYCODONE 5 MG: 5 TABLET ORAL at 08:01

## 2025-01-18 RX ADMIN — NIFEDIPINE 60 MG: 60 TABLET, FILM COATED, EXTENDED RELEASE ORAL at 09:01

## 2025-01-18 RX ADMIN — MICONAZOLE NITRATE: 20 OINTMENT TOPICAL at 09:01

## 2025-01-18 RX ADMIN — POLYETHYLENE GLYCOL 3350 17 G: 17 POWDER, FOR SOLUTION ORAL at 09:01

## 2025-01-18 RX ADMIN — MICONAZOLE NITRATE 2 % TOPICAL POWDER: at 12:01

## 2025-01-18 RX ADMIN — CEFTRIAXONE 2 G: 2 INJECTION, POWDER, FOR SOLUTION INTRAMUSCULAR; INTRAVENOUS at 12:01

## 2025-01-18 NOTE — PLAN OF CARE
Per MD and therapy, pt is recommended to go to snf facility.   Pt is agreeing to snf and would like to go to [facility].   SW will send referral and provide pt with list.     Weekday CM will have to follow up.     Alee Bernard MSW, CSW

## 2025-01-18 NOTE — PLAN OF CARE
Problem: Adult Inpatient Plan of Care  Goal: Plan of Care Review  Outcome: Progressing  Goal: Patient-Specific Goal (Individualized)  Outcome: Progressing  Goal: Absence of Hospital-Acquired Illness or Injury  Outcome: Progressing  Goal: Optimal Comfort and Wellbeing  Outcome: Progressing  Goal: Readiness for Transition of Care  Outcome: Progressing     Problem: Bariatric Environmental Safety  Goal: Safety Maintained with Care  Outcome: Progressing     Problem: Skin or Soft Tissue Infection  Goal: Absence of Infection Signs and Symptoms  Outcome: Progressing     Problem: Wound  Goal: Optimal Coping  Outcome: Progressing  Goal: Optimal Functional Ability  Outcome: Progressing  Goal: Absence of Infection Signs and Symptoms  Outcome: Progressing  Goal: Improved Oral Intake  Outcome: Progressing  Goal: Optimal Pain Control and Function  Outcome: Progressing  Goal: Skin Health and Integrity  Outcome: Progressing  Goal: Optimal Wound Healing  Outcome: Progressing

## 2025-01-18 NOTE — SUBJECTIVE & OBJECTIVE
Interval History: Hypertensive overnight. Given IV labetol x 2. BP remained in 140/150s. Discontinued home amlodipine and started nifedipine 60 mg daily. This afternoon around 2pm Rapid was called when he was found unresponsive. Nursing reported that he became altered when standing up. CT Head was normal. CTA Chest/Abdomen/Pelvis pending.     Review of Systems  Objective:     Vital Signs (Most Recent):  Temp: 98.5 °F (36.9 °C) (01/17/25 1745)  Pulse: 72 (01/17/25 1745)  Resp: (!) 22 (01/17/25 1745)  BP: 139/65 (01/17/25 1745)  SpO2: (!) 93 % (01/17/25 1745) Vital Signs (24h Range):  Temp:  [97.6 °F (36.4 °C)-98.9 °F (37.2 °C)] 98.5 °F (36.9 °C)  Pulse:  [58-76] 72  Resp:  [18-22] 22  SpO2:  [92 %-100 %] 93 %  BP: ()/(62-82) 139/65     Weight: (!) 181.4 kg (400 lb)  Body mass index is 54.25 kg/m².    Intake/Output Summary (Last 24 hours) at 1/17/2025 1849  Last data filed at 1/17/2025 1751  Gross per 24 hour   Intake 720 ml   Output 800 ml   Net -80 ml         Physical Exam  Constitutional:       General: He is not in acute distress.     Appearance: He is not ill-appearing or diaphoretic.   HENT:      Head: Normocephalic and atraumatic.      Nose: Nose normal.      Mouth/Throat:      Mouth: Mucous membranes are moist.   Eyes:      Extraocular Movements: Extraocular movements intact.   Cardiovascular:      Rate and Rhythm: Normal rate and regular rhythm.      Heart sounds: No murmur heard.  Pulmonary:      Effort: Pulmonary effort is normal. No respiratory distress.      Breath sounds: Rhonchi (b/l lower lung fields) present.   Abdominal:      General: Abdomen is flat. There is no distension.      Tenderness: There is no abdominal tenderness.   Musculoskeletal:      Cervical back: Normal range of motion.      Right lower leg: Edema present.      Left lower leg: Edema present.      Comments: Lymphedema. Right medial thigh with large collection and overlying cellulitis with purulent draining wound on medial side.    Neurological:      Mental Status: He is alert.             Significant Labs: All pertinent labs within the past 24 hours have been reviewed.    Significant Imaging: I have reviewed all pertinent imaging results/findings within the past 24 hours.

## 2025-01-18 NOTE — ASSESSMENT & PLAN NOTE
"Right medial thigh collection with overlying cellulitis and draining wound. In setting of chronic lymphedema.  Worsening over past 2 weeks with increased size and drainage of blood and pus.   Reports pain is well controlled with current regimen.  CT R thigh reported scattered areas of subcutaneous dense infiltration of the subcutaneous fat in the posterior and medial upper thigh, the lower calf and dorsal foot.  Correlation for cellulitis. No drainable fluid collection or abscess.     - Wound cultures positive for serratia and pseudomonas  - Started Ceftriaxone 2g q 24hr, switched to cefepime, OPAT antibiotics will be managed prior to SNF discharge.  - Oxycodone 5 mg PO q4 hr prn moderate to severe pain.   - Wound care consulted, recs below:  -Clean right inner thigh with CGH soap/water, pat dry. Loosely pack 1/4" Iodoform or AMD antimicrobial packing gauze into open "holes" to ulcerated wound. Cover with ABD and secure with cloth tape, daily and prn. Apply a zinc barrier to periwound to protect skin from moisture/drainage.   - Remove bilateral lower leg wraps, once.   "

## 2025-01-18 NOTE — SUBJECTIVE & OBJECTIVE
Interval History: NAEON. Patient's CTH and CTA came back normal. Patient's wound cultures positive for serratia and pseudomonas    Review of Systems  Objective:     Vital Signs (Most Recent):  Temp: 98.3 °F (36.8 °C) (01/18/25 1142)  Pulse: 68 (01/18/25 1142)  Resp: 20 (01/18/25 1142)  BP: 136/61 (01/18/25 1142)  SpO2: 98 % (01/18/25 1300) Vital Signs (24h Range):  Temp:  [28.6 °F (-1.9 °C)-98.5 °F (36.9 °C)] 98.3 °F (36.8 °C)  Pulse:  [59-86] 68  Resp:  [18-25] 20  SpO2:  [90 %-100 %] 98 %  BP: (113-150)/(56-82) 136/61     Weight: (!) 181.4 kg (400 lb)  Body mass index is 54.25 kg/m².    Intake/Output Summary (Last 24 hours) at 1/18/2025 1505  Last data filed at 1/18/2025 1248  Gross per 24 hour   Intake 820 ml   Output 1935 ml   Net -1115 ml             Physical Exam  Constitutional:       General: He is not in acute distress.     Appearance: He is not ill-appearing or diaphoretic. Not in pain.  HENT:      Head: Normocephalic and atraumatic.      Nose: Nose normal.      Mouth/Throat:      Mouth: Mucous membranes are moist.   Eyes:      Extraocular Movements: Extraocular movements intact.   Cardiovascular:      Rate and Rhythm: Normal rate and regular rhythm.      Heart sounds: No murmur heard.  Pulmonary:      Effort: Pulmonary effort is normal. No respiratory distress.      Breath sounds: Rhonchi (b/l lower lung fields) present.   Abdominal:      General: Abdomen is flat. There is no distension.      Tenderness: There is no abdominal tenderness.   Musculoskeletal:      Cervical back: Normal range of motion.      Right lower leg: Edema present.      Left lower leg: Edema present.      Comments: Lymphedema. Right medial thigh with large collection and overlying cellulitis.    Neurological:      Mental Status: He is alert. Oriented. At baseline.       Significant Labs: All pertinent labs within the past 24 hours have been reviewed.    Significant Imaging: I have reviewed all pertinent imaging results/findings within  the past 24 hours.

## 2025-01-18 NOTE — PROGRESS NOTES
"[facility] ([place] [address])  [facility]  Progress Note    Patient Name: Edd Wills  MRN: [id number]  Patient Class: IP- Inpatient   Admission Date: 1/15/2025  Length of Stay: 2 days  Attending Physician: Chio Crabtree MD  Primary Care Provider: Zuleyka Sabillon MD        Subjective     Principal Problem:Cellulitis of right thigh        HPI:  Edd Wills is a 69 y.o. male with past medical history of malignant HTN, HLD, morbid obesity, Type B aortic dissection, chronic lymphedema, LLUVIA, chronic neck pain s/p cervical fusion and laminectomy, chronic bilateral knee pain, grade 1 diastolic dysfunction, and CKD who presented for abscess on his R medial thigh. He has a 6-7 year history of a lymphedematous collection to his R medial thigh, however noticed a week ago that there was mild purulent drainage coming from a new wound. He also reports the collection has increased in size over the past week. Wound care visits him 3 times a week to manage this collection, but a couple days before his presentation, while trying to express some of the drainage, drainage evolved to "blood clots" and a large amount of purulent drainage which wound care felt was too extensive to care for at home. He was then recommended to present to the ED. He denies fever, chills, SOB, chest pain, N/V, diarrhea, abdominal pain, and dysuria.      In ED, vitals were hypertensive to 144, but otherwise hemodynamically stable. Labs were significant for Hgb 13.2 and hematocrit 39.5 with no leukocytosis. Na 135 and albumin 3.0. He was started on clindamycin in D5W 600mg/50mL Q8 for his cellulitis.       Overview/Hospital Course:  Patient with chronic lymphedema and hx Type B aortic dissection admitted with R medial thigh collection. Placed on Clindamycin. CT of RLE with scattered areas of subcutaneous dense infiltration of the subcutaneous fat in the posterior and medial upper thigh, the lower calf and dorsal foot c/f cellulitis. " However no drainable fluid collection or abscess seen. Wound culture positive for gram negative rods, susceptibilities pending. Transitioned from clindamycin to ceftriaxone. Patient had rapid called on 1/17 when he was found unresponsive. ICU team came to room to assess. Patient was altered. CTH with no acute abnormality. Pt has been hypertensive, added Nifedipine for further BP contol in setting of known type B aortic dissection. CTA chest ordered: without abnormalities. Patient's wound culture grew serratia and pseudomonas. Transitioned to cefepime from ceftriaxone. OPAT antibiotics will be managed prior to SNF discharge. SNF arrangements are underway.      Interval History: NAEON. Patient's CTH and CTA came back normal. Patient's wound cultures positive for serratia and pseudomonas    Review of Systems  Objective:     Vital Signs (Most Recent):  Temp: 98.3 °F (36.8 °C) (01/18/25 1142)  Pulse: 68 (01/18/25 1142)  Resp: 20 (01/18/25 1142)  BP: 136/61 (01/18/25 1142)  SpO2: 98 % (01/18/25 1300) Vital Signs (24h Range):  Temp:  [28.6 °F (-1.9 °C)-98.5 °F (36.9 °C)] 98.3 °F (36.8 °C)  Pulse:  [59-86] 68  Resp:  [18-25] 20  SpO2:  [90 %-100 %] 98 %  BP: (113-150)/(56-82) 136/61     Weight: (!) 181.4 kg (400 lb)  Body mass index is 54.25 kg/m².    Intake/Output Summary (Last 24 hours) at 1/18/2025 1505  Last data filed at 1/18/2025 1248  Gross per 24 hour   Intake 820 ml   Output 1935 ml   Net -1115 ml             Physical Exam  Constitutional:       General: He is not in acute distress.     Appearance: He is not ill-appearing or diaphoretic. Not in pain.  HENT:      Head: Normocephalic and atraumatic.      Nose: Nose normal.      Mouth/Throat:      Mouth: Mucous membranes are moist.   Eyes:      Extraocular Movements: Extraocular movements intact.   Cardiovascular:      Rate and Rhythm: Normal rate and regular rhythm.      Heart sounds: No murmur heard.  Pulmonary:      Effort: Pulmonary effort is normal. No  "respiratory distress.      Breath sounds: Rhonchi (b/l lower lung fields) present.   Abdominal:      General: Abdomen is flat. There is no distension.      Tenderness: There is no abdominal tenderness.   Musculoskeletal:      Cervical back: Normal range of motion.      Right lower leg: Edema present.      Left lower leg: Edema present.      Comments: Lymphedema. Right medial thigh with large collection and overlying cellulitis.    Neurological:      Mental Status: He is alert. Oriented. At baseline.       Significant Labs: All pertinent labs within the past 24 hours have been reviewed.    Significant Imaging: I have reviewed all pertinent imaging results/findings within the past 24 hours.    Assessment and Plan     * Cellulitis of right thigh  Right medial thigh collection with overlying cellulitis and draining wound. In setting of chronic lymphedema.  Worsening over past 2 weeks with increased size and drainage of blood and pus.   Reports pain is well controlled with current regimen.  CT R thigh reported scattered areas of subcutaneous dense infiltration of the subcutaneous fat in the posterior and medial upper thigh, the lower calf and dorsal foot.  Correlation for cellulitis. No drainable fluid collection or abscess.     - Wound cultures positive for serratia and pseudomonas  - Started Ceftriaxone 2g q 24hr, switched to cefepime, OPAT antibiotics will be managed prior to SNF discharge.  - Oxycodone 5 mg PO q4 hr prn moderate to severe pain.   - Wound care consulted, recs below:  -Clean right inner thigh with CGH soap/water, pat dry. Loosely pack 1/4" Iodoform or AMD antimicrobial packing gauze into open "holes" to ulcerated wound. Cover with ABD and secure with cloth tape, daily and prn. Apply a zinc barrier to periwound to protect skin from moisture/drainage.   - Remove bilateral lower leg wraps, once.     AMS (altered mental status)  Rapid called 1/17 after pt found unresponsive by nursing.   Reportedly " altered. Possibly vasovagal vs hypotension related to changes in antihypertensive regimen.   CT Head with no acute abnormality    -CTA Chest/Abdomen/Pelvis ordered, f/u results      Chronic knee pain  Reported pain 5/10 BLE (from knees down).     - Continue home gabapentin  - Continue oxy 5 mg q 4 prn    Debility  Patient with Acute on chronic debility due to  chronic lymphedema . The patient's latest AMPAC (Activity Measure for Post Acute Care) Score is listed below.    AM-PAC Score - How much help does the patient need for each activity listed  Basic Mobility Total Score: 13  Turning over in bed (including adjusting bedclothes, sheets and blankets)?: A lot  Sitting down on and standing up from a chair with arms (e.g., wheelchair, bedside commode, etc.): A little  Moving from lying on back to sitting on the side of the bed?: A little  Moving to and from a bed to a chair (including a wheelchair)?: A lot  Need to walk in hospital room?: A lot  Climbing 3-5 steps with a railing?: Unable    Plan  - PT/OT consulted  - Fall precautions in place  - PT and OT rec moderate intensity therapy.   He reportedly does not have transport to go to daily outpatient therapy. Can consider Home Health vs SNF for PT and further wound care          Lymphedema  Chronic issue, pt reported ~ 7 yrs.  -Continue home lasix 40 mg daily     History of aortic dissection  Hx Type B aortic dissection.   Continues to be over goal. Started Nifedipine 60 mg daily.     - Goal SBP <120  - Goal HR <60   - Continue home antihypertensives  - PRN labetalol     Hypertension  Patient's blood pressure range in the last 24 hours was: BP  Min: 94/62  Max: 161/74.The patient's inpatient anti-hypertensive regimen is listed below:  Current Antihypertensives  carvediloL tablet 25 mg, 2 times daily, Oral  furosemide tablet 40 mg, Daily, Oral  lisinopriL tablet 40 mg, Nightly, Oral  labetaloL tablet 100 mg, Daily PRN, Oral  NIFEdipine 24 hr tablet 60 mg, Daily,  Oral    Plan  - BP is controlled, no changes needed to their regimen  - Continue home  carvdilol, and lisinopril  - Started Nifedipine on 1/17 and discontinued home amlodipine  - Continue home lasix 40 mg daily       VTE Risk Mitigation (From admission, onward)           Ordered     heparin (porcine) injection 7,500 Units  Every 8 hours         01/16/25 1857     IP VTE HIGH RISK PATIENT  Once         01/15/25 2233                    Discharge Planning   BROWN:      Code Status: Full Code   Medical Readiness for Discharge Date:   Discharge Plan A: Home, Home Health   Discharge Delays: None known at this time            Please place Justification for DME        Jinny Campa MD  Department of Hospital Medicine   [facility] - [facility] ([place])

## 2025-01-18 NOTE — PLAN OF CARE
Patient aao x's 4 able to verbalize needs. Patient requested to get up out of bed md wrote prders for physical therapy consult. Patient c/o pain to left hand iv site iv was removed. Wound care completed per orders no current c/o pain or discomfort safety precautions maintained and plan of care ongoing.                    Problem: Adult Inpatient Plan of Care  Goal: Plan of Care Review  Outcome: Progressing  Goal: Patient-Specific Goal (Individualized)  Outcome: Progressing  Goal: Absence of Hospital-Acquired Illness or Injury  Outcome: Progressing  Goal: Optimal Comfort and Wellbeing  Outcome: Progressing  Goal: Readiness for Transition of Care  Outcome: Progressing     Problem: Bariatric Environmental Safety  Goal: Safety Maintained with Care  Outcome: Progressing     Problem: Skin or Soft Tissue Infection  Goal: Absence of Infection Signs and Symptoms  Outcome: Progressing     Problem: Wound  Goal: Optimal Coping  Outcome: Progressing  Goal: Optimal Functional Ability  Outcome: Progressing  Goal: Absence of Infection Signs and Symptoms  Outcome: Progressing  Goal: Improved Oral Intake  Outcome: Progressing  Goal: Optimal Pain Control and Function  Outcome: Progressing  Goal: Skin Health and Integrity  Outcome: Progressing  Goal: Optimal Wound Healing  Outcome: Progressing

## 2025-01-19 PROBLEM — L03.119 CELLULITIS OF LEG: Status: ACTIVE | Noted: 2025-01-19

## 2025-01-19 LAB
ALBUMIN SERPL BCP-MCNC: 2.5 G/DL (ref 3.5–5.2)
ALP SERPL-CCNC: 75 U/L (ref 40–150)
ALT SERPL W/O P-5'-P-CCNC: 26 U/L (ref 10–44)
ANION GAP SERPL CALC-SCNC: 7 MMOL/L (ref 8–16)
AST SERPL-CCNC: 26 U/L (ref 10–40)
BACTERIA SPEC AEROBE CULT: ABNORMAL
BACTERIA SPEC AEROBE CULT: ABNORMAL
BASOPHILS # BLD AUTO: 0.06 K/UL (ref 0–0.2)
BASOPHILS NFR BLD: 0.8 % (ref 0–1.9)
BILIRUB SERPL-MCNC: 0.3 MG/DL (ref 0.1–1)
BUN SERPL-MCNC: 13 MG/DL (ref 8–23)
CALCIUM SERPL-MCNC: 8.4 MG/DL (ref 8.7–10.5)
CHLORIDE SERPL-SCNC: 104 MMOL/L (ref 95–110)
CO2 SERPL-SCNC: 24 MMOL/L (ref 23–29)
CREAT SERPL-MCNC: 0.9 MG/DL (ref 0.5–1.4)
DIFFERENTIAL METHOD BLD: ABNORMAL
EOSINOPHIL # BLD AUTO: 0.3 K/UL (ref 0–0.5)
EOSINOPHIL NFR BLD: 4.3 % (ref 0–8)
ERYTHROCYTE [DISTWIDTH] IN BLOOD BY AUTOMATED COUNT: 13.9 % (ref 11.5–14.5)
EST. GFR  (NO RACE VARIABLE): >60 ML/MIN/1.73 M^2
GLUCOSE SERPL-MCNC: 84 MG/DL (ref 70–110)
HCT VFR BLD AUTO: 37.9 % (ref 40–54)
HGB BLD-MCNC: 12.2 G/DL (ref 14–18)
IMM GRANULOCYTES # BLD AUTO: 0.04 K/UL (ref 0–0.04)
IMM GRANULOCYTES NFR BLD AUTO: 0.5 % (ref 0–0.5)
LYMPHOCYTES # BLD AUTO: 1.9 K/UL (ref 1–4.8)
LYMPHOCYTES NFR BLD: 25.5 % (ref 18–48)
MAGNESIUM SERPL-MCNC: 2.1 MG/DL (ref 1.6–2.6)
MCH RBC QN AUTO: 27.2 PG (ref 27–31)
MCHC RBC AUTO-ENTMCNC: 32.2 G/DL (ref 32–36)
MCV RBC AUTO: 84 FL (ref 82–98)
MONOCYTES # BLD AUTO: 1 K/UL (ref 0.3–1)
MONOCYTES NFR BLD: 14 % (ref 4–15)
NEUTROPHILS # BLD AUTO: 4 K/UL (ref 1.8–7.7)
NEUTROPHILS NFR BLD: 54.9 % (ref 38–73)
NRBC BLD-RTO: 0 /100 WBC
PHOSPHATE SERPL-MCNC: 3.3 MG/DL (ref 2.7–4.5)
PLATELET # BLD AUTO: 396 K/UL (ref 150–450)
PMV BLD AUTO: 10 FL (ref 9.2–12.9)
POTASSIUM SERPL-SCNC: 3.7 MMOL/L (ref 3.5–5.1)
PROT SERPL-MCNC: 7.1 G/DL (ref 6–8.4)
RBC # BLD AUTO: 4.49 M/UL (ref 4.6–6.2)
SODIUM SERPL-SCNC: 135 MMOL/L (ref 136–145)
WBC # BLD AUTO: 7.37 K/UL (ref 3.9–12.7)

## 2025-01-19 PROCEDURE — 63600175 PHARM REV CODE 636 W HCPCS: Performed by: STUDENT IN AN ORGANIZED HEALTH CARE EDUCATION/TRAINING PROGRAM

## 2025-01-19 PROCEDURE — 63600175 PHARM REV CODE 636 W HCPCS

## 2025-01-19 PROCEDURE — 85025 COMPLETE CBC W/AUTO DIFF WBC: CPT

## 2025-01-19 PROCEDURE — 83735 ASSAY OF MAGNESIUM: CPT

## 2025-01-19 PROCEDURE — 36415 COLL VENOUS BLD VENIPUNCTURE: CPT

## 2025-01-19 PROCEDURE — 99233 SBSQ HOSP IP/OBS HIGH 50: CPT | Mod: ,,, | Performed by: STUDENT IN AN ORGANIZED HEALTH CARE EDUCATION/TRAINING PROGRAM

## 2025-01-19 PROCEDURE — 20600001 HC STEP DOWN PRIVATE ROOM

## 2025-01-19 PROCEDURE — 80053 COMPREHEN METABOLIC PANEL: CPT

## 2025-01-19 PROCEDURE — 25000003 PHARM REV CODE 250

## 2025-01-19 PROCEDURE — 25000003 PHARM REV CODE 250: Performed by: PHYSICIAN ASSISTANT

## 2025-01-19 PROCEDURE — 84100 ASSAY OF PHOSPHORUS: CPT

## 2025-01-19 RX ORDER — POTASSIUM CHLORIDE 750 MG/1
30 CAPSULE, EXTENDED RELEASE ORAL ONCE
Status: COMPLETED | OUTPATIENT
Start: 2025-01-19 | End: 2025-01-19

## 2025-01-19 RX ORDER — POLYETHYLENE GLYCOL 3350 17 G/17G
17 POWDER, FOR SOLUTION ORAL 2 TIMES DAILY
Status: DISCONTINUED | OUTPATIENT
Start: 2025-01-19 | End: 2025-01-29 | Stop reason: HOSPADM

## 2025-01-19 RX ORDER — CEFEPIME HYDROCHLORIDE 2 G/1
2 INJECTION, POWDER, FOR SOLUTION INTRAVENOUS
Status: DISCONTINUED | OUTPATIENT
Start: 2025-01-19 | End: 2025-01-29 | Stop reason: HOSPADM

## 2025-01-19 RX ORDER — SENNOSIDES 8.6 MG/1
8.6 TABLET ORAL DAILY
Status: DISCONTINUED | OUTPATIENT
Start: 2025-01-19 | End: 2025-01-29 | Stop reason: HOSPADM

## 2025-01-19 RX ORDER — BISACODYL 10 MG/1
10 SUPPOSITORY RECTAL ONCE
Status: COMPLETED | OUTPATIENT
Start: 2025-01-19 | End: 2025-01-19

## 2025-01-19 RX ADMIN — HEPARIN SODIUM 7500 UNITS: 5000 INJECTION INTRAVENOUS; SUBCUTANEOUS at 05:01

## 2025-01-19 RX ADMIN — NIFEDIPINE 90 MG: 60 TABLET, FILM COATED, EXTENDED RELEASE ORAL at 09:01

## 2025-01-19 RX ADMIN — MICONAZOLE NITRATE 2 % TOPICAL POWDER: at 09:01

## 2025-01-19 RX ADMIN — POLYETHYLENE GLYCOL 3350 17 G: 17 POWDER, FOR SOLUTION ORAL at 08:01

## 2025-01-19 RX ADMIN — POLYETHYLENE GLYCOL 3350 17 G: 17 POWDER, FOR SOLUTION ORAL at 09:01

## 2025-01-19 RX ADMIN — ATORVASTATIN CALCIUM 40 MG: 40 TABLET, FILM COATED ORAL at 09:01

## 2025-01-19 RX ADMIN — CEFEPIME 1 G: 1 INJECTION, POWDER, FOR SOLUTION INTRAMUSCULAR; INTRAVENOUS at 09:01

## 2025-01-19 RX ADMIN — CEFEPIME 1 G: 1 INJECTION, POWDER, FOR SOLUTION INTRAMUSCULAR; INTRAVENOUS at 03:01

## 2025-01-19 RX ADMIN — CEFEPIME 2 G: 2 INJECTION, POWDER, FOR SOLUTION INTRAVENOUS at 05:01

## 2025-01-19 RX ADMIN — LISINOPRIL 40 MG: 20 TABLET ORAL at 08:01

## 2025-01-19 RX ADMIN — HEPARIN SODIUM 7500 UNITS: 5000 INJECTION INTRAVENOUS; SUBCUTANEOUS at 01:01

## 2025-01-19 RX ADMIN — CARVEDILOL 25 MG: 12.5 TABLET, FILM COATED ORAL at 08:01

## 2025-01-19 RX ADMIN — SENNOSIDES 8.6 MG: 8.6 TABLET, FILM COATED ORAL at 09:01

## 2025-01-19 RX ADMIN — BISACODYL 10 MG: 10 SUPPOSITORY RECTAL at 09:01

## 2025-01-19 RX ADMIN — FUROSEMIDE 40 MG: 40 TABLET ORAL at 09:01

## 2025-01-19 RX ADMIN — CARVEDILOL 25 MG: 12.5 TABLET, FILM COATED ORAL at 09:01

## 2025-01-19 RX ADMIN — ASPIRIN 81 MG: 81 TABLET, COATED ORAL at 09:01

## 2025-01-19 RX ADMIN — OXYCODONE 5 MG: 5 TABLET ORAL at 08:01

## 2025-01-19 RX ADMIN — OXYCODONE 5 MG: 5 TABLET ORAL at 01:01

## 2025-01-19 RX ADMIN — HEPARIN SODIUM 7500 UNITS: 5000 INJECTION INTRAVENOUS; SUBCUTANEOUS at 10:01

## 2025-01-19 RX ADMIN — GABAPENTIN 600 MG: 300 CAPSULE ORAL at 08:01

## 2025-01-19 RX ADMIN — POTASSIUM CHLORIDE 30 MEQ: 750 CAPSULE, EXTENDED RELEASE ORAL at 09:01

## 2025-01-19 RX ADMIN — MICONAZOLE NITRATE: 20 OINTMENT TOPICAL at 09:01

## 2025-01-19 RX ADMIN — GABAPENTIN 600 MG: 300 CAPSULE ORAL at 09:01

## 2025-01-19 NOTE — PROGRESS NOTES
"[facility] ([place] [address])  [facility]  Progress Note    Patient Name: Edd Wills  MRN: [id number]  Patient Class: IP- Inpatient   Admission Date: 1/15/2025  Length of Stay: 3 days  Attending Physician: Chio Crabtree MD  Primary Care Provider: Zuleyka Sabillon MD        Subjective     Principal Problem:Cellulitis of right thigh        HPI:  Edd Wills is a 69 y.o. male with past medical history of malignant HTN, HLD, morbid obesity, Type B aortic dissection, chronic lymphedema, LLUVIA, chronic neck pain s/p cervical fusion and laminectomy, chronic bilateral knee pain, grade 1 diastolic dysfunction, and CKD who presented for abscess on his R medial thigh. He has a 6-7 year history of a lymphedematous collection to his R medial thigh, however noticed a week ago that there was mild purulent drainage coming from a new wound. He also reports the collection has increased in size over the past week. Wound care visits him 3 times a week to manage this collection, but a couple days before his presentation, while trying to express some of the drainage, drainage evolved to "blood clots" and a large amount of purulent drainage which wound care felt was too extensive to care for at home. He was then recommended to present to the ED. He denies fever, chills, SOB, chest pain, N/V, diarrhea, abdominal pain, and dysuria.      In ED, vitals were hypertensive to 144, but otherwise hemodynamically stable. Labs were significant for Hgb 13.2 and hematocrit 39.5 with no leukocytosis. Na 135 and albumin 3.0. He was started on clindamycin in D5W 600mg/50mL Q8 for his cellulitis.       Overview/Hospital Course:  Patient with chronic lymphedema and hx Type B aortic dissection admitted with R medial thigh collection. Placed on Clindamycin. CT of RLE with scattered areas of subcutaneous dense infiltration of the subcutaneous fat in the posterior and medial upper thigh, the lower calf and dorsal foot c/f cellulitis. " However no drainable fluid collection or abscess seen. Wound culture positive for gram negative rods, susceptibilities pending. Transitioned from clindamycin to ceftriaxone. Patient had rapid called on 1/17 when he was found unresponsive. ICU team came to room to assess. Patient was altered. CTH with no acute abnormality. Pt has been hypertensive, added Nifedipine for further BP contol in setting of known type B aortic dissection. CTA chest ordered: without abnormalities. Patient's wound culture grew serratia and pseudomonas. Transitioned to cefepime from ceftriaxone. Only oral option based on susceptibilities is fluoroquinolones but this is contraindicated with hx of aortic dissection. Consulted ID for OPAT antibiotics which will be managed prior to SNF discharge. SNF arrangements are underway.      Interval History: No acute events overnight. Wound culture with serratia and pseudomonas. ID consulted for OPAT, as pt has hx of aortic dissection and flouroquinolones are contraindicated. Patient reports that he is feeling well this morning. A&Ox3.     Review of Systems  Objective:     Vital Signs (Most Recent):  Temp: 98.1 °F (36.7 °C) (01/19/25 1056)  Pulse: 72 (01/19/25 1056)  Resp: 19 (01/19/25 1056)  BP: (!) 145/64 (01/19/25 1056)  SpO2: (!) 92 % (01/19/25 1056) Vital Signs (24h Range):  Temp:  [97.6 °F (36.4 °C)-98.4 °F (36.9 °C)] 98.1 °F (36.7 °C)  Pulse:  [57-72] 72  Resp:  [19-25] 19  SpO2:  [91 %-98 %] 92 %  BP: (123-155)/(60-70) 145/64     Weight: (!) 181.4 kg (400 lb)  Body mass index is 54.25 kg/m².    Intake/Output Summary (Last 24 hours) at 1/19/2025 1200  Last data filed at 1/19/2025 0917  Gross per 24 hour   Intake 550 ml   Output 2660 ml   Net -2110 ml         Physical Exam  Constitutional:       General: He is not in acute distress.     Appearance: He is not ill-appearing or diaphoretic.   HENT:      Head: Normocephalic and atraumatic.      Nose: Nose normal.      Mouth/Throat:      Mouth: Mucous  "membranes are moist.   Eyes:      Extraocular Movements: Extraocular movements intact.   Cardiovascular:      Rate and Rhythm: Normal rate and regular rhythm.      Heart sounds: No murmur heard.  Pulmonary:      Effort: Pulmonary effort is normal. No respiratory distress.      Breath sounds: Rhonchi (b/l lower lung fields) present.   Abdominal:      General: Abdomen is flat. There is no distension.      Tenderness: There is no abdominal tenderness.   Musculoskeletal:      Cervical back: Normal range of motion.      Right lower leg: Edema present.      Left lower leg: Edema present.      Comments: Lymphedema. Right medial thigh with large collection and wound covered by intact,clean bandage   Neurological:      General: No focal deficit present.      Mental Status: He is alert.      Comments: A&Ox3             Significant Labs: All pertinent labs within the past 24 hours have been reviewed.    Significant Imaging: I have reviewed all pertinent imaging results/findings within the past 24 hours.    Assessment and Plan     * Cellulitis of right thigh  Right medial thigh collection with overlying cellulitis and draining wound. In setting of chronic lymphedema.  Worsening over past 2 weeks with increased size and drainage of blood and pus.   Reports pain is well controlled with current regimen.  CT R thigh reported scattered areas of subcutaneous dense infiltration of the subcutaneous fat in the posterior and medial upper thigh, the lower calf and dorsal foot.  Correlation for cellulitis. No drainable fluid collection or abscess.     - Wound cultures positive for serratia and pseudomonas  - Started Ceftriaxone 2g q 24hr, switched to cefepime,  - ID consulted for OPAT antibiotics as pt is unable to take oral flouroquinolones with hx of aortic dissection  - Oxycodone 5 mg PO q4 hr prn moderate to severe pain.   - Wound care consulted, recs below:  -Clean right inner thigh with CGH soap/water, pat dry. Loosely pack 1/4" " "Iodoform or AMD antimicrobial packing gauze into open "holes" to ulcerated wound. Cover with ABD and secure with cloth tape, daily and prn. Apply a zinc barrier to periwound to protect skin from moisture/drainage.   - Remove bilateral lower leg wraps, once.     AMS (altered mental status)  Rapid called 1/17 after pt found unresponsive by nursing.   Reportedly altered. Possibly vasovagal vs hypotension related to changes in antihypertensive regimen.   CT Head with no acute abnormality    -CTA Chest/Abdomen/Pelvis ordered, f/u results      Chronic knee pain  Reported pain 5/10 BLE (from knees down).     - Continue home gabapentin  - Continue oxy 5 mg q 4 prn    Debility  Patient with Acute on chronic debility due to  chronic lymphedema . The patient's latest AMPAC (Activity Measure for Post Acute Care) Score is listed below.    AM-PAC Score - How much help does the patient need for each activity listed  Basic Mobility Total Score: 13  Turning over in bed (including adjusting bedclothes, sheets and blankets)?: A lot  Sitting down on and standing up from a chair with arms (e.g., wheelchair, bedside commode, etc.): A little  Moving from lying on back to sitting on the side of the bed?: A little  Moving to and from a bed to a chair (including a wheelchair)?: A lot  Need to walk in hospital room?: A lot  Climbing 3-5 steps with a railing?: Unable    Plan  - PT/OT consulted  - Fall precautions in place  - PT and OT rec moderate intensity therapy.   He reportedly does not have transport to go to daily outpatient therapy. Plan for SNF          Lymphedema  Chronic issue, pt reported ~ 7 yrs.  -Continue home lasix 40 mg daily     History of aortic dissection  Hx Type B aortic dissection.   Continues to be over goal. Increased Nifedipine  to 90 mg daily.     - Goal SBP <120  - Goal HR <60   - Continue home antihypertensives  - PRN labetalol     Hypertension  Patient's blood pressure range in the last 24 hours was: BP  Min: " 123/60  Max: 155/70.The patient's inpatient anti-hypertensive regimen is listed below:  Current Antihypertensives  carvediloL tablet 25 mg, 2 times daily, Oral  furosemide tablet 40 mg, Daily, Oral  lisinopriL tablet 40 mg, Nightly, Oral  labetaloL tablet 100 mg, Daily PRN, Oral  NIFEdipine 24 hr tablet 90 mg, Daily, Oral    Plan  - BP is uncontrolled, will adjust as follows: Increase Nifedipine from 60 mg to 90 mg daily on 1/19  - Continue home  carvdilol, and lisinopril  - Continue home lasix 40 mg daily       VTE Risk Mitigation (From admission, onward)           Ordered     heparin (porcine) injection 7,500 Units  Every 8 hours         01/16/25 1857     IP VTE HIGH RISK PATIENT  Once         01/15/25 2233                    Discharge Planning   BROWN:      Code Status: Full Code   Medical Readiness for Discharge Date:   Discharge Plan A: Home, Home Health   Discharge Delays: None known at this time            Please place Justification for DME        Chasidy Arguelles MD  Department of Hospital Medicine   [facility] - Stepdown Flex ([place]

## 2025-01-19 NOTE — ASSESSMENT & PLAN NOTE
Patient with Acute on chronic debility due to  chronic lymphedema . The patient's latest AMPAC (Activity Measure for Post Acute Care) Score is listed below.    AM-PAC Score - How much help does the patient need for each activity listed  Basic Mobility Total Score: 13  Turning over in bed (including adjusting bedclothes, sheets and blankets)?: A lot  Sitting down on and standing up from a chair with arms (e.g., wheelchair, bedside commode, etc.): A little  Moving from lying on back to sitting on the side of the bed?: A little  Moving to and from a bed to a chair (including a wheelchair)?: A lot  Need to walk in hospital room?: A lot  Climbing 3-5 steps with a railing?: Unable    Plan  - PT/OT consulted  - Fall precautions in place  - PT and OT rec moderate intensity therapy.   He reportedly does not have transport to go to daily outpatient therapy. Plan for SNF
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
## 2025-01-19 NOTE — ASSESSMENT & PLAN NOTE
Patient's blood pressure range in the last 24 hours was: BP  Min: 123/60  Max: 155/70.The patient's inpatient anti-hypertensive regimen is listed below:  Current Antihypertensives  carvediloL tablet 25 mg, 2 times daily, Oral  furosemide tablet 40 mg, Daily, Oral  lisinopriL tablet 40 mg, Nightly, Oral  labetaloL tablet 100 mg, Daily PRN, Oral  NIFEdipine 24 hr tablet 90 mg, Daily, Oral    Plan  - BP is uncontrolled, will adjust as follows: Increase Nifedipine from 60 mg to 90 mg daily on 1/19  - Continue home  carvdilol, and lisinopril  - Continue home lasix 40 mg daily

## 2025-01-19 NOTE — ASSESSMENT & PLAN NOTE
I have reviewed hospital notes from   service and other specialty providers. I have also reviewed CBC, CMP/BMP,  cultures and imaging with my interpretation as documented.      69M with malignant HTN, CKD, type-B aortic dissection, severe obesity (BMI 69) related chronic lymphedema R leg c/b recurrent / chronic wounds and SSTI. - admitted 01/15 for increased drainage, purulent for past few days.     CT thigh was done which showed no evidence of fluid collection or abscess.  Wound culture 01/16 +PSA (panS), and serratia marcescens (panS). Pt started on IV-cefepime 01/18. Tentative plans for d/c SNF upon hospital d/c. ID ocnsulted for abx recs outpt.     Recommendations / Plan:  Continue Iv-cefepime 2g Q8h  -- [consider FQ, but avoiding d/t h/o type-B aortic dissection].   To complete abx duration of at-least 2wks, SOHAN 02/03  Continue wound care  Accepting facility to perform weekly labs (cbc, cmp, crp), and picc line dressing changes.  Facility to fax results to [facility] Fax Number: [phone].     -- ID will schedule pt for ID clinic f/u appt.  If d/c to [facility], they may re-consult ID if needed before abx SOHAN 02/03.   -- Discussed with ID staff and primary team.  -- ID will sign off at this time.

## 2025-01-19 NOTE — PLAN OF CARE
Problem: Adult Inpatient Plan of Care  Goal: Plan of Care Review  1/19/2025 0029 by Magy Mobley RN  Outcome: Progressing  1/19/2025 0028 by Magy Mobley RN  Outcome: Progressing  Goal: Patient-Specific Goal (Individualized)  1/19/2025 0029 by Magy Mobley RN  Outcome: Progressing  1/19/2025 0028 by Magy Mobley RN  Outcome: Progressing  Goal: Absence of Hospital-Acquired Illness or Injury  1/19/2025 0029 by Magy Mobley RN  Outcome: Progressing  1/19/2025 0028 by Magy Mobley RN  Outcome: Progressing  Goal: Optimal Comfort and Wellbeing  1/19/2025 0029 by Magy Mobley RN  Outcome: Progressing  1/19/2025 0028 by Magy Mobley RN  Outcome: Progressing  Goal: Readiness for Transition of Care  1/19/2025 0029 by aMgy Mobley RN  Outcome: Progressing  1/19/2025 0028 by Magy Mobley RN  Outcome: Progressing     Problem: Bariatric Environmental Safety  Goal: Safety Maintained with Care  1/19/2025 0029 by Magy Mobley RN  Outcome: Progressing  1/19/2025 0028 by Magy Mobley RN  Outcome: Progressing     Problem: Skin or Soft Tissue Infection  Goal: Absence of Infection Signs and Symptoms  1/19/2025 0029 by Magy Mobley RN  Outcome: Progressing  1/19/2025 0028 by Magy Mobley RN  Outcome: Progressing     Problem: Wound  Goal: Optimal Coping  1/19/2025 0029 by Magy Mobley RN  Outcome: Progressing  1/19/2025 0028 by Magy Mobley RN  Outcome: Progressing  Goal: Optimal Functional Ability  1/19/2025 0029 by Magy Mobley RN  Outcome: Progressing  1/19/2025 0028 by Magy Mobley RN  Outcome: Progressing  Goal: Absence of Infection Signs and Symptoms  1/19/2025 0029 by Magy Mobley RN  Outcome: Progressing  1/19/2025 0028 by Magy Mobley RN  Outcome: Progressing  Goal: Improved Oral Intake  1/19/2025 0029 by Magy Mobley RN  Outcome: Progressing  1/19/2025 0028 by Landix, Magy, RN  Outcome: Progressing  Goal: Optimal Pain Control and Function  1/19/2025 0029 by  Landix, Magy, RN  Outcome: Progressing  1/19/2025 0028 by Magy Mobley RN  Outcome: Progressing  Goal: Skin Health and Integrity  1/19/2025 0029 by Magy Mobley RN  Outcome: Progressing  1/19/2025 0028 by Magy Mobley RN  Outcome: Progressing  Goal: Optimal Wound Healing  1/19/2025 0029 by Magy Mobley RN  Outcome: Progressing  1/19/2025 0028 by Magy Mobley RN  Outcome: Progressing

## 2025-01-19 NOTE — HPI
69M with malignant HTN, CKD, type-B aortic dissection, severe obesity (BMI 69) related chronic lymphedema R leg c/b recurrent / chronic wounds and SSTI. - admitted 01/15 for increased drainage, purulent for past few days.     CT thigh was done which showed no evidence of fluid collection or abscess.  Wound culture 01/16 +PSA (panS), and serratia marcescens (panS). Pt started on IV-cefepime 01/18. Tentative plans for d/c SNF upon hospital d/c. ID ocnsulted for abx recs outpt.

## 2025-01-19 NOTE — ASSESSMENT & PLAN NOTE
Hx Type B aortic dissection.   Continues to be over goal. Increased Nifedipine  to 90 mg daily.     - Goal SBP <120  - Goal HR <60   - Continue home antihypertensives  - PRN labetalol
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
## 2025-01-19 NOTE — ASSESSMENT & PLAN NOTE
"Right medial thigh collection with overlying cellulitis and draining wound. In setting of chronic lymphedema.  Worsening over past 2 weeks with increased size and drainage of blood and pus.   Reports pain is well controlled with current regimen.  CT R thigh reported scattered areas of subcutaneous dense infiltration of the subcutaneous fat in the posterior and medial upper thigh, the lower calf and dorsal foot.  Correlation for cellulitis. No drainable fluid collection or abscess.     - Wound cultures positive for serratia and pseudomonas  - Started Ceftriaxone 2g q 24hr, switched to cefepime,  - ID consulted for OPAT antibiotics as pt is unable to take oral flouroquinolones with hx of aortic dissection  - Oxycodone 5 mg PO q4 hr prn moderate to severe pain.   - Wound care consulted, recs below:  -Clean right inner thigh with CGH soap/water, pat dry. Loosely pack 1/4" Iodoform or AMD antimicrobial packing gauze into open "holes" to ulcerated wound. Cover with ABD and secure with cloth tape, daily and prn. Apply a zinc barrier to periwound to protect skin from moisture/drainage.   - Remove bilateral lower leg wraps, once.   "

## 2025-01-19 NOTE — SUBJECTIVE & OBJECTIVE
Interval History: No acute events overnight. Wound culture with serratia and pseudomonas. ID consulted for OPAT, as pt has hx of aortic dissection and flouroquinolones are contraindicated. Patient reports that he is feeling well this morning. A&Ox3.     Review of Systems  Objective:     Vital Signs (Most Recent):  Temp: 98.1 °F (36.7 °C) (01/19/25 1056)  Pulse: 72 (01/19/25 1056)  Resp: 19 (01/19/25 1056)  BP: (!) 145/64 (01/19/25 1056)  SpO2: (!) 92 % (01/19/25 1056) Vital Signs (24h Range):  Temp:  [97.6 °F (36.4 °C)-98.4 °F (36.9 °C)] 98.1 °F (36.7 °C)  Pulse:  [57-72] 72  Resp:  [19-25] 19  SpO2:  [91 %-98 %] 92 %  BP: (123-155)/(60-70) 145/64     Weight: (!) 181.4 kg (400 lb)  Body mass index is 54.25 kg/m².    Intake/Output Summary (Last 24 hours) at 1/19/2025 1200  Last data filed at 1/19/2025 0917  Gross per 24 hour   Intake 550 ml   Output 2660 ml   Net -2110 ml         Physical Exam  Constitutional:       General: He is not in acute distress.     Appearance: He is not ill-appearing or diaphoretic.   HENT:      Head: Normocephalic and atraumatic.      Nose: Nose normal.      Mouth/Throat:      Mouth: Mucous membranes are moist.   Eyes:      Extraocular Movements: Extraocular movements intact.   Cardiovascular:      Rate and Rhythm: Normal rate and regular rhythm.      Heart sounds: No murmur heard.  Pulmonary:      Effort: Pulmonary effort is normal. No respiratory distress.      Breath sounds: Rhonchi (b/l lower lung fields) present.   Abdominal:      General: Abdomen is flat. There is no distension.      Tenderness: There is no abdominal tenderness.   Musculoskeletal:      Cervical back: Normal range of motion.      Right lower leg: Edema present.      Left lower leg: Edema present.      Comments: Lymphedema. Right medial thigh with large collection and wound covered by intact,clean bandage   Neurological:      General: No focal deficit present.      Mental Status: He is alert.      Comments: A&Ox3              Significant Labs: All pertinent labs within the past 24 hours have been reviewed.    Significant Imaging: I have reviewed all pertinent imaging results/findings within the past 24 hours.

## 2025-01-19 NOTE — PLAN OF CARE
Patient aaox's 4. Patient got up to bedside commode with 2 staff members and a walker had a bm.  Wound care completed per md orders. Safety precautions maintained call light in reach and plan of care ongoing.                    Problem: Adult Inpatient Plan of Care  Goal: Plan of Care Review  Outcome: Progressing  Goal: Patient-Specific Goal (Individualized)  Outcome: Progressing  Goal: Absence of Hospital-Acquired Illness or Injury  Outcome: Progressing  Goal: Optimal Comfort and Wellbeing  Outcome: Progressing  Goal: Readiness for Transition of Care  Outcome: Progressing     Problem: Bariatric Environmental Safety  Goal: Safety Maintained with Care  Outcome: Progressing     Problem: Skin or Soft Tissue Infection  Goal: Absence of Infection Signs and Symptoms  Outcome: Progressing     Problem: Wound  Goal: Optimal Coping  Outcome: Progressing  Goal: Optimal Functional Ability  Outcome: Progressing  Goal: Absence of Infection Signs and Symptoms  Outcome: Progressing  Goal: Improved Oral Intake  Outcome: Progressing  Goal: Optimal Pain Control and Function  Outcome: Progressing  Goal: Skin Health and Integrity  Outcome: Progressing  Goal: Optimal Wound Healing  Outcome: Progressing

## 2025-01-19 NOTE — NURSING
Uneventful night, patient remain in stable condition skin warm dry resp even unlabored VSS, c/o RLE pain PRN pain admin as ordered see MAR. Safety measures remain in place.

## 2025-01-20 LAB
ALBUMIN SERPL BCP-MCNC: 2.6 G/DL (ref 3.5–5.2)
ALP SERPL-CCNC: 77 U/L (ref 40–150)
ALT SERPL W/O P-5'-P-CCNC: 25 U/L (ref 10–44)
ANION GAP SERPL CALC-SCNC: 6 MMOL/L (ref 8–16)
AST SERPL-CCNC: 23 U/L (ref 10–40)
BACTERIA BLD CULT: NORMAL
BACTERIA BLD CULT: NORMAL
BACTERIA SPEC ANAEROBE CULT: NORMAL
BASOPHILS # BLD AUTO: 0.07 K/UL (ref 0–0.2)
BASOPHILS NFR BLD: 0.9 % (ref 0–1.9)
BILIRUB SERPL-MCNC: 0.3 MG/DL (ref 0.1–1)
BUN SERPL-MCNC: 14 MG/DL (ref 8–23)
CALCIUM SERPL-MCNC: 8.6 MG/DL (ref 8.7–10.5)
CHLORIDE SERPL-SCNC: 103 MMOL/L (ref 95–110)
CO2 SERPL-SCNC: 23 MMOL/L (ref 23–29)
CREAT SERPL-MCNC: 0.8 MG/DL (ref 0.5–1.4)
DIFFERENTIAL METHOD BLD: ABNORMAL
EOSINOPHIL # BLD AUTO: 0.3 K/UL (ref 0–0.5)
EOSINOPHIL NFR BLD: 3.6 % (ref 0–8)
ERYTHROCYTE [DISTWIDTH] IN BLOOD BY AUTOMATED COUNT: 13.8 % (ref 11.5–14.5)
EST. GFR  (NO RACE VARIABLE): >60 ML/MIN/1.73 M^2
GLUCOSE SERPL-MCNC: 91 MG/DL (ref 70–110)
HCT VFR BLD AUTO: 39 % (ref 40–54)
HGB BLD-MCNC: 12.4 G/DL (ref 14–18)
IMM GRANULOCYTES # BLD AUTO: 0.04 K/UL (ref 0–0.04)
IMM GRANULOCYTES NFR BLD AUTO: 0.5 % (ref 0–0.5)
LYMPHOCYTES # BLD AUTO: 1.8 K/UL (ref 1–4.8)
LYMPHOCYTES NFR BLD: 24.4 % (ref 18–48)
MAGNESIUM SERPL-MCNC: 2 MG/DL (ref 1.6–2.6)
MCH RBC QN AUTO: 27 PG (ref 27–31)
MCHC RBC AUTO-ENTMCNC: 31.8 G/DL (ref 32–36)
MCV RBC AUTO: 85 FL (ref 82–98)
MONOCYTES # BLD AUTO: 1 K/UL (ref 0.3–1)
MONOCYTES NFR BLD: 13.4 % (ref 4–15)
NEUTROPHILS # BLD AUTO: 4.2 K/UL (ref 1.8–7.7)
NEUTROPHILS NFR BLD: 57.2 % (ref 38–73)
NRBC BLD-RTO: 0 /100 WBC
PHOSPHATE SERPL-MCNC: 3.1 MG/DL (ref 2.7–4.5)
PLATELET # BLD AUTO: 400 K/UL (ref 150–450)
PMV BLD AUTO: 9.6 FL (ref 9.2–12.9)
POTASSIUM SERPL-SCNC: 3.9 MMOL/L (ref 3.5–5.1)
PROT SERPL-MCNC: 7.2 G/DL (ref 6–8.4)
RBC # BLD AUTO: 4.59 M/UL (ref 4.6–6.2)
SODIUM SERPL-SCNC: 132 MMOL/L (ref 136–145)
WBC # BLD AUTO: 7.41 K/UL (ref 3.9–12.7)

## 2025-01-20 PROCEDURE — 20600001 HC STEP DOWN PRIVATE ROOM

## 2025-01-20 PROCEDURE — 83735 ASSAY OF MAGNESIUM: CPT

## 2025-01-20 PROCEDURE — 36415 COLL VENOUS BLD VENIPUNCTURE: CPT

## 2025-01-20 PROCEDURE — 25000003 PHARM REV CODE 250: Performed by: INTERNAL MEDICINE

## 2025-01-20 PROCEDURE — 25000003 PHARM REV CODE 250

## 2025-01-20 PROCEDURE — 63600175 PHARM REV CODE 636 W HCPCS: Performed by: STUDENT IN AN ORGANIZED HEALTH CARE EDUCATION/TRAINING PROGRAM

## 2025-01-20 PROCEDURE — 36410 VNPNXR 3YR/> PHY/QHP DX/THER: CPT

## 2025-01-20 PROCEDURE — 25000003 PHARM REV CODE 250: Performed by: PHYSICIAN ASSISTANT

## 2025-01-20 PROCEDURE — 63600175 PHARM REV CODE 636 W HCPCS

## 2025-01-20 PROCEDURE — 76937 US GUIDE VASCULAR ACCESS: CPT

## 2025-01-20 PROCEDURE — 80053 COMPREHEN METABOLIC PANEL: CPT

## 2025-01-20 PROCEDURE — 85025 COMPLETE CBC W/AUTO DIFF WBC: CPT

## 2025-01-20 PROCEDURE — 84100 ASSAY OF PHOSPHORUS: CPT

## 2025-01-20 PROCEDURE — C1751 CATH, INF, PER/CENT/MIDLINE: HCPCS

## 2025-01-20 RX ORDER — CEFEPIME HYDROCHLORIDE 2 G/1
2 INJECTION, POWDER, FOR SOLUTION INTRAVENOUS EVERY 8 HOURS
Qty: 84 G | Refills: 0 | Status: SHIPPED | OUTPATIENT
Start: 2025-01-20 | End: 2025-01-28

## 2025-01-20 RX ORDER — NIFEDIPINE 60 MG/1
120 TABLET, EXTENDED RELEASE ORAL DAILY
Qty: 60 TABLET | Refills: 11 | Status: SHIPPED | OUTPATIENT
Start: 2025-01-21 | End: 2025-01-28 | Stop reason: HOSPADM

## 2025-01-20 RX ORDER — SODIUM CHLORIDE 0.9 % (FLUSH) 0.9 %
10 SYRINGE (ML) INJECTION EVERY 12 HOURS PRN
Status: DISCONTINUED | OUTPATIENT
Start: 2025-01-20 | End: 2025-01-29 | Stop reason: HOSPADM

## 2025-01-20 RX ORDER — NIFEDIPINE 30 MG/1
30 TABLET, EXTENDED RELEASE ORAL ONCE
Status: COMPLETED | OUTPATIENT
Start: 2025-01-20 | End: 2025-01-20

## 2025-01-20 RX ORDER — NIFEDIPINE 60 MG/1
120 TABLET, EXTENDED RELEASE ORAL DAILY
Status: DISCONTINUED | OUTPATIENT
Start: 2025-01-21 | End: 2025-01-24

## 2025-01-20 RX ADMIN — ATORVASTATIN CALCIUM 40 MG: 40 TABLET, FILM COATED ORAL at 08:01

## 2025-01-20 RX ADMIN — HEPARIN SODIUM 7500 UNITS: 5000 INJECTION INTRAVENOUS; SUBCUTANEOUS at 11:01

## 2025-01-20 RX ADMIN — CEFEPIME 2 G: 2 INJECTION, POWDER, FOR SOLUTION INTRAVENOUS at 08:01

## 2025-01-20 RX ADMIN — NIFEDIPINE 30 MG: 30 TABLET, FILM COATED, EXTENDED RELEASE ORAL at 11:01

## 2025-01-20 RX ADMIN — GABAPENTIN 600 MG: 300 CAPSULE ORAL at 09:01

## 2025-01-20 RX ADMIN — POLYETHYLENE GLYCOL 3350 17 G: 17 POWDER, FOR SOLUTION ORAL at 08:01

## 2025-01-20 RX ADMIN — SENNOSIDES 8.6 MG: 8.6 TABLET, FILM COATED ORAL at 08:01

## 2025-01-20 RX ADMIN — GABAPENTIN 600 MG: 300 CAPSULE ORAL at 08:01

## 2025-01-20 RX ADMIN — CARVEDILOL 25 MG: 12.5 TABLET, FILM COATED ORAL at 08:01

## 2025-01-20 RX ADMIN — CEFEPIME 2 G: 2 INJECTION, POWDER, FOR SOLUTION INTRAVENOUS at 04:01

## 2025-01-20 RX ADMIN — HEPARIN SODIUM 7500 UNITS: 5000 INJECTION INTRAVENOUS; SUBCUTANEOUS at 06:01

## 2025-01-20 RX ADMIN — MICONAZOLE NITRATE 2 % TOPICAL POWDER: at 08:01

## 2025-01-20 RX ADMIN — MICONAZOLE NITRATE: 20 OINTMENT TOPICAL at 09:01

## 2025-01-20 RX ADMIN — FUROSEMIDE 40 MG: 40 TABLET ORAL at 08:01

## 2025-01-20 RX ADMIN — CARVEDILOL 25 MG: 12.5 TABLET, FILM COATED ORAL at 09:01

## 2025-01-20 RX ADMIN — LISINOPRIL 40 MG: 20 TABLET ORAL at 09:01

## 2025-01-20 RX ADMIN — NIFEDIPINE 90 MG: 60 TABLET, FILM COATED, EXTENDED RELEASE ORAL at 08:01

## 2025-01-20 RX ADMIN — CEFEPIME 2 G: 2 INJECTION, POWDER, FOR SOLUTION INTRAVENOUS at 01:01

## 2025-01-20 RX ADMIN — OXYCODONE 5 MG: 5 TABLET ORAL at 08:01

## 2025-01-20 RX ADMIN — OXYCODONE 5 MG: 5 TABLET ORAL at 09:01

## 2025-01-20 RX ADMIN — HEPARIN SODIUM 7500 UNITS: 5000 INJECTION INTRAVENOUS; SUBCUTANEOUS at 04:01

## 2025-01-20 RX ADMIN — ASPIRIN 81 MG: 81 TABLET, COATED ORAL at 08:01

## 2025-01-20 NOTE — ASSESSMENT & PLAN NOTE
Rapid called 1/17 after pt found unresponsive by nursing.   Reportedly altered. Possibly vasovagal vs hypotension related to changes in antihypertensive regimen.   CT Head with no acute abnormality    -CTA Chest/Abdomen/Pelvis ordered, negative  -Resolved, pt currently A&Ox3.

## 2025-01-20 NOTE — NURSING
Pt aaox4, c/o pain, prn pain med given per ordered. NSR on tele. BP q4h, MD notified, prn bp med hold per ordered. Wound care completed per ordered. Midline put in per picc team. Pt was up in the chair during the shift. Side rails upx2, call light within reach.

## 2025-01-20 NOTE — ASSESSMENT & PLAN NOTE
"Right medial thigh collection with overlying cellulitis and draining wound. In setting of chronic lymphedema.  Worsening over past 2 weeks with increased size and drainage of blood and pus.   Reports pain is well controlled with current regimen.  CT R thigh reported scattered areas of subcutaneous dense infiltration of the subcutaneous fat in the posterior and medial upper thigh, the lower calf and dorsal foot.  Correlation for cellulitis. No drainable fluid collection or abscess.     - Wound cultures positive for serratia and pseudomonas  - Started Ceftriaxone 2g q 24hr, switched to cefepime,  - ID recs to continue IV cefepime 2g Q8h  -- [considered FQ, but avoiding d/t h/o type-B aortic dissection]. To complete abx duration of at-least 2wks, SOHAN 02/03/25.  - Oxycodone 5 mg PO q4 hr prn moderate to severe pain.   - Wound care consulted, recs below:  -Clean right inner thigh with CGH soap/water, pat dry. Loosely pack 1/4" Iodoform or AMD antimicrobial packing gauze into open "holes" to ulcerated wound. Cover with ABD and secure with cloth tape, daily and prn. Apply a zinc barrier to periwound to protect skin from moisture/drainage.   - Remove bilateral lower leg wraps, once.   "

## 2025-01-20 NOTE — ASSESSMENT & PLAN NOTE
Patient with Acute on chronic debility due to  chronic lymphedema . The patient's latest AMPAC (Activity Measure for Post Acute Care) Score is listed below.    AM-PAC Score - How much help does the patient need for each activity listed  Basic Mobility Total Score: 13  Turning over in bed (including adjusting bedclothes, sheets and blankets)?: A lot  Sitting down on and standing up from a chair with arms (e.g., wheelchair, bedside commode, etc.): A little  Moving from lying on back to sitting on the side of the bed?: A little  Moving to and from a bed to a chair (including a wheelchair)?: A lot  Need to walk in hospital room?: A lot  Climbing 3-5 steps with a railing?: Unable    Plan  - PT/OT consulted  - Fall precautions in place  - PT and OT rec moderate intensity therapy.   He reportedly does not have transport to go to daily outpatient therapy. Plan for SNF  - On 1/18 [facility] sent referral to [facility], pending placement

## 2025-01-20 NOTE — PLAN OF CARE
Problem: Adult Inpatient Plan of Care  Goal: Plan of Care Review  Outcome: Progressing  Goal: Patient-Specific Goal (Individualized)  Outcome: Progressing  Goal: Absence of Hospital-Acquired Illness or Injury  Outcome: Progressing  Goal: Optimal Comfort and Wellbeing  Outcome: Progressing  Goal: Readiness for Transition of Care  Outcome: Progressing     Problem: Bariatric Environmental Safety  Goal: Safety Maintained with Care  Outcome: Progressing     Problem: Skin or Soft Tissue Infection  Goal: Absence of Infection Signs and Symptoms  Outcome: Progressing     Problem: Wound  Goal: Optimal Coping  Outcome: Progressing  Goal: Optimal Functional Ability  Outcome: Progressing  Goal: Absence of Infection Signs and Symptoms  Outcome: Progressing  Goal: Improved Oral Intake  Outcome: Progressing  Goal: Optimal Pain Control and Function  Outcome: Progressing  Goal: Skin Health and Integrity  Outcome: Progressing  Goal: Optimal Wound Healing  Outcome: Progressing     Problem: Skin Injury Risk Increased  Goal: Skin Health and Integrity  Outcome: Progressing     Problem: Infection  Goal: Absence of Infection Signs and Symptoms  Outcome: Progressing

## 2025-01-20 NOTE — ASSESSMENT & PLAN NOTE
Patient's blood pressure range in the last 24 hours was: BP  Min: 128/63  Max: 147/66.The patient's inpatient anti-hypertensive regimen is listed below:  Current Antihypertensives  carvediloL tablet 25 mg, 2 times daily, Oral  furosemide tablet 40 mg, Daily, Oral  lisinopriL tablet 40 mg, Nightly, Oral  labetaloL tablet 100 mg, Daily PRN, Oral  NIFEdipine 24 hr tablet 120 mg, Daily, Oral  NIFEdipine (PROCARDIA-XL) 24 hr tablet, Daily, Oral    Plan  - BP is uncontrolled, will adjust as follows: Increase Nifedipine from 120 mg daily  - Continue home  carvdilol, and lisinopril  - Continue home lasix 40 mg daily

## 2025-01-20 NOTE — PLAN OF CARE
NURSING HOME ORDERS    01/24/2025  [facility] [facility]  [address]  [facility] [address]  Dept: [phone]  Loc: [phone]     Admit to Nursing Home:      Diagnoses:  Active Hospital Problems    Diagnosis  POA    *Cellulitis of right thigh [L03.115]  Yes    Hyponatremia [E87.1]  Yes    ATN (acute tubular necrosis) [N17.0]  Yes    LEIF (acute kidney injury) [N17.9]  Yes    Cellulitis of leg [L03.119]  Yes    AMS (altered mental status) [R41.82]  No    Debility [R53.81]  Yes    Chronic knee pain [M25.569, G89.29]  Yes    Lymphedema [I89.0]  Yes    History of aortic dissection [Z86.79]  Not Applicable    Hypertension [I10]  Yes      Resolved Hospital Problems   No resolved problems to display.       Patient is homebound due to:  Cellulitis of right thigh    Allergies:Review of patient's allergies indicates:  No Known Allergies    Vitals:  Routine    Diet: cardiac diet    Activities:   Activity as tolerated    Goals of Care Treatment Preferences:  Code Status: Full Code          What is most important right now is to focus on avoiding the hospital.  Accordingly, we have decided that the best plan to meet the patient's goals includes ordered home health wound care..      Labs:  Accepting facility to perform weekly labs (cbc, cmp, crp), and picc line dressing changes. Facility to fax results to [facility] Fax Number: [phone].     Nursing Precautions:  Fall    Consults:   PT to evaluate and treat- 4 times a week, OT to evaluate and treat- 4 times a week, and Wound Care     Miscellaneous Care: Wound Care: yes:  Pt will need assistance with cleaning BLE daily and applying antifungal cream.  PICC Line Care: PICC Line Use/Care Instructions:  Scrub the Hub: Prior to accessing the line, always perform a 30 second alcohol scrub  Each lumen of the central line is to be flushed at least daily with 10 mL Normal Saline and 3 mL Heparin flush (10  units/mL)  Skilled Nurse (SN) may draw blood from IV access  Blood Draw Procedure:  - Aspirate at least 5 mL of blood  - Discard  - Obtain specimen  - Change injection cap  - Flush with 20 mL Normal Saline followed by a 3-5 mL Heparin flush (10 units/mL)    Central :  - Sterile dressing changes are done weekly and as needed.  - Use chlor-hexadine scrub to cleanse site, apply Biopatch to insertion site, apply securement device dressing  - Injection caps are changed weekly and after EVERY lab draw.  - If sterile gauze is under dressing to control oozing, dressing change must be performed every 24 hours until gauze is not needed.    Antibiotic Plan:  Continue Iv-cefepime 2g Q8h  -- [considered FQ, but avoiding d/t h/o type-B aortic dissection].   To complete abx duration of at-least 2wks, SOHAN 02/03  Continue wound care  Accepting facility to perform weekly labs (cbc, cmp, crp), and picc line dressing changes.  Facility to fax results to [facility] Fax Number: [phone].      -- ID will schedule pt for ID clinic f/u appt.  If d/c to [facility]-SNF, they may re-consult ID if needed before abx SOHAN 02/03.             Medications: Discontinue all previous medication orders, if any. See new list below.     Medication List        START taking these medications      ceFEPIme 2 gram injection  Commonly known as: MAXIPIME  Inject 2 g into the vein every 8 (eight) hours. for 14 days     NIFEdipine 60 MG (OSM) 24 hr tablet  Commonly known as: PROCARDIA-XL  Take 2 tablets (120 mg total) by mouth once daily.            CHANGE how you take these medications      lisinopriL 40 MG tablet  Commonly known as: PRINIVIL,ZESTRIL  Take 1 tablet (40 mg total) by mouth every evening.  Notes to patient: Hold until follow up with PCP            CONTINUE taking these medications      amLODIPine 10 MG tablet  Commonly known as: NORVASC  TAKE ONE TABLET BY MOUTH EVERY DAY     aspirin 81 MG EC tablet  Commonly known as: ECOTRIN  Take  81 mg by mouth once daily.     atorvastatin 40 MG tablet  Commonly known as: LIPITOR  TAKE ONE TABLET BY MOUTH EVERY DAY     carvediloL 25 MG tablet  Commonly known as: COREG  TAKE ONE TABLET BY MOUTH TWICE DAILY WITH FOOD     diclofenac sodium 1 % Gel  Commonly known as: VOLTAREN  Apply 2 g topically once daily. Apply to affected area     furosemide 40 MG tablet  Commonly known as: LASIX  Take 1 tablet (40 mg total) by mouth once daily.     gabapentin 600 MG tablet  Commonly known as: NEURONTIN  Take 1 tablet (600 mg total) by mouth 3 (three) times daily.     polyethylene glycol 17 gram Pwpk  Commonly known as: GLYCOLAX  Take 17 g by mouth 2 (two) times daily as needed.                Immunizations Administered as of 1/24/2025       No immunizations on file.            This patient has had both covid vaccinations    Some patients may experience side effects after vaccination.  These may include fever, headache, muscle or joint aches.  Most symptoms resolve with 24-48 hours and do not require urgent medical evaluation unless they persist for more than 72 hours or symptoms are concerning for an unrelated medical condition.          _________________________________  Jono Adams MD  01/24/2025

## 2025-01-20 NOTE — NURSING
At 0815, /66, hr 67. Md notified, am bp meds given per ordered. Hold prn bp med per order.     At 1115, bp 147/66, hr 67. MD rounded with pt, adjusted nifedipine dose. Med given per ordered.

## 2025-01-20 NOTE — SUBJECTIVE & OBJECTIVE
Interval History: No acute events overnight. ID recommended to continue IV Cefepime at discharge for at-least 2wks, SOHAN 02/03/25. Patient is ammenable to this plan. PICC team consulted, and plans for midline placment. BP still over goal. Increased Nifedipine to 120 mg daily.    Review of Systems  Objective:     Vital Signs (Most Recent):  Temp: 97.9 °F (36.6 °C) (01/20/25 1130)  Pulse: 64 (01/20/25 1130)  Resp: (!) 22 (01/20/25 1130)  BP: 128/63 (01/20/25 1130)  SpO2: (!) 92 % (01/20/25 1130) Vital Signs (24h Range):  Temp:  [97.9 °F (36.6 °C)-98.8 °F (37.1 °C)] 97.9 °F (36.6 °C)  Pulse:  [53-70] 64  Resp:  [18-22] 22  SpO2:  [92 %-100 %] 92 %  BP: (128-147)/(60-66) 128/63     Weight: (!) 181.4 kg (400 lb)  Body mass index is 54.25 kg/m².    Intake/Output Summary (Last 24 hours) at 1/20/2025 1320  Last data filed at 1/20/2025 0825  Gross per 24 hour   Intake 400 ml   Output 2400 ml   Net -2000 ml         Physical Exam  Constitutional:       General: He is not in acute distress.     Appearance: He is not ill-appearing or diaphoretic.   HENT:      Head: Normocephalic and atraumatic.      Nose: Nose normal.      Mouth/Throat:      Mouth: Mucous membranes are moist.   Eyes:      Extraocular Movements: Extraocular movements intact.   Cardiovascular:      Rate and Rhythm: Normal rate and regular rhythm.      Heart sounds: No murmur heard.  Pulmonary:      Effort: Pulmonary effort is normal. No respiratory distress.      Breath sounds: Rhonchi (b/l lower lung fields) present.   Abdominal:      General: Abdomen is flat. There is no distension.      Tenderness: There is no abdominal tenderness.   Musculoskeletal:      Cervical back: Normal range of motion.      Right lower leg: Edema present.      Left lower leg: Edema present.      Comments: Lymphedema. Right medial thigh with large collection and wound covered by intact bandage with bloody drainage   Neurological:      General: No focal deficit present.      Mental Status:  He is alert.      Comments: A&Ox3             Significant Labs: All pertinent labs within the past 24 hours have been reviewed.    Significant Imaging: I have reviewed all pertinent imaging results/findings within the past 24 hours.

## 2025-01-20 NOTE — MEDICAL/APP STUDENT
"     PRINCIPAL PROBLEM:   Cellulitis of right thigh                                                     HPI  Edd Wills is a 69 y.o. male with past medical history of malignant HTN, HLD, morbid obesity, Type B aortic dissection, chronic lymphedema, LLUVIA, chronic neck pain s/p cervical fusion and laminectomy, chronic bilateral knee pain, grade 1 diastolic dysfunction, and CKD who presented for abscess on his R medial thigh. He has a 6-7 year history of a lymphedematous collection to his R medial thigh, however noticed a week ago that there was mild purulent drainage coming from a new wound. He also reports the collection has increased in size over the past week. Wound care visits him 3 times a week to manage this collection, but a couple days before his presentation, while trying to express some of the drainage, drainage evolved to "blood clots" and a large amount of purulent drainage which wound care felt was too extensive to care for at home. He was then recommended to present to the ED. He denies fever, chills, SOB, chest pain, N/V, diarrhea, abdominal pain, and dysuria.      In ED, vitals were hypertensive to 144, but otherwise hemodynamically stable. Labs were significant for Hgb 13.2 and hematocrit 39.5 with no leukocytosis. Na 135 and albumin 3.0. He was started on clindamycin in D5W 600mg/50mL Q8 for his cellulitis.       Hospital Course  Patient with chronic lymphedema and hx Type B aortic dissection admitted with R medial thigh collection. Placed on Clindamycin. CT of RLE with scattered areas of subcutaneous dense infiltration of the subcutaneous fat in the posterior and medial upper thigh, the lower calf and dorsal foot c/f cellulitis. However no drainable fluid collection or abscess seen. Wound culture positive for gram negative rods, susceptibilities pending. Transitioned from clindamycin to ceftriaxone. Patient had rapid called on 1/17 when he was found unresponsive. ICU team came to room to assess. " Patient was altered. CTH with no acute abnormality. Pt has been hypertensive, added Nifedipine for further BP contol in setting of known type B aortic dissection. CTA chest ordered: without abnormalities. Patient's wound culture grew serratia and pseudomonas. Transitioned to cefepime from ceftriaxone. Only oral option based on susceptibilities is fluoroquinolones but this is contraindicated with hx of aortic dissection. Consulted ID for OPAT antibiotics which will be managed prior to SNF discharge- recommended continuing the IV cefepime for 2 weeks. SNF arrangements are underway.      Overnight Events:  No acute overnight events. Pt received oxycodone 5 mg for pain, feels it is well controlled and progressing. Amenable to PICC line placement for antibiotic tx at discharge. Had one BM yesterday.      VITALS  Vital Signs (Most Recent):  Temp: 97.9 °F (36.6 °C) (01/20/25 0815)  Pulse: 66 (01/20/25 1115)  Resp: 20 (01/20/25 1115)  BP: 128/63 (01/20/25 1115)  SpO2: (!) 93 % (01/20/25 1115) Vital Signs (24h Range):  Temp:  [97.9 °F (36.6 °C)-98.8 °F (37.1 °C)] 97.9 °F (36.6 °C)  Pulse:  [53-70] 66  Resp:  [18-20] 20  SpO2:  [92 %-100 %] 93 %  BP: (128-147)/(60-66) 128/63   Weight: (!) 181.4 kg (400 lb)  Body mass index is 54.25 kg/m².    I/O's    Intake/Output Summary (Last 24 hours) at 1/20/2025 1152  Last data filed at 1/20/2025 0825  Gross per 24 hour   Intake 400 ml   Output 2400 ml   Net -2000 ml     Net IO Since Admission: -8,355.93 mL [01/20/25 1152]    REPLACE LYTES  Recent Labs   Lab 01/18/25  0242 01/19/25  0452 01/20/25  0545   MG 2.0 2.1 2.0   PHOS 3.4 3.3 3.1       Recent Labs   Lab 01/18/25  0242 01/19/25  0452 01/20/25  0545   * 135* 132*   K 3.8 3.7 3.9    104 103   CO2 21* 24 23   BUN 15 13 14   CREATININE 0.9 0.9 0.8   GLU 99 84 91   ANIONGAP 8 7* 6*       Antibiotics (72h ago, onward)      Start     Stop Route Frequency Ordered    01/19/25 1730  ceFEPIme injection 2 g         -- IV Every 8  "hours (non-standard times) 01/19/25 1246          Lab Results   Component Value Date    LABBLOO No Growth to date 01/15/2025    LABBLOO No Growth to date 01/15/2025    LABBLOO No Growth to date 01/15/2025    LABBLOO No Growth to date 01/15/2025    LABBLOO No Growth to date 01/15/2025    LABBLOO No Growth to date 01/15/2025    LABBLOO No Growth to date 01/15/2025    LABBLOO No Growth to date 01/15/2025    LABBLOO No Growth to date 01/15/2025    LABBLOO No Growth to date 01/15/2025     Lab Results   Component Value Date    LABURIN No growth 06/15/2016    LABURIN  02/03/2013     MULTIPLE ORGANISMS ISOLATED. NONE IN PREDOMINANCE. REPEAT IF CLINICALLY NECESSARY.         ASSESSMENT/PLAN    * Cellulitis of right thigh  Right medial thigh collection with overlying cellulitis and draining wound. In setting of chronic lymphedema.  Worsening over past 2 weeks with increased size and drainage of blood and pus.   Reports pain is well controlled with current regimen.  CT R thigh reported scattered areas of subcutaneous dense infiltration of the subcutaneous fat in the posterior and medial upper thigh, the lower calf and dorsal foot.  Correlation for cellulitis. No drainable fluid collection or abscess.     - Wound cultures positive for serratia and pseudomonas  - Started Ceftriaxone 2g q 24hr, switched to cefepime,  - ID consulted for OPAT antibiotics as pt is unable to take oral flouroquinolones with hx of aortic dissection, recs below:        -Continue IV cefepime 2g Q8 with end date of 2/3        -PICC team consulted for placement, midline to be placed  - Oxycodone 5 mg PO q4 hr prn moderate to severe pain.   - Wound care consulted, recs below:  -Clean right inner thigh with CGH soap/water, pat dry. Loosely pack 1/4" Iodoform or AMD antimicrobial packing gauze into open "holes" to ulcerated wound. Cover with ABD and secure with cloth tape, daily and prn. Apply a zinc barrier to periwound to protect skin from moisture/drainage. "   - Remove bilateral lower leg wraps, once.     AMS (altered mental status)  Rapid called 1/17 after pt found unresponsive by nursing.   Reportedly altered. Possibly vasovagal vs hypotension related to changes in antihypertensive regimen.   CT Head with no acute abnormality    -CTA Chest/Abdomen/Pelvis with no acute abnormality.      Chronic knee pain  Reported pain 5/10 BLE (from knees down).     - Continue home gabapentin  - Continue oxy 5 mg q 4 prn    Debility  Patient with Acute on chronic debility due to  chronic lymphedema . The patient's latest AMPAC (Activity Measure for Post Acute Care) Score is listed below.    AM-PAC Score - How much help does the patient need for each activity listed  Basic Mobility Total Score: 13  Turning over in bed (including adjusting bedclothes, sheets and blankets)?: A lot  Sitting down on and standing up from a chair with arms (e.g., wheelchair, bedside commode, etc.): A little  Moving from lying on back to sitting on the side of the bed?: A little  Moving to and from a bed to a chair (including a wheelchair)?: A lot  Need to walk in hospital room?: A lot  Climbing 3-5 steps with a railing?: Unable    Plan  - PT/OT consulted  - Fall precautions in place  - PT and OT rec moderate intensity therapy.   He reportedly does not have transport to go to daily outpatient therapy. Plan for SNF      Lymphedema  Chronic issue, pt reported ~ 7 yrs.  -Continue home lasix 40 mg daily     History of aortic dissection  Hx Type B aortic dissection.   Continues to be over goal. Increased Nifedipine  to 90 mg daily.     - Goal SBP <120  - Goal HR <60   - Continue home antihypertensives  - PRN labetalol     Hypertension  Patient's blood pressure range in the last 24 hours was: BP  Min: 123/60  Max: 155/70.The patient's inpatient anti-hypertensive regimen is listed below:  Current Antihypertensives  carvediloL tablet 25 mg, 2 times daily, Oral  furosemide tablet 40 mg, Daily, Oral  lisinopriL tablet  40 mg, Nightly, Oral  labetaloL tablet 100 mg, Daily PRN, Oral  NIFEdipine 24 hr tablet 90 mg, Daily, Oral    Plan  - BP is uncontrolled, will adjust as follows: Increase Nifedipine from 60 mg to 90 mg daily on 1/19. Increasing to 120 mg daily starting 1/20.  - Continue home  carvdilol, and lisinopril  - Continue home lasix 40 mg daily     Discharge Planning:  BROWN: 1/21/25  Code Status: Full  Medical Readiness for Discharge Date: Pending midline placement for antibiotics  Discharge Plan: SNF

## 2025-01-20 NOTE — NURSING
Uneventful night, patient remain in stable condition VSS, no distress noted skin warm dry resp even unlabored. C/o pain to RLE received prn pain medication as ordered see MAR, was effective. IVABT ongoing for cellulitis to RLE. Safety measures in progress.

## 2025-01-20 NOTE — PROGRESS NOTES
"[facility] ([place] [address])  [facility] Medicine  Progress Note    Patient Name: Edd Wills  MRN: [id number]  Patient Class: IP- Inpatient   Admission Date: 1/15/2025  Length of Stay: 4 days  Attending Physician: Chio Crabtree MD  Primary Care Provider: Zuleyka Sabillon MD        Subjective     Principal Problem:Cellulitis of right thigh        HPI:  Edd Wills is a 69 y.o. male with past medical history of malignant HTN, HLD, morbid obesity, Type B aortic dissection, chronic lymphedema, LLUVIA, chronic neck pain s/p cervical fusion and laminectomy, chronic bilateral knee pain, grade 1 diastolic dysfunction, and CKD who presented for abscess on his R medial thigh. He has a 6-7 year history of a lymphedematous collection to his R medial thigh, however noticed a week ago that there was mild purulent drainage coming from a new wound. He also reports the collection has increased in size over the past week. Wound care visits him 3 times a week to manage this collection, but a couple days before his presentation, while trying to express some of the drainage, drainage evolved to "blood clots" and a large amount of purulent drainage which wound care felt was too extensive to care for at home. He was then recommended to present to the ED. He denies fever, chills, SOB, chest pain, N/V, diarrhea, abdominal pain, and dysuria.      In ED, vitals were hypertensive to 144, but otherwise hemodynamically stable. Labs were significant for Hgb 13.2 and hematocrit 39.5 with no leukocytosis. Na 135 and albumin 3.0. He was started on clindamycin in D5W 600mg/50mL Q8 for his cellulitis.       Overview/Hospital Course:  Patient with chronic lymphedema and hx Type B aortic dissection admitted with R medial thigh collection. Placed on Clindamycin. CT of RLE with scattered areas of subcutaneous dense infiltration of the subcutaneous fat in the posterior and medial upper thigh, the lower calf and dorsal foot c/f cellulitis. " However no drainable fluid collection or abscess seen. Wound culture positive for gram negative rods, susceptibilities pending. Transitioned from clindamycin to ceftriaxone. Patient had rapid called on 1/17 when he was found unresponsive. ICU team came to room to assess. Patient was altered. CTH with no acute abnormality. Pt has been hypertensive, added Nifedipine for further BP contol in setting of known type B aortic dissection. CTA chest ordered: without abnormalities. Patient's wound culture grew serratia and pseudomonas. Transitioned to cefepime from ceftriaxone. Only oral option based on susceptibilities is fluoroquinolones but this is contraindicated with hx of aortic dissection. Consulted ID who recommended to continue IV cefepime 2g Q8h to complete abx duration of at-least 2 weeks, SOHAN 02/03/25. SNF arrangements are underway.      Interval History: No acute events overnight. ID recommended to continue IV Cefepime at discharge for at-least 2wks, SOHAN 02/03/25. Patient is ammenable to this plan. PICC team consulted, and plans for midline placment. BP still over goal. Increased Nifedipine to 120 mg daily.    Review of Systems  Objective:     Vital Signs (Most Recent):  Temp: 97.9 °F (36.6 °C) (01/20/25 1130)  Pulse: 64 (01/20/25 1130)  Resp: (!) 22 (01/20/25 1130)  BP: 128/63 (01/20/25 1130)  SpO2: (!) 92 % (01/20/25 1130) Vital Signs (24h Range):  Temp:  [97.9 °F (36.6 °C)-98.8 °F (37.1 °C)] 97.9 °F (36.6 °C)  Pulse:  [53-70] 64  Resp:  [18-22] 22  SpO2:  [92 %-100 %] 92 %  BP: (128-147)/(60-66) 128/63     Weight: (!) 181.4 kg (400 lb)  Body mass index is 54.25 kg/m².    Intake/Output Summary (Last 24 hours) at 1/20/2025 1320  Last data filed at 1/20/2025 0825  Gross per 24 hour   Intake 400 ml   Output 2400 ml   Net -2000 ml         Physical Exam  Constitutional:       General: He is not in acute distress.     Appearance: He is not ill-appearing or diaphoretic.   HENT:      Head: Normocephalic and atraumatic.       Nose: Nose normal.      Mouth/Throat:      Mouth: Mucous membranes are moist.   Eyes:      Extraocular Movements: Extraocular movements intact.   Cardiovascular:      Rate and Rhythm: Normal rate and regular rhythm.      Heart sounds: No murmur heard.  Pulmonary:      Effort: Pulmonary effort is normal. No respiratory distress.      Breath sounds: Rhonchi (b/l lower lung fields) present.   Abdominal:      General: Abdomen is flat. There is no distension.      Tenderness: There is no abdominal tenderness.   Musculoskeletal:      Cervical back: Normal range of motion.      Right lower leg: Edema present.      Left lower leg: Edema present.      Comments: Lymphedema. Right medial thigh with large collection and wound covered by intact bandage with bloody drainage   Neurological:      General: No focal deficit present.      Mental Status: He is alert.      Comments: A&Ox3             Significant Labs: All pertinent labs within the past 24 hours have been reviewed.    Significant Imaging: I have reviewed all pertinent imaging results/findings within the past 24 hours.    Assessment and Plan     * Cellulitis of right thigh  Right medial thigh collection with overlying cellulitis and draining wound. In setting of chronic lymphedema.  Worsening over past 2 weeks with increased size and drainage of blood and pus.   Reports pain is well controlled with current regimen.  CT R thigh reported scattered areas of subcutaneous dense infiltration of the subcutaneous fat in the posterior and medial upper thigh, the lower calf and dorsal foot.  Correlation for cellulitis. No drainable fluid collection or abscess.     - Wound cultures positive for serratia and pseudomonas  - Started Ceftriaxone 2g q 24hr, switched to cefepime,  - ID recs to continue IV cefepime 2g Q8h  -- [considered FQ, but avoiding d/t h/o type-B aortic dissection]. To complete abx duration of at-least 2wks, SOHAN 02/03/25.  - Oxycodone 5 mg PO q4 hr prn moderate  "to severe pain.   - Wound care consulted, recs below:  -Clean right inner thigh with CGH soap/water, pat dry. Loosely pack 1/4" Iodoform or AMD antimicrobial packing gauze into open "holes" to ulcerated wound. Cover with ABD and secure with cloth tape, daily and prn. Apply a zinc barrier to periwound to protect skin from moisture/drainage.   - Remove bilateral lower leg wraps, once.     AMS (altered mental status)  Rapid called 1/17 after pt found unresponsive by nursing.   Reportedly altered. Possibly vasovagal vs hypotension related to changes in antihypertensive regimen.   CT Head with no acute abnormality    -CTA Chest/Abdomen/Pelvis ordered, negative  -Resolved, pt currently A&Ox3.       Chronic knee pain  Reported pain 5/10 BLE (from knees down).     - Continue home gabapentin  - Continue oxy 5 mg q 4 prn    Debility  Patient with Acute on chronic debility due to  chronic lymphedema . The patient's latest AMPAC (Activity Measure for Post Acute Care) Score is listed below.    AM-PAC Score - How much help does the patient need for each activity listed  Basic Mobility Total Score: 13  Turning over in bed (including adjusting bedclothes, sheets and blankets)?: A lot  Sitting down on and standing up from a chair with arms (e.g., wheelchair, bedside commode, etc.): A little  Moving from lying on back to sitting on the side of the bed?: A little  Moving to and from a bed to a chair (including a wheelchair)?: A lot  Need to walk in hospital room?: A lot  Climbing 3-5 steps with a railing?: Unable    Plan  - PT/OT consulted  - Fall precautions in place  - PT and OT rec moderate intensity therapy.   He reportedly does not have transport to go to daily outpatient therapy. Plan for SNF  - On 1/18 [facility] sent referral to [facility], pending placement          Lymphedema  Chronic issue, pt reported ~ 7 yrs.  -Continue home lasix 40 mg daily     History of aortic dissection  Hx Type B aortic dissection.   Continues to be over " goal. Increased Nifedipine  to 90 mg daily.     - Goal SBP <120  - Goal HR <60   - Continue home antihypertensives  - PRN labetalol     Hypertension  Patient's blood pressure range in the last 24 hours was: BP  Min: 128/63  Max: 147/66.The patient's inpatient anti-hypertensive regimen is listed below:  Current Antihypertensives  carvediloL tablet 25 mg, 2 times daily, Oral  furosemide tablet 40 mg, Daily, Oral  lisinopriL tablet 40 mg, Nightly, Oral  labetaloL tablet 100 mg, Daily PRN, Oral  NIFEdipine 24 hr tablet 120 mg, Daily, Oral  NIFEdipine (PROCARDIA-XL) 24 hr tablet, Daily, Oral    Plan  - BP is uncontrolled, will adjust as follows: Increase Nifedipine from 120 mg daily  - Continue home  carvdilol, and lisinopril  - Continue home lasix 40 mg daily       VTE Risk Mitigation (From admission, onward)           Ordered     heparin (porcine) injection 7,500 Units  Every 8 hours         01/16/25 1857     IP VTE HIGH RISK PATIENT  Once         01/15/25 2233                    Discharge Planning   BROWN:      Code Status: Full Code   Medical Readiness for Discharge Date:   Discharge Plan A: Home, Home Health   Discharge Delays: None known at this time            Please place Justification for DME        Chasidy Arguelles MD  Department of Hospital Medicine   [facility] ([place])

## 2025-01-20 NOTE — PLAN OF CARE
Problem: Adult Inpatient Plan of Care  Goal: Plan of Care Review  Outcome: Progressing  Goal: Patient-Specific Goal (Individualized)  Outcome: Progressing  Goal: Absence of Hospital-Acquired Illness or Injury  Outcome: Progressing  Goal: Optimal Comfort and Wellbeing  Outcome: Progressing  Goal: Readiness for Transition of Care  Outcome: Progressing     Problem: Bariatric Environmental Safety  Goal: Safety Maintained with Care  Outcome: Progressing     Problem: Skin or Soft Tissue Infection  Goal: Absence of Infection Signs and Symptoms  Outcome: Progressing     Problem: Wound  Goal: Optimal Coping  Outcome: Progressing  Goal: Optimal Functional Ability  Outcome: Progressing  Goal: Absence of Infection Signs and Symptoms  Outcome: Progressing  Goal: Improved Oral Intake  Outcome: Progressing  Goal: Optimal Pain Control and Function  Outcome: Progressing  Goal: Skin Health and Integrity  Outcome: Progressing  Goal: Optimal Wound Healing  Outcome: Progressing     Problem: Skin Injury Risk Increased  Goal: Skin Health and Integrity  Outcome: Progressing

## 2025-01-20 NOTE — ASSESSMENT & PLAN NOTE
Patient with Acute on chronic debility due to  chronic lymphedema . The patient's latest AMPAC (Activity Measure for Post Acute Care) Score is listed below.    AM-PAC Score - How much help does the patient need for each activity listed  Basic Mobility Total Score: 13  Turning over in bed (including adjusting bedclothes, sheets and blankets)?: A lot  Sitting down on and standing up from a chair with arms (e.g., wheelchair, bedside commode, etc.): A little  Moving from lying on back to sitting on the side of the bed?: A little  Moving to and from a bed to a chair (including a wheelchair)?: A lot  Need to walk in hospital room?: A lot  Climbing 3-5 steps with a railing?: Unable    Plan  - PT/OT consulted  - Fall precautions in place  - PT and OT rec moderate intensity therapy.   He reportedly does not have transport to go to daily outpatient therapy. Plan for SNF  - On 1/18  sent referral to [facility], pending placement     Edd's mobility limitation cannot be sufficiently resolved by the use of a cane. His functional mobility deficit can be sufficiently resolved with the use of a Rollator. Patient's mobility limitation significantly impairs their ability to participate in one of more activities of daily living.  The use of a Rollator will significantly improve the patient's ability to participate in MRADLS and the patient will use it on regular basis in the home.

## 2025-01-20 NOTE — CONSULTS
VASCULAR ACCESS NOTE       Bed:01696/02826 A    Single lumen  4F x 13cm  midline placed in the right basilic vein. Needle advanced into the vessel under real time ultrasound guidance.    Attempts: 1  Max dwell date: 02/18/25  Lot number: [id number]    Wayne Neville RN

## 2025-01-21 LAB
ALBUMIN SERPL BCP-MCNC: 2.4 G/DL (ref 3.5–5.2)
ALP SERPL-CCNC: 68 U/L (ref 40–150)
ALT SERPL W/O P-5'-P-CCNC: 26 U/L (ref 10–44)
ANION GAP SERPL CALC-SCNC: 7 MMOL/L (ref 8–16)
AST SERPL-CCNC: 32 U/L (ref 10–40)
BASOPHILS # BLD AUTO: 0.07 K/UL (ref 0–0.2)
BASOPHILS NFR BLD: 0.8 % (ref 0–1.9)
BILIRUB SERPL-MCNC: 0.3 MG/DL (ref 0.1–1)
BUN SERPL-MCNC: 16 MG/DL (ref 8–23)
CALCIUM SERPL-MCNC: 8.5 MG/DL (ref 8.7–10.5)
CHLORIDE SERPL-SCNC: 104 MMOL/L (ref 95–110)
CO2 SERPL-SCNC: 24 MMOL/L (ref 23–29)
CREAT SERPL-MCNC: 0.8 MG/DL (ref 0.5–1.4)
DIFFERENTIAL METHOD BLD: ABNORMAL
EOSINOPHIL # BLD AUTO: 0.3 K/UL (ref 0–0.5)
EOSINOPHIL NFR BLD: 3.2 % (ref 0–8)
ERYTHROCYTE [DISTWIDTH] IN BLOOD BY AUTOMATED COUNT: 13.7 % (ref 11.5–14.5)
EST. GFR  (NO RACE VARIABLE): >60 ML/MIN/1.73 M^2
GLUCOSE SERPL-MCNC: 90 MG/DL (ref 70–110)
HCT VFR BLD AUTO: 37.6 % (ref 40–54)
HGB BLD-MCNC: 12.2 G/DL (ref 14–18)
IMM GRANULOCYTES # BLD AUTO: 0.05 K/UL (ref 0–0.04)
IMM GRANULOCYTES NFR BLD AUTO: 0.6 % (ref 0–0.5)
LYMPHOCYTES # BLD AUTO: 2 K/UL (ref 1–4.8)
LYMPHOCYTES NFR BLD: 24.4 % (ref 18–48)
MAGNESIUM SERPL-MCNC: 1.9 MG/DL (ref 1.6–2.6)
MCH RBC QN AUTO: 27.5 PG (ref 27–31)
MCHC RBC AUTO-ENTMCNC: 32.4 G/DL (ref 32–36)
MCV RBC AUTO: 85 FL (ref 82–98)
MONOCYTES # BLD AUTO: 1 K/UL (ref 0.3–1)
MONOCYTES NFR BLD: 12.2 % (ref 4–15)
NEUTROPHILS # BLD AUTO: 4.9 K/UL (ref 1.8–7.7)
NEUTROPHILS NFR BLD: 58.8 % (ref 38–73)
NRBC BLD-RTO: 0 /100 WBC
PHOSPHATE SERPL-MCNC: 3.2 MG/DL (ref 2.7–4.5)
PLATELET # BLD AUTO: 374 K/UL (ref 150–450)
PMV BLD AUTO: 9.7 FL (ref 9.2–12.9)
POTASSIUM SERPL-SCNC: 4 MMOL/L (ref 3.5–5.1)
PROT SERPL-MCNC: 6.8 G/DL (ref 6–8.4)
RBC # BLD AUTO: 4.44 M/UL (ref 4.6–6.2)
SODIUM SERPL-SCNC: 135 MMOL/L (ref 136–145)
WBC # BLD AUTO: 8.35 K/UL (ref 3.9–12.7)

## 2025-01-21 PROCEDURE — 25000003 PHARM REV CODE 250

## 2025-01-21 PROCEDURE — 85025 COMPLETE CBC W/AUTO DIFF WBC: CPT

## 2025-01-21 PROCEDURE — 63600175 PHARM REV CODE 636 W HCPCS

## 2025-01-21 PROCEDURE — 80053 COMPREHEN METABOLIC PANEL: CPT

## 2025-01-21 PROCEDURE — 20600001 HC STEP DOWN PRIVATE ROOM

## 2025-01-21 PROCEDURE — 97535 SELF CARE MNGMENT TRAINING: CPT

## 2025-01-21 PROCEDURE — 97530 THERAPEUTIC ACTIVITIES: CPT

## 2025-01-21 PROCEDURE — 84100 ASSAY OF PHOSPHORUS: CPT

## 2025-01-21 PROCEDURE — 83735 ASSAY OF MAGNESIUM: CPT

## 2025-01-21 PROCEDURE — 25000003 PHARM REV CODE 250: Performed by: PHYSICIAN ASSISTANT

## 2025-01-21 PROCEDURE — 63600175 PHARM REV CODE 636 W HCPCS: Performed by: STUDENT IN AN ORGANIZED HEALTH CARE EDUCATION/TRAINING PROGRAM

## 2025-01-21 RX ORDER — LABETALOL 100 MG/1
100 TABLET, FILM COATED ORAL DAILY PRN
Status: DISCONTINUED | OUTPATIENT
Start: 2025-01-21 | End: 2025-01-29 | Stop reason: HOSPADM

## 2025-01-21 RX ADMIN — HEPARIN SODIUM 7500 UNITS: 5000 INJECTION INTRAVENOUS; SUBCUTANEOUS at 05:01

## 2025-01-21 RX ADMIN — CARVEDILOL 25 MG: 12.5 TABLET, FILM COATED ORAL at 08:01

## 2025-01-21 RX ADMIN — OXYCODONE 5 MG: 5 TABLET ORAL at 08:01

## 2025-01-21 RX ADMIN — SENNOSIDES 8.6 MG: 8.6 TABLET, FILM COATED ORAL at 08:01

## 2025-01-21 RX ADMIN — CEFEPIME 2 G: 2 INJECTION, POWDER, FOR SOLUTION INTRAVENOUS at 08:01

## 2025-01-21 RX ADMIN — FUROSEMIDE 40 MG: 40 TABLET ORAL at 08:01

## 2025-01-21 RX ADMIN — MICONAZOLE NITRATE 2 % TOPICAL POWDER: at 08:01

## 2025-01-21 RX ADMIN — CEFEPIME 2 G: 2 INJECTION, POWDER, FOR SOLUTION INTRAVENOUS at 05:01

## 2025-01-21 RX ADMIN — HEPARIN SODIUM 7500 UNITS: 5000 INJECTION INTRAVENOUS; SUBCUTANEOUS at 06:01

## 2025-01-21 RX ADMIN — GABAPENTIN 600 MG: 300 CAPSULE ORAL at 08:01

## 2025-01-21 RX ADMIN — OXYCODONE 5 MG: 5 TABLET ORAL at 05:01

## 2025-01-21 RX ADMIN — ATORVASTATIN CALCIUM 40 MG: 40 TABLET, FILM COATED ORAL at 08:01

## 2025-01-21 RX ADMIN — CEFEPIME 2 G: 2 INJECTION, POWDER, FOR SOLUTION INTRAVENOUS at 01:01

## 2025-01-21 RX ADMIN — ASPIRIN 81 MG: 81 TABLET, COATED ORAL at 08:01

## 2025-01-21 RX ADMIN — MICONAZOLE NITRATE: 20 OINTMENT TOPICAL at 08:01

## 2025-01-21 RX ADMIN — NIFEDIPINE 120 MG: 60 TABLET, FILM COATED, EXTENDED RELEASE ORAL at 08:01

## 2025-01-21 RX ADMIN — POLYETHYLENE GLYCOL 3350 17 G: 17 POWDER, FOR SOLUTION ORAL at 08:01

## 2025-01-21 RX ADMIN — HEPARIN SODIUM 7500 UNITS: 5000 INJECTION INTRAVENOUS; SUBCUTANEOUS at 09:01

## 2025-01-21 NOTE — SUBJECTIVE & OBJECTIVE
Interval History: NAEON. Doing well. Rested comfortably without any concerns.    Review of Systems  Objective:     Vital Signs (Most Recent):  Temp: 98.9 °F (37.2 °C) (01/21/25 1133)  Pulse: 65 (01/21/25 1133)  Resp: 18 (01/21/25 1133)  BP: (!) 113/55 (01/21/25 1133)  SpO2: 95 % (01/21/25 1133) Vital Signs (24h Range):  Temp:  [97.9 °F (36.6 °C)-98.9 °F (37.2 °C)] 98.9 °F (37.2 °C)  Pulse:  [57-72] 65  Resp:  [18-22] 18  SpO2:  [93 %-97 %] 95 %  BP: (113-125)/(55-60) 113/55     Weight: (!) 181.4 kg (400 lb)  Body mass index is 54.25 kg/m².    Intake/Output Summary (Last 24 hours) at 1/21/2025 1539  Last data filed at 1/21/2025 1210  Gross per 24 hour   Intake --   Output 2300 ml   Net -2300 ml         Physical Exam      Constitutional:       General: He is not in acute distress.     Appearance: He is not ill-appearing or diaphoretic.   Eyes:      Extraocular Movements: Extraocular movements intact.   Cardiovascular:      Rate and Rhythm: Normal rate and regular rhythm.      Heart sounds: No murmur heard.  Pulmonary:      Effort: Pulmonary effort is normal. No respiratory distress.      Breath sounds: Rhonchi (b/l lower lung fields) present.   Abdominal:      General: Abdomen is flat. There is no distension.      Tenderness: There is no abdominal tenderness.  There is no rebound. There is no guarding.  Musculoskeletal:         Right lower leg: Edema present.      Left lower leg: Edema present.      Comments: Lymphedema. Right medial thigh with large collection and wound covered by bandage  Neurological:      General: No focal deficit present.      Mental Status: He is alert.      Comments: alert and oriented    Significant Labs: All pertinent labs within the past 24 hours have been reviewed.    Significant Imaging: I have reviewed all pertinent imaging results/findings within the past 24 hours.

## 2025-01-21 NOTE — NURSING
Uneventful night, patient remain in stable condition skin warm dry resp even and unlabored. C/o pain to RLE received PRN pain med as ordered see MAR. New midline to RUE flushes well with positive blood return. VSS.

## 2025-01-21 NOTE — PROGRESS NOTES
"[facility] ([place] [address])  [facility] Medicine  Progress Note    Patient Name: Edd Wills  MRN: [id number]  Patient Class: IP- Inpatient   Admission Date: 1/15/2025  Length of Stay: 5 days  Attending Physician: Chio Crabtree MD  Primary Care Provider: Zuleyka Sabillon MD        Subjective     Principal Problem:Cellulitis of right thigh        HPI:  Edd Wills is a 69 y.o. male with past medical history of malignant HTN, HLD, morbid obesity, Type B aortic dissection, chronic lymphedema, LLUVIA, chronic neck pain s/p cervical fusion and laminectomy, chronic bilateral knee pain, grade 1 diastolic dysfunction, and CKD who presented for abscess on his R medial thigh. He has a 6-7 year history of a lymphedematous collection to his R medial thigh, however noticed a week ago that there was mild purulent drainage coming from a new wound. He also reports the collection has increased in size over the past week. Wound care visits him 3 times a week to manage this collection, but a couple days before his presentation, while trying to express some of the drainage, drainage evolved to "blood clots" and a large amount of purulent drainage which wound care felt was too extensive to care for at home. He was then recommended to present to the ED. He denies fever, chills, SOB, chest pain, N/V, diarrhea, abdominal pain, and dysuria.      In ED, vitals were hypertensive to 144, but otherwise hemodynamically stable. Labs were significant for Hgb 13.2 and hematocrit 39.5 with no leukocytosis. Na 135 and albumin 3.0. He was started on clindamycin in D5W 600mg/50mL Q8 for his cellulitis.       Overview/Hospital Course:  Patient with chronic lymphedema and hx Type B aortic dissection admitted with R medial thigh collection. Placed on Clindamycin. CT of RLE with scattered areas of subcutaneous dense infiltration of the subcutaneous fat in the posterior and medial upper thigh, the lower calf and dorsal foot c/f cellulitis. " However no drainable fluid collection or abscess seen. Wound culture positive for gram negative rods, susceptibilities pending. Transitioned from clindamycin to ceftriaxone. Patient had rapid called on 1/17 when he was found unresponsive. ICU team came to room to assess. Patient was altered. CTH with no acute abnormality. Pt has been hypertensive, added Nifedipine for further BP contol in setting of known type B aortic dissection. CTA chest ordered: without abnormalities. Patient's wound culture grew serratia and pseudomonas. Transitioned to cefepime from ceftriaxone. Only oral option based on susceptibilities is fluoroquinolones but this is contraindicated with hx of aortic dissection. Consulted ID who recommended to continue IV cefepime 2g Q8h to complete abx duration of at-least 2 weeks, SOHAN 02/03/25. SNF arrangements are underway.      Interval History: NAEON. Doing well. Rested comfortably without any concerns.    Review of Systems  Objective:     Vital Signs (Most Recent):  Temp: 98.9 °F (37.2 °C) (01/21/25 1133)  Pulse: 65 (01/21/25 1133)  Resp: 18 (01/21/25 1133)  BP: (!) 113/55 (01/21/25 1133)  SpO2: 95 % (01/21/25 1133) Vital Signs (24h Range):  Temp:  [97.9 °F (36.6 °C)-98.9 °F (37.2 °C)] 98.9 °F (37.2 °C)  Pulse:  [57-72] 65  Resp:  [18-22] 18  SpO2:  [93 %-97 %] 95 %  BP: (113-125)/(55-60) 113/55     Weight: (!) 181.4 kg (400 lb)  Body mass index is 54.25 kg/m².    Intake/Output Summary (Last 24 hours) at 1/21/2025 1539  Last data filed at 1/21/2025 1210  Gross per 24 hour   Intake --   Output 2300 ml   Net -2300 ml         Physical Exam      Constitutional:       General: He is not in acute distress.     Appearance: He is not ill-appearing or diaphoretic.   Eyes:      Extraocular Movements: Extraocular movements intact.   Cardiovascular:      Rate and Rhythm: Normal rate and regular rhythm.      Heart sounds: No murmur heard.  Pulmonary:      Effort: Pulmonary effort is normal. No respiratory distress.  "     Breath sounds: Rhonchi (b/l lower lung fields) present.   Abdominal:      General: Abdomen is flat. There is no distension.      Tenderness: There is no abdominal tenderness.  There is no rebound. There is no guarding.  Musculoskeletal:         Right lower leg: Edema present.      Left lower leg: Edema present.      Comments: Lymphedema. Right medial thigh with large collection and wound covered by bandage  Neurological:      General: No focal deficit present.      Mental Status: He is alert.      Comments: alert and oriented    Significant Labs: All pertinent labs within the past 24 hours have been reviewed.    Significant Imaging: I have reviewed all pertinent imaging results/findings within the past 24 hours.    Assessment and Plan     * Cellulitis of right thigh  Right medial thigh collection with overlying cellulitis and draining wound. In setting of chronic lymphedema.  Worsening over past 2 weeks with increased size and drainage of blood and pus.   Reports pain is well controlled with current regimen.  CT R thigh reported scattered areas of subcutaneous dense infiltration of the subcutaneous fat in the posterior and medial upper thigh, the lower calf and dorsal foot.  Correlation for cellulitis. No drainable fluid collection or abscess.     - Wound cultures positive for serratia and pseudomonas  - Started Ceftriaxone 2g q 24hr, switched to cefepime,  - ID recs to continue IV cefepime 2g Q8h  -- [considered FQ, but avoiding d/t h/o type-B aortic dissection]. To complete abx duration of at-least 2wks, SOHAN 02/03/25.  - Oxycodone 5 mg PO q4 hr prn moderate to severe pain.   - Wound care consulted, recs below:  -Clean right inner thigh with CGH soap/water, pat dry. Loosely pack 1/4" Iodoform or AMD antimicrobial packing gauze into open "holes" to ulcerated wound. Cover with ABD and secure with cloth tape, daily and prn. Apply a zinc barrier to periwound to protect skin from moisture/drainage.   - Remove " bilateral lower leg wraps, once.     AMS (altered mental status)  Rapid called 1/17 after pt found unresponsive by nursing.   Reportedly altered. Possibly vasovagal vs hypotension related to changes in antihypertensive regimen.   CT Head with no acute abnormality    -CTA Chest/Abdomen/Pelvis ordered, negative  -Resolved, pt currently A&Ox3.       Chronic knee pain  Reported pain 5/10 BLE (from knees down).     - Continue home gabapentin  - Continue oxy 5 mg q 4 prn    Debility  Patient with Acute on chronic debility due to  chronic lymphedema . The patient's latest AMPAC (Activity Measure for Post Acute Care) Score is listed below.    AM-PAC Score - How much help does the patient need for each activity listed  Basic Mobility Total Score: 13  Turning over in bed (including adjusting bedclothes, sheets and blankets)?: A lot  Sitting down on and standing up from a chair with arms (e.g., wheelchair, bedside commode, etc.): A little  Moving from lying on back to sitting on the side of the bed?: A little  Moving to and from a bed to a chair (including a wheelchair)?: A lot  Need to walk in hospital room?: A lot  Climbing 3-5 steps with a railing?: Unable    Plan  - PT/OT consulted  - Fall precautions in place  - PT and OT rec moderate intensity therapy.   He reportedly does not have transport to go to daily outpatient therapy. Plan for SNF  - On 1/18 SW sent referral to [facility], pending placement     Edd's mobility limitation cannot be sufficiently resolved by the use of a cane. His functional mobility deficit can be sufficiently resolved with the use of a Rollator. Patient's mobility limitation significantly impairs their ability to participate in one of more activities of daily living.  The use of a Rollator will significantly improve the patient's ability to participate in MRADLS and the patient will use it on regular basis in the home.         Lymphedema  Chronic issue, pt reported ~ 7 yrs.  -Continue home lasix  40 mg daily     History of aortic dissection  Hx Type B aortic dissection.   Continues to be over goal. Increased Nifedipine  to 90 mg daily.     - Goal SBP <120  - Goal HR <60   - Continue home antihypertensives  - PRN labetalol     Hypertension  Patient's blood pressure range in the last 24 hours was: BP  Min: 128/63  Max: 147/66.The patient's inpatient anti-hypertensive regimen is listed below:  Current Antihypertensives  carvediloL tablet 25 mg, 2 times daily, Oral  furosemide tablet 40 mg, Daily, Oral  lisinopriL tablet 40 mg, Nightly, Oral  labetaloL tablet 100 mg, Daily PRN, Oral  NIFEdipine 24 hr tablet 120 mg, Daily, Oral  NIFEdipine (PROCARDIA-XL) 24 hr tablet, Daily, Oral    Plan  - BP is uncontrolled, will adjust as follows: Increase Nifedipine from 120 mg daily  - Continue home  carvdilol, and lisinopril  - Continue home lasix 40 mg daily       VTE Risk Mitigation (From admission, onward)           Ordered     heparin (porcine) injection 7,500 Units  Every 8 hours         01/16/25 1857     IP VTE HIGH RISK PATIENT  Once         01/15/25 2233                    Discharge Planning   BROWN:      Code Status: Full Code   Medical Readiness for Discharge Date:   Discharge Plan A: Home, Home Health   Discharge Delays: None known at this time            Please place Justification for DME        Jinny Campa MD  Department of Hospital Medicine   [facility] - [facility] ([place])

## 2025-01-22 VITALS
SYSTOLIC BLOOD PRESSURE: 147 MMHG | DIASTOLIC BLOOD PRESSURE: 77 MMHG | RESPIRATION RATE: 20 BRPM | OXYGEN SATURATION: 98 % | HEART RATE: 77 BPM | TEMPERATURE: 98 F

## 2025-01-22 PROBLEM — N17.9 AKI (ACUTE KIDNEY INJURY): Status: ACTIVE | Noted: 2025-01-22

## 2025-01-22 LAB
ALBUMIN SERPL BCP-MCNC: 2.6 G/DL (ref 3.5–5.2)
ALBUMIN SERPL BCP-MCNC: 2.9 G/DL (ref 3.5–5.2)
ALP SERPL-CCNC: 73 U/L (ref 40–150)
ALT SERPL W/O P-5'-P-CCNC: 28 U/L (ref 10–44)
ANION GAP SERPL CALC-SCNC: 8 MMOL/L (ref 8–16)
ANION GAP SERPL CALC-SCNC: 8 MMOL/L (ref 8–16)
AST SERPL-CCNC: 30 U/L (ref 10–40)
BASOPHILS # BLD AUTO: 0.08 K/UL (ref 0–0.2)
BASOPHILS NFR BLD: 0.9 % (ref 0–1.9)
BILIRUB SERPL-MCNC: 0.3 MG/DL (ref 0.1–1)
BUN SERPL-MCNC: 22 MG/DL (ref 8–23)
BUN SERPL-MCNC: 23 MG/DL (ref 8–23)
CALCIUM SERPL-MCNC: 8.7 MG/DL (ref 8.7–10.5)
CALCIUM SERPL-MCNC: 9.2 MG/DL (ref 8.7–10.5)
CHLORIDE SERPL-SCNC: 102 MMOL/L (ref 95–110)
CHLORIDE SERPL-SCNC: 104 MMOL/L (ref 95–110)
CO2 SERPL-SCNC: 22 MMOL/L (ref 23–29)
CO2 SERPL-SCNC: 23 MMOL/L (ref 23–29)
CREAT SERPL-MCNC: 1.2 MG/DL (ref 0.5–1.4)
CREAT SERPL-MCNC: 1.3 MG/DL (ref 0.5–1.4)
DIFFERENTIAL METHOD BLD: ABNORMAL
EOSINOPHIL # BLD AUTO: 0.3 K/UL (ref 0–0.5)
EOSINOPHIL NFR BLD: 2.8 % (ref 0–8)
ERYTHROCYTE [DISTWIDTH] IN BLOOD BY AUTOMATED COUNT: 14 % (ref 11.5–14.5)
EST. GFR  (NO RACE VARIABLE): 58.7 ML/MIN/1.73 M^2
EST. GFR  (NO RACE VARIABLE): >60 ML/MIN/1.73 M^2
GLUCOSE SERPL-MCNC: 89 MG/DL (ref 70–110)
GLUCOSE SERPL-MCNC: 92 MG/DL (ref 70–110)
HCT VFR BLD AUTO: 40 % (ref 40–54)
HGB BLD-MCNC: 13 G/DL (ref 14–18)
IMM GRANULOCYTES # BLD AUTO: 0.11 K/UL (ref 0–0.04)
IMM GRANULOCYTES NFR BLD AUTO: 1.2 % (ref 0–0.5)
LYMPHOCYTES # BLD AUTO: 2.1 K/UL (ref 1–4.8)
LYMPHOCYTES NFR BLD: 22.7 % (ref 18–48)
MAGNESIUM SERPL-MCNC: 2 MG/DL (ref 1.6–2.6)
MCH RBC QN AUTO: 27.5 PG (ref 27–31)
MCHC RBC AUTO-ENTMCNC: 32.5 G/DL (ref 32–36)
MCV RBC AUTO: 85 FL (ref 82–98)
MONOCYTES # BLD AUTO: 0.9 K/UL (ref 0.3–1)
MONOCYTES NFR BLD: 10.1 % (ref 4–15)
NEUTROPHILS # BLD AUTO: 5.8 K/UL (ref 1.8–7.7)
NEUTROPHILS NFR BLD: 62.3 % (ref 38–73)
NRBC BLD-RTO: 0 /100 WBC
PHOSPHATE SERPL-MCNC: 3.4 MG/DL (ref 2.7–4.5)
PHOSPHATE SERPL-MCNC: 4 MG/DL (ref 2.7–4.5)
PLATELET # BLD AUTO: 347 K/UL (ref 150–450)
PMV BLD AUTO: 9.6 FL (ref 9.2–12.9)
POTASSIUM SERPL-SCNC: 4.1 MMOL/L (ref 3.5–5.1)
POTASSIUM SERPL-SCNC: 4.2 MMOL/L (ref 3.5–5.1)
PROT SERPL-MCNC: 7.1 G/DL (ref 6–8.4)
RBC # BLD AUTO: 4.72 M/UL (ref 4.6–6.2)
SODIUM SERPL-SCNC: 133 MMOL/L (ref 136–145)
SODIUM SERPL-SCNC: 134 MMOL/L (ref 136–145)
WBC # BLD AUTO: 9.29 K/UL (ref 3.9–12.7)

## 2025-01-22 PROCEDURE — 25000003 PHARM REV CODE 250: Performed by: PHYSICIAN ASSISTANT

## 2025-01-22 PROCEDURE — 63600175 PHARM REV CODE 636 W HCPCS

## 2025-01-22 PROCEDURE — 36415 COLL VENOUS BLD VENIPUNCTURE: CPT

## 2025-01-22 PROCEDURE — 84100 ASSAY OF PHOSPHORUS: CPT

## 2025-01-22 PROCEDURE — 83735 ASSAY OF MAGNESIUM: CPT

## 2025-01-22 PROCEDURE — 63600175 PHARM REV CODE 636 W HCPCS: Performed by: STUDENT IN AN ORGANIZED HEALTH CARE EDUCATION/TRAINING PROGRAM

## 2025-01-22 PROCEDURE — 80069 RENAL FUNCTION PANEL: CPT

## 2025-01-22 PROCEDURE — 80053 COMPREHEN METABOLIC PANEL: CPT

## 2025-01-22 PROCEDURE — 85025 COMPLETE CBC W/AUTO DIFF WBC: CPT

## 2025-01-22 PROCEDURE — 25000003 PHARM REV CODE 250

## 2025-01-22 PROCEDURE — 20600001 HC STEP DOWN PRIVATE ROOM

## 2025-01-22 RX ADMIN — MICONAZOLE NITRATE: 20 OINTMENT TOPICAL at 08:01

## 2025-01-22 RX ADMIN — GABAPENTIN 600 MG: 300 CAPSULE ORAL at 08:01

## 2025-01-22 RX ADMIN — LISINOPRIL 40 MG: 20 TABLET ORAL at 09:01

## 2025-01-22 RX ADMIN — CARVEDILOL 25 MG: 12.5 TABLET, FILM COATED ORAL at 09:01

## 2025-01-22 RX ADMIN — MICONAZOLE NITRATE 2 % TOPICAL POWDER: at 08:01

## 2025-01-22 RX ADMIN — GABAPENTIN 600 MG: 300 CAPSULE ORAL at 09:01

## 2025-01-22 RX ADMIN — ATORVASTATIN CALCIUM 40 MG: 40 TABLET, FILM COATED ORAL at 08:01

## 2025-01-22 RX ADMIN — CEFEPIME 2 G: 2 INJECTION, POWDER, FOR SOLUTION INTRAVENOUS at 08:01

## 2025-01-22 RX ADMIN — CARVEDILOL 25 MG: 12.5 TABLET, FILM COATED ORAL at 08:01

## 2025-01-22 RX ADMIN — HEPARIN SODIUM 7500 UNITS: 5000 INJECTION INTRAVENOUS; SUBCUTANEOUS at 05:01

## 2025-01-22 RX ADMIN — FUROSEMIDE 40 MG: 40 TABLET ORAL at 08:01

## 2025-01-22 RX ADMIN — CEFEPIME 2 G: 2 INJECTION, POWDER, FOR SOLUTION INTRAVENOUS at 01:01

## 2025-01-22 RX ADMIN — POLYETHYLENE GLYCOL 3350 17 G: 17 POWDER, FOR SOLUTION ORAL at 08:01

## 2025-01-22 RX ADMIN — ASPIRIN 81 MG: 81 TABLET, COATED ORAL at 08:01

## 2025-01-22 RX ADMIN — POLYETHYLENE GLYCOL 3350 17 G: 17 POWDER, FOR SOLUTION ORAL at 09:01

## 2025-01-22 RX ADMIN — OXYCODONE 5 MG: 5 TABLET ORAL at 01:01

## 2025-01-22 RX ADMIN — SENNOSIDES 8.6 MG: 8.6 TABLET, FILM COATED ORAL at 08:01

## 2025-01-22 RX ADMIN — OXYCODONE 5 MG: 5 TABLET ORAL at 08:01

## 2025-01-22 RX ADMIN — CEFEPIME 2 G: 2 INJECTION, POWDER, FOR SOLUTION INTRAVENOUS at 05:01

## 2025-01-22 RX ADMIN — NIFEDIPINE 120 MG: 60 TABLET, FILM COATED, EXTENDED RELEASE ORAL at 08:01

## 2025-01-22 RX ADMIN — HEPARIN SODIUM 7500 UNITS: 5000 INJECTION INTRAVENOUS; SUBCUTANEOUS at 09:01

## 2025-01-22 NOTE — PROGRESS NOTES
VSS. OOBTC. No significant events this shift. Patient resting at this time with needs met and safety measures in place.    01/21/25 0743   Pain/Comfort/Sleep   Pain Rating (0-10): Rest 5   Cognitive   Cognitive/Neuro/Behavioral WDL WDL   Level of Consciousness (AVPU) alert   Arousal Level opens eyes spontaneously   Orientation oriented x 4   Speech clear/fluent;follows commands   Mood/Behavior calm;cooperative   HEENT   HEENT WDL ex   Vision Aid glasses on   Respiratory   Respiratory WDL ex   Rhythm/Pattern, Respiratory chest wiggle adequate;no shortness of breath reported;depth regular;pattern regular;unlabored   Expansion/Accessory Muscles/Retractions no retractions;no use of accessory muscles   Nailbeds no discoloration   Oxygen Therapy   Device (Oxygen Therapy) room air   Cardiac   Cardiac WDL WDL   ECG   Pulse 66   Peripheral Neurovascular   Peripheral Neurovascular WDL ex   Pulse Assessment radial;dorsalis pedis   VTE Core Measure Pharmacological prophylaxis initiated/maintained   VTE Prevention/Management bleeding precautions maintained;bleeding risk assessed;bleeding risk factor(s) identified, provider notified   Pulse Radial   Left Radial Pulse 2+ (normal)   Right Radial Pulse 2+ (normal)   Pulse Dorsalis Pedis   Left Dorsalis Pedis Pulse 1+ (weak)   Right Dorsalis Pedis Pulse 1+ (weak)   Edema   Edema arm, left;arm, right;leg, left;leg, right;ankle, right;ankle, left   Leg, Left Edema 3+ (Moderate)   Leg, Right Edema 3+ (Moderate)   Knee, Left Edema 3+ (Moderate)   Knee, Right Edema 3+ (Moderate)   Ankle, Left Edema 3+ (Moderate)   Ankle, Right Edema 3+ (Moderate)   Foot, Left Edema 3+ (Moderate)   Foot, Right Edema 3+ (Moderate)        Peripheral IV - Single Lumen 01/15/25 1615 20 G Right Hand   Placement Date/Time: 01/15/25 1615   Present Prior to Hospital Arrival?: No  Inserted by: RN  Size (G): 20 G  Orientation: Right  Location: Hand  Placement directed by: Anatomic Landmarks  Site Prep: Chlorhexidine    Local Anesthetic: None  Insertion a...   Site Assessment Clean;Dry;Intact;No redness;No swelling;No warmth;No drainage   Line Securement Device Antimicrobial Adhesive   Extremity Assessment Distal to IV No warmth;No swelling;No abnormal discoloration;No redness   Line Status Saline locked   Dressing Status Clean;Dry;Intact   Dressing Intervention Integrity maintained        Midline Catheter - Single Lumen 01/20/25 1735 Right basilic vein (medial side of arm) other (see comments)   Placement Date/Time: 01/20/25 1735   Present Prior to Hospital Arrival?: No  Hand Hygiene: Performed  Barrier Precautions: Performed  Skin Antisepsis: ChloraPrep  Device: BD  Side: Right  Location: basilic vein (medial side of arm)  Size: (c) other (s...   Site Assessment Dry;Clean;No redness;Intact;No swelling;No warmth;No drainage   IV Device Securement catheter securement device   Line Status Blood return noted   Dressing Type CHG impregnated dressing/sponge   Dressing Status Clean;Dry;Intact   Dressing Intervention First dressing   Dressing Change Due 01/27/25   Skin   Skin WDL ex   General Skin Color/Characteristics other (see comments)   Specialty Bed/Overlay Bariatric   Bed Support Surface Assessed   Antonio Risk Assessment   Sensory Perception 3-->slightly limited   Moisture 3-->occasionally moist   Activity 2-->chairfast   Mobility 3-->slightly limited   Nutrition 3-->adequate   Friction and Shear 2-->potential problem   Antonio Score 16   Pressure Injury Prevention    Check Moisture Management Pad Done   Sacral Foam Dressing Peel back sacral foam dressing, assess skin and reapply   Heel protection technique Pillow   Heel preventative measures Replace   Check Medical Devices Done        Wound 01/16/25 1257 Abscess Right proximal Leg   Date First Assessed/Time First Assessed: 01/16/25 1257   Present on Original Admission: Yes  Primary Wound Type: Abscess  Side: Right  Orientation: proximal  Location: Leg   Dressing Appearance  Dry;Intact;Clean   Appearance Dressing in place, unable to visualize   Musculoskeletal   Musculoskeletal WDL ex   General Mobility generalized weakness;moderately impaired   Gastrointestinal   GI WDL ex   Abdominal Appearance rounded;nondistended;obese   GI Signs/Symptoms no gastrointestinal signs/symptoms   Last Bowel Movement 01/19/25   Genitourinary   Genitourinary WDL WDL   Voiding Characteristics voids spontaneously without difficulty   Coping/Psychosocial   Observed Emotional State accepting;calm;cooperative   Verbalized Emotional State acceptance   Plan of Care Reviewed With patient   Safety   Safety WDL WDL   Safety Factors ID band on;upper side rails raised x 2;call light in reach;wheels locked;bed in low position   All Alarms alarm(s) activated and audible   Fall Risk Assessment (every shift)   History Of Fall (W/I 3 Mos) 0-->No   Polypharmacy 3-->Yes   Central Nervous System/Psychotropic Medication 3-->Yes   Cardiovascular Medication 3-->Yes   Age Greater Than 65 Years 2-->Yes   Altered Elimination 0-->No   Cognitive Deficit 0-->No   Sensory Deficit 0-->No   Dizziness/Vertigo 0-->No   Depression 0-->No   Mobility Deficit/Weakness 2-->Yes   Male 1-->Yes   Fall Risk Score 14   ABC Risk for Fall with Injury Assessment   A= Age: Is the patient greater than or equal to 85 years old or frail due to clinical condition? No   B=Bones: Does the patient have osteoporosis, previous fracture, prolonged steroid use, or metastatic bone cancer? No   C=Coagulation Disorders: Does the patient have a bleeding disorder, either through anticoagulants or underlying clinical condition? No   S=recent Surgery: Is the patient post-op surgicalwith a recent lower limb amputation or recent major abdominal or thoracic surgery? No   Safety Management   Safety Promotion/Fall Prevention assistive device/personal item within reach;family to remain at bedside;medications reviewed;nonskid shoes/socks when out of bed;pulse ox;toileting  scheduled   Medication Review/Management medications reviewed   Patient Rounds bed in low position;bed wheels locked;call light in patient/parent reach;clutter free environment maintained;ID band on;placement of personal items at bedside;visualized patient;toileting offered   Safety Bands on Patient Fall Risk Band   Daily Care   Activity Management Rolling - L1   Activity Assistance Provided independent   Mobility   GEMS ([place] Early Mobility Scale) Level 2-Edge of bed activities with assist

## 2025-01-22 NOTE — PROGRESS NOTES
01/21/25 2052   Provider Notification   Reason for Communication Other (Comment)  (NOTIFIED OF SOFT /55 Instructed to hold bedtime dose of Lisinopril)   Provider Name Asher Mcguire MD   Provider Role Hospitalist   Method of Communication Secure Message   Response Other (Comment)   Notification Time 2051

## 2025-01-22 NOTE — PROGRESS NOTES
VSS. UOP recorded. No significant events this shift. Patient resting at this time with needs met and safety measures in place.     01/22/25 0824   Pain/Comfort/Sleep   Pain Rating (0-10): Rest 5   POSS ([place] Opioid-Induced Sed Scale) 1 - Awake and alert   Pain Reassessment   Pain Rating Prior to Med Admin 5   Cognitive   Cognitive/Neuro/Behavioral WDL WDL   Level of Consciousness (AVPU) alert   Arousal Level opens eyes spontaneously   Orientation oriented x 4   Speech follows commands;clear/fluent   Mood/Behavior calm;cooperative   [place] Coma Scale   Best Eye Response 4-->(E4) spontaneous   Best Motor Response 6-->(M6) obeys commands   Best Verbal Response 5-->(V5) oriented   [place] Coma Scale Score 15   HEENT   HEENT WDL WDL;vision aid   Vision Aid glasses at bedside   Respiratory   Respiratory WDL WDL   Rhythm/Pattern, Respiratory unlabored;pattern regular;depth regular;no shortness of breath reported   Expansion/Accessory Muscles/Retractions no retractions;no use of accessory muscles   Nailbeds no discoloration   Breath Sounds   Breath Sounds LLL;RLL   FITO Breath Sounds diminished   LLL Breath Sounds diminished   RUL Breath Sounds diminished   RLL Breath Sounds diminished   Cardiac   Cardiac WDL WDL   ECG   Pulse 60   Peripheral Neurovascular   Peripheral Neurovascular WDL ex;edema   Pulse Assessment dorsalis pedis;radial   VTE Core Measure Pharmacological prophylaxis initiated/maintained   VTE Prevention/Management bleeding risk assessed;bleeding risk factor(s) identified, provider notified   Pulse Radial   Left Radial Pulse 2+ (normal)   Right Radial Pulse 2+ (normal)   Pulse Dorsalis Pedis   Left Dorsalis Pedis Pulse 2+ (normal)   Right Dorsalis Pedis Pulse 2+ (normal)   Edema   Edema arm, left;arm, right;foot, right;leg, right;ankle, right;ankle, left;foot, left   Leg, Left Edema 3+ (Moderate)   Leg, Right Edema 3+ (Moderate)   Knee, Left Edema 3+ (Moderate)   Knee, Right Edema 3+ (Moderate)   Ankle,  Left Edema 3+ (Moderate)   Ankle, Right Edema 3+ (Moderate)   Foot, Left Edema 3+ (Moderate)   Foot, Right Edema 3+ (Moderate)        Peripheral IV - Single Lumen 01/15/25 1615 20 G Right Hand   Placement Date/Time: 01/15/25 1615   Present Prior to Hospital Arrival?: No  Inserted by: RN  Size (G): 20 G  Orientation: Right  Location: Hand  Placement directed by: Anatomic Landmarks  Site Prep: Chlorhexidine   Local Anesthetic: None  Insertion a...   Site Assessment Clean;Intact;No redness;No swelling;Dry;No drainage;No warmth   Line Securement Device Antimicrobial Adhesive   Extremity Assessment Distal to IV No warmth;No swelling;No redness;No abnormal discoloration   Line Status Saline locked   Dressing Status Clean;Intact;Dry   Dressing Intervention Integrity maintained        Midline Catheter - Single Lumen 01/20/25 1735 Right basilic vein (medial side of arm) other (see comments)   Placement Date/Time: 01/20/25 1735   Present Prior to Hospital Arrival?: No  Hand Hygiene: Performed  Barrier Precautions: Performed  Skin Antisepsis: ChloraPrep  Device: BD  Side: Right  Location: basilic vein (medial side of arm)  Size: (c) other (s...   Site Assessment Dry;Clean;Intact;No redness;No swelling;No warmth;No drainage   IV Device Securement catheter securement device   Line Status Flushed;Saline locked   Dressing Type CHG impregnated dressing/sponge   Dressing Status Clean;Dry;Intact   Dressing Intervention Integrity maintained   Skin   Skin WDL ex   General Skin Color/Characteristics other (see comments)   Antonio Risk Assessment   Sensory Perception 3-->slightly limited   Moisture 3-->occasionally moist   Activity 2-->chairfast   Mobility 3-->slightly limited   Nutrition 3-->adequate   Friction and Shear 2-->potential problem   Antonio Score 16   Pressure Injury Prevention    Check Moisture Management Pad Done   Sacral Foam Dressing Peel back sacral foam dressing, assess skin and reapply   Heel protection technique Pillow    Heel preventative measures Replace   Check Medical Devices Done        Wound 01/16/25 1257 Abscess Right proximal Leg   Date First Assessed/Time First Assessed: 01/16/25 1257   Present on Original Admission: Yes  Primary Wound Type: Abscess  Side: Right  Orientation: proximal  Location: Leg   Dressing Appearance Dry;Intact;Clean   Appearance Dressing in place, unable to visualize   Musculoskeletal   Musculoskeletal WDL ex   General Mobility generalized weakness   Gastrointestinal   GI WDL ex   Abdominal Appearance rounded;nondistended;obese   Last Bowel Movement 01/19/25   Genitourinary   Genitourinary WDL WDL   Voiding Characteristics voids spontaneously without difficulty   Coping/Psychosocial   Observed Emotional State accepting;calm;cooperative   Verbalized Emotional State acceptance   Safety   Safety WDL WDL   Safety Factors ID band on;upper side rails raised x 2;call light in reach;wheels locked;bed in low position   All Alarms alarm(s) activated and audible   Fall Risk Assessment (every shift)   History Of Fall (W/I 3 Mos) 0-->No   Polypharmacy 3-->Yes   Central Nervous System/Psychotropic Medication 3-->Yes   Cardiovascular Medication 3-->Yes   Age Greater Than 65 Years 2-->Yes   Altered Elimination 0-->No   Cognitive Deficit 0-->No   Sensory Deficit 2-->Yes   Dizziness/Vertigo 0-->No   Depression 0-->No   Mobility Deficit/Weakness 2-->Yes   Male 1-->Yes   Fall Risk Score 16   ABC Risk for Fall with Injury Assessment   A= Age: Is the patient greater than or equal to 85 years old or frail due to clinical condition? No   B=Bones: Does the patient have osteoporosis, previous fracture, prolonged steroid use, or metastatic bone cancer? No   C=Coagulation Disorders: Does the patient have a bleeding disorder, either through anticoagulants or underlying clinical condition? No   S=recent Surgery: Is the patient post-op surgicalwith a recent lower limb amputation or recent major abdominal or thoracic surgery? No    Safety Management   Safety Promotion/Fall Prevention assistive device/personal item within reach;instructed to call staff for mobility;lighting adjusted;medications reviewed;nonskid shoes/socks when out of bed   Medication Review/Management medications reviewed   Patient Rounds bed in low position;bed wheels locked;call light in patient/parent reach;toileting offered;visualized patient;placement of personal items at bedside;ID band on;clutter free environment maintained   Safety Bands on Patient Fall Risk Band;Allergy Band   Daily Care   Activity Management Up in chair - L3   Activity Assistance Provided assistance, 1 person   Mobility   GEMS ([place] Early Mobility Scale) Level 2-Edge of bed activities with assist   Positioning   Body Position turned   Head of Bed (HOB) Positioning HOB at 30-45 degrees   Positioning/Transfer Devices pillows;in use

## 2025-01-22 NOTE — ASSESSMENT & PLAN NOTE
Wounds being managed by [facility] home wound care  Continue home health wound care  Elevated low extremities while sitting/lying  Low Na diet   Continue furosemide

## 2025-01-22 NOTE — ASSESSMENT & PLAN NOTE
chronic  Continue prn pain control  He has been educated on the negative effects of obesity to one's health and encouraged to consider lifestyle diet modifications and exercise.    Refer to pain management

## 2025-01-22 NOTE — PROGRESS NOTES
"[facility] [id number]  [facility] Medicine  Progress Note    Patient Name: Edd Wills  MRN: [id number]  Patient Class: IP- Inpatient   Admission Date: 1/15/2025  Length of Stay: 6 days  Attending Physician: Chio Crabtree MD  Primary Care Provider: Zuleyka Sabillon MD        Subjective     Principal Problem:Cellulitis of right thigh        HPI:  Edd Wills is a 69 y.o. male with past medical history of malignant HTN, HLD, morbid obesity, Type B aortic dissection, chronic lymphedema, LLUVIA, chronic neck pain s/p cervical fusion and laminectomy, chronic bilateral knee pain, grade 1 diastolic dysfunction, and CKD who presented for abscess on his R medial thigh. He has a 6-7 year history of a lymphedematous collection to his R medial thigh, however noticed a week ago that there was mild purulent drainage coming from a new wound. He also reports the collection has increased in size over the past week. Wound care visits him 3 times a week to manage this collection, but a couple days before his presentation, while trying to express some of the drainage, drainage evolved to "blood clots" and a large amount of purulent drainage which wound care felt was too extensive to care for at home. He was then recommended to present to the ED. He denies fever, chills, SOB, chest pain, N/V, diarrhea, abdominal pain, and dysuria.      In ED, vitals were hypertensive to 144, but otherwise hemodynamically stable. Labs were significant for Hgb 13.2 and hematocrit 39.5 with no leukocytosis. Na 135 and albumin 3.0. He was started on clindamycin in D5W 600mg/50mL Q8 for his cellulitis.       Overview/Hospital Course:  Patient with chronic lymphedema and hx Type B aortic dissection admitted with R medial thigh collection. Placed on Clindamycin. CT of RLE with scattered areas of subcutaneous dense infiltration of the subcutaneous fat in the posterior and medial upper thigh, the lower calf and dorsal foot c/f cellulitis. " However no drainable fluid collection or abscess seen. Wound culture positive for gram negative rods, susceptibilities pending. Transitioned from clindamycin to ceftriaxone. Patient had rapid called on 1/17 when he was found unresponsive. ICU team came to room to assess. Patient was altered. CTH with no acute abnormality. Pt has been hypertensive, added Nifedipine for further BP contol in setting of known type B aortic dissection. CTA chest ordered: without abnormalities. Patient's wound culture grew serratia and pseudomonas. Transitioned to cefepime from ceftriaxone. Only oral option based on susceptibilities is fluoroquinolones but this is contraindicated with hx of aortic dissection. Consulted ID who recommended to continue IV cefepime 2g Q8h to complete abx duration of at-least 2 weeks, SOHAN 02/03/25. SNF arrangements are underway. Minor elevation in creatinine, patient denied any changes in his urine output. Possibly due to correction of blood pressure to meet target of dissection when it was higher prior.      Interval History: NAEON. Patient reported urinating well without any issues overnight. Tolerated feeds properly.    Review of Systems  Objective:     Vital Signs (Most Recent):  Temp: 97.7 °F (36.5 °C) (01/22/25 1240)  Pulse: 64 (01/22/25 1240)  Resp: 20 (01/22/25 1240)  BP: 123/63 (01/22/25 1240)  SpO2: (!) 94 % (01/22/25 1240) Vital Signs (24h Range):  Temp:  [97.4 °F (36.3 °C)-98.6 °F (37 °C)] 97.7 °F (36.5 °C)  Pulse:  [52-69] 64  Resp:  [16-24] 20  SpO2:  [92 %-100 %] 94 %  BP: (106-128)/(55-63) 123/63     Weight: (!) 181.4 kg (400 lb)  Body mass index is 54.25 kg/m².    Intake/Output Summary (Last 24 hours) at 1/22/2025 1315  Last data filed at 1/22/2025 0613  Gross per 24 hour   Intake 200 ml   Output 225 ml   Net -25 ml         Physical Exam      Constitutional:       General: He is not in acute distress.     Appearance: He is not ill-appearing or diaphoretic.   Eyes:      Extraocular Movements:  "Extraocular movements intact.   Cardiovascular:      Rate and Rhythm: Normal rate and regular rhythm.      Heart sounds: No murmur heard.  Pulmonary:      Effort: Pulmonary effort is normal. No respiratory distress.      Breath sounds: Rhonchi (b/l lower lung fields) present.   Abdominal:      General: Abdomen is flat. There is no distension.      Tenderness: There is no abdominal tenderness.  There is no rebound. There is no guarding.  Musculoskeletal:         Right lower leg: Edema present.      Left lower leg: Edema present.      Comments: Lymphedema. Right medial thigh with large collection and wound covered by bandage  Neurological:      General: No focal deficit present.      Mental Status: He is alert.      Comments: alert and oriented        Significant Labs: All pertinent labs within the past 24 hours have been reviewed.    Significant Imaging: I have reviewed all pertinent imaging results/findings within the past 24 hours.    Assessment and Plan     * Cellulitis of right thigh  Right medial thigh collection with overlying cellulitis and draining wound. In setting of chronic lymphedema.  Worsening over past 2 weeks with increased size and drainage of blood and pus.   Reports pain is well controlled with current regimen.  CT R thigh reported scattered areas of subcutaneous dense infiltration of the subcutaneous fat in the posterior and medial upper thigh, the lower calf and dorsal foot.  Correlation for cellulitis. No drainable fluid collection or abscess.     - Wound cultures positive for serratia and pseudomonas  - Started Ceftriaxone 2g q 24hr, switched to cefepime,  - ID recs to continue IV cefepime 2g Q8h  -- [considered FQ, but avoiding d/t h/o type-B aortic dissection]. To complete abx duration of at-least 2wks, SOHAN 02/03/25.  - Oxycodone 5 mg PO q4 hr prn moderate to severe pain.   - Wound care consulted, recs below:  -Clean right inner thigh with CGH soap/water, pat dry. Loosely pack 1/4" Iodoform " "or AMD antimicrobial packing gauze into open "holes" to ulcerated wound. Cover with ABD and secure with cloth tape, daily and prn. Apply a zinc barrier to periwound to protect skin from moisture/drainage.   - Remove bilateral lower leg wraps, once.     LEIF (acute kidney injury)  LEIF is likely due to acute tubular necrosis caused by blood pressure medications . Most recent creatinine and eGFR are listed below.  Recent Labs     01/20/25  0545 01/21/25  0442 01/22/25  0442   CREATININE 0.8 0.8 1.3   EGFRNORACEVR >60.0 >60.0 58.7*      Plan  - LEIF is stable  - Avoid nephrotoxins and renally dose meds for GFR listed above  - Monitor urine output, serial BMP, and adjust therapy as needed  - Trend RFT daily     AMS (altered mental status)  Rapid called 1/17 after pt found unresponsive by nursing.   Reportedly altered. Possibly vasovagal vs hypotension related to changes in antihypertensive regimen.   CT Head with no acute abnormality    -CTA Chest/Abdomen/Pelvis ordered, negative  -Resolved, pt currently A&Ox3.       Chronic knee pain  Reported pain 5/10 BLE (from knees down).     - Continue home gabapentin  - Continue oxy 5 mg q 4 prn    Debility  Patient with Acute on chronic debility due to  chronic lymphedema . The patient's latest AMPAC (Activity Measure for Post Acute Care) Score is listed below.    AM-PAC Score - How much help does the patient need for each activity listed  Basic Mobility Total Score: 13  Turning over in bed (including adjusting bedclothes, sheets and blankets)?: A lot  Sitting down on and standing up from a chair with arms (e.g., wheelchair, bedside commode, etc.): A little  Moving from lying on back to sitting on the side of the bed?: A little  Moving to and from a bed to a chair (including a wheelchair)?: A lot  Need to walk in hospital room?: A lot  Climbing 3-5 steps with a railing?: Unable    Plan  - PT/OT consulted  - Fall precautions in place  - PT and OT rec moderate intensity therapy.   He " reportedly does not have transport to go to daily outpatient therapy. Plan for SNF  - On 1/18  sent referral to [facility], pending placement     Edd's mobility limitation cannot be sufficiently resolved by the use of a cane. His functional mobility deficit can be sufficiently resolved with the use of a Rollator. Patient's mobility limitation significantly impairs their ability to participate in one of more activities of daily living.  The use of a Rollator will significantly improve the patient's ability to participate in MRADLS and the patient will use it on regular basis in the home.         Lymphedema  Chronic issue, pt reported ~ 7 yrs.  -Continue home lasix 40 mg daily     History of aortic dissection  Hx Type B aortic dissection.   Continues to be over goal. Increased Nifedipine  to 90 mg daily.     - Goal SBP <120  - Goal HR <60   - Continue home antihypertensives  - PRN labetalol     Hypertension  Patient's blood pressure range in the last 24 hours was: BP  Min: 128/63  Max: 147/66.The patient's inpatient anti-hypertensive regimen is listed below:  Current Antihypertensives  carvediloL tablet 25 mg, 2 times daily, Oral  furosemide tablet 40 mg, Daily, Oral  lisinopriL tablet 40 mg, Nightly, Oral  labetaloL tablet 100 mg, Daily PRN, Oral  NIFEdipine 24 hr tablet 120 mg, Daily, Oral  NIFEdipine (PROCARDIA-XL) 24 hr tablet, Daily, Oral    Plan  - BP is uncontrolled, will adjust as follows: Increase Nifedipine from 120 mg daily  - Continue home  carvdilol, and lisinopril  - Continue home lasix 40 mg daily       VTE Risk Mitigation (From admission, onward)           Ordered     heparin (porcine) injection 7,500 Units  Every 8 hours         01/16/25 1857     IP VTE HIGH RISK PATIENT  Once         01/15/25 2233                    Discharge Planning   BROWN: 1/25/2025     Code Status: Full Code   Medical Readiness for Discharge Date:   Discharge Plan A: Home, Home Health   Discharge Delays: None known at this  time            Please place Justification for DME        Jinny Campa MD  Department of Hospital Medicine   [address] [facility] ([address])

## 2025-01-22 NOTE — ASSESSMENT & PLAN NOTE
LEIF is likely due to acute tubular necrosis caused by blood pressure medications . Most recent creatinine and eGFR are listed below.  Recent Labs     01/20/25  0545 01/21/25  0442 01/22/25  0442   CREATININE 0.8 0.8 1.3   EGFRNORACEVR >60.0 >60.0 58.7*      Plan  - LEIF is stable  - Avoid nephrotoxins and renally dose meds for GFR listed above  - Monitor urine output, serial BMP, and adjust therapy as needed  - Trend RFT daily

## 2025-01-22 NOTE — SUBJECTIVE & OBJECTIVE
Interval History: NAEON. Patient reported urinating well without any issues overnight. Tolerated feeds properly.    Review of Systems  Objective:     Vital Signs (Most Recent):  Temp: 97.7 °F (36.5 °C) (01/22/25 1240)  Pulse: 64 (01/22/25 1240)  Resp: 20 (01/22/25 1240)  BP: 123/63 (01/22/25 1240)  SpO2: (!) 94 % (01/22/25 1240) Vital Signs (24h Range):  Temp:  [97.4 °F (36.3 °C)-98.6 °F (37 °C)] 97.7 °F (36.5 °C)  Pulse:  [52-69] 64  Resp:  [16-24] 20  SpO2:  [92 %-100 %] 94 %  BP: (106-128)/(55-63) 123/63     Weight: (!) 181.4 kg (400 lb)  Body mass index is 54.25 kg/m².    Intake/Output Summary (Last 24 hours) at 1/22/2025 1315  Last data filed at 1/22/2025 0613  Gross per 24 hour   Intake 200 ml   Output 225 ml   Net -25 ml         Physical Exam      Constitutional:       General: He is not in acute distress.     Appearance: He is not ill-appearing or diaphoretic.   Eyes:      Extraocular Movements: Extraocular movements intact.   Cardiovascular:      Rate and Rhythm: Normal rate and regular rhythm.      Heart sounds: No murmur heard.  Pulmonary:      Effort: Pulmonary effort is normal. No respiratory distress.      Breath sounds: Rhonchi (b/l lower lung fields) present.   Abdominal:      General: Abdomen is flat. There is no distension.      Tenderness: There is no abdominal tenderness.  There is no rebound. There is no guarding.  Musculoskeletal:         Right lower leg: Edema present.      Left lower leg: Edema present.      Comments: Lymphedema. Right medial thigh with large collection and wound covered by bandage  Neurological:      General: No focal deficit present.      Mental Status: He is alert.      Comments: alert and oriented        Significant Labs: All pertinent labs within the past 24 hours have been reviewed.    Significant Imaging: I have reviewed all pertinent imaging results/findings within the past 24 hours.
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
## 2025-01-22 NOTE — PT/OT/SLP PROGRESS
"Occupational Therapy   Treatment    Name: Edd Wills  MRN: [id number]  Admitting Diagnosis:  Cellulitis of right thigh       Recommendations:     Discharge Recommendations: Moderate Intensity Therapy  Discharge Equipment Recommendations:  rollator  Barriers to discharge:  Decreased caregiver support    Assessment:     Edd Wills is a 69 y.o. male with a medical diagnosis of Cellulitis of right thigh.  He presents with cellulitis R thigh. Performance deficits affecting function are weakness, impaired endurance, impaired functional mobility, impaired self care skills, impaired balance. Pt was able to perform sit/stand T/F c CGA and RW.  Pt was unable to walk d/t feeling like his B LE are going to give out.  Able to perform UB dressing c max A and grooming task c set-up.  Pt is progressing well.    Rehab Prognosis:  Good; patient would benefit from acute skilled OT services to address these deficits and reach maximum level of function.       Plan:     Patient to be seen 4 x/week to address the above listed problems via self-care/home management, therapeutic activities, therapeutic exercises  Plan of Care Expires: 02/15/25  Plan of Care Reviewed with: patient    Subjective     Chief Complaint: Cellulitis R thigh  Patient/Family Comments/goals: "I just got up with the nurse."  Pain/Comfort:  Pain Rating 1: 0/10    Objective:     Communicated with: RN prior to session.  Patient found up in chair with blood pressure cuff, peripheral IV, telemetry upon OT entry to room.    General Precautions: Standard, fall    Orthopedic Precautions:N/A  Braces: N/A  Respiratory Status: Room air     Occupational Performance:     Functional Mobility/Transfers:  Patient completed Sit <> Stand Transfer with contact guard assistance  with  rolling walker   Functional Mobility: Pt was unable to walk to bathroom d/t pt feeling like B LE are going to give out.    Activities of Daily Living:  Grooming: supervision to wash off face while sitting " UIC.  Upper Body Dressing: maximal assistance to don hospital gown.      [facility] 6 Click ADL: 16    Patient left up in chair with all lines intact, call button in reach, and RN notified    GOALS:   Multidisciplinary Problems       Occupational Therapy Goals          Problem: Occupational Therapy    Goal Priority Disciplines Outcome Interventions   Occupational Therapy Goal     OT, PT/OT Progressing    Description: Goals to be met by: 2/15/25     Patient will increase functional independence with ADLs by performing:    UE Dressing with Modified [place].  LE Dressing with Modified [place].  Grooming while standing at sink with Modified [place].  Toileting from toilet/bedside commode with Modified [place] for hygiene and clothing management.   Supine to sit with Modified [place].  Toilet transfer to toilet/bedside commode with Modified [place].                         DME Justifications:  No DME recommended requiring DME justifications    Time Tracking:     OT Date of Treatment: 01/21/25  OT Start Time: 1730  OT Stop Time: 1754  OT Total Time (min): 24 min    Billable Minutes:Self Care/Home Management 12  Therapeutic Activity 12               1/21/2025

## 2025-01-23 PROBLEM — N17.0 ATN (ACUTE TUBULAR NECROSIS): Status: ACTIVE | Noted: 2025-01-23

## 2025-01-23 PROBLEM — E87.1 HYPONATREMIA: Status: ACTIVE | Noted: 2025-01-23

## 2025-01-23 LAB
ALBUMIN SERPL BCP-MCNC: 2.6 G/DL (ref 3.5–5.2)
ALP SERPL-CCNC: 73 U/L (ref 40–150)
ALT SERPL W/O P-5'-P-CCNC: 31 U/L (ref 10–44)
ANION GAP SERPL CALC-SCNC: 7 MMOL/L (ref 8–16)
AST SERPL-CCNC: 32 U/L (ref 10–40)
BASOPHILS # BLD AUTO: 0.06 K/UL (ref 0–0.2)
BASOPHILS NFR BLD: 0.6 % (ref 0–1.9)
BILIRUB SERPL-MCNC: 0.3 MG/DL (ref 0.1–1)
BUN SERPL-MCNC: 29 MG/DL (ref 8–23)
CALCIUM SERPL-MCNC: 8.6 MG/DL (ref 8.7–10.5)
CHLORIDE SERPL-SCNC: 105 MMOL/L (ref 95–110)
CO2 SERPL-SCNC: 22 MMOL/L (ref 23–29)
CREAT SERPL-MCNC: 1 MG/DL (ref 0.5–1.4)
DIFFERENTIAL METHOD BLD: ABNORMAL
EOSINOPHIL # BLD AUTO: 0.3 K/UL (ref 0–0.5)
EOSINOPHIL NFR BLD: 3.5 % (ref 0–8)
ERYTHROCYTE [DISTWIDTH] IN BLOOD BY AUTOMATED COUNT: 14.1 % (ref 11.5–14.5)
EST. GFR  (NO RACE VARIABLE): >60 ML/MIN/1.73 M^2
GLUCOSE SERPL-MCNC: 90 MG/DL (ref 70–110)
HCT VFR BLD AUTO: 39.7 % (ref 40–54)
HGB BLD-MCNC: 12.6 G/DL (ref 14–18)
IMM GRANULOCYTES # BLD AUTO: 0.1 K/UL (ref 0–0.04)
IMM GRANULOCYTES NFR BLD AUTO: 1.1 % (ref 0–0.5)
LYMPHOCYTES # BLD AUTO: 1.8 K/UL (ref 1–4.8)
LYMPHOCYTES NFR BLD: 19.5 % (ref 18–48)
MAGNESIUM SERPL-MCNC: 2 MG/DL (ref 1.6–2.6)
MCH RBC QN AUTO: 26.6 PG (ref 27–31)
MCHC RBC AUTO-ENTMCNC: 31.7 G/DL (ref 32–36)
MCV RBC AUTO: 84 FL (ref 82–98)
MONOCYTES # BLD AUTO: 1.2 K/UL (ref 0.3–1)
MONOCYTES NFR BLD: 12.8 % (ref 4–15)
NEUTROPHILS # BLD AUTO: 5.8 K/UL (ref 1.8–7.7)
NEUTROPHILS NFR BLD: 62.5 % (ref 38–73)
NRBC BLD-RTO: 0 /100 WBC
PHOSPHATE SERPL-MCNC: 3.6 MG/DL (ref 2.7–4.5)
PLATELET # BLD AUTO: 363 K/UL (ref 150–450)
PMV BLD AUTO: 9.8 FL (ref 9.2–12.9)
POTASSIUM SERPL-SCNC: 4.4 MMOL/L (ref 3.5–5.1)
PROT SERPL-MCNC: 7 G/DL (ref 6–8.4)
RBC # BLD AUTO: 4.73 M/UL (ref 4.6–6.2)
SODIUM SERPL-SCNC: 134 MMOL/L (ref 136–145)
WBC # BLD AUTO: 9.25 K/UL (ref 3.9–12.7)

## 2025-01-23 PROCEDURE — 25000003 PHARM REV CODE 250

## 2025-01-23 PROCEDURE — 94761 N-INVAS EAR/PLS OXIMETRY MLT: CPT

## 2025-01-23 PROCEDURE — 20600001 HC STEP DOWN PRIVATE ROOM

## 2025-01-23 PROCEDURE — 36415 COLL VENOUS BLD VENIPUNCTURE: CPT

## 2025-01-23 PROCEDURE — 83735 ASSAY OF MAGNESIUM: CPT

## 2025-01-23 PROCEDURE — 63600175 PHARM REV CODE 636 W HCPCS

## 2025-01-23 PROCEDURE — 85025 COMPLETE CBC W/AUTO DIFF WBC: CPT

## 2025-01-23 PROCEDURE — 84100 ASSAY OF PHOSPHORUS: CPT

## 2025-01-23 PROCEDURE — 63600175 PHARM REV CODE 636 W HCPCS: Performed by: STUDENT IN AN ORGANIZED HEALTH CARE EDUCATION/TRAINING PROGRAM

## 2025-01-23 PROCEDURE — 97530 THERAPEUTIC ACTIVITIES: CPT

## 2025-01-23 PROCEDURE — 25000003 PHARM REV CODE 250: Performed by: PHYSICIAN ASSISTANT

## 2025-01-23 PROCEDURE — 80053 COMPREHEN METABOLIC PANEL: CPT

## 2025-01-23 PROCEDURE — 97116 GAIT TRAINING THERAPY: CPT

## 2025-01-23 RX ADMIN — HEPARIN SODIUM 7500 UNITS: 5000 INJECTION INTRAVENOUS; SUBCUTANEOUS at 05:01

## 2025-01-23 RX ADMIN — GABAPENTIN 600 MG: 300 CAPSULE ORAL at 09:01

## 2025-01-23 RX ADMIN — MICONAZOLE NITRATE: 20 OINTMENT TOPICAL at 09:01

## 2025-01-23 RX ADMIN — SENNOSIDES 8.6 MG: 8.6 TABLET, FILM COATED ORAL at 09:01

## 2025-01-23 RX ADMIN — ATORVASTATIN CALCIUM 40 MG: 40 TABLET, FILM COATED ORAL at 09:01

## 2025-01-23 RX ADMIN — CARVEDILOL 25 MG: 12.5 TABLET, FILM COATED ORAL at 09:01

## 2025-01-23 RX ADMIN — MICONAZOLE NITRATE 2 % TOPICAL POWDER: at 09:01

## 2025-01-23 RX ADMIN — CEFEPIME 2 G: 2 INJECTION, POWDER, FOR SOLUTION INTRAVENOUS at 09:01

## 2025-01-23 RX ADMIN — LISINOPRIL 40 MG: 20 TABLET ORAL at 09:01

## 2025-01-23 RX ADMIN — POLYETHYLENE GLYCOL 3350 17 G: 17 POWDER, FOR SOLUTION ORAL at 09:01

## 2025-01-23 RX ADMIN — NIFEDIPINE 120 MG: 60 TABLET, FILM COATED, EXTENDED RELEASE ORAL at 09:01

## 2025-01-23 RX ADMIN — OXYCODONE 5 MG: 5 TABLET ORAL at 09:01

## 2025-01-23 RX ADMIN — HEPARIN SODIUM 7500 UNITS: 5000 INJECTION INTRAVENOUS; SUBCUTANEOUS at 09:01

## 2025-01-23 RX ADMIN — FUROSEMIDE 40 MG: 40 TABLET ORAL at 09:01

## 2025-01-23 RX ADMIN — CEFEPIME 2 G: 2 INJECTION, POWDER, FOR SOLUTION INTRAVENOUS at 12:01

## 2025-01-23 RX ADMIN — HEPARIN SODIUM 7500 UNITS: 5000 INJECTION INTRAVENOUS; SUBCUTANEOUS at 02:01

## 2025-01-23 RX ADMIN — OXYCODONE 5 MG: 5 TABLET ORAL at 12:01

## 2025-01-23 RX ADMIN — ASPIRIN 81 MG: 81 TABLET, COATED ORAL at 09:01

## 2025-01-23 RX ADMIN — CEFEPIME 2 G: 2 INJECTION, POWDER, FOR SOLUTION INTRAVENOUS at 04:01

## 2025-01-23 NOTE — PT/OT/SLP PROGRESS
Physical Therapy Treatment    Patient Name:  Edd Wills   MRN:  [id number]  Admitting Diagnosis:  Cellulitis of right thigh   Recent Surgery: * No surgery found *    Admit Date: 1/15/2025  Length of Stay: 7 days    Recommendations:     Discharge Recommendations:  Moderate Intensity Therapy  Discharge Equipment Recommendations: rollator   Justification for Equipment: The mobility limitation cannot be sufficiently resolved by the use of a cane. Patient's functional mobility deficit can be sufficiently resolved with the use of a Rolling Walker. Patient's mobility limitation significantly impairs their ability to participate in one of more activities of daily living. The use of a Rolling Walker will significantly improve the patient's ability to participate in MRADLS and the patient will use it on regular basis in the home.   Barriers to discharge: Evolving Clinical Presentation    Assessment:     Edd Wills is a 69 y.o. male admitted with a medical diagnosis of Cellulitis of right thigh.  He presents with the following impairments/functional limitations:  weakness, impaired endurance, impaired functional mobility, impaired self care skills, gait instability, impaired balance, pain, decreased safety awareness, decreased lower extremity function, impaired cognition, edema.     Pt agreeable to therapy, wants to get to bedside commode. Pt with improvements in sit to stand transfers today, still with decreased endurance with gait. Assisted to bedside commode and ambulation in hallway.    Rehab Prognosis: Fair; patient would benefit from acute skilled PT services to address these deficits and reach maximum level of function.    Recent Surgery: * No surgery found *      Treatment Tolerated: Fair    Highest level of mobility achieved this visit: ambulates 10ft + 5ft w/ bariatric RW and CGA    Activity with RN/PCT: transfer with one person assist    Plan:     During this hospitalization, patient to be seen 4 x/week to address  "the identified rehab impairments via gait training, therapeutic activities, therapeutic exercises, neuromuscular re-education and progress toward the following goals:    Plan of Care Expires:  02/15/25    Subjective     ELIE Schmidt notified prior to session. No family present upon PT entrance into room.    Chief Complaint: pain  Patient/Family Comments/goals: "This feels like the farthest I've walked"  Pain/Comfort:  Pain Rating 1: 5/10  Location - Side 1: Bilateral  Location - Orientation 1: generalized  Location 1: knee (into feet)  Pain Addressed 1: Reposition, Distraction      Objective:     Additional staff present: none    Patient found up in chair with: blood pressure cuff, peripheral IV, telemetry   Cognition:   Alert and Cooperative  Command following: Follows two-step verbal commands  Fluency: clear/fluent  General Precautions: Standard, fall   Orthopedic Precautions:N/A   Braces: N/A   Body mass index is 54.25 kg/m².  Oxygen Device: Room Air    Vitals: BP (!) 105/56 (BP Location: Left arm, Patient Position: Sitting)   Pulse 65   Temp 98.3 °F (36.8 °C) (Oral)   Resp 18   Ht 6' (1.829 m)   Wt (!) 181.4 kg (400 lb)   SpO2 (!) 94%   BMI 54.25 kg/m²     Outcome Measures:  AM-PAC 6 CLICK MOBILITY  Turning over in bed (including adjusting bedclothes, sheets and blankets)?: 2  Sitting down on and standing up from a chair with arms (e.g., wheelchair, bedside commode, etc.): 3  Moving from lying on back to sitting on the side of the bed?: 3  Moving to and from a bed to a chair (including a wheelchair)?: 3  Need to walk in hospital room?: 3  Climbing 3-5 steps with a railing?: 1  Basic Mobility Total Score: 15     Functional Mobility:    Bed Mobility:   Pt found/returned to bedside chair    Transfers:   Sit > Stand Transfer: contact guard assistance with rolling walker   Stand > Sit Transfer: contact guard assistance with rolling walker   x1 trials from bedside chair and x2 trials from bedside commode  Chair > " Commode Transfer: Step Transfer technique with contact guard assistance with rolling walker  Commode on patient's R    Standing Balance:  Assistance Level Required: Contact Guard Assistance  Patient used: rolling walker   Time: 5 min x 3 trials  Postural deviations noted: forward lean over walker  Encouraged: upright stance      Gait:  Patient ambulated: 5ft + 10ft   Patient required: contact guard  Patient used:  rolling walker   Gait Pattern observed: swing-to gait  Gait Deviation(s): decreased step length, decreased grace, flexed posture, and wide base of support  Impairments due to: impaired balance, decreased endurance, and impaired postural control  all lines remained intact throughout ambulation trial    Education:  Time provided for education, counseling and discussion of health disposition in regards to patient's current status  All questions answered within PT scope of practice and to patient's satisfaction  PT role in POC to address current functional deficits  Pt educated on proper body mechanics, safety techniques, and energy conservation with PT facilitation and cueing throughout session    Patient left up in chair with all lines intact and call button in reach.    GOALS:   Multidisciplinary Problems       Physical Therapy Goals          Problem: Physical Therapy    Goal Priority Disciplines Outcome Interventions   Physical Therapy Goal     PT, PT/OT Progressing    Description: Goals to be met by: 25     Patient will increase functional independence with mobility by performin. Supine to sit with Modified [place] - Not met  2. Sit to stand transfer with Stand-by Assistance using bariatric RW - Not met  3. Gait  x 100 feet with Contact Guard Assistance using bariatric Rolling Walker.   4. Pt will demo ability to stand for 5 minutes with either bariatric RW and sink support (for self-care) with SBA - Not met                     Time Tracking:     PT Received On: 25  PT Start Time:  1057     PT Stop Time: 1131  PT Total Time (min): 34 min     Billable Minutes:   Gait Training 10 min and Therapeutic Activity 15 min    Treatment Type: Treatment  PT/PTA: PT       Pierre Mena PT, DPT  1/23/2025

## 2025-01-23 NOTE — PROGRESS NOTES
"[facility] [address])  [facility] Medicine  Progress Note    Patient Name: Edd Wills  MRN: [id number]  Patient Class: IP- Inpatient   Admission Date: 1/15/2025  Length of Stay: 7 days  Attending Physician: Chio Crabtree MD  Primary Care Provider: Zuleyka Sabillon MD        Subjective     Principal Problem:Cellulitis of right thigh        HPI:  Edd Wills is a 69 y.o. male with past medical history of malignant HTN, HLD, morbid obesity, Type B aortic dissection, chronic lymphedema, LLUVIA, chronic neck pain s/p cervical fusion and laminectomy, chronic bilateral knee pain, grade 1 diastolic dysfunction, and CKD who presented for abscess on his R medial thigh. He has a 6-7 year history of a lymphedematous collection to his R medial thigh, however noticed a week ago that there was mild purulent drainage coming from a new wound. He also reports the collection has increased in size over the past week. Wound care visits him 3 times a week to manage this collection, but a couple days before his presentation, while trying to express some of the drainage, drainage evolved to "blood clots" and a large amount of purulent drainage which wound care felt was too extensive to care for at home. He was then recommended to present to the ED. He denies fever, chills, SOB, chest pain, N/V, diarrhea, abdominal pain, and dysuria.      In ED, vitals were hypertensive to 144, but otherwise hemodynamically stable. Labs were significant for Hgb 13.2 and hematocrit 39.5 with no leukocytosis. Na 135 and albumin 3.0. He was started on clindamycin in D5W 600mg/50mL Q8 for his cellulitis.       Overview/Hospital Course:  Patient with chronic lymphedema and hx Type B aortic dissection admitted with R medial thigh collection. Placed on Clindamycin. CT of RLE with scattered areas of subcutaneous dense infiltration of the subcutaneous fat in the posterior and medial upper thigh, the lower calf and dorsal foot c/f cellulitis. " However no drainable fluid collection or abscess seen. Wound culture positive for gram negative rods, susceptibilities pending. Transitioned from clindamycin to ceftriaxone. Patient had rapid called on 1/17 when he was found unresponsive. ICU team came to room to assess. Patient was altered. CTH with no acute abnormality. Pt has been hypertensive, added Nifedipine for further BP contol in setting of known type B aortic dissection. CTA chest ordered: without abnormalities. Patient's wound culture grew serratia and pseudomonas. Transitioned to cefepime from ceftriaxone. Only oral option based on susceptibilities is fluoroquinolones but this is contraindicated with hx of aortic dissection. Consulted ID who recommended to continue IV cefepime 2g Q8h to complete abx duration of at-least 2 weeks, SOHAN 02/03/25. SNF arrangements are underway. Minor elevation in creatinine, patient denied any changes in his urine output. Possibly due to correction of blood pressure to meet target of dissection when it was higher prior. Patient's blood pressure control can be slightly more lenient on discharge to avoid any hypotensive episodes.      Interval History: STAR, patient said he urinated well overnight,    Review of Systems  Objective:     Vital Signs (Most Recent):  Temp: (!) 95.7 °F (35.4 °C) (01/23/25 1148)  Pulse: 63 (01/23/25 1152)  Resp: (!) 21 (01/23/25 1152)  BP: (!) 102/52 (01/23/25 1152)  SpO2: (!) 93 % (01/23/25 1152) Vital Signs (24h Range):  Temp:  [95.7 °F (35.4 °C)-98.4 °F (36.9 °C)] 95.7 °F (35.4 °C)  Pulse:  [63-72] 63  Resp:  [18-22] 21  SpO2:  [93 %-96 %] 93 %  BP: ()/(52-58) 102/52     Weight: (!) 181.4 kg (400 lb)  Body mass index is 54.25 kg/m².    Intake/Output Summary (Last 24 hours) at 1/23/2025 1525  Last data filed at 1/23/2025 1102  Gross per 24 hour   Intake 240 ml   Output 350 ml   Net -110 ml         Physical Exam    Physical Exam      Constitutional:       General: He is not in acute  distress.     Appearance: He is not ill-appearing or diaphoretic.   Eyes:      Extraocular Movements: Extraocular movements intact.   Cardiovascular:      Rate and Rhythm: Normal rate and regular rhythm.      Heart sounds: No murmur heard.  Pulmonary:      Effort: Pulmonary effort is normal. No respiratory distress.      Breath sounds: Rhonchi (b/l lower lung fields) present.   Abdominal:      General: Abdomen is flat. There is no distension.      Tenderness: There is no abdominal tenderness.  There is no rebound. There is no guarding.  Musculoskeletal:         Right lower leg: Edema present.      Left lower leg: Edema present.      Comments: Lymphedema. Right medial thigh with large collection and wound covered by bandage  Neurological:      General: No focal deficit present.      Mental Status: He is alert.      Comments: alert and oriented        Significant Labs: All pertinent labs within the past 24 hours have been reviewed.    Significant Imaging: I have reviewed all pertinent imaging results/findings within the past 24 hours.    Assessment and Plan     * Cellulitis of right thigh  Right medial thigh collection with overlying cellulitis and draining wound. In setting of chronic lymphedema.  Worsening over past 2 weeks with increased size and drainage of blood and pus.   Reports pain is well controlled with current regimen.  CT R thigh reported scattered areas of subcutaneous dense infiltration of the subcutaneous fat in the posterior and medial upper thigh, the lower calf and dorsal foot.  Correlation for cellulitis. No drainable fluid collection or abscess.     - Wound cultures positive for serratia and pseudomonas  - Started Ceftriaxone 2g q 24hr, switched to cefepime,  - ID recs to continue IV cefepime 2g Q8h  -- [considered FQ, but avoiding d/t h/o type-B aortic dissection]. To complete abx duration of at-least 2wks, SOHAN 02/03/25.  - Oxycodone 5 mg PO q4 hr prn moderate to severe pain.   - Wound care  "consulted, recs below:  -Clean right inner thigh with CGH soap/water, pat dry. Loosely pack 1/4" Iodoform or AMD antimicrobial packing gauze into open "holes" to ulcerated wound. Cover with ABD and secure with cloth tape, daily and prn. Apply a zinc barrier to periwound to protect skin from moisture/drainage.   - Remove bilateral lower leg wraps, once.     LEIF (acute kidney injury)  LEIF is likely due to acute tubular necrosis caused by blood pressure medications . Most recent creatinine and eGFR are listed below.  Recent Labs     01/20/25  0545 01/21/25  0442 01/22/25  0442   CREATININE 0.8 0.8 1.3   EGFRNORACEVR >60.0 >60.0 58.7*      Plan  - LEIF is stable  - Avoid nephrotoxins and renally dose meds for GFR listed above  - Monitor urine output, serial BMP, and adjust therapy as needed  - Trend RFT daily     AMS (altered mental status)  Rapid called 1/17 after pt found unresponsive by nursing.   Reportedly altered. Possibly vasovagal vs hypotension related to changes in antihypertensive regimen.   CT Head with no acute abnormality    -CTA Chest/Abdomen/Pelvis ordered, negative  -Resolved, pt currently A&Ox3.       Chronic knee pain  Reported pain 5/10 BLE (from knees down).     - Continue home gabapentin  - Continue oxy 5 mg q 4 prn    Debility  Patient with Acute on chronic debility due to  chronic lymphedema . The patient's latest AMPAC (Activity Measure for Post Acute Care) Score is listed below.    AM-PAC Score - How much help does the patient need for each activity listed  Basic Mobility Total Score: 13  Turning over in bed (including adjusting bedclothes, sheets and blankets)?: A lot  Sitting down on and standing up from a chair with arms (e.g., wheelchair, bedside commode, etc.): A little  Moving from lying on back to sitting on the side of the bed?: A little  Moving to and from a bed to a chair (including a wheelchair)?: A lot  Need to walk in hospital room?: A lot  Climbing 3-5 steps with a railing?: " Unable    Plan  - PT/OT consulted  - Fall precautions in place  - PT and OT rec moderate intensity therapy.   He reportedly does not have transport to go to daily outpatient therapy. Plan for SNF  - On 1/18 TSONE sent referral to [facility], pending placement     Edd's mobility limitation cannot be sufficiently resolved by the use of a cane. His functional mobility deficit can be sufficiently resolved with the use of a Rollator. Patient's mobility limitation significantly impairs their ability to participate in one of more activities of daily living.  The use of a Rollator will significantly improve the patient's ability to participate in MRADLS and the patient will use it on regular basis in the home.         Lymphedema  Chronic issue, pt reported ~ 7 yrs.  -Continue home lasix 40 mg daily     History of aortic dissection  Hx Type B aortic dissection.   Continues to be over goal. Increased Nifedipine  to 90 mg daily.     - Goal SBP <120  - Goal HR <60   - Continue home antihypertensives  - PRN labetalol     Hypertension  Patient's blood pressure range in the last 24 hours was: BP  Min: 128/63  Max: 147/66.The patient's inpatient anti-hypertensive regimen is listed below:  Current Antihypertensives  carvediloL tablet 25 mg, 2 times daily, Oral  furosemide tablet 40 mg, Daily, Oral  lisinopriL tablet 40 mg, Nightly, Oral  labetaloL tablet 100 mg, Daily PRN, Oral  NIFEdipine 24 hr tablet 120 mg, Daily, Oral  NIFEdipine (PROCARDIA-XL) 24 hr tablet, Daily, Oral    Plan  - BP is uncontrolled, will adjust as follows: Increase Nifedipine from 120 mg daily  - Continue home  carvdilol, and lisinopril  - Continue home lasix 40 mg daily       VTE Risk Mitigation (From admission, onward)           Ordered     heparin (porcine) injection 7,500 Units  Every 8 hours         01/16/25 1857     IP VTE HIGH RISK PATIENT  Once         01/15/25 2233                    Discharge Planning   BROWN: 1/25/2025     Code Status: Full Code    Medical Readiness for Discharge Date:   Discharge Plan A: Home, Home Health   Discharge Delays: None known at this time            Please place Justification for DME        Jinny Campa MD  Department of Hospital Medicine   [facility] ([place]-[facility])

## 2025-01-23 NOTE — PLAN OF CARE
I have reviewed the chart of Edd Wills who is hospitalized for the following:    Active Hospital Problems    Diagnosis    *Cellulitis of right thigh    Hyponatremia  - Na 132-135 Monitor with daily labs. No further lab work up needed currently    ATN (acute tubular necrosis)  - could be mild ATN in setting of normalizing BP. LEIF now resolved    LEIF (acute kidney injury)    Cellulitis of leg    AMS (altered mental status)    Debility    Chronic knee pain    Lymphedema    History of aortic dissection    Hypertension        Jeannette Barron PA-C  [facility]

## 2025-01-23 NOTE — PLAN OF CARE
Problem: Adult Inpatient Plan of Care  Goal: Plan of Care Review  Outcome: Progressing  Goal: Patient-Specific Goal (Individualized)  Outcome: Progressing  Goal: Absence of Hospital-Acquired Illness or Injury  Outcome: Progressing  Goal: Optimal Comfort and Wellbeing  Outcome: Progressing  Goal: Readiness for Transition of Care  Outcome: Progressing     Problem: Bariatric Environmental Safety  Goal: Safety Maintained with Care  Outcome: Progressing     Problem: Skin or Soft Tissue Infection  Goal: Absence of Infection Signs and Symptoms  Outcome: Progressing     Problem: Wound  Goal: Optimal Coping  Outcome: Progressing  Goal: Optimal Functional Ability  Outcome: Progressing  Goal: Absence of Infection Signs and Symptoms  Outcome: Progressing  Goal: Improved Oral Intake  Outcome: Progressing  Goal: Optimal Pain Control and Function  Outcome: Progressing  Goal: Skin Health and Integrity  Outcome: Progressing  Goal: Optimal Wound Healing  Outcome: Progressing     Problem: Skin Injury Risk Increased  Goal: Skin Health and Integrity  Outcome: Progressing     Problem: Infection  Goal: Absence of Infection Signs and Symptoms  Outcome: Progressing     Problem: Acute Kidney Injury/Impairment  Goal: Fluid and Electrolyte Balance  Outcome: Progressing  Goal: Improved Oral Intake  Outcome: Progressing  Goal: Effective Renal Function  Outcome: Progressing

## 2025-01-23 NOTE — SUBJECTIVE & OBJECTIVE
Interval History: NAEON, patient said he urinated well overnight,    Review of Systems  Objective:     Vital Signs (Most Recent):  Temp: (!) 95.7 °F (35.4 °C) (01/23/25 1148)  Pulse: 63 (01/23/25 1152)  Resp: (!) 21 (01/23/25 1152)  BP: (!) 102/52 (01/23/25 1152)  SpO2: (!) 93 % (01/23/25 1152) Vital Signs (24h Range):  Temp:  [95.7 °F (35.4 °C)-98.4 °F (36.9 °C)] 95.7 °F (35.4 °C)  Pulse:  [63-72] 63  Resp:  [18-22] 21  SpO2:  [93 %-96 %] 93 %  BP: ()/(52-58) 102/52     Weight: (!) 181.4 kg (400 lb)  Body mass index is 54.25 kg/m².    Intake/Output Summary (Last 24 hours) at 1/23/2025 1525  Last data filed at 1/23/2025 1102  Gross per 24 hour   Intake 240 ml   Output 350 ml   Net -110 ml         Physical Exam    Physical Exam      Constitutional:       General: He is not in acute distress.     Appearance: He is not ill-appearing or diaphoretic.   Eyes:      Extraocular Movements: Extraocular movements intact.   Cardiovascular:      Rate and Rhythm: Normal rate and regular rhythm.      Heart sounds: No murmur heard.  Pulmonary:      Effort: Pulmonary effort is normal. No respiratory distress.      Breath sounds: Rhonchi (b/l lower lung fields) present.   Abdominal:      General: Abdomen is flat. There is no distension.      Tenderness: There is no abdominal tenderness.  There is no rebound. There is no guarding.  Musculoskeletal:         Right lower leg: Edema present.      Left lower leg: Edema present.      Comments: Lymphedema. Right medial thigh with large collection and wound covered by bandage  Neurological:      General: No focal deficit present.      Mental Status: He is alert.      Comments: alert and oriented        Significant Labs: All pertinent labs within the past 24 hours have been reviewed.    Significant Imaging: I have reviewed all pertinent imaging results/findings within the past 24 hours.

## 2025-01-23 NOTE — PROGRESS NOTES
Episode of asymptomatic hypotension resolved without interventon, otherwise, VSS. Patient resting at this time with needs met and safety measures in place.   01/23/25 0905   Pain/Comfort/Sleep   Pain Rating (0-10): Rest 7   POSS (Pasero Opioid-Induced Sed Scale) 1 - Awake and alert   Pain Reassessment   Pain Rating Prior to Med Admin 7   Cognitive   Cognitive/Neuro/Behavioral WDL WDL   Level of Consciousness (AVPU) alert   Arousal Level opens eyes spontaneously   Orientation oriented x 4   Speech clear/fluent;follows commands   Mood/Behavior calm;cooperative   Belinda Coma Scale   Best Eye Response 4-->(E4) spontaneous   Best Motor Response 6-->(M6) obeys commands   Best Verbal Response 5-->(V5) oriented   [place]ford Coma Scale Score 15   HEENT   HEENT WDL WDL;vision aid   Vision Aid glasses at bedside   Respiratory   Respiratory WDL WDL   Rhythm/Pattern, Respiratory unlabored;pattern regular;depth regular;chest wiggle adequate;no shortness of breath reported   Expansion/Accessory Muscles/Retractions no retractions;no use of accessory muscles   Nailbeds no discoloration   Breath Sounds   FITO Breath Sounds Anterior:;diminished   Cardiac   Cardiac WDL WDL   ECG   Pulse 66   Peripheral Neurovascular   Peripheral Neurovascular WDL ex;edema   Pulse Assessment radial;dorsalis pedis   VTE Core Measure Pharmacological prophylaxis initiated/maintained   VTE Prevention/Management bleeding precautions maintained;bleeding risk assessed;bleeding risk factor(s) identified, provider notified   Pulse Radial   Left Radial Pulse 2+ (normal)   Right Radial Pulse 2+ (normal)   Pulse Dorsalis Pedis   Left Dorsalis Pedis Pulse 2+ (normal)   Right Dorsalis Pedis Pulse 2+ (normal)   Edema   Edema arm, left;arm, right        Peripheral IV - Single Lumen 01/15/25 1615 20 G Right Hand   Placement Date/Time: 01/15/25 1615   Present Prior to Hospital Arrival?: No  Inserted by: RN  Size (G): 20 G  Orientation: Right  Location: Hand  Placement  directed by: Anatomic Landmarks  Site Prep: Chlorhexidine   Local Anesthetic: None  Insertion a...   Site Assessment Clean;Intact;Dry;No redness;No swelling;No warmth;No drainage   Line Securement Device Antimicrobial Adhesive   Extremity Assessment Distal to IV No warmth;No swelling;No redness;No abnormal discoloration   Line Status Saline locked   Dressing Status Dry;Clean;Intact   Dressing Intervention Integrity maintained        Midline Catheter - Single Lumen 01/20/25 1735 Right basilic vein (medial side of arm) other (see comments)   Placement Date/Time: 01/20/25 1735   Present Prior to Hospital Arrival?: No  Hand Hygiene: Performed  Barrier Precautions: Performed  Skin Antisepsis: ChloraPrep  Device: BD  Side: Right  Location: basilic vein (medial side of arm)  Size: (c) other (s...   Site Assessment No redness;Intact;Dry;Clean   IV Device Securement catheter securement device   Line Status Flushed;Saline locked   Dressing Type CHG impregnated dressing/sponge   Dressing Status Clean;Intact;Dry   Dressing Intervention Integrity maintained   Skin   Skin WDL ex   Antonio Risk Assessment   Sensory Perception 3-->slightly limited   Moisture 3-->occasionally moist   Activity 2-->chairfast   Mobility 3-->slightly limited   Nutrition 3-->adequate   Friction and Shear 2-->potential problem   Antonio Score 16   Pressure Injury Prevention    Check Moisture Management Pad Done   Sacral Foam Dressing Peel back sacral foam dressing, assess skin and reapply   Heel protection technique Pillow   Heel preventative measures Replace   Check Medical Devices Done        Wound 01/16/25 1257 Abscess Right proximal Leg   Date First Assessed/Time First Assessed: 01/16/25 1257   Present on Original Admission: Yes  Primary Wound Type: Abscess  Side: Right  Orientation: proximal  Location: Leg   Dressing Appearance Dry;Intact;Clean   Appearance Dressing in place, unable to visualize   Musculoskeletal   Musculoskeletal WDL ex   General  Mobility generalized weakness   Gastrointestinal   GI WDL ex   GI Signs/Symptoms constipation   Last Bowel Movement 01/19/25   Genitourinary   Genitourinary WDL WDL   Voiding Characteristics voids spontaneously without difficulty   Coping/Psychosocial   Observed Emotional State accepting;calm;cooperative   Verbalized Emotional State acceptance   Plan of Care Reviewed With patient   Safety   Safety WDL WDL   Safety Factors ID band on;upper side rails raised x 2;call light in reach;wheels locked;bed in low position   All Alarms alarm(s) activated and audible   Fall Risk Assessment (every shift)   History Of Fall (W/I 3 Mos) 0-->No   Polypharmacy 3-->Yes   Central Nervous System/Psychotropic Medication 3-->Yes   Cardiovascular Medication 3-->Yes   Age Greater Than 65 Years 2-->Yes   Altered Elimination 0-->No   Cognitive Deficit 0-->No   Sensory Deficit 2-->Yes   Dizziness/Vertigo 0-->No   Depression 0-->No   Mobility Deficit/Weakness 2-->Yes   Male 1-->Yes   Fall Risk Score 16   ABC Risk for Fall with Injury Assessment   A= Age: Is the patient greater than or equal to 85 years old or frail due to clinical condition? No   B=Bones: Does the patient have osteoporosis, previous fracture, prolonged steroid use, or metastatic bone cancer? No   C=Coagulation Disorders: Does the patient have a bleeding disorder, either through anticoagulants or underlying clinical condition? No   S=recent Surgery: Is the patient post-op surgicalwith a recent lower limb amputation or recent major abdominal or thoracic surgery? No   Safety Management   Safety Promotion/Fall Prevention assistive device/personal item within reach;lighting adjusted;medications reviewed;nonskid shoes/socks when out of bed;pulse ox   Medication Review/Management medications reviewed   Patient Rounds bed in low position;clutter free environment maintained;toileting offered;visualized patient;placement of personal items at bedside;ID band on;call light in  patient/parent reach;bed wheels locked   Safety Bands on Patient Fall Risk Band;Allergy Band   Daily Care   Activity Management Up in chair - L3   Activity Assistance Provided assistance, 1 person   Mobility   GEMS (Burnett Early Mobility Scale) Level 2-Edge of bed activities with assist   Positioning   Body Position position changed independently   Head of Bed (HOB) Positioning HOB elevated   Positioning/Transfer Devices pillows;in use

## 2025-01-24 LAB
ALBUMIN SERPL BCP-MCNC: 2.4 G/DL (ref 3.5–5.2)
ALLENS TEST: ABNORMAL
ALP SERPL-CCNC: 67 U/L (ref 40–150)
ALT SERPL W/O P-5'-P-CCNC: 25 U/L (ref 10–44)
ANION GAP SERPL CALC-SCNC: 10 MMOL/L (ref 8–16)
AST SERPL-CCNC: 23 U/L (ref 10–40)
BASOPHILS # BLD AUTO: 0.05 K/UL (ref 0–0.2)
BASOPHILS NFR BLD: 0.5 % (ref 0–1.9)
BILIRUB SERPL-MCNC: 0.3 MG/DL (ref 0.1–1)
BUN SERPL-MCNC: 42 MG/DL (ref 8–23)
CALCIUM SERPL-MCNC: 8.7 MG/DL (ref 8.7–10.5)
CHLORIDE SERPL-SCNC: 103 MMOL/L (ref 95–110)
CO2 SERPL-SCNC: 20 MMOL/L (ref 23–29)
CREAT SERPL-MCNC: 1.4 MG/DL (ref 0.5–1.4)
DELSYS: ABNORMAL
DIFFERENTIAL METHOD BLD: ABNORMAL
EOSINOPHIL # BLD AUTO: 0.3 K/UL (ref 0–0.5)
EOSINOPHIL NFR BLD: 3.2 % (ref 0–8)
ERYTHROCYTE [DISTWIDTH] IN BLOOD BY AUTOMATED COUNT: 14.5 % (ref 11.5–14.5)
EST. GFR  (NO RACE VARIABLE): 53.7 ML/MIN/1.73 M^2
FIO2: 21
GLUCOSE SERPL-MCNC: 79 MG/DL (ref 70–110)
HCO3 UR-SCNC: 22.8 MMOL/L (ref 24–28)
HCT VFR BLD AUTO: 38.3 % (ref 40–54)
HCT VFR BLD CALC: 43 %PCV (ref 36–54)
HGB BLD-MCNC: 12.1 G/DL (ref 14–18)
IMM GRANULOCYTES # BLD AUTO: 0.14 K/UL (ref 0–0.04)
IMM GRANULOCYTES NFR BLD AUTO: 1.4 % (ref 0–0.5)
LYMPHOCYTES # BLD AUTO: 2.3 K/UL (ref 1–4.8)
LYMPHOCYTES NFR BLD: 22.4 % (ref 18–48)
MAGNESIUM SERPL-MCNC: 2.2 MG/DL (ref 1.6–2.6)
MCH RBC QN AUTO: 26.8 PG (ref 27–31)
MCHC RBC AUTO-ENTMCNC: 31.6 G/DL (ref 32–36)
MCV RBC AUTO: 85 FL (ref 82–98)
MODE: ABNORMAL
MONOCYTES # BLD AUTO: 1.5 K/UL (ref 0.3–1)
MONOCYTES NFR BLD: 14.3 % (ref 4–15)
NEUTROPHILS # BLD AUTO: 6 K/UL (ref 1.8–7.7)
NEUTROPHILS NFR BLD: 58.2 % (ref 38–73)
NRBC BLD-RTO: 0 /100 WBC
PCO2 BLDA: 44.2 MMHG (ref 35–45)
PH SMN: 7.32 [PH] (ref 7.35–7.45)
PHOSPHATE SERPL-MCNC: 4.2 MG/DL (ref 2.7–4.5)
PLATELET # BLD AUTO: 319 K/UL (ref 150–450)
PMV BLD AUTO: 10.3 FL (ref 9.2–12.9)
PO2 BLDA: 53 MMHG (ref 40–60)
POC BE: -3 MMOL/L
POC IONIZED CALCIUM: 1.33 MMOL/L (ref 1.06–1.42)
POC SATURATED O2: 84 % (ref 95–100)
POC TCO2: 24 MMOL/L (ref 24–29)
POCT GLUCOSE: 123 MG/DL (ref 70–110)
POTASSIUM BLD-SCNC: 4.6 MMOL/L (ref 3.5–5.1)
POTASSIUM SERPL-SCNC: 4.4 MMOL/L (ref 3.5–5.1)
PROT SERPL-MCNC: 6.4 G/DL (ref 6–8.4)
RBC # BLD AUTO: 4.52 M/UL (ref 4.6–6.2)
SAMPLE: ABNORMAL
SITE: ABNORMAL
SODIUM BLD-SCNC: 135 MMOL/L (ref 136–145)
SODIUM SERPL-SCNC: 133 MMOL/L (ref 136–145)
SP02: 94
WBC # BLD AUTO: 10.28 K/UL (ref 3.9–12.7)

## 2025-01-24 PROCEDURE — 99900035 HC TECH TIME PER 15 MIN (STAT)

## 2025-01-24 PROCEDURE — 85025 COMPLETE CBC W/AUTO DIFF WBC: CPT

## 2025-01-24 PROCEDURE — 97535 SELF CARE MNGMENT TRAINING: CPT

## 2025-01-24 PROCEDURE — 20600001 HC STEP DOWN PRIVATE ROOM

## 2025-01-24 PROCEDURE — 83605 ASSAY OF LACTIC ACID: CPT

## 2025-01-24 PROCEDURE — 84132 ASSAY OF SERUM POTASSIUM: CPT

## 2025-01-24 PROCEDURE — 84295 ASSAY OF SERUM SODIUM: CPT

## 2025-01-24 PROCEDURE — 85014 HEMATOCRIT: CPT

## 2025-01-24 PROCEDURE — 36415 COLL VENOUS BLD VENIPUNCTURE: CPT

## 2025-01-24 PROCEDURE — 25000003 PHARM REV CODE 250

## 2025-01-24 PROCEDURE — 80053 COMPREHEN METABOLIC PANEL: CPT

## 2025-01-24 PROCEDURE — 63600175 PHARM REV CODE 636 W HCPCS

## 2025-01-24 PROCEDURE — 83735 ASSAY OF MAGNESIUM: CPT

## 2025-01-24 PROCEDURE — 82330 ASSAY OF CALCIUM: CPT

## 2025-01-24 PROCEDURE — 63600175 PHARM REV CODE 636 W HCPCS: Performed by: STUDENT IN AN ORGANIZED HEALTH CARE EDUCATION/TRAINING PROGRAM

## 2025-01-24 PROCEDURE — 97530 THERAPEUTIC ACTIVITIES: CPT

## 2025-01-24 PROCEDURE — 25000003 PHARM REV CODE 250: Performed by: PHYSICIAN ASSISTANT

## 2025-01-24 PROCEDURE — 82803 BLOOD GASES ANY COMBINATION: CPT

## 2025-01-24 PROCEDURE — 84100 ASSAY OF PHOSPHORUS: CPT

## 2025-01-24 RX ADMIN — ATORVASTATIN CALCIUM 40 MG: 40 TABLET, FILM COATED ORAL at 09:01

## 2025-01-24 RX ADMIN — CEFEPIME 2 G: 2 INJECTION, POWDER, FOR SOLUTION INTRAVENOUS at 09:01

## 2025-01-24 RX ADMIN — FUROSEMIDE 40 MG: 40 TABLET ORAL at 09:01

## 2025-01-24 RX ADMIN — MICONAZOLE NITRATE: 20 OINTMENT TOPICAL at 09:01

## 2025-01-24 RX ADMIN — GABAPENTIN 600 MG: 300 CAPSULE ORAL at 09:01

## 2025-01-24 RX ADMIN — NIFEDIPINE 120 MG: 60 TABLET, FILM COATED, EXTENDED RELEASE ORAL at 09:01

## 2025-01-24 RX ADMIN — CEFEPIME 2 G: 2 INJECTION, POWDER, FOR SOLUTION INTRAVENOUS at 04:01

## 2025-01-24 RX ADMIN — CARVEDILOL 25 MG: 12.5 TABLET, FILM COATED ORAL at 09:01

## 2025-01-24 RX ADMIN — MICONAZOLE NITRATE 2 % TOPICAL POWDER: at 09:01

## 2025-01-24 RX ADMIN — OXYCODONE 5 MG: 5 TABLET ORAL at 06:01

## 2025-01-24 RX ADMIN — HEPARIN SODIUM 7500 UNITS: 5000 INJECTION INTRAVENOUS; SUBCUTANEOUS at 01:01

## 2025-01-24 RX ADMIN — POLYETHYLENE GLYCOL 3350 17 G: 17 POWDER, FOR SOLUTION ORAL at 09:01

## 2025-01-24 RX ADMIN — HEPARIN SODIUM 7500 UNITS: 5000 INJECTION INTRAVENOUS; SUBCUTANEOUS at 06:01

## 2025-01-24 RX ADMIN — ASPIRIN 81 MG: 81 TABLET, COATED ORAL at 09:01

## 2025-01-24 RX ADMIN — SENNOSIDES 8.6 MG: 8.6 TABLET, FILM COATED ORAL at 09:01

## 2025-01-24 RX ADMIN — HEPARIN SODIUM 7500 UNITS: 5000 INJECTION INTRAVENOUS; SUBCUTANEOUS at 09:01

## 2025-01-24 RX ADMIN — CEFEPIME 2 G: 2 INJECTION, POWDER, FOR SOLUTION INTRAVENOUS at 01:01

## 2025-01-24 NOTE — PLAN OF CARE
Recommendation/Intervention:   1) Continue current cardiac diet as tolerated, encourage PO intake  2) RD to monitor and follow-up as needed     Goals: Meet % of EEN/EPN by next RD follow-up  Nutrition Goal Status: new  Communication of RD Recs: other (comment) (POC)

## 2025-01-24 NOTE — SUBJECTIVE & OBJECTIVE
Interval History: Bps soft this AM. Holding lisinopril. Cr increased to 1.4. Will continue to monitor. Pending placement to SNF.    Review of Systems  Objective:     Vital Signs (Most Recent):  Temp: 98.4 °F (36.9 °C) (01/24/25 1140)  Pulse: 64 (01/24/25 1140)  Resp: 18 (01/24/25 1140)  BP: (!) 107/55 (01/24/25 1140)  SpO2: 95 % (01/24/25 1140) Vital Signs (24h Range):  Temp:  [96.5 °F (35.8 °C)-98.4 °F (36.9 °C)] 98.4 °F (36.9 °C)  Pulse:  [55-70] 64  Resp:  [18-22] 18  SpO2:  [91 %-96 %] 95 %  BP: ()/(53-61) 107/55     Weight: (!) 181.4 kg (400 lb)  Body mass index is 54.25 kg/m².    Intake/Output Summary (Last 24 hours) at 1/24/2025 1426  Last data filed at 1/23/2025 2231  Gross per 24 hour   Intake 370 ml   Output 150 ml   Net 220 ml         Physical Exam  Constitutional:       General: He is not in acute distress.     Appearance: He is not ill-appearing or diaphoretic.   HENT:      Head: Normocephalic and atraumatic.      Nose: Nose normal.      Mouth/Throat:      Mouth: Mucous membranes are moist.   Eyes:      Extraocular Movements: Extraocular movements intact.   Cardiovascular:      Rate and Rhythm: Normal rate and regular rhythm.      Heart sounds: No murmur heard.  Pulmonary:      Effort: Pulmonary effort is normal. No respiratory distress.      Breath sounds: Rhonchi: b/l lower lung fields.   Abdominal:      General: Abdomen is flat. There is no distension.      Tenderness: There is no abdominal tenderness.   Musculoskeletal:      Cervical back: Normal range of motion.      Right lower leg: Edema present.      Left lower leg: Edema present.      Comments: Lymphedema. Right medial thigh with large collection and wound covered by intact bandage with bloody drainage   Neurological:      General: No focal deficit present.      Mental Status: He is alert.      Comments: A&Ox3             Significant Labs: All pertinent labs within the past 24 hours have been reviewed.  CBC:   Recent Labs   Lab  01/23/25  0614 01/24/25  0337   WBC 9.25 10.28   HGB 12.6* 12.1*   HCT 39.7* 38.3*    319     CMP:   Recent Labs   Lab 01/23/25  0614 01/24/25  0337   * 133*   K 4.4 4.4    103   CO2 22* 20*   GLU 90 79   BUN 29* 42*   CREATININE 1.0 1.4   CALCIUM 8.6* 8.7   PROT 7.0 6.4   ALBUMIN 2.6* 2.4*   BILITOT 0.3 0.3   ALKPHOS 73 67   AST 32 23   ALT 31 25   ANIONGAP 7* 10       Significant Imaging: I have reviewed all pertinent imaging results/findings within the past 24 hours.

## 2025-01-24 NOTE — PROGRESS NOTES
"[facility] ([place] [address])  [facility]  Progress Note    Patient Name: Edd Wills  MRN: [id number]  Patient Class: IP- Inpatient   Admission Date: 1/15/2025  Length of Stay: 8 days  Attending Physician: Abdon Dutton MD  Primary Care Provider: Zuleyka Sabillon MD        Subjective     Principal Problem:Cellulitis of right thigh        HPI:  Edd Wills is a 69 y.o. male with past medical history of malignant HTN, HLD, morbid obesity, Type B aortic dissection, chronic lymphedema, LLUVIA, chronic neck pain s/p cervical fusion and laminectomy, chronic bilateral knee pain, grade 1 diastolic dysfunction, and CKD who presented for abscess on his R medial thigh. He has a 6-7 year history of a lymphedematous collection to his R medial thigh, however noticed a week ago that there was mild purulent drainage coming from a new wound. He also reports the collection has increased in size over the past week. Wound care visits him 3 times a week to manage this collection, but a couple days before his presentation, while trying to express some of the drainage, drainage evolved to "blood clots" and a large amount of purulent drainage which wound care felt was too extensive to care for at home. He was then recommended to present to the ED. He denies fever, chills, SOB, chest pain, N/V, diarrhea, abdominal pain, and dysuria.      In ED, vitals were hypertensive to 144, but otherwise hemodynamically stable. Labs were significant for Hgb 13.2 and hematocrit 39.5 with no leukocytosis. Na 135 and albumin 3.0. He was started on clindamycin in D5W 600mg/50mL Q8 for his cellulitis.       Overview/Hospital Course:  Patient with chronic lymphedema and hx Type B aortic dissection admitted with R medial thigh collection. Placed on Clindamycin. CT of RLE with scattered areas of subcutaneous dense infiltration of the subcutaneous fat in the posterior and medial upper thigh, the lower calf and dorsal foot c/f cellulitis. " However no drainable fluid collection or abscess seen. Wound culture positive for gram negative rods, susceptibilities pending. Transitioned from clindamycin to ceftriaxone. Patient had rapid called on 1/17 when he was found unresponsive. ICU team came to room to assess. Patient was altered. CTH with no acute abnormality. Pt has been hypertensive, added Nifedipine for further BP contol in setting of known type B aortic dissection. CTA chest ordered: without abnormalities. Patient's wound culture grew serratia and pseudomonas. Transitioned to cefepime from ceftriaxone. Only oral option based on susceptibilities is fluoroquinolones but this is contraindicated with hx of aortic dissection. Consulted ID who recommended to continue IV cefepime 2g Q8h to complete abx duration of at-least 2 weeks, SOHAN 02/03/25. SNF arrangements are underway. Minor elevation in creatinine, patient denied any changes in his urine output. Possibly due to correction of blood pressure to meet target of dissection when it was higher prior. Patient's blood pressure control can be slightly more lenient on discharge to avoid any hypotensive episodes.      Interval History: BPs soft this AM. Vasovagal episode this afternoon after standing up to use bathroom. All HTN medications and diuretics on hold at this time.Cr increased to 1.4. Will continue to monitor. Pending placement to SNF.    Review of Systems  Objective:     Vital Signs (Most Recent):  Temp: 98.4 °F (36.9 °C) (01/24/25 1140)  Pulse: 64 (01/24/25 1140)  Resp: 18 (01/24/25 1140)  BP: (!) 107/55 (01/24/25 1140)  SpO2: 95 % (01/24/25 1140) Vital Signs (24h Range):  Temp:  [96.5 °F (35.8 °C)-98.4 °F (36.9 °C)] 98.4 °F (36.9 °C)  Pulse:  [55-70] 64  Resp:  [18-22] 18  SpO2:  [91 %-96 %] 95 %  BP: ()/(53-61) 107/55     Weight: (!) 181.4 kg (400 lb)  Body mass index is 54.25 kg/m².    Intake/Output Summary (Last 24 hours) at 1/24/2025 8326  Last data filed at 1/23/2025 2231  Gross per 24  hour   Intake 370 ml   Output 150 ml   Net 220 ml         Physical Exam  Constitutional:       General: He is not in acute distress.     Appearance: He is not ill-appearing or diaphoretic.   HENT:      Head: Normocephalic and atraumatic.      Nose: Nose normal.      Mouth/Throat:      Mouth: Mucous membranes are moist.   Eyes:      Extraocular Movements: Extraocular movements intact.   Cardiovascular:      Rate and Rhythm: Normal rate and regular rhythm.      Heart sounds: No murmur heard.  Pulmonary:      Effort: Pulmonary effort is normal. No respiratory distress.      Breath sounds: Rhonchi: b/l lower lung fields.   Abdominal:      General: Abdomen is flat. There is no distension.      Tenderness: There is no abdominal tenderness.   Musculoskeletal:      Cervical back: Normal range of motion.      Right lower leg: Edema present.      Left lower leg: Edema present.      Comments: Lymphedema. Right medial thigh with large collection and wound covered by intact bandage with bloody drainage   Neurological:      General: No focal deficit present.      Mental Status: He is alert.      Comments: A&Ox3             Significant Labs: All pertinent labs within the past 24 hours have been reviewed.  CBC:   Recent Labs   Lab 01/23/25  0614 01/24/25  0337   WBC 9.25 10.28   HGB 12.6* 12.1*   HCT 39.7* 38.3*    319     CMP:   Recent Labs   Lab 01/23/25  0614 01/24/25  0337   * 133*   K 4.4 4.4    103   CO2 22* 20*   GLU 90 79   BUN 29* 42*   CREATININE 1.0 1.4   CALCIUM 8.6* 8.7   PROT 7.0 6.4   ALBUMIN 2.6* 2.4*   BILITOT 0.3 0.3   ALKPHOS 73 67   AST 32 23   ALT 31 25   ANIONGAP 7* 10       Significant Imaging: I have reviewed all pertinent imaging results/findings within the past 24 hours.    Assessment and Plan     * Cellulitis of right thigh  Right medial thigh collection with overlying cellulitis and draining wound. In setting of chronic lymphedema.  Worsening over past 2 weeks with increased size and  "drainage of blood and pus.   Reports pain is well controlled with current regimen.  CT R thigh reported scattered areas of subcutaneous dense infiltration of the subcutaneous fat in the posterior and medial upper thigh, the lower calf and dorsal foot.  Correlation for cellulitis. No drainable fluid collection or abscess.     - Wound cultures positive for serratia and pseudomonas  - Started Ceftriaxone 2g q 24hr, switched to cefepime,  - ID recs to continue IV cefepime 2g Q8h  -- [considered FQ, but avoiding d/t h/o type-B aortic dissection]. To complete abx duration of at-least 2wks, SOHAN 02/03/25.  - Oxycodone 5 mg PO q4 hr prn moderate to severe pain.   - Wound care consulted, recs below:  -Clean right inner thigh with CGH soap/water, pat dry. Loosely pack 1/4" Iodoform or AMD antimicrobial packing gauze into open "holes" to ulcerated wound. Cover with ABD and secure with cloth tape, daily and prn. Apply a zinc barrier to periwound to protect skin from moisture/drainage.   - Remove bilateral lower leg wraps, once.     LEIF (acute kidney injury)  LEIF is likely due to acute tubular necrosis caused by blood pressure medications . Most recent creatinine and eGFR are listed below.  Recent Labs     01/22/25  1244 01/23/25  0614 01/24/25  0337   CREATININE 1.2 1.0 1.4   EGFRNORACEVR >60.0 >60.0 53.7*        Plan  - LEIF is stable  - Avoid nephrotoxins and renally dose meds for GFR listed above  - Monitor urine output, serial BMP, and adjust therapy as needed  - Trend RFT daily     AMS (altered mental status)  Rapid called 1/17 after pt found unresponsive by nursing.   Reportedly altered. Possibly vasovagal vs hypotension related to changes in antihypertensive regimen.   CT Head with no acute abnormality    -CTA Chest/Abdomen/Pelvis ordered, negative  -Resolved, pt currently A&Ox3.       Chronic knee pain  Reported pain 5/10 BLE (from knees down).     - Continue home gabapentin  - Continue oxy 5 mg q 4 " prn    Debility  Patient with Acute on chronic debility due to  chronic lymphedema . The patient's latest AMPAC (Activity Measure for Post Acute Care) Score is listed below.    AM-PAC Score - How much help does the patient need for each activity listed  Basic Mobility Total Score: 15  Turning over in bed (including adjusting bedclothes, sheets and blankets)?: A lot  Sitting down on and standing up from a chair with arms (e.g., wheelchair, bedside commode, etc.): A little  Moving from lying on back to sitting on the side of the bed?: A little  Moving to and from a bed to a chair (including a wheelchair)?: A little  Need to walk in hospital room?: A little  Climbing 3-5 steps with a railing?: Unable    Plan  - PT/OT consulted  - Fall precautions in place  - PT and OT rec moderate intensity therapy.   He reportedly does not have transport to go to daily outpatient therapy. Plan for SNF  - On 1/18  sent referral to [facility], pending placement     Edd's mobility limitation cannot be sufficiently resolved by the use of a cane. His functional mobility deficit can be sufficiently resolved with the use of a Rollator. Patient's mobility limitation significantly impairs their ability to participate in one of more activities of daily living.  The use of a Rollator will significantly improve the patient's ability to participate in MRADLS and the patient will use it on regular basis in the home.         Lymphedema  Chronic issue, pt reported ~ 7 yrs.  -Continue home lasix 40 mg daily     History of aortic dissection  Hx Type B aortic dissection.   Continues to be over goal. Increased Nifedipine  to 90 mg daily.     - Goal SBP <120  - Goal HR <60   - Continue home antihypertensives  - PRN labetalol     Hypertension  Patient's blood pressure range in the last 24 hours was: BP  Min: 93/58  Max: 136/61.The patient's inpatient anti-hypertensive regimen is listed below:  Current Antihypertensives  carvediloL tablet 25 mg, 2  times daily, Oral  furosemide tablet 40 mg, Daily, Oral  NIFEdipine 24 hr tablet 120 mg, Daily, Oral  NIFEdipine (PROCARDIA-XL) 24 hr tablet, Daily, Oral  labetaloL tablet 100 mg, Daily PRN, Oral    Plan  - BP is uncontrolled, will adjust as follows: Increase Nifedipine from 120 mg daily  - Continue home  carvdilol, and lisinopril  - Continue home lasix 40 mg daily       VTE Risk Mitigation (From admission, onward)           Ordered     heparin (porcine) injection 7,500 Units  Every 8 hours         01/16/25 1857     IP VTE HIGH RISK PATIENT  Once         01/15/25 2233                    Discharge Planning   BROWN: 1/27/2025     Code Status: Full Code   Medical Readiness for Discharge Date:   Discharge Plan A: Home, Home Health   Discharge Delays: None known at this time            Please place Justification for DME        Jono Adams MD  Department of Hospital Medicine   [facility] ([place]-)

## 2025-01-24 NOTE — PROGRESS NOTES
[facility] ([place] [id number])  Adult Nutrition  Initial Assessment- LOS    SUMMARY     Recommendation/Intervention:   1) Continue current cardiac diet as tolerated, encourage PO intake  2) RD to monitor and follow-up as needed    Goals: Meet % of EEN/EPN by next RD follow-up  Nutrition Goal Status: new  Communication of RD Recs: other (comment) (POC)    Assessment and Plan    No acute nutrition diagnosis at this time      Reason for Assessment    Reason For Assessment: length of stay  Diagnosis: other (see comments) (Cellulitis of right thigh)  General Information Comments: Pt screened by RD for LOS.  past medical history of malignant HTN, HLD, morbid obesity, Type B aortic dissection, chronic lymphedema, LLUVIA, chronic neck pain s/p cervical fusion and laminectomy, chronic bilateral knee pain, grade 1 diastolic dysfunction, and CKD. On cardiac diet at this time with 100% PO intake documented at recent meals. No N/V/D/C. BMI of 54.  Nutrition Discharge Planning: Cardiac Diet    Nutrition/Diet History    Spiritual, Cultural Beliefs, Druze Practices, Values that Affect Care: no  Food Allergies: NKFA  Nutrition Related Social Determinants of Health: SDOH: Unable to assess at this time.   Food Insecurity: No Food Insecurity (1/16/2025)    Hunger Vital Sign     Worried About Running Out of Food in the Last Year: Never true     Ran Out of Food in the Last Year: Never true     Anthropometrics    Height: 6' (182.9 cm)  Height (inches): 72 in  Height Method: Stated  Weight: (!) 181.4 kg (400 lb)  Weight (lb): (!) 400 lb  Weight Method: Bed Scale  Ideal Body Weight (IBW), Male: 178 lb  % Ideal Body Weight, Male (lb): 224.72 %  BMI (Calculated): 54.2  BMI Grade: greater than 40 - morbid obesity       Lab/Procedures/Meds    Pertinent Labs Reviewed: reviewed  Pertinent Labs Comments: Hgb: 12.1, Hct: 38.3, Na: 133, CO2: 20, BUN: 42, eGFR: 53.7  Pertinent Medications Reviewed: reviewed   aspirin  81 mg Oral  Daily    atorvastatin  40 mg Oral Daily    carvediloL  25 mg Oral BID    ceFEPime IV (PEDS and ADULTS)  2 g Intravenous Q8H    furosemide  40 mg Oral Daily    gabapentin  600 mg Oral BID    heparin (porcine)  7,500 Units Subcutaneous Q8H    miconazole NITRATE 2 %   Topical (Top) Daily    miconazole nitrate 2%   Topical (Top) Daily    NIFEdipine  120 mg Oral Daily    polyethylene glycol  17 g Oral BID    senna  8.6 mg Oral Daily     Estimated/Assessed Needs    Weight Used For Calorie Calculations: (!) 181.4 kg (399 lb 14.6 oz)  Energy Calorie Requirements (kcal): 2607 kcal/day  Energy Need Method: [place]-St Jeor (x 1)  Protein Requirements: 80 g (1 g/kg IBW)  Weight Used For Protein Calculations: 80.7 kg (178 lb)  Fluid Requirements (mL): 1 mL/kcal or per MD  Estimated Fluid Requirement Method: RDA Method  RDA Method (mL): 2607     Nutrition Prescription Ordered    Current Diet Order: Cardiac    Evaluation of Received Nutrient/Fluid Intake    I/O: - 10.6 L since admit  Energy Calories Required: meeting needs  Protein Required: meeting needs  Fluid Required: other (see comments) (As per MD)  Comments: LBM 1/23  Tolerance: tolerating  % Intake of Estimated Energy Needs: 75 - 100 %  % Meal Intake: 75 - 100 %    Nutrition Risk    Level of Risk/Frequency of Follow-up: low     Monitor and Evaluation    Food and Nutrient Intake: energy intake, food and beverage intake  Food and Nutrient Adminstration: diet order  Anthropometric Measurements: weight, weight change, body mass index  Biochemical Data, Medical Tests and Procedures: electrolyte and renal panel, gastrointestinal profile, glucose/endocrine profile, inflammatory profile, lipid profile  Nutrition-Focused Physical Findings: overall appearance     Nutrition Follow-Up    RD Follow-up?: Yes

## 2025-01-24 NOTE — ASSESSMENT & PLAN NOTE
Patient's blood pressure range in the last 24 hours was: BP  Min: 93/58  Max: 136/61.The patient's inpatient anti-hypertensive regimen is listed below:  Current Antihypertensives  carvediloL tablet 25 mg, 2 times daily, Oral  furosemide tablet 40 mg, Daily, Oral  NIFEdipine 24 hr tablet 120 mg, Daily, Oral  NIFEdipine (PROCARDIA-XL) 24 hr tablet, Daily, Oral  labetaloL tablet 100 mg, Daily PRN, Oral    Plan  - BP is uncontrolled, will adjust as follows: Increase Nifedipine from 120 mg daily  - Continue home  carvdilol, and lisinopril  - Continue home lasix 40 mg daily

## 2025-01-24 NOTE — ASSESSMENT & PLAN NOTE
LEIF is likely due to acute tubular necrosis caused by blood pressure medications . Most recent creatinine and eGFR are listed below.  Recent Labs     01/22/25  1244 01/23/25  0614 01/24/25  0337   CREATININE 1.2 1.0 1.4   EGFRNORACEVR >60.0 >60.0 53.7*        Plan  - LEIF is stable  - Avoid nephrotoxins and renally dose meds for GFR listed above  - Monitor urine output, serial BMP, and adjust therapy as needed  - Trend RFT daily

## 2025-01-24 NOTE — PT/OT/SLP PROGRESS
Occupational Therapy   Treatment    Name: Edd Wills  MRN: [id number]  Admitting Diagnosis:  Cellulitis of right thigh       Recommendations:     Discharge Recommendations: Moderate Intensity Therapy  Discharge Equipment Recommendations:  rollator  Barriers to discharge:       Assessment:     Edd Wills is a 69 y.o. male with a medical diagnosis of Cellulitis of right thigh.  He walked 12' + 12' with bariatric RW, CGA, and chair follow. Performance deficits affecting function are weakness, impaired endurance, impaired sensation, decreased coordination, impaired self care skills, decreased lower extremity function, impaired functional mobility, gait instability, impaired balance, edema.     Rehab Prognosis:  Good; patient would benefit from acute skilled OT services to address these deficits and reach maximum level of function.       Plan:     Patient to be seen 4 x/week to address the above listed problems via self-care/home management, therapeutic activities, therapeutic exercises  Plan of Care Expires: 02/15/25  Plan of Care Reviewed with: patient    Subjective       Pain/Comfort:       Objective:     Communicated with: rn prior to session.  Patient found up in chair with telemetry, peripheral IV, pulse ox (continuous) upon OT entry to room.    General Precautions: Standard, fall    Orthopedic Precautions:N/A  Braces: N/A  Respiratory Status: Room air     Occupational Performance:     Bed Mobility:    Pt found in chair upon arrival.    Functional Mobility/Transfers:  Patient completed Sit <> Stand Transfer from chair with contact guard assistance  with  bariatric rolling walker.  Patient completed Sit <> Stand Transfer from bariatric bedside commode with minimum assistance  with  bariatric rolling walker.  Patient completed Toilet Transfer Step Transfer technique from bariatric bedside commode with contact guard assistance with bariatric rolling walker  Functional Mobility: He walked 12' + 12' with bariatric RW,  CGA, and chair follow.    Activities of Daily Living:  Grooming: supervision seated at sink  Upper Body Dressing: minimum assistance seated      AMPAC 6 Click ADL: 16    Treatment & Education:  Educated on sitting OOB to promote strength, endurance, and breathing over the weekend.  Discussed POC / post-acute plan.    Patient left up in chair with all lines intact and call button in reach    GOALS:   Multidisciplinary Problems       Occupational Therapy Goals          Problem: Occupational Therapy    Goal Priority Disciplines Outcome Interventions   Occupational Therapy Goal     OT, PT/OT Progressing    Description: Goals to be met by: 2/15/25     Patient will increase functional independence with ADLs by performing:    UE Dressing with Modified Effie.  LE Dressing with Modified Effie.  Grooming while standing at sink with Modified Effie.  Toileting from toilet/bedside commode with Modified Effie for hygiene and clothing management.   Supine to sit with Modified Effie.  Toilet transfer to toilet/bedside commode with Modified Effie.                         DME Justifications:  No DME recommended requiring DME justifications    Time Tracking:     OT Date of Treatment: 01/24/25  OT Start Time: 1253  OT Stop Time: 1343  OT Total Time (min): 50 min    Billable Minutes:Self Care/Home Management 36  Therapeutic Activity 14    OT/DEVIN: OT          1/24/2025

## 2025-01-24 NOTE — ASSESSMENT & PLAN NOTE
Patient with Acute on chronic debility due to  chronic lymphedema . The patient's latest AMPAC (Activity Measure for Post Acute Care) Score is listed below.    AM-PAC Score - How much help does the patient need for each activity listed  Basic Mobility Total Score: 15  Turning over in bed (including adjusting bedclothes, sheets and blankets)?: A lot  Sitting down on and standing up from a chair with arms (e.g., wheelchair, bedside commode, etc.): A little  Moving from lying on back to sitting on the side of the bed?: A little  Moving to and from a bed to a chair (including a wheelchair)?: A little  Need to walk in hospital room?: A little  Climbing 3-5 steps with a railing?: Unable    Plan  - PT/OT consulted  - Fall precautions in place  - PT and OT rec moderate intensity therapy.   He reportedly does not have transport to go to daily outpatient therapy. Plan for SNF  - On 1/18  sent referral to [facility], pending placement     Edd's mobility limitation cannot be sufficiently resolved by the use of a cane. His functional mobility deficit can be sufficiently resolved with the use of a Rollator. Patient's mobility limitation significantly impairs their ability to participate in one of more activities of daily living.  The use of a Rollator will significantly improve the patient's ability to participate in MRADLS and the patient will use it on regular basis in the home.

## 2025-01-24 NOTE — CODE/ RAPID DOCUMENTATION
RAPID RESPONSE NURSE NOTE        Admit Date: 1/15/2025  LOS: 8  Code Status: Full Code   Date of Consult: 2025  : 1955  Age: 69 y.o.  Weight:   Wt Readings from Last 1 Encounters:   01/15/25 (!) 181.4 kg (400 lb)     Sex: male  Race: Black or    Bed: [address]121/[facility] A:   MRN: [id number]  Time Rapid Response Team page Received: 1542  Time Rapid Response Team at Bedside: 1544  Time Rapid Response Team left Bedside: 1615  Was the patient discharged from an ICU this admission? No   Was the patient discharged from a PACU within last 24 hours? No   Did the patient receive conscious sedation/general anesthesia in last 24 hours? No  Was the patient in the ED within the past 24 hours? No  Was the patient on NIPPV within the past 24 hours? No   Did this progress into an ARC or CPA: No  Attending Physician: Abdon Dutton MD  Primary Service: [facility] MED 1       SITUATION    Notified by pager.  Reason for alert: Hypotensive  Called to evaluate the patient for Circulatory.    BACKGROUND     Why is the patient in the hospital?: Cellulitis of right thigh    Patient has a past medical history of Aortic dissection distal to left subclavian, Aortic valve sclerosis, Cervical myelopathy, Descending thoracic aortic aneurysm, Gait instability, Grade I diastolic dysfunction, HTN (hypertension), malignant, Hyperlipemia, Lymphedema, Morbid obesity, Neuropathy, LLUVIA (obstructive sleep apnea), Osteoarthritis of knee, Peripheral vascular insufficiency, Pressure ulcer, and Staph skin infection.    Last Vitals:  Temp: 98.4 °F (36.9 °C) ( 1140)  Pulse: 64 ( 1140)  Resp: 18 ( 1140)  BP: 107/55 ( 1140)  SpO2: 95 % ( 1140)    24 Hours Vitals Range:  Temp:  [96.5 °F (35.8 °C)-98.4 °F (36.9 °C)]   Pulse:  [55-70]   Resp:  [18-22]   BP: ()/(53-61)   SpO2:  [91 %-96 %]     Labs:  Recent Labs     25  0442 25  0614 25  0337   WBC 9.29 9.25 10.28   HGB 13.0* 12.6* 12.1*   HCT  40.0 39.7* 38.3*    363 319       Recent Labs     01/22/25  0442 01/22/25  1244 01/23/25  0614 01/24/25  0337   * 133* 134* 133*   K 4.2 4.1 4.4 4.4    102 105 103   CO2 22* 23 22* 20*   BUN 23 22 29* 42*   CREATININE 1.3 1.2 1.0 1.4   GLU 92 89 90 79   PHOS 4.0 3.4 3.6 4.2   MG 2.0  --  2.0 2.2        ASSESSMENT    Physical Exam  Vitals reviewed.   Constitutional:       Appearance: He is morbidly obese.   Eyes:      Pupils: Pupils are equal, round, and reactive to light.   Cardiovascular:      Rate and Rhythm: Normal rate and regular rhythm.   Pulmonary:      Effort: Pulmonary effort is normal.   Neurological:      Mental Status: He is oriented to person, place, and time. He is lethargic.      GCS: GCS eye subscore is 4. GCS verbal subscore is 5. GCS motor subscore is 6.     Patient hypotensive while sitting in the bedside chair. Patient AAOx4 but very lethargic and having difficulty staying awake    INTERVENTIONS    The patient was seen for Cardiac problem. Staff concerns included hypotension. The following interventions were performed: continuous cardiac monitoring continued, continuous pulse ox monitoring continued, and No additional interventions needed at this time..    RECOMMENDATIONS    We recommend: Continuous telemetry monitoring, Maintain IV access, Delirium prevention, and Notify Rapid Response of decline in patient status    PROVIDER ESCALATION    Orders received and case discussed with Dr. Tijerina .    Primary team arrival time: 1550    Disposition: Remain in room 42499.    FOLLOW UP    Charge Delia DELGADILLO  updated on plan of care. Instructed to call the Rapid Response Nurse, Haresh Pak RN at [phone] for additional questions or concerns.

## 2025-01-24 NOTE — RESPIRATORY THERAPY
RAPID RESPONSE RESPIRATORY THERAPY NOTE             Code Status: Full Code   : 1955  Bed: 95945/38546 A:   MRN: [id number]  Time page Received: 1542  Time Rapid Response RT at Bedside: 1544  Time Rapid Response RT left Bedside: 1615    SITUATION    Evaluated patient for: circulatory      BACKGROUND    Why is the patient in the hospital?: Cellulitis of right thigh    Patient has a past medical history of Aortic dissection distal to left subclavian, Aortic valve sclerosis, Cervical myelopathy, Descending thoracic aortic aneurysm, Gait instability, Grade I diastolic dysfunction, HTN (hypertension), malignant, Hyperlipemia, Lymphedema, Morbid obesity, Neuropathy, LLUVIA (obstructive sleep apnea), Osteoarthritis of knee, Peripheral vascular insufficiency, Pressure ulcer, and Staph skin infection.    24 Hours Vitals Range:  Temp:  [96.5 °F (35.8 °C)-98.4 °F (36.9 °C)]   Pulse:  [55-70]   Resp:  [18-22]   BP: ()/(53-61)   SpO2:  [91 %-96 %]     Labs:    Recent Labs     25  0442 25  1244 25  0614 25  0337   * 133* 134* 133*   K 4.2 4.1 4.4 4.4    102 105 103   CO2 22* 23 22* 20*   BUN 23 22 29* 42*   CREATININE 1.3 1.2 1.0 1.4   GLU 92 89 90 79   PHOS 4.0 3.4 3.6 4.2   MG 2.0  --  2.0 2.2        Recent Labs     25  1551   PH 7.321*   PCO2 44.2   PO2 53   HCO3 22.8*   POCSATURATED 84   BE -3*       ASSESSMENT/INTERVENTIONS  The patient was seen for Cardiac problem. Staff concerns included hypotension. Pt had O2 sats of 94% on room air with no signs or symptoms of resp distress. The following interventions were performed: continuous cardiac monitoring continued, continuous pulse ox monitoring continued, and VBG.  No further resp interventions indicated at this time..     Last Vitals: Temp: 98.4 °F (36.9 °C) ( 1140)  Pulse: 64 ( 1140)  Resp: 18 ( 1140)  BP: 107/55 ( 1140)  SpO2: 95 % (1140)  Level of Consciousness: Level of Consciousness (AVPU):  alert  Respiratory Effort:    Expansion/Accessory Muscle Usage: Expansion/Accessory Muscles/Retractions: no use of accessory muscles  All Lung Field Breath Sounds: All Lung Fields Breath Sounds: Anterior:, Lateral:, diminished  FITO Breath Sounds: Anterior:, diminished  LLL Breath Sounds: diminished  RUL Breath Sounds: diminished  RLL Breath Sounds: diminished  O2 Device/Concentration: room air  NIPPV: No Surgical airway: No Vent: No  ETCO2 monitored:    Ambu at bedside: yes    Active Orders   Respiratory Care    Pulse Oximetry Continuous     Frequency: Continuous     Number of Occurrences: Until Specified       RECOMMENDATIONS  ?  We recommend: RRT Recs: Continue POC per primary team.    ESCALATION    POC and orders reviewed.    FOLLOW-UP    Disposition: Remain in room 86798.    Please call back the Rapid Response RT, Mónica Mendez RRT at x [phone] for any questions or concerns.

## 2025-01-25 LAB
ALBUMIN SERPL BCP-MCNC: 2.6 G/DL (ref 3.5–5.2)
ALP SERPL-CCNC: 69 U/L (ref 40–150)
ALT SERPL W/O P-5'-P-CCNC: 22 U/L (ref 10–44)
ANION GAP SERPL CALC-SCNC: 7 MMOL/L (ref 8–16)
AST SERPL-CCNC: 20 U/L (ref 10–40)
BASOPHILS # BLD AUTO: 0.05 K/UL (ref 0–0.2)
BASOPHILS NFR BLD: 0.5 % (ref 0–1.9)
BILIRUB SERPL-MCNC: 0.4 MG/DL (ref 0.1–1)
BUN SERPL-MCNC: 43 MG/DL (ref 8–23)
CALCIUM SERPL-MCNC: 8.5 MG/DL (ref 8.7–10.5)
CHLORIDE SERPL-SCNC: 104 MMOL/L (ref 95–110)
CO2 SERPL-SCNC: 22 MMOL/L (ref 23–29)
CREAT SERPL-MCNC: 1.3 MG/DL (ref 0.5–1.4)
DIFFERENTIAL METHOD BLD: ABNORMAL
EOSINOPHIL # BLD AUTO: 0.2 K/UL (ref 0–0.5)
EOSINOPHIL NFR BLD: 2.5 % (ref 0–8)
ERYTHROCYTE [DISTWIDTH] IN BLOOD BY AUTOMATED COUNT: 14.5 % (ref 11.5–14.5)
EST. GFR  (NO RACE VARIABLE): 58.7 ML/MIN/1.73 M^2
GLUCOSE SERPL-MCNC: 90 MG/DL (ref 70–110)
HCT VFR BLD AUTO: 36.9 % (ref 40–54)
HGB BLD-MCNC: 12.1 G/DL (ref 14–18)
IMM GRANULOCYTES # BLD AUTO: 0.1 K/UL (ref 0–0.04)
IMM GRANULOCYTES NFR BLD AUTO: 1 % (ref 0–0.5)
LYMPHOCYTES # BLD AUTO: 2.1 K/UL (ref 1–4.8)
LYMPHOCYTES NFR BLD: 22.1 % (ref 18–48)
MAGNESIUM SERPL-MCNC: 2.3 MG/DL (ref 1.6–2.6)
MCH RBC QN AUTO: 27.3 PG (ref 27–31)
MCHC RBC AUTO-ENTMCNC: 32.8 G/DL (ref 32–36)
MCV RBC AUTO: 83 FL (ref 82–98)
MONOCYTES # BLD AUTO: 1.3 K/UL (ref 0.3–1)
MONOCYTES NFR BLD: 13.3 % (ref 4–15)
NEUTROPHILS # BLD AUTO: 5.8 K/UL (ref 1.8–7.7)
NEUTROPHILS NFR BLD: 60.6 % (ref 38–73)
NRBC BLD-RTO: 0 /100 WBC
PHOSPHATE SERPL-MCNC: 3.8 MG/DL (ref 2.7–4.5)
PLATELET # BLD AUTO: 279 K/UL (ref 150–450)
PMV BLD AUTO: 10.2 FL (ref 9.2–12.9)
POTASSIUM SERPL-SCNC: 4.4 MMOL/L (ref 3.5–5.1)
PROT SERPL-MCNC: 6.8 G/DL (ref 6–8.4)
RBC # BLD AUTO: 4.43 M/UL (ref 4.6–6.2)
SODIUM SERPL-SCNC: 133 MMOL/L (ref 136–145)
WBC # BLD AUTO: 9.62 K/UL (ref 3.9–12.7)

## 2025-01-25 PROCEDURE — 63600175 PHARM REV CODE 636 W HCPCS: Performed by: STUDENT IN AN ORGANIZED HEALTH CARE EDUCATION/TRAINING PROGRAM

## 2025-01-25 PROCEDURE — 63600175 PHARM REV CODE 636 W HCPCS

## 2025-01-25 PROCEDURE — 85025 COMPLETE CBC W/AUTO DIFF WBC: CPT

## 2025-01-25 PROCEDURE — 20600001 HC STEP DOWN PRIVATE ROOM

## 2025-01-25 PROCEDURE — 83735 ASSAY OF MAGNESIUM: CPT

## 2025-01-25 PROCEDURE — 36415 COLL VENOUS BLD VENIPUNCTURE: CPT

## 2025-01-25 PROCEDURE — 25000003 PHARM REV CODE 250: Performed by: PHYSICIAN ASSISTANT

## 2025-01-25 PROCEDURE — 25000003 PHARM REV CODE 250

## 2025-01-25 PROCEDURE — 84100 ASSAY OF PHOSPHORUS: CPT

## 2025-01-25 PROCEDURE — 80053 COMPREHEN METABOLIC PANEL: CPT

## 2025-01-25 RX ADMIN — ATORVASTATIN CALCIUM 40 MG: 40 TABLET, FILM COATED ORAL at 09:01

## 2025-01-25 RX ADMIN — POLYETHYLENE GLYCOL 3350 17 G: 17 POWDER, FOR SOLUTION ORAL at 09:01

## 2025-01-25 RX ADMIN — OXYCODONE 5 MG: 5 TABLET ORAL at 07:01

## 2025-01-25 RX ADMIN — CEFEPIME 2 G: 2 INJECTION, POWDER, FOR SOLUTION INTRAVENOUS at 05:01

## 2025-01-25 RX ADMIN — HEPARIN SODIUM 7500 UNITS: 5000 INJECTION INTRAVENOUS; SUBCUTANEOUS at 09:01

## 2025-01-25 RX ADMIN — HEPARIN SODIUM 7500 UNITS: 5000 INJECTION INTRAVENOUS; SUBCUTANEOUS at 02:01

## 2025-01-25 RX ADMIN — MICONAZOLE NITRATE 2 % TOPICAL POWDER: at 09:01

## 2025-01-25 RX ADMIN — CEFEPIME 2 G: 2 INJECTION, POWDER, FOR SOLUTION INTRAVENOUS at 09:01

## 2025-01-25 RX ADMIN — HEPARIN SODIUM 7500 UNITS: 5000 INJECTION INTRAVENOUS; SUBCUTANEOUS at 06:01

## 2025-01-25 RX ADMIN — SENNOSIDES 8.6 MG: 8.6 TABLET, FILM COATED ORAL at 09:01

## 2025-01-25 RX ADMIN — CEFEPIME 2 G: 2 INJECTION, POWDER, FOR SOLUTION INTRAVENOUS at 02:01

## 2025-01-25 RX ADMIN — OXYCODONE 5 MG: 5 TABLET ORAL at 09:01

## 2025-01-25 RX ADMIN — GABAPENTIN 600 MG: 300 CAPSULE ORAL at 09:01

## 2025-01-25 RX ADMIN — MICONAZOLE NITRATE: 20 OINTMENT TOPICAL at 09:01

## 2025-01-25 RX ADMIN — ASPIRIN 81 MG: 81 TABLET, COATED ORAL at 09:01

## 2025-01-25 NOTE — ASSESSMENT & PLAN NOTE
Hx Type B aortic dissection. Goal SBP <120 and HR <60 thought to be to stringent given 2 syncopal episodes related to titration of BP meds while here.     - Holding home antihypertensives  - Plan to restart home carvdilol and lasix, if SBP>140   - PRN labetalol

## 2025-01-25 NOTE — ASSESSMENT & PLAN NOTE
Patient's blood pressure range in the last 24 hours was: BP  Min: 98/55  Max: 132/67.The patient's inpatient anti-hypertensive regimen is listed below:  Current Antihypertensives  NIFEdipine (PROCARDIA-XL) 24 hr tablet, Daily, Oral  labetaloL tablet 100 mg, Daily PRN, Oral    Plan  - BP is uncontrolled, will adjust as follows: antihypertensives held currently following pre-syncopal episode on 1/24  - Can consider restarting carvdilol, nifedipine and lisinopril as needed  - Held home lasix 40 mg daily, restart as tolerated

## 2025-01-25 NOTE — PLAN OF CARE
Pt AAOx4, VSS, no acute changes this shift. Wound care performed. Continues with pain mgmt. Up in chair most of shift. Free from falls and injuries. Bed locked and low. Nad present. Safety measures maintained.     Problem: Adult Inpatient Plan of Care  Goal: Plan of Care Review  Outcome: Progressing  Goal: Patient-Specific Goal (Individualized)  Outcome: Progressing  Goal: Absence of Hospital-Acquired Illness or Injury  Outcome: Progressing  Goal: Optimal Comfort and Wellbeing  Outcome: Progressing  Goal: Readiness for Transition of Care  Outcome: Progressing     Problem: Bariatric Environmental Safety  Goal: Safety Maintained with Care  Outcome: Progressing     Problem: Skin or Soft Tissue Infection  Goal: Absence of Infection Signs and Symptoms  Outcome: Progressing     Problem: Wound  Goal: Optimal Coping  Outcome: Progressing  Goal: Optimal Functional Ability  Outcome: Progressing  Goal: Absence of Infection Signs and Symptoms  Outcome: Progressing  Goal: Improved Oral Intake  Outcome: Progressing  Goal: Optimal Pain Control and Function  Outcome: Progressing  Goal: Skin Health and Integrity  Outcome: Progressing  Goal: Optimal Wound Healing  Outcome: Progressing     Problem: Skin Injury Risk Increased  Goal: Skin Health and Integrity  Outcome: Progressing     Problem: Infection  Goal: Absence of Infection Signs and Symptoms  Outcome: Progressing     Problem: Acute Kidney Injury/Impairment  Goal: Fluid and Electrolyte Balance  Outcome: Progressing  Goal: Improved Oral Intake  Outcome: Progressing  Goal: Effective Renal Function  Outcome: Progressing

## 2025-01-25 NOTE — PROGRESS NOTES
"[facility] [address]  [facility] Medicine  Progress Note    Patient Name: Edd Wills  MRN: [id number]  Patient Class: IP- Inpatient   Admission Date: 1/15/2025  Length of Stay: 9 days  Attending Physician: Abdon Dutton MD  Primary Care Provider: Zuleyka Sabillon MD        Subjective     Principal Problem:Cellulitis of right thigh        HPI:  Edd Wills is a 69 y.o. male with past medical history of malignant HTN, HLD, morbid obesity, Type B aortic dissection, chronic lymphedema, LLUVIA, chronic neck pain s/p cervical fusion and laminectomy, chronic bilateral knee pain, grade 1 diastolic dysfunction, and CKD who presented for abscess on his R medial thigh. He has a 6-7 year history of a lymphedematous collection to his R medial thigh, however noticed a week ago that there was mild purulent drainage coming from a new wound. He also reports the collection has increased in size over the past week. Wound care visits him 3 times a week to manage this collection, but a couple days before his presentation, while trying to express some of the drainage, drainage evolved to "blood clots" and a large amount of purulent drainage which wound care felt was too extensive to care for at home. He was then recommended to present to the ED. He denies fever, chills, SOB, chest pain, N/V, diarrhea, abdominal pain, and dysuria.      In ED, vitals were hypertensive to 144, but otherwise hemodynamically stable. Labs were significant for Hgb 13.2 and hematocrit 39.5 with no leukocytosis. Na 135 and albumin 3.0. He was started on clindamycin in D5W 600mg/50mL Q8 for his cellulitis.       Overview/Hospital Course:  Patient with chronic lymphedema and hx Type B aortic dissection admitted with R medial thigh collection. Placed on Clindamycin. CT of RLE with scattered areas of subcutaneous dense infiltration of the subcutaneous fat in the posterior and medial upper thigh, the lower calf and dorsal foot c/f cellulitis. " However no drainable fluid collection or abscess seen. Wound culture positive for gram negative rods, susceptibilities pending. Transitioned from clindamycin to ceftriaxone. Patient had rapid called on 1/17 when he was found unresponsive. ICU team came to room to assess. Patient was altered. CTH with no acute abnormality. Pt has been hypertensive, added Nifedipine for further BP contol in setting of known type B aortic dissection. CTA chest ordered: without abnormalities. Patient's wound culture grew serratia and pseudomonas. Transitioned to cefepime from ceftriaxone. Only oral option based on susceptibilities is fluoroquinolones but this is contraindicated with hx of aortic dissection. Consulted ID who recommended to continue IV cefepime 2g Q8h to complete abx duration of at-least 2 weeks, SOHAN 02/03/25. SNF arrangements are underway. Minor elevation in creatinine, patient denied any changes in his urine output. Possibly due to correction of blood pressure to meet target of dissection when it was higher prior. Patient had second presyncopal episode on 1/26, likely related to strict BP control. Holding antihypertensives currently and plan to add back as tolerated. Patient's blood pressure control can be slightly more lenient on discharge to avoid any hypotensive episodes.      Interval History: Yesterday evening, patient had pre-syncopal episode with hypotension. Held all antihypertensives for now. Plan to add back as tolerated. He reports that he is feeling much better this morning. No current lightheaded/dizziness.     Review of Systems  Objective:     Vital Signs (Most Recent):  Temp: 98 °F (36.7 °C) (01/25/25 1204)  Pulse: 65 (01/25/25 1204)  Resp: 18 (01/25/25 1204)  BP: 127/61 (01/25/25 1204)  SpO2: (!) 94 % (01/25/25 1204) Vital Signs (24h Range):  Temp:  [98 °F (36.7 °C)] 98 °F (36.7 °C)  Pulse:  [58-72] 65  Resp:  [13-27] 18  SpO2:  [91 %-96 %] 94 %  BP: ()/(55-67) 127/61     Weight: (!) 181.4 kg (400  Yes lb)  Body mass index is 54.25 kg/m².    Intake/Output Summary (Last 24 hours) at 1/25/2025 1407  Last data filed at 1/25/2025 0730  Gross per 24 hour   Intake 240 ml   Output 1750 ml   Net -1510 ml         Physical Exam  Constitutional:       General: He is not in acute distress.     Appearance: He is not ill-appearing or diaphoretic.   HENT:      Head: Normocephalic and atraumatic.      Nose: Nose normal.      Mouth/Throat:      Mouth: Mucous membranes are moist.   Eyes:      Extraocular Movements: Extraocular movements intact.   Cardiovascular:      Rate and Rhythm: Normal rate and regular rhythm.      Heart sounds: No murmur heard.  Pulmonary:      Effort: Pulmonary effort is normal. No respiratory distress.      Breath sounds: Rhonchi: b/l lower lung fields.   Abdominal:      General: Abdomen is flat. There is no distension.      Tenderness: There is no abdominal tenderness.   Musculoskeletal:      Cervical back: Normal range of motion.      Right lower leg: Edema present.      Left lower leg: Edema present.      Comments: Lymphedema. Right medial thigh with large collection and wound covered by intact bandage with bloody drainage   Neurological:      General: No focal deficit present.      Mental Status: He is alert.      Comments: A&Ox3             Significant Labs: All pertinent labs within the past 24 hours have been reviewed.    Significant Imaging: I have reviewed all pertinent imaging results/findings within the past 24 hours.    Assessment and Plan     * Cellulitis of right thigh  Right medial thigh collection with overlying cellulitis and draining wound. In setting of chronic lymphedema.  Worsening over past 2 weeks with increased size and drainage of blood and pus.   Reports pain is well controlled with current regimen.  CT R thigh reported scattered areas of subcutaneous dense infiltration of the subcutaneous fat in the posterior and medial upper thigh, the lower calf and dorsal foot.  Correlation for  "cellulitis. No drainable fluid collection or abscess.     - Wound cultures positive for serratia and pseudomonas  - Started Ceftriaxone 2g q 24hr, switched to cefepime,  - ID recs to continue IV cefepime 2g Q8h  -- [considered FQ, but avoiding d/t h/o type-B aortic dissection]. To complete abx duration of at-least 2wks, SOHAN 02/03/25.  - Oxycodone 5 mg PO q4 hr prn moderate to severe pain.   - Wound care consulted, recs below:  -Clean right inner thigh with CGH soap/water, pat dry. Loosely pack 1/4" Iodoform or AMD antimicrobial packing gauze into open "holes" to ulcerated wound. Cover with ABD and secure with cloth tape, daily and prn. Apply a zinc barrier to periwound to protect skin from moisture/drainage.   - Remove bilateral lower leg wraps, once.     LEIF (acute kidney injury)  LEIF is likely due to acute tubular necrosis caused by blood pressure medications . Most recent creatinine and eGFR are listed below.  Recent Labs     01/23/25  0614 01/24/25  0337 01/25/25  0510   CREATININE 1.0 1.4 1.3   EGFRNORACEVR >60.0 53.7* 58.7*      Plan  - LEIF is stable  - Avoid nephrotoxins and renally dose meds for GFR listed above  - Monitor urine output, serial BMP, and adjust therapy as needed  - Trend RFT daily     AMS (altered mental status)  Rapid called 1/17 after pt found unresponsive by nursing.   Reportedly altered. Possibly vasovagal vs hypotension related to changes in antihypertensive regimen.   CT Head with no acute abnormality    -CTA Chest/Abdomen/Pelvis ordered, negative  -Resolved, pt currently A&Ox3.       Chronic knee pain  Reported pain 5/10 BLE (from knees down).     - Continue home gabapentin  - Continue oxy 5 mg q 4 prn    Debility  Patient with Acute on chronic debility due to  chronic lymphedema . The patient's latest AMPAC (Activity Measure for Post Acute Care) Score is listed below.    AM-PAC Score - How much help does the patient need for each activity listed  Basic Mobility Total Score: 15  Turning " over in bed (including adjusting bedclothes, sheets and blankets)?: A lot  Sitting down on and standing up from a chair with arms (e.g., wheelchair, bedside commode, etc.): A little  Moving from lying on back to sitting on the side of the bed?: A little  Moving to and from a bed to a chair (including a wheelchair)?: A little  Need to walk in hospital room?: A little  Climbing 3-5 steps with a railing?: Unable    Plan  - PT/OT consulted  - Fall precautions in place  - PT and OT rec moderate intensity therapy.   He reportedly does not have transport to go to daily outpatient therapy. Plan for SNF  - On 1/18 SW sent referral to [facility], pending placement     Edd's mobility limitation cannot be sufficiently resolved by the use of a cane. His functional mobility deficit can be sufficiently resolved with the use of a Rollator. Patient's mobility limitation significantly impairs their ability to participate in one of more activities of daily living.  The use of a Rollator will significantly improve the patient's ability to participate in MRADLS and the patient will use it on regular basis in the home.         Lymphedema  Chronic issue, pt reported ~ 7 yrs.  -Held home lasix 40 mg daily , restart as tolerated     History of aortic dissection  Hx Type B aortic dissection. Goal SBP <120 and HR <60 thought to be to stringent given 2 syncopal episodes related to titration of BP meds while here.     - Holding home antihypertensives  - Plan to restart home carvdilol and lasix, if SBP>140   - PRN labetalol     Hypertension  Patient's blood pressure range in the last 24 hours was: BP  Min: 98/55  Max: 132/67.The patient's inpatient anti-hypertensive regimen is listed below:  Current Antihypertensives  NIFEdipine (PROCARDIA-XL) 24 hr tablet, Daily, Oral  labetaloL tablet 100 mg, Daily PRN, Oral    Plan  - BP is uncontrolled, will adjust as follows: antihypertensives held currently following pre-syncopal episode on 1/24  - Can  consider restarting carvdilol, nifedipine and lisinopril as needed  - Held home lasix 40 mg daily, restart as tolerated      VTE Risk Mitigation (From admission, onward)           Ordered     heparin (porcine) injection 7,500 Units  Every 8 hours         01/16/25 1857     IP VTE HIGH RISK PATIENT  Once         01/15/25 2233                    Discharge Planning   BROWN: 1/27/2025     Code Status: Full Code   Medical Readiness for Discharge Date:   Discharge Plan A: Skilled Nursing Facility   Discharge Delays: None known at this time            Please place Justification for DME        Chasidy Arguelles MD  Department of Hospital Medicine   [facility] - [facility] ([place])

## 2025-01-25 NOTE — SUBJECTIVE & OBJECTIVE
Interval History: Yesterday evening, patient had pre-syncopal episode with hypotension. Held all antihypertensives for now. Plan to add back as tolerated. He reports that he is feeling much better this morning. No current lightheaded/dizziness.     Review of Systems  Objective:     Vital Signs (Most Recent):  Temp: 98 °F (36.7 °C) (01/25/25 1204)  Pulse: 65 (01/25/25 1204)  Resp: 18 (01/25/25 1204)  BP: 127/61 (01/25/25 1204)  SpO2: (!) 94 % (01/25/25 1204) Vital Signs (24h Range):  Temp:  [98 °F (36.7 °C)] 98 °F (36.7 °C)  Pulse:  [58-72] 65  Resp:  [13-27] 18  SpO2:  [91 %-96 %] 94 %  BP: ()/(55-67) 127/61     Weight: (!) 181.4 kg (400 lb)  Body mass index is 54.25 kg/m².    Intake/Output Summary (Last 24 hours) at 1/25/2025 1407  Last data filed at 1/25/2025 0730  Gross per 24 hour   Intake 240 ml   Output 1750 ml   Net -1510 ml         Physical Exam  Constitutional:       General: He is not in acute distress.     Appearance: He is not ill-appearing or diaphoretic.   HENT:      Head: Normocephalic and atraumatic.      Nose: Nose normal.      Mouth/Throat:      Mouth: Mucous membranes are moist.   Eyes:      Extraocular Movements: Extraocular movements intact.   Cardiovascular:      Rate and Rhythm: Normal rate and regular rhythm.      Heart sounds: No murmur heard.  Pulmonary:      Effort: Pulmonary effort is normal. No respiratory distress.      Breath sounds: Rhonchi: b/l lower lung fields.   Abdominal:      General: Abdomen is flat. There is no distension.      Tenderness: There is no abdominal tenderness.   Musculoskeletal:      Cervical back: Normal range of motion.      Right lower leg: Edema present.      Left lower leg: Edema present.      Comments: Lymphedema. Right medial thigh with large collection and wound covered by intact bandage with bloody drainage   Neurological:      General: No focal deficit present.      Mental Status: He is alert.      Comments: A&Ox3             Significant Labs: All  pertinent labs within the past 24 hours have been reviewed.    Significant Imaging: I have reviewed all pertinent imaging results/findings within the past 24 hours.

## 2025-01-25 NOTE — PLAN OF CARE
Discharge Plan A and Plan B have been determined by review of patient's clinical status, future medical and therapeutic needs, and coverage/benefits for post-acute care in coordination with multidisciplinary team members.    01/24/25 1800   Discharge Reassessment   Assessment Type Discharge Planning Reassessment   Discharge Plan discussed with: Adult children  (Edd Wills (Son)  [phone] (Home Phone))   Communicated BROWN with patient/caregiver Yes   Discharge Plan A Skilled Nursing Facility   DME Needed Upon Discharge  other (see comments)  (TBD)   Transition of Care Barriers Mobility   Why the patient remains in the hospital Requires continued medical care   Post-Acute Status   Post-Acute Authorization Placement   Post-Acute Placement Status Referrals Sent   Home Health Status Discharge Plan Changed   Coverage MEDICARE - MEDICARE PART A & B -   Patient choice form signed by patient/caregiver List with quality metrics by geographic area provided;List from System Post-Acute Care   Discharge Delays None known at this time     Spoke with Edd valencia Jr via phone  to review discharge recommendation of  SNF  and is agreeable to plan. CM left a provider list at the bedside     Patient/family provided list of facilities in-network with patient's payor plan. Providers that are owned, operated, or affiliated with [facility] are included on the list.     Notified that referral sent to below listed facilities from in-network list based on proximity to home/family support:  Previous CM sent the following SN F referrals     [facility] [place] [phone] referral received     2.  [facility] [phone]  referral  received     3.  [facility] [phone] referral received     4.  [facility]  [phone]  declined     5.  [facility] [phone] declined     6.  [facility]  [phone]     7. [facility] [phone]    8  jordan Villarreal/ [place]   [phone]    Patient/family instructed to identify preference.    Preferred Facility: (if more than 1, listed in order of descending preference)  [facility] -[phone]   [facility]     If an additional preferred facility not listed above is identified, additional referral to be sent. If above facilities unable to accept, will send additional referrals to in-network providers.      1/27/25   CM completed the LOCET via phone. CM faxed [facility] to obtain the 142 for NH admission          Ria Tian RN  Case Management  [facility]  [phone]

## 2025-01-25 NOTE — ASSESSMENT & PLAN NOTE
LEIF is likely due to acute tubular necrosis caused by blood pressure medications . Most recent creatinine and eGFR are listed below.  Recent Labs     01/23/25  0614 01/24/25  0337 01/25/25  0510   CREATININE 1.0 1.4 1.3   EGFRNORACEVR >60.0 53.7* 58.7*      Plan  - LEIF is stable  - Avoid nephrotoxins and renally dose meds for GFR listed above  - Monitor urine output, serial BMP, and adjust therapy as needed  - Trend RFT daily

## 2025-01-26 LAB
ALBUMIN SERPL BCP-MCNC: 2.6 G/DL (ref 3.5–5.2)
ALP SERPL-CCNC: 70 U/L (ref 40–150)
ALT SERPL W/O P-5'-P-CCNC: 21 U/L (ref 10–44)
ANION GAP SERPL CALC-SCNC: 7 MMOL/L (ref 8–16)
AST SERPL-CCNC: 18 U/L (ref 10–40)
BASOPHILS # BLD AUTO: 0.07 K/UL (ref 0–0.2)
BASOPHILS NFR BLD: 0.8 % (ref 0–1.9)
BILIRUB SERPL-MCNC: 0.4 MG/DL (ref 0.1–1)
BUN SERPL-MCNC: 38 MG/DL (ref 8–23)
CALCIUM SERPL-MCNC: 8.4 MG/DL (ref 8.7–10.5)
CHLORIDE SERPL-SCNC: 106 MMOL/L (ref 95–110)
CO2 SERPL-SCNC: 20 MMOL/L (ref 23–29)
CREAT SERPL-MCNC: 1.2 MG/DL (ref 0.5–1.4)
DIFFERENTIAL METHOD BLD: ABNORMAL
EOSINOPHIL # BLD AUTO: 0.3 K/UL (ref 0–0.5)
EOSINOPHIL NFR BLD: 2.9 % (ref 0–8)
ERYTHROCYTE [DISTWIDTH] IN BLOOD BY AUTOMATED COUNT: 14.6 % (ref 11.5–14.5)
EST. GFR  (NO RACE VARIABLE): >60 ML/MIN/1.73 M^2
GLUCOSE SERPL-MCNC: 80 MG/DL (ref 70–110)
HCT VFR BLD AUTO: 37.5 % (ref 40–54)
HGB BLD-MCNC: 12 G/DL (ref 14–18)
IMM GRANULOCYTES # BLD AUTO: 0.09 K/UL (ref 0–0.04)
IMM GRANULOCYTES NFR BLD AUTO: 1 % (ref 0–0.5)
LYMPHOCYTES # BLD AUTO: 2.1 K/UL (ref 1–4.8)
LYMPHOCYTES NFR BLD: 23.2 % (ref 18–48)
MAGNESIUM SERPL-MCNC: 2.2 MG/DL (ref 1.6–2.6)
MCH RBC QN AUTO: 27.2 PG (ref 27–31)
MCHC RBC AUTO-ENTMCNC: 32 G/DL (ref 32–36)
MCV RBC AUTO: 85 FL (ref 82–98)
MONOCYTES # BLD AUTO: 1.4 K/UL (ref 0.3–1)
MONOCYTES NFR BLD: 15.9 % (ref 4–15)
NEUTROPHILS # BLD AUTO: 5.1 K/UL (ref 1.8–7.7)
NEUTROPHILS NFR BLD: 56.2 % (ref 38–73)
NRBC BLD-RTO: 0 /100 WBC
PHOSPHATE SERPL-MCNC: 3.3 MG/DL (ref 2.7–4.5)
PLATELET # BLD AUTO: 284 K/UL (ref 150–450)
PMV BLD AUTO: 10.7 FL (ref 9.2–12.9)
POTASSIUM SERPL-SCNC: 4.6 MMOL/L (ref 3.5–5.1)
PROT SERPL-MCNC: 6.8 G/DL (ref 6–8.4)
RBC # BLD AUTO: 4.41 M/UL (ref 4.6–6.2)
SODIUM SERPL-SCNC: 133 MMOL/L (ref 136–145)
WBC # BLD AUTO: 9.01 K/UL (ref 3.9–12.7)

## 2025-01-26 PROCEDURE — 83735 ASSAY OF MAGNESIUM: CPT

## 2025-01-26 PROCEDURE — 63600175 PHARM REV CODE 636 W HCPCS

## 2025-01-26 PROCEDURE — 20600001 HC STEP DOWN PRIVATE ROOM

## 2025-01-26 PROCEDURE — 80053 COMPREHEN METABOLIC PANEL: CPT

## 2025-01-26 PROCEDURE — 36415 COLL VENOUS BLD VENIPUNCTURE: CPT

## 2025-01-26 PROCEDURE — 25000003 PHARM REV CODE 250: Performed by: PHYSICIAN ASSISTANT

## 2025-01-26 PROCEDURE — 25000003 PHARM REV CODE 250

## 2025-01-26 PROCEDURE — 84100 ASSAY OF PHOSPHORUS: CPT

## 2025-01-26 PROCEDURE — 63600175 PHARM REV CODE 636 W HCPCS: Performed by: STUDENT IN AN ORGANIZED HEALTH CARE EDUCATION/TRAINING PROGRAM

## 2025-01-26 PROCEDURE — 85025 COMPLETE CBC W/AUTO DIFF WBC: CPT

## 2025-01-26 RX ORDER — FUROSEMIDE 40 MG/1
40 TABLET ORAL DAILY
Status: DISCONTINUED | OUTPATIENT
Start: 2025-01-26 | End: 2025-01-29 | Stop reason: HOSPADM

## 2025-01-26 RX ORDER — CARVEDILOL 12.5 MG/1
12.5 TABLET ORAL 2 TIMES DAILY
Status: DISCONTINUED | OUTPATIENT
Start: 2025-01-26 | End: 2025-01-27

## 2025-01-26 RX ADMIN — MICONAZOLE NITRATE: 20 OINTMENT TOPICAL at 09:01

## 2025-01-26 RX ADMIN — HEPARIN SODIUM 7500 UNITS: 5000 INJECTION INTRAVENOUS; SUBCUTANEOUS at 02:01

## 2025-01-26 RX ADMIN — MICONAZOLE NITRATE 2 % TOPICAL POWDER: at 09:01

## 2025-01-26 RX ADMIN — OXYCODONE 5 MG: 5 TABLET ORAL at 09:01

## 2025-01-26 RX ADMIN — FUROSEMIDE 40 MG: 40 TABLET ORAL at 10:01

## 2025-01-26 RX ADMIN — CARVEDILOL 12.5 MG: 12.5 TABLET, FILM COATED ORAL at 10:01

## 2025-01-26 RX ADMIN — GABAPENTIN 600 MG: 300 CAPSULE ORAL at 09:01

## 2025-01-26 RX ADMIN — OXYCODONE 5 MG: 5 TABLET ORAL at 10:01

## 2025-01-26 RX ADMIN — CEFEPIME 2 G: 2 INJECTION, POWDER, FOR SOLUTION INTRAVENOUS at 09:01

## 2025-01-26 RX ADMIN — ASPIRIN 81 MG: 81 TABLET, COATED ORAL at 09:01

## 2025-01-26 RX ADMIN — HEPARIN SODIUM 7500 UNITS: 5000 INJECTION INTRAVENOUS; SUBCUTANEOUS at 05:01

## 2025-01-26 RX ADMIN — CEFEPIME 2 G: 2 INJECTION, POWDER, FOR SOLUTION INTRAVENOUS at 01:01

## 2025-01-26 RX ADMIN — HEPARIN SODIUM 7500 UNITS: 5000 INJECTION INTRAVENOUS; SUBCUTANEOUS at 10:01

## 2025-01-26 RX ADMIN — GABAPENTIN 600 MG: 300 CAPSULE ORAL at 10:01

## 2025-01-26 RX ADMIN — CEFEPIME 2 G: 2 INJECTION, POWDER, FOR SOLUTION INTRAVENOUS at 04:01

## 2025-01-26 RX ADMIN — ATORVASTATIN CALCIUM 40 MG: 40 TABLET, FILM COATED ORAL at 09:01

## 2025-01-26 RX ADMIN — POLYETHYLENE GLYCOL 3350 17 G: 17 POWDER, FOR SOLUTION ORAL at 09:01

## 2025-01-26 RX ADMIN — POLYETHYLENE GLYCOL 3350 17 G: 17 POWDER, FOR SOLUTION ORAL at 10:01

## 2025-01-26 RX ADMIN — SENNOSIDES 8.6 MG: 8.6 TABLET, FILM COATED ORAL at 09:01

## 2025-01-26 NOTE — ASSESSMENT & PLAN NOTE
"Right medial thigh collection with overlying cellulitis and draining wound. In setting of chronic lymphedema.  Worsening over past 2 weeks with increased size and drainage of blood and pus.   CT R thigh reported scattered areas of subcutaneous dense infiltration of the subcutaneous fat in the posterior and medial upper thigh, the lower calf and dorsal foot.  Correlation for cellulitis. No drainable fluid collection or abscess.     - Wound cultures positive for serratia and pseudomonas  - Started Ceftriaxone 2g q 24hr, switched to cefepime,  - ID recs to continue IV cefepime 2g Q8h  -- [considered FQ, but avoiding d/t h/o type-B aortic dissection]. To complete abx duration of at-least 2wks, SOHAN 02/03/25.  - Oxycodone 5 mg PO q4 hr prn moderate to severe pain.   - Wound care consulted, recs below:  -Clean right inner thigh with CGH soap/water, pat dry. Loosely pack 1/4" Iodoform or AMD antimicrobial packing gauze into open "holes" to ulcerated wound. Cover with ABD and secure with cloth tape, daily and prn. Apply a zinc barrier to periwound to protect skin from moisture/drainage.   - Remove bilateral lower leg wraps, once.   "

## 2025-01-26 NOTE — PROGRESS NOTES
"[facility] ([place] [address])  [facility] Medicine  Progress Note    Patient Name: Edd Wills  MRN: [id number]  Patient Class: IP- Inpatient   Admission Date: 1/15/2025  Length of Stay: 10 days  Attending Physician: Abdon Dutton MD  Primary Care Provider: Zuleyka Sabillon MD        Subjective     Principal Problem:Cellulitis of right thigh        HPI:  Edd Wills is a 69 y.o. male with past medical history of malignant HTN, HLD, morbid obesity, Type B aortic dissection, chronic lymphedema, LLUVIA, chronic neck pain s/p cervical fusion and laminectomy, chronic bilateral knee pain, grade 1 diastolic dysfunction, and CKD who presented for abscess on his R medial thigh. He has a 6-7 year history of a lymphedematous collection to his R medial thigh, however noticed a week ago that there was mild purulent drainage coming from a new wound. He also reports the collection has increased in size over the past week. Wound care visits him 3 times a week to manage this collection, but a couple days before his presentation, while trying to express some of the drainage, drainage evolved to "blood clots" and a large amount of purulent drainage which wound care felt was too extensive to care for at home. He was then recommended to present to the ED. He denies fever, chills, SOB, chest pain, N/V, diarrhea, abdominal pain, and dysuria.      In ED, vitals were hypertensive to 144, but otherwise hemodynamically stable. Labs were significant for Hgb 13.2 and hematocrit 39.5 with no leukocytosis. Na 135 and albumin 3.0. He was started on clindamycin in D5W 600mg/50mL Q8 for his cellulitis.       Overview/Hospital Course:  Patient with chronic lymphedema and hx Type B aortic dissection admitted with R medial thigh collection. Placed on Clindamycin. CT of RLE with scattered areas of subcutaneous dense infiltration of the subcutaneous fat in the posterior and medial upper thigh, the lower calf and dorsal foot c/f cellulitis. " However no drainable fluid collection or abscess seen. Patient's wound culture grew serratia and pseudomonas. Transitioned to cefepime. Only oral option based on susceptibilities is fluoroquinolones but this is contraindicated with hx of aortic dissection. Consulted ID who recommended to continue IV cefepime 2g Q8h to complete abx duration of at-least 2 weeks, SOHAN 02/03/25. Patient had rapid called on 1/17 when he was found unresponsive.  ICU team came to room to assess. CTH and CTA chest showed no acute abnormalities. Likely related to titration of antihypertensives to meet BP goal <120 given hx aortic dissection. Pt also had minor elevation in creatinine but no change in his urine output. Patient had second presyncopal episode on 1/26, likely related to strict BP control. Holding antihypertensives currently and plan to add back as tolerated. Patient's blood pressure control can be slightly more lenient on discharge to avoid any hypotensive episodes.SNF arrangements are underway.       Interval History: No acute events overnight. Patient reports that he is feeling well this morning. BP this morning 133/56. He reports some increased lower extremity swelling.     Review of Systems  Objective:     Vital Signs (Most Recent):  Temp: 98.7 °F (37.1 °C) (01/26/25 0803)  Pulse: 69 (01/26/25 0803)  Resp: (!) 25 (01/26/25 0803)  BP: (!) 133/56 (01/26/25 0803)  SpO2: 96 % (01/26/25 0803) Vital Signs (24h Range):  Temp:  [98 °F (36.7 °C)-98.7 °F (37.1 °C)] 98.7 °F (37.1 °C)  Pulse:  [56-72] 69  Resp:  [12-25] 25  SpO2:  [94 %-97 %] 96 %  BP: (115-133)/(55-61) 133/56     Weight: (!) 181.4 kg (400 lb)  Body mass index is 54.25 kg/m².    Intake/Output Summary (Last 24 hours) at 1/26/2025 0831  Last data filed at 1/26/2025 0616  Gross per 24 hour   Intake 120 ml   Output 400 ml   Net -280 ml         Physical Exam  Constitutional:       General: He is not in acute distress.     Appearance: He is not ill-appearing or diaphoretic.    HENT:      Head: Normocephalic and atraumatic.      Nose: Nose normal.      Mouth/Throat:      Mouth: Mucous membranes are moist.   Eyes:      Extraocular Movements: Extraocular movements intact.   Cardiovascular:      Rate and Rhythm: Normal rate and regular rhythm.      Heart sounds: No murmur heard.  Pulmonary:      Effort: Pulmonary effort is normal. No respiratory distress.      Breath sounds: Rhonchi: b/l lower lung fields.   Abdominal:      General: Abdomen is flat. There is no distension.      Tenderness: There is no abdominal tenderness.   Musculoskeletal:      Cervical back: Normal range of motion.      Right lower leg: Edema present.      Left lower leg: Edema present.      Comments: Lymphedema. Right medial thigh with large collection and wound covered by clean intact bandage   Neurological:      General: No focal deficit present.      Mental Status: He is alert.      Comments: A&Ox3             Significant Labs: All pertinent labs within the past 24 hours have been reviewed.    Significant Imaging: I have reviewed all pertinent imaging results/findings within the past 24 hours.    Assessment and Plan     * Cellulitis of right thigh  Right medial thigh collection with overlying cellulitis and draining wound. In setting of chronic lymphedema.  Worsening over past 2 weeks with increased size and drainage of blood and pus.   CT R thigh reported scattered areas of subcutaneous dense infiltration of the subcutaneous fat in the posterior and medial upper thigh, the lower calf and dorsal foot.  Correlation for cellulitis. No drainable fluid collection or abscess.     - Wound cultures positive for serratia and pseudomonas  - Started Ceftriaxone 2g q 24hr, switched to cefepime,  - ID recs to continue IV cefepime 2g Q8h  -- [considered FQ, but avoiding d/t h/o type-B aortic dissection]. To complete abx duration of at-least 2wks, SOHAN 02/03/25.  - Oxycodone 5 mg PO q4 hr prn moderate to severe pain.   - Wound care  "consulted, recs below:  -Clean right inner thigh with CGH soap/water, pat dry. Loosely pack 1/4" Iodoform or AMD antimicrobial packing gauze into open "holes" to ulcerated wound. Cover with ABD and secure with cloth tape, daily and prn. Apply a zinc barrier to periwound to protect skin from moisture/drainage.   - Remove bilateral lower leg wraps, once.     LEIF (acute kidney injury)  LEIF is likely due to acute tubular necrosis caused by blood pressure medications . Most recent creatinine and eGFR are listed below.  Recent Labs     01/24/25  0337 01/25/25  0510 01/26/25  0418   CREATININE 1.4 1.3 1.2   EGFRNORACEVR 53.7* 58.7* >60.0      Plan  - LEIF is stable  - Avoid nephrotoxins and renally dose meds for GFR listed above  - Monitor urine output, serial BMP, and adjust therapy as needed  - Trend RFT daily     AMS (altered mental status)  Rapid called 1/17 after pt found unresponsive by nursing.   Reportedly altered. Possibly vasovagal vs hypotension related to changes in antihypertensive regimen.   CT Head with no acute abnormality    -CTA Chest/Abdomen/Pelvis ordered, negative  -Resolved, pt currently A&Ox3.       Chronic knee pain  Reported pain 5/10 BLE (from knees down).     - Continue home gabapentin  - Continue oxy 5 mg q 4 prn    Debility  Patient with Acute on chronic debility due to  chronic lymphedema . The patient's latest AMPAC (Activity Measure for Post Acute Care) Score is listed below.    AM-PAC Score - How much help does the patient need for each activity listed  Basic Mobility Total Score: 15  Turning over in bed (including adjusting bedclothes, sheets and blankets)?: A lot  Sitting down on and standing up from a chair with arms (e.g., wheelchair, bedside commode, etc.): A little  Moving from lying on back to sitting on the side of the bed?: A little  Moving to and from a bed to a chair (including a wheelchair)?: A little  Need to walk in hospital room?: A little  Climbing 3-5 steps with a railing?: " Unable    Plan  - PT/OT consulted  - Fall precautions in place  - PT and OT rec moderate intensity therapy.   - On 1/18 SW sent referral to [facility], pending placement     Edd's mobility limitation cannot be sufficiently resolved by the use of a cane. His functional mobility deficit can be sufficiently resolved with the use of a Rollator. Patient's mobility limitation significantly impairs their ability to participate in one of more activities of daily living.  The use of a Rollator will significantly improve the patient's ability to participate in MRADLS and the patient will use it on regular basis in the home.         Lymphedema  Chronic issue, pt reported ~ 7 yrs.  -Held home lasix 40 mg daily, restart as tolerated     History of aortic dissection  Hx Type B aortic dissection. Goal SBP <120 and HR <60 thought to be to stringent given 2 syncopal episodes related to titration of BP meds while here.     - Holding home antihypertensives  - Plan to restart home carvdilol and lasix, if SBP>140   - PRN labetalol     Hypertension  Patient's blood pressure range in the last 24 hours was: BP  Min: 115/56  Max: 133/56.The patient's inpatient anti-hypertensive regimen is listed below:  Current Antihypertensives  NIFEdipine (PROCARDIA-XL) 24 hr tablet, Daily, Oral  labetaloL tablet 100 mg, Daily PRN, Oral    Plan  - BP is uncontrolled, will adjust as follows: antihypertensives held currently following pre-syncopal episode on 1/24  - Can consider restarting carvdilol, nifedipine and lisinopril as needed  - Held home lasix 40 mg daily, restart as tolerated      VTE Risk Mitigation (From admission, onward)           Ordered     heparin (porcine) injection 7,500 Units  Every 8 hours         01/16/25 1857     IP VTE HIGH RISK PATIENT  Once         01/15/25 2233                    Discharge Planning   BROWN: 1/27/2025     Code Status: Full Code   Medical Readiness for Discharge Date:   Discharge Plan A: Skilled Nursing Facility    Discharge Delays: None known at this time            Please place Justification for DME        Chasidy Arguelles MD  Department of Hospital Medicine   [address] [facility] ([place])

## 2025-01-26 NOTE — ASSESSMENT & PLAN NOTE
LEIF is likely due to acute tubular necrosis caused by blood pressure medications . Most recent creatinine and eGFR are listed below.  Recent Labs     01/24/25  0337 01/25/25  0510 01/26/25  0418   CREATININE 1.4 1.3 1.2   EGFRNORACEVR 53.7* 58.7* >60.0      Plan  - LEIF is stable  - Avoid nephrotoxins and renally dose meds for GFR listed above  - Monitor urine output, serial BMP, and adjust therapy as needed  - Trend RFT daily

## 2025-01-26 NOTE — PLAN OF CARE
Discharge Plan A and Plan B have been determined by review of patient's clinical status, future medical and therapeutic needs, and coverage/benefits for post-acute care in coordination with multidisciplinary team members.        01/26/25 1002   Discharge Reassessment   Assessment Type Discharge Planning Reassessment   Did the patient's condition or plan change since previous assessment? No   Discharge Plan discussed with: Adult children;Patient  (Edd Wills (Son)  [phone] (Home Phone))   Communicated BROWN with patient/caregiver Yes   Discharge Plan A Skilled Nursing Facility   Transition of Care Barriers Mobility   Why the patient remains in the hospital Requires continued medical care   Post-Acute Status   Post-Acute Authorization Placement;HME   Post-Acute Placement Status Referrals Sent   HME Status Pending Qualifying Diagnosis  (Rollator)   Home Health Status Discharge Plan Changed   Coverage MEDICARE - MEDICARE PART A & B   Patient choice form signed by patient/caregiver List with quality metrics by geographic area provided;List from System Post-Acute Care   Discharge Delays None known at this time         CM met with patient and/ spoke with Edd kpaoor  via phone  to discuss discharge planning.  Patients plan is to  dc to SNF. CM provided the son a list of facilities.     Case Management will continue to follow and assist with discharge planning.  BROWN: 1/28/25     Discharge Recommendations:  Moderate Intensity Therapy    Discharge Equipment Recommendations: rollator      Barriers to discharge: Evolving Clinical Presentation       TREATMENT PLAN UPDATE     1006 am  CM to follow up SNF referrals and 142       Ria Tian RN  Case Management  [facility]  [phone]

## 2025-01-26 NOTE — ASSESSMENT & PLAN NOTE
Patient with Acute on chronic debility due to  chronic lymphedema . The patient's latest AMPAC (Activity Measure for Post Acute Care) Score is listed below.    AM-PAC Score - How much help does the patient need for each activity listed  Basic Mobility Total Score: 15  Turning over in bed (including adjusting bedclothes, sheets and blankets)?: A lot  Sitting down on and standing up from a chair with arms (e.g., wheelchair, bedside commode, etc.): A little  Moving from lying on back to sitting on the side of the bed?: A little  Moving to and from a bed to a chair (including a wheelchair)?: A little  Need to walk in hospital room?: A little  Climbing 3-5 steps with a railing?: Unable    Plan  - PT/OT consulted  - Fall precautions in place  - PT and OT rec moderate intensity therapy.   - On 1/18  sent referral to [facility], pending placement     Edd's mobility limitation cannot be sufficiently resolved by the use of a cane. His functional mobility deficit can be sufficiently resolved with the use of a Rollator. Patient's mobility limitation significantly impairs their ability to participate in one of more activities of daily living.  The use of a Rollator will significantly improve the patient's ability to participate in MRADLS and the patient will use it on regular basis in the home.

## 2025-01-26 NOTE — SUBJECTIVE & OBJECTIVE
Interval History: No acute events overnight. Patient reports that he is feeling well this morning. BP this morning 133/56. He reports some increased lower extremity swelling.     Review of Systems  Objective:     Vital Signs (Most Recent):  Temp: 98.7 °F (37.1 °C) (01/26/25 0803)  Pulse: 69 (01/26/25 0803)  Resp: (!) 25 (01/26/25 0803)  BP: (!) 133/56 (01/26/25 0803)  SpO2: 96 % (01/26/25 0803) Vital Signs (24h Range):  Temp:  [98 °F (36.7 °C)-98.7 °F (37.1 °C)] 98.7 °F (37.1 °C)  Pulse:  [56-72] 69  Resp:  [12-25] 25  SpO2:  [94 %-97 %] 96 %  BP: (115-133)/(55-61) 133/56     Weight: (!) 181.4 kg (400 lb)  Body mass index is 54.25 kg/m².    Intake/Output Summary (Last 24 hours) at 1/26/2025 0831  Last data filed at 1/26/2025 0616  Gross per 24 hour   Intake 120 ml   Output 400 ml   Net -280 ml         Physical Exam  Constitutional:       General: He is not in acute distress.     Appearance: He is not ill-appearing or diaphoretic.   HENT:      Head: Normocephalic and atraumatic.      Nose: Nose normal.      Mouth/Throat:      Mouth: Mucous membranes are moist.   Eyes:      Extraocular Movements: Extraocular movements intact.   Cardiovascular:      Rate and Rhythm: Normal rate and regular rhythm.      Heart sounds: No murmur heard.  Pulmonary:      Effort: Pulmonary effort is normal. No respiratory distress.      Breath sounds: Rhonchi: b/l lower lung fields.   Abdominal:      General: Abdomen is flat. There is no distension.      Tenderness: There is no abdominal tenderness.   Musculoskeletal:      Cervical back: Normal range of motion.      Right lower leg: Edema present.      Left lower leg: Edema present.      Comments: Lymphedema. Right medial thigh with large collection and wound covered by clean intact bandage   Neurological:      General: No focal deficit present.      Mental Status: He is alert.      Comments: A&Ox3             Significant Labs: All pertinent labs within the past 24 hours have been  reviewed.    Significant Imaging: I have reviewed all pertinent imaging results/findings within the past 24 hours.

## 2025-01-26 NOTE — ASSESSMENT & PLAN NOTE
Patient's blood pressure range in the last 24 hours was: BP  Min: 115/56  Max: 133/56.The patient's inpatient anti-hypertensive regimen is listed below:  Current Antihypertensives  NIFEdipine (PROCARDIA-XL) 24 hr tablet, Daily, Oral  labetaloL tablet 100 mg, Daily PRN, Oral    Plan  - BP is uncontrolled, will adjust as follows: antihypertensives held currently following pre-syncopal episode on 1/24  - Can consider restarting carvdilol, nifedipine and lisinopril as needed  - Held home lasix 40 mg daily, restart as tolerated

## 2025-01-27 ENCOUNTER — TELEPHONE (OUTPATIENT)
Dept: INFECTIOUS DISEASES | Facility: CLINIC | Age: 70
End: 2025-01-27
Payer: MEDICARE

## 2025-01-27 LAB
ALBUMIN SERPL BCP-MCNC: 2.6 G/DL (ref 3.5–5.2)
ALP SERPL-CCNC: 68 U/L (ref 40–150)
ALT SERPL W/O P-5'-P-CCNC: 18 U/L (ref 10–44)
ANION GAP SERPL CALC-SCNC: 7 MMOL/L (ref 8–16)
AST SERPL-CCNC: 17 U/L (ref 10–40)
BASOPHILS # BLD AUTO: 0.05 K/UL (ref 0–0.2)
BASOPHILS NFR BLD: 0.6 % (ref 0–1.9)
BILIRUB SERPL-MCNC: 0.4 MG/DL (ref 0.1–1)
BUN SERPL-MCNC: 36 MG/DL (ref 8–23)
CALCIUM SERPL-MCNC: 8.7 MG/DL (ref 8.7–10.5)
CHLORIDE SERPL-SCNC: 107 MMOL/L (ref 95–110)
CO2 SERPL-SCNC: 21 MMOL/L (ref 23–29)
CREAT SERPL-MCNC: 1 MG/DL (ref 0.5–1.4)
DIFFERENTIAL METHOD BLD: ABNORMAL
EOSINOPHIL # BLD AUTO: 0.2 K/UL (ref 0–0.5)
EOSINOPHIL NFR BLD: 2.8 % (ref 0–8)
ERYTHROCYTE [DISTWIDTH] IN BLOOD BY AUTOMATED COUNT: 14.6 % (ref 11.5–14.5)
EST. GFR  (NO RACE VARIABLE): >60 ML/MIN/1.73 M^2
GLUCOSE SERPL-MCNC: 91 MG/DL (ref 70–110)
HCT VFR BLD AUTO: 38.3 % (ref 40–54)
HGB BLD-MCNC: 12.1 G/DL (ref 14–18)
IMM GRANULOCYTES # BLD AUTO: 0.07 K/UL (ref 0–0.04)
IMM GRANULOCYTES NFR BLD AUTO: 0.9 % (ref 0–0.5)
LYMPHOCYTES # BLD AUTO: 2.3 K/UL (ref 1–4.8)
LYMPHOCYTES NFR BLD: 28.6 % (ref 18–48)
MAGNESIUM SERPL-MCNC: 2.2 MG/DL (ref 1.6–2.6)
MCH RBC QN AUTO: 26.8 PG (ref 27–31)
MCHC RBC AUTO-ENTMCNC: 31.6 G/DL (ref 32–36)
MCV RBC AUTO: 85 FL (ref 82–98)
MONOCYTES # BLD AUTO: 1.2 K/UL (ref 0.3–1)
MONOCYTES NFR BLD: 15 % (ref 4–15)
NEUTROPHILS # BLD AUTO: 4.3 K/UL (ref 1.8–7.7)
NEUTROPHILS NFR BLD: 52.1 % (ref 38–73)
NRBC BLD-RTO: 0 /100 WBC
PHOSPHATE SERPL-MCNC: 3.1 MG/DL (ref 2.7–4.5)
PLATELET # BLD AUTO: 273 K/UL (ref 150–450)
PMV BLD AUTO: 10.2 FL (ref 9.2–12.9)
POTASSIUM SERPL-SCNC: 4.6 MMOL/L (ref 3.5–5.1)
PROT SERPL-MCNC: 6.8 G/DL (ref 6–8.4)
RBC # BLD AUTO: 4.52 M/UL (ref 4.6–6.2)
SODIUM SERPL-SCNC: 135 MMOL/L (ref 136–145)
WBC # BLD AUTO: 8.18 K/UL (ref 3.9–12.7)

## 2025-01-27 PROCEDURE — 85025 COMPLETE CBC W/AUTO DIFF WBC: CPT

## 2025-01-27 PROCEDURE — 20600001 HC STEP DOWN PRIVATE ROOM

## 2025-01-27 PROCEDURE — 83735 ASSAY OF MAGNESIUM: CPT

## 2025-01-27 PROCEDURE — 36415 COLL VENOUS BLD VENIPUNCTURE: CPT

## 2025-01-27 PROCEDURE — 84100 ASSAY OF PHOSPHORUS: CPT

## 2025-01-27 PROCEDURE — 97530 THERAPEUTIC ACTIVITIES: CPT

## 2025-01-27 PROCEDURE — 63600175 PHARM REV CODE 636 W HCPCS: Performed by: STUDENT IN AN ORGANIZED HEALTH CARE EDUCATION/TRAINING PROGRAM

## 2025-01-27 PROCEDURE — 25000003 PHARM REV CODE 250: Performed by: PHYSICIAN ASSISTANT

## 2025-01-27 PROCEDURE — 80053 COMPREHEN METABOLIC PANEL: CPT

## 2025-01-27 PROCEDURE — 63600175 PHARM REV CODE 636 W HCPCS

## 2025-01-27 PROCEDURE — 25000003 PHARM REV CODE 250

## 2025-01-27 PROCEDURE — 97116 GAIT TRAINING THERAPY: CPT

## 2025-01-27 PROCEDURE — 25000003 PHARM REV CODE 250: Performed by: STUDENT IN AN ORGANIZED HEALTH CARE EDUCATION/TRAINING PROGRAM

## 2025-01-27 RX ORDER — CARVEDILOL 25 MG/1
25 TABLET ORAL 2 TIMES DAILY
Status: DISCONTINUED | OUTPATIENT
Start: 2025-01-27 | End: 2025-01-29 | Stop reason: HOSPADM

## 2025-01-27 RX ADMIN — ATORVASTATIN CALCIUM 40 MG: 40 TABLET, FILM COATED ORAL at 08:01

## 2025-01-27 RX ADMIN — GABAPENTIN 600 MG: 300 CAPSULE ORAL at 09:01

## 2025-01-27 RX ADMIN — CEFEPIME 2 G: 2 INJECTION, POWDER, FOR SOLUTION INTRAVENOUS at 12:01

## 2025-01-27 RX ADMIN — HEPARIN SODIUM 7500 UNITS: 5000 INJECTION INTRAVENOUS; SUBCUTANEOUS at 09:01

## 2025-01-27 RX ADMIN — MICONAZOLE NITRATE: 20 OINTMENT TOPICAL at 08:01

## 2025-01-27 RX ADMIN — CEFEPIME 2 G: 2 INJECTION, POWDER, FOR SOLUTION INTRAVENOUS at 08:01

## 2025-01-27 RX ADMIN — SENNOSIDES 8.6 MG: 8.6 TABLET, FILM COATED ORAL at 08:01

## 2025-01-27 RX ADMIN — ASPIRIN 81 MG: 81 TABLET, COATED ORAL at 08:01

## 2025-01-27 RX ADMIN — GABAPENTIN 600 MG: 300 CAPSULE ORAL at 08:01

## 2025-01-27 RX ADMIN — HEPARIN SODIUM 7500 UNITS: 5000 INJECTION INTRAVENOUS; SUBCUTANEOUS at 02:01

## 2025-01-27 RX ADMIN — MICONAZOLE NITRATE 2 % TOPICAL POWDER: at 08:01

## 2025-01-27 RX ADMIN — CARVEDILOL 25 MG: 25 TABLET, FILM COATED ORAL at 09:01

## 2025-01-27 RX ADMIN — OXYCODONE 5 MG: 5 TABLET ORAL at 09:01

## 2025-01-27 RX ADMIN — FUROSEMIDE 40 MG: 40 TABLET ORAL at 08:01

## 2025-01-27 RX ADMIN — CEFEPIME 2 G: 2 INJECTION, POWDER, FOR SOLUTION INTRAVENOUS at 05:01

## 2025-01-27 RX ADMIN — CARVEDILOL 12.5 MG: 12.5 TABLET, FILM COATED ORAL at 08:01

## 2025-01-27 RX ADMIN — HEPARIN SODIUM 7500 UNITS: 5000 INJECTION INTRAVENOUS; SUBCUTANEOUS at 06:01

## 2025-01-27 RX ADMIN — OXYCODONE 5 MG: 5 TABLET ORAL at 08:01

## 2025-01-27 RX ADMIN — POLYETHYLENE GLYCOL 3350 17 G: 17 POWDER, FOR SOLUTION ORAL at 08:01

## 2025-01-27 NOTE — ASSESSMENT & PLAN NOTE
Hx Type B aortic dissection. Goal SBP <120 and HR <60 thought to be to stringent given 2 syncopal episodes related to titration of BP meds while here.     - Lasix restarted  - Carvedilol restarted  - Plan to restart home carvdilol and lasix, if SBP>140   - PRN labetalol

## 2025-01-27 NOTE — SUBJECTIVE & OBJECTIVE
Interval History: No active events overnight, vitaks were stable, labs were unremarkable. Continue Cefepime, PT/OT, Patient medically ready for discharge, awaiting placement.    Review of Systems  Objective:     Vital Signs (Most Recent):  Temp: 98.4 °F (36.9 °C) (01/27/25 0755)  Pulse: 67 (01/27/25 0755)  Resp: 18 (01/27/25 0846)  BP: 132/60 (01/27/25 0755)  SpO2:  (02 PROBE OFF) (01/27/25 1048) Vital Signs (24h Range):  Temp:  [97.9 °F (36.6 °C)-98.4 °F (36.9 °C)] 98.4 °F (36.9 °C)  Pulse:  [60-67] 67  Resp:  [18-22] 18  SpO2:  [93 %-97 %] 97 %  BP: (132-144)/(60-67) 132/60     Weight: (!) 181.4 kg (400 lb)  Body mass index is 54.25 kg/m².    Intake/Output Summary (Last 24 hours) at 1/27/2025 1310  Last data filed at 1/27/2025 1100  Gross per 24 hour   Intake 717 ml   Output 1201 ml   Net -484 ml         Physical Exam  Vitals and nursing note reviewed.   Constitutional:       General: He is not in acute distress.     Appearance: He is not ill-appearing or diaphoretic.   HENT:      Head: Normocephalic and atraumatic.      Nose: Nose normal.      Mouth/Throat:      Mouth: Mucous membranes are moist.   Eyes:      Extraocular Movements: Extraocular movements intact.   Cardiovascular:      Rate and Rhythm: Normal rate and regular rhythm.      Heart sounds: No murmur heard.  Pulmonary:      Effort: Pulmonary effort is normal. No respiratory distress.      Breath sounds: Rhonchi: b/l lower lung fields.   Abdominal:      General: Abdomen is flat. There is no distension.      Tenderness: There is no abdominal tenderness.   Musculoskeletal:      Cervical back: Normal range of motion.      Right lower leg: Edema present.      Left lower leg: Edema present.      Comments: Lymphedema. Right medial thigh with large collection and wound covered by clean intact bandage   Skin:     General: Skin is warm.   Neurological:      Mental Status: He is alert and oriented to person, place, and time.             Significant Labs: All  pertinent labs within the past 24 hours have been reviewed.    Significant Imaging: I have reviewed all pertinent imaging results/findings within the past 24 hours.

## 2025-01-27 NOTE — PLAN OF CARE
"CM completed the LOCET via phone. CM faxed [facility] to obtain the 142 for NH admission and uploaded via  EPIC    Your fax has been successfully sent to [phone] at [phone].  ------------------------------------------------------------  From: [id number]  ------------------------------------------------------------  1/27/2025 2:19:26 PM Transmission Record          Sent to [phone] with remote ID "Fax "          Result: (0/339;0/0) Success          Page record: 1 - 5          Elapsed time: 02:07 on channel 20         01/27/25 1434   Post-Acute Status   Post-Acute Authorization Placement   Post-Acute Placement Status Set-up Complete/Auth obtained   Discharge Plan   Discharge Plan A Skilled Nursing Facility  ([facility] [phone])     2:35 pm   [facility] is willing to [facility] and CM spoke with sonEdd Jr via phone [phone]  and is in agreement with pursuing [facility]       1/28/2025 0843 am  142 uploaded via EPIC     Ria Tian RN  Case Management  [facility]  [phone]   "

## 2025-01-27 NOTE — PLAN OF CARE
Pt had no acute changes during my shift. AAOx4, VSS, o2 saturation above 95% on rm air, up in recliner chair. Continues with abx tx, afebrile. Drsg to rt medial thigh changed. Oxy given for pain. Good appetite. Uses walker with one person for assistance and transfers. Free from falls and injuries. Recliner chair locked. Bed locked and low. Call julien in reach. Nad present. Safety measures maintained.    Problem: Adult Inpatient Plan of Care  Goal: Plan of Care Review  Outcome: Progressing  Goal: Patient-Specific Goal (Individualized)  Outcome: Progressing  Goal: Absence of Hospital-Acquired Illness or Injury  Outcome: Progressing  Goal: Optimal Comfort and Wellbeing  Outcome: Progressing  Goal: Readiness for Transition of Care  Outcome: Progressing     Problem: Bariatric Environmental Safety  Goal: Safety Maintained with Care  Outcome: Progressing     Problem: Skin or Soft Tissue Infection  Goal: Absence of Infection Signs and Symptoms  Outcome: Progressing     Problem: Wound  Goal: Optimal Coping  Outcome: Progressing  Goal: Optimal Functional Ability  Outcome: Progressing  Goal: Absence of Infection Signs and Symptoms  Outcome: Progressing  Goal: Improved Oral Intake  Outcome: Progressing  Goal: Optimal Pain Control and Function  Outcome: Progressing  Goal: Skin Health and Integrity  Outcome: Progressing  Goal: Optimal Wound Healing  Outcome: Progressing

## 2025-01-27 NOTE — PLAN OF CARE
Pt Aaox4, VSS. Continues with IV abt. Afebrile. Up with assistance. Pain mgmt as needed. Wound drsg dry and intact. Free from falls and injuries. Bed locked and low. Nad present.  Safety measures maintained.     Problem: Adult Inpatient Plan of Care  Goal: Plan of Care Review  Outcome: Progressing  Goal: Patient-Specific Goal (Individualized)  Outcome: Progressing  Goal: Absence of Hospital-Acquired Illness or Injury  Outcome: Progressing  Goal: Optimal Comfort and Wellbeing  Outcome: Progressing  Goal: Readiness for Transition of Care  Outcome: Progressing     Problem: Bariatric Environmental Safety  Goal: Safety Maintained with Care  Outcome: Progressing     Problem: Skin or Soft Tissue Infection  Goal: Absence of Infection Signs and Symptoms  Outcome: Progressing

## 2025-01-27 NOTE — PROGRESS NOTES
"[facility] - Stepdown Flex ([place] [address])  [facility] Medicine  Progress Note    Patient Name: Edd Wills  MRN: [id number]  Patient Class: IP- Inpatient   Admission Date: 1/15/2025  Length of Stay: 11 days  Attending Physician: Abdon Dutton MD  Primary Care Provider: Zuleyka Sabillon MD        Subjective     Principal Problem:Cellulitis of right thigh        HPI:  Edd Wills is a 69 y.o. male with past medical history of malignant HTN, HLD, morbid obesity, Type B aortic dissection, chronic lymphedema, LLUVIA, chronic neck pain s/p cervical fusion and laminectomy, chronic bilateral knee pain, grade 1 diastolic dysfunction, and CKD who presented for abscess on his R medial thigh. He has a 6-7 year history of a lymphedematous collection to his R medial thigh, however noticed a week ago that there was mild purulent drainage coming from a new wound. He also reports the collection has increased in size over the past week. Wound care visits him 3 times a week to manage this collection, but a couple days before his presentation, while trying to express some of the drainage, drainage evolved to "blood clots" and a large amount of purulent drainage which wound care felt was too extensive to care for at home. He was then recommended to present to the ED. He denies fever, chills, SOB, chest pain, N/V, diarrhea, abdominal pain, and dysuria.      In ED, vitals were hypertensive to 144, but otherwise hemodynamically stable. Labs were significant for Hgb 13.2 and hematocrit 39.5 with no leukocytosis. Na 135 and albumin 3.0. He was started on clindamycin in D5W 600mg/50mL Q8 for his cellulitis.       Overview/Hospital Course:  Known hx of of aortic dissection, admitted d/o right medial thigh collection started on Clinamycin CT of RLE with scattered areas of subcutaneous dense infiltration of the subcutaneous fat in the posterior and medial upper thigh, the lower calf and dorsal foot c/f cellulitis. However no drainable fluid " collection or abscess seen. Patient's wound culture grew serratia and pseudomonas. Consulted ID who recommended to continue IV cefepime 2g Q8h to complete abx duration of at-least 2 weeks, SOHAN 02/03/25. Patient had rapid called on 1/17 when he was found unresponsive.  ICU team came to room to assess. CTH and CTA chest showed no acute abnormalities. Likely related to titration of antihypertensives to meet BP goal <120 given hx aortic dissection. Pt also had minor elevation in creatinine but no change in his urine output. Patient had second presyncopal episode on 1/26, likely related to strict BP control. Holding antihypertensives currently and plan to add back as tolerated. Patient's blood pressure control can be slightly more lenient on discharge to avoid any hypotensive episodes. CM working on SNF placement.    Interval History: No active events overnight, vitaks were stable, labs were unremarkable. Continue Cefepime, PT/OT, Patient medically ready for discharge, awaiting placement.    Review of Systems  Objective:     Vital Signs (Most Recent):  Temp: 98.4 °F (36.9 °C) (01/27/25 0755)  Pulse: 67 (01/27/25 0755)  Resp: 18 (01/27/25 0846)  BP: 132/60 (01/27/25 0755)  SpO2:  (02 PROBE OFF) (01/27/25 1048) Vital Signs (24h Range):  Temp:  [97.9 °F (36.6 °C)-98.4 °F (36.9 °C)] 98.4 °F (36.9 °C)  Pulse:  [60-67] 67  Resp:  [18-22] 18  SpO2:  [93 %-97 %] 97 %  BP: (132-144)/(60-67) 132/60     Weight: (!) 181.4 kg (400 lb)  Body mass index is 54.25 kg/m².    Intake/Output Summary (Last 24 hours) at 1/27/2025 1310  Last data filed at 1/27/2025 1100  Gross per 24 hour   Intake 717 ml   Output 1201 ml   Net -484 ml         Physical Exam  Vitals and nursing note reviewed.   Constitutional:       General: He is not in acute distress.     Appearance: He is not ill-appearing or diaphoretic.   HENT:      Head: Normocephalic and atraumatic.      Nose: Nose normal.      Mouth/Throat:      Mouth: Mucous membranes are moist.   Eyes:  "     Extraocular Movements: Extraocular movements intact.   Cardiovascular:      Rate and Rhythm: Normal rate and regular rhythm.      Heart sounds: No murmur heard.  Pulmonary:      Effort: Pulmonary effort is normal. No respiratory distress.      Breath sounds: Rhonchi: b/l lower lung fields.   Abdominal:      General: Abdomen is flat. There is no distension.      Tenderness: There is no abdominal tenderness.   Musculoskeletal:      Cervical back: Normal range of motion.      Right lower leg: Edema present.      Left lower leg: Edema present.      Comments: Lymphedema. Right medial thigh with large collection and wound covered by clean intact bandage   Skin:     General: Skin is warm.   Neurological:      Mental Status: He is alert and oriented to person, place, and time.             Significant Labs: All pertinent labs within the past 24 hours have been reviewed.    Significant Imaging: I have reviewed all pertinent imaging results/findings within the past 24 hours.    Assessment and Plan     * Cellulitis of right thigh  Right medial thigh collection with overlying cellulitis and draining wound. In setting of chronic lymphedema.  Worsening over past 2 weeks with increased size and drainage of blood and pus.   CT R thigh reported scattered areas of subcutaneous dense infiltration of the subcutaneous fat in the posterior and medial upper thigh, the lower calf and dorsal foot.  Correlation for cellulitis. No drainable fluid collection or abscess.     - Wound cultures positive for serratia and pseudomonas  - Started Ceftriaxone 2g q 24hr, switched to cefepime,  - ID recs to continue IV cefepime 2g Q8h  -- [considered FQ, but avoiding d/t h/o type-B aortic dissection]. To complete abx duration of at-least 2wks, SOHAN 02/03/25.  - Oxycodone 5 mg PO q4 hr prn moderate to severe pain.   - Wound care consulted, recs below:  -Clean right inner thigh with CGH soap/water, pat dry. Loosely pack 1/4" Iodoform or AMD " "antimicrobial packing gauze into open "holes" to ulcerated wound. Cover with ABD and secure with cloth tape, daily and prn. Apply a zinc barrier to periwound to protect skin from moisture/drainage.   - Remove bilateral lower leg wraps, once.     LEIF (acute kidney injury)  LEIF is likely due to acute tubular necrosis caused by blood pressure medications . Most recent creatinine and eGFR are listed below.  Recent Labs     01/25/25  0510 01/26/25  0418 01/27/25  0505   CREATININE 1.3 1.2 1.0   EGFRNORACEVR 58.7* >60.0 >60.0        Plan  - LEIF is stable  - Avoid nephrotoxins and renally dose meds for GFR listed above  - Monitor urine output, serial BMP, and adjust therapy as needed  - Trend RFT daily     AMS (altered mental status)  Rapid called 1/17 after pt found unresponsive by nursing.   Reportedly altered. Possibly vasovagal vs hypotension related to changes in antihypertensive regimen.   CT Head with no acute abnormality    -CTA Chest/Abdomen/Pelvis ordered, negative  -Resolved, pt currently A&Ox3.       Chronic knee pain  Reported pain 5/10 BLE (from knees down).     - Continue home gabapentin  - Continue oxy 5 mg q 4 prn    Debility  Patient with Acute on chronic debility due to  chronic lymphedema . The patient's latest AMPAC (Activity Measure for Post Acute Care) Score is listed below.    AM-PAC Score - How much help does the patient need for each activity listed  Basic Mobility Total Score: 15  Turning over in bed (including adjusting bedclothes, sheets and blankets)?: A lot  Sitting down on and standing up from a chair with arms (e.g., wheelchair, bedside commode, etc.): A little  Moving from lying on back to sitting on the side of the bed?: A little  Moving to and from a bed to a chair (including a wheelchair)?: A little  Need to walk in hospital room?: A little  Climbing 3-5 steps with a railing?: Unable    Plan  - PT/OT consulted  - Fall precautions in place  - PT and OT rec moderate intensity therapy.   - " On 1/18 STONE sent referral to [facility], pending placement     Edd's mobility limitation cannot be sufficiently resolved by the use of a cane. His functional mobility deficit can be sufficiently resolved with the use of a Rollator. Patient's mobility limitation significantly impairs their ability to participate in one of more activities of daily living.  The use of a Rollator will significantly improve the patient's ability to participate in MRADLS and the patient will use it on regular basis in the home.         Lymphedema  Chronic issue, pt reported ~ 7 yrs.  -Held home lasix 40 mg daily, restart as tolerated     History of aortic dissection  Hx Type B aortic dissection. Goal SBP <120 and HR <60 thought to be to stringent given 2 syncopal episodes related to titration of BP meds while here.     - Lasix restarted  - Carvedilol restarted  - Plan to restart home carvdilol and lasix, if SBP>140   - PRN labetalol     Hypertension  Patient's blood pressure range in the last 24 hours was: BP  Min: 132/60  Max: 144/65.The patient's inpatient anti-hypertensive regimen is listed below:  Current Antihypertensives  NIFEdipine (PROCARDIA-XL) 24 hr tablet, Daily, Oral  labetaloL tablet 100 mg, Daily PRN, Oral  furosemide tablet 40 mg, Daily, Oral  carvediloL tablet 12.5 mg, 2 times daily, Oral    Plan  - BP is uncontrolled, will adjust as follows: antihypertensives held currently following pre-syncopal episode on 1/24  - Can consider restarting carvdilol, nifedipine and lisinopril as needed  - Held home lasix 40 mg daily, restart as tolerated      VTE Risk Mitigation (From admission, onward)           Ordered     heparin (porcine) injection 7,500 Units  Every 8 hours         01/16/25 1857     IP VTE HIGH RISK PATIENT  Once         01/15/25 2233                    Discharge Planning   BROWN: 1/29/2025     Code Status: Full Code   Medical Readiness for Discharge Date:   Discharge Plan A: Skilled Nursing Facility   Discharge  Delays: None known at this time            Please place Justification for DME        Ricardo Harmon MD  Department of Hospital Medicine   [facility] - Stepdown Flex ([place])

## 2025-01-27 NOTE — TELEPHONE ENCOUNTER
Scheduled pt 02/06 at 10am, also mailed a letter reminder.    -----José Manuel Au PA-C [place], [place]  Hosp f/u in 2wks. If sent to [facility] we can just f/u there, no clinic appt needed then. Tnk you

## 2025-01-27 NOTE — PT/OT/SLP PROGRESS
Physical Therapy  Treatment    Edd Wills   [id number]    Time Tracking:     PT Received On: 01/27/25   PT Start Time: 1023   PT Stop Time: 1049   PT Total Time (min): 26 min    Billable Minutes: Gait Training 16 and Therapeutic Activity 10       Recommendations:     Therapy Intensity Recommendations at Discharge: Moderate Intensity Therapy     Equipment Needed After Discharge: rollator    Barriers to Discharge: None    Patient Information:     Recent Surgery: * No surgery found *      Diagnosis: Cellulitis of right thigh    Length of Stay: 11 days    General Precautions: Standard, fall  Orthopedic Precautions: N/A  Brace: N/A    Assessment:     Edd Wills tolerated treatment well today. Upon arrival, Edd was found seated in bedside chair and receptive to start session. Pt had complaints of noticeable UE weakness, completed 2 x10 seated shoudler press-ups to begin. He ambulated 18 feet in two trials with RW and Stand-By (A), required chair follow-up. Trial #1 was 10 feet, trial #2 was 8 feet, both trials required a seated rest break 2/2 UE fatigue. Education provided to pt on UE therex (seated shoulder press-ups and chair push-ups) throughout day. Patient continues to demonstrate the need for moderate intensity therapy on a daily basis post acute exhibited by decreased independence with self-care and functional mobility. Discussed PT role, POC, and goals with patient and/or family; verbalized understanding. Edd Wills will continue to benefit from acute PT services to promote mobility during this admission and improve return to PLOF.    Problem List: weakness, impaired endurance, impaired cardiopulmonary response to activity, edema, impaired self care skills, impaired functional mobility, gait instability, impaired balance, decreased upper extremity function, decreased lower extremity function, impaired sensation, decreased coordination, pain    Rehab Prognosis: Good; patient would benefit from acute skilled PT  "services to address these deficits and reach maximum level of function.    Plan:     Patient to be seen 4 x/week to address the above listed problems via gait training, therapeutic activities, therapeutic exercises, neuromuscular re-education    Plan of Care Expires: 02/15/25  Plan of Care reviewed with: patient    Subjective:     Communicated with RN prior to treatment, appropriate to see for treatment.    Pt found sitting up in bedside chair upon PT entry to room, agreeable to treatment.    Patient commenting: "I do not feel great but it is nothing to complain about"    Does this patient have any cultural, spiritual, Jewish conflicts given the current situation? Patient/family has no barriers to learning. Patient/family verbalizes understanding of his/her program and goals and demonstrates them correctly. No cultural, spiritual, or educational needs identified.    Objective:     Patient found with: pulse ox (continuous), telemetry    Pain:  Pain Rating 1: 5/10  Location - Side 1: Bilateral  Location - Orientation 1: generalized  Location 1: knee  Pain Addressed 1: Reposition, Distraction  Pain Rating Post-Intervention 1:  (unrated)    Functional Mobility:    Bed Mobility:  NT; patient sitting up out of bed before and after session    Transfers:  Sit to Stand: Stand-By Assist from chair with Rolling walker x 2 trial(s). Increased time for weight shifting when standing    Gait:  18 feet in two trials with RW and Stand-By (A), required chair follow-up. Trial #1 was 10 feet, trial #2 was 8 feet, both trials required a seated rest break 2/2 UE fatigue.     Assist level: Stand-By Assist  Device: Rolling walker    Balance:  Static Sit: Independent at EOB    Static Stand: Stand-By Assist with Rolling walker    Additional Therapeutic Activity/Exercises:     1. Upon arrival, Edd was found seated in bedside chair and receptive to start session.     2. Pt had complaints of noticeable UE weakness, completed 2 x10 seated " shoudler press-ups to begin.     3. He ambulated 18 feet in two trials with RW and Stand-By (A), required chair follow-up. Trial #1 was 10 feet, trial #2 was 8 feet, both trials required a seated rest break 2/2 UE fatigue.     4. Education provided to pt on UE therex (seated shoulder press-ups and chair push-ups) throughout day. Patient continues to demonstrate the need for moderate intensity therapy on a daily basis post acute exhibited by decreased independence with self-care and functional mobility. Discussed PT role, POC, and goals with patient and/or family; verbalized understanding    AM-PAC 6 CLICK MOBILITY  Turning over in bed (including adjusting bedclothes, sheets and blankets)?: 2  Sitting down on and standing up from a chair with arms (e.g., wheelchair, bedside commode, etc.): 3  Moving from lying on back to sitting on the side of the bed?: 3  Moving to and from a bed to a chair (including a wheelchair)?: 3  Need to walk in hospital room?: 3  Climbing 3-5 steps with a railing?: 1  Basic Mobility Total Score: 15    Patient was left sitting up in bedside chair with all lines intact and call button in reach.    GOALS:   Multidisciplinary Problems       Physical Therapy Goals          Problem: Physical Therapy    Goal Priority Disciplines Outcome Interventions   Physical Therapy Goal     PT, PT/OT Progressing    Description: Goals to be met by: 25     Patient will increase functional independence with mobility by performin. Supine to sit with Modified [place] - Not met  2. Sit to stand transfer with Stand-by Assistance using bariatric RW - Met   3. Gait  x 100 feet with Contact Guard Assistance using bariatric Rolling Walker - Not met  4. Pt will demo ability to stand for 5 minutes with either bariatric RW and sink support (for self-care) with SBA - Not met                       Isauro Montes De Oca, SPT  2025

## 2025-01-27 NOTE — PLAN OF CARE
He is progressing towards goals and plan of care. Medicated him once for pain, changed dressing on his R leg. VSS.

## 2025-01-27 NOTE — PHARMACY MED REC
"Admission Medication History     The home medication history was taken by Foster Pagan.    You may go to "Admission" then "Reconcile Home Medications" tabs to review and/or act upon these items.     The home medication list has been updated by the Pharmacy department.   Please read ALL comments highlighted in yellow.   Please address this information as you see fit.    Feel free to contact us if you have any questions or require assistance.      Medications listed below were obtained from: Patient/family and Patient's pharmacy  PTA Medications   Medication Sig    amLODIPine (NORVASC) 10 MG tablet TAKE ONE TABLET BY MOUTH EVERY DAY    aspirin (ECOTRIN) 81 MG EC tablet Take 81 mg by mouth once daily.    atorvastatin (LIPITOR) 40 MG tablet TAKE ONE TABLET BY MOUTH EVERY DAY    carvediloL (COREG) 25 MG tablet TAKE ONE TABLET BY MOUTH TWICE DAILY WITH FOOD    diclofenac sodium (VOLTAREN) 1 % Gel Apply 2 g topically once daily. Apply to affected area    furosemide (LASIX) 40 MG tablet Take 1 tablet (40 mg total) by mouth once daily.    gabapentin (NEURONTIN) 600 MG tablet Take 1 tablet (600 mg total) by mouth 3 (three) times daily.    lisinopriL (PRINIVIL,ZESTRIL) 40 MG tablet Take 1 tablet (40 mg total) by mouth every evening.    polyethylene glycol (GLYCOLAX) 17 gram PwPk Take 17 g by mouth daily as needed for Constipation.      sennosides (LAXATIVE ORAL) Take 1 tablet by mouth daily as needed (uses Miralax as well.). Patient stated he didn't remember the name of the laxative as it was a generic.     Foster Pagan  [id number]            .          "

## 2025-01-27 NOTE — ASSESSMENT & PLAN NOTE
LEIF is likely due to acute tubular necrosis caused by blood pressure medications . Most recent creatinine and eGFR are listed below.  Recent Labs     01/25/25  0510 01/26/25  0418 01/27/25  0505   CREATININE 1.3 1.2 1.0   EGFRNORACEVR 58.7* >60.0 >60.0        Plan  - LEIF is stable  - Avoid nephrotoxins and renally dose meds for GFR listed above  - Monitor urine output, serial BMP, and adjust therapy as needed  - Trend RFT daily

## 2025-01-28 ENCOUNTER — TELEPHONE (OUTPATIENT)
Dept: ADMINISTRATIVE | Facility: OTHER | Age: 70
End: 2025-01-28
Payer: MEDICARE

## 2025-01-28 LAB
ALBUMIN SERPL BCP-MCNC: 2.6 G/DL (ref 3.5–5.2)
ALP SERPL-CCNC: 65 U/L (ref 40–150)
ALT SERPL W/O P-5'-P-CCNC: 18 U/L (ref 10–44)
ANION GAP SERPL CALC-SCNC: 5 MMOL/L (ref 8–16)
AST SERPL-CCNC: 16 U/L (ref 10–40)
BASOPHILS # BLD AUTO: 0.06 K/UL (ref 0–0.2)
BASOPHILS NFR BLD: 0.7 % (ref 0–1.9)
BILIRUB SERPL-MCNC: 0.3 MG/DL (ref 0.1–1)
BUN SERPL-MCNC: 31 MG/DL (ref 8–23)
CALCIUM SERPL-MCNC: 8.6 MG/DL (ref 8.7–10.5)
CHLORIDE SERPL-SCNC: 109 MMOL/L (ref 95–110)
CO2 SERPL-SCNC: 21 MMOL/L (ref 23–29)
CREAT SERPL-MCNC: 1 MG/DL (ref 0.5–1.4)
DIFFERENTIAL METHOD BLD: ABNORMAL
EOSINOPHIL # BLD AUTO: 0.2 K/UL (ref 0–0.5)
EOSINOPHIL NFR BLD: 2.6 % (ref 0–8)
ERYTHROCYTE [DISTWIDTH] IN BLOOD BY AUTOMATED COUNT: 14.6 % (ref 11.5–14.5)
EST. GFR  (NO RACE VARIABLE): >60 ML/MIN/1.73 M^2
GLUCOSE SERPL-MCNC: 90 MG/DL (ref 70–110)
HCT VFR BLD AUTO: 36 % (ref 40–54)
HGB BLD-MCNC: 11.7 G/DL (ref 14–18)
IMM GRANULOCYTES # BLD AUTO: 0.08 K/UL (ref 0–0.04)
IMM GRANULOCYTES NFR BLD AUTO: 1 % (ref 0–0.5)
LYMPHOCYTES # BLD AUTO: 2.3 K/UL (ref 1–4.8)
LYMPHOCYTES NFR BLD: 28.8 % (ref 18–48)
MAGNESIUM SERPL-MCNC: 2.1 MG/DL (ref 1.6–2.6)
MCH RBC QN AUTO: 27.5 PG (ref 27–31)
MCHC RBC AUTO-ENTMCNC: 32.5 G/DL (ref 32–36)
MCV RBC AUTO: 85 FL (ref 82–98)
MONOCYTES # BLD AUTO: 1.3 K/UL (ref 0.3–1)
MONOCYTES NFR BLD: 16.4 % (ref 4–15)
NEUTROPHILS # BLD AUTO: 4.1 K/UL (ref 1.8–7.7)
NEUTROPHILS NFR BLD: 50.5 % (ref 38–73)
NRBC BLD-RTO: 0 /100 WBC
PHOSPHATE SERPL-MCNC: 3.1 MG/DL (ref 2.7–4.5)
PLATELET # BLD AUTO: 244 K/UL (ref 150–450)
PMV BLD AUTO: 10.4 FL (ref 9.2–12.9)
POTASSIUM SERPL-SCNC: 4.5 MMOL/L (ref 3.5–5.1)
PROT SERPL-MCNC: 6.6 G/DL (ref 6–8.4)
RBC # BLD AUTO: 4.26 M/UL (ref 4.6–6.2)
SODIUM SERPL-SCNC: 135 MMOL/L (ref 136–145)
WBC # BLD AUTO: 8.03 K/UL (ref 3.9–12.7)

## 2025-01-28 PROCEDURE — 63600175 PHARM REV CODE 636 W HCPCS: Performed by: STUDENT IN AN ORGANIZED HEALTH CARE EDUCATION/TRAINING PROGRAM

## 2025-01-28 PROCEDURE — 25000003 PHARM REV CODE 250

## 2025-01-28 PROCEDURE — 97530 THERAPEUTIC ACTIVITIES: CPT | Mod: CO

## 2025-01-28 PROCEDURE — 25000003 PHARM REV CODE 250: Performed by: INTERNAL MEDICINE

## 2025-01-28 PROCEDURE — 85025 COMPLETE CBC W/AUTO DIFF WBC: CPT

## 2025-01-28 PROCEDURE — 84100 ASSAY OF PHOSPHORUS: CPT

## 2025-01-28 PROCEDURE — 63600175 PHARM REV CODE 636 W HCPCS

## 2025-01-28 PROCEDURE — 80053 COMPREHEN METABOLIC PANEL: CPT

## 2025-01-28 PROCEDURE — 25000003 PHARM REV CODE 250: Performed by: PHYSICIAN ASSISTANT

## 2025-01-28 PROCEDURE — 97535 SELF CARE MNGMENT TRAINING: CPT | Mod: CO

## 2025-01-28 PROCEDURE — 20600001 HC STEP DOWN PRIVATE ROOM

## 2025-01-28 PROCEDURE — 83735 ASSAY OF MAGNESIUM: CPT

## 2025-01-28 PROCEDURE — 36415 COLL VENOUS BLD VENIPUNCTURE: CPT

## 2025-01-28 PROCEDURE — 25000003 PHARM REV CODE 250: Performed by: STUDENT IN AN ORGANIZED HEALTH CARE EDUCATION/TRAINING PROGRAM

## 2025-01-28 RX ORDER — CEFEPIME HYDROCHLORIDE 2 G/1
2 INJECTION, POWDER, FOR SOLUTION INTRAVENOUS EVERY 8 HOURS
Start: 2025-01-28 | End: 2025-02-03

## 2025-01-28 RX ORDER — LISINOPRIL 20 MG/1
20 TABLET ORAL DAILY
Status: DISCONTINUED | OUTPATIENT
Start: 2025-01-28 | End: 2025-01-29

## 2025-01-28 RX ORDER — LISINOPRIL 20 MG/1
20 TABLET ORAL DAILY
Qty: 90 TABLET | Refills: 3 | Status: SHIPPED | OUTPATIENT
Start: 2025-01-28 | End: 2025-01-28

## 2025-01-28 RX ORDER — FUROSEMIDE 40 MG/1
40 TABLET ORAL DAILY
Start: 2025-01-28 | End: 2026-01-28

## 2025-01-28 RX ORDER — CARVEDILOL 25 MG/1
25 TABLET ORAL 2 TIMES DAILY
Qty: 180 TABLET | Refills: 3 | Status: SHIPPED | OUTPATIENT
Start: 2025-01-28 | End: 2026-01-28

## 2025-01-28 RX ORDER — LISINOPRIL 20 MG/1
40 TABLET ORAL DAILY
Qty: 180 TABLET | Refills: 0 | Status: SHIPPED | OUTPATIENT
Start: 2025-01-28 | End: 2026-01-28

## 2025-01-28 RX ADMIN — CEFEPIME 2 G: 2 INJECTION, POWDER, FOR SOLUTION INTRAVENOUS at 08:01

## 2025-01-28 RX ADMIN — MICONAZOLE NITRATE: 20 OINTMENT TOPICAL at 08:01

## 2025-01-28 RX ADMIN — GABAPENTIN 600 MG: 300 CAPSULE ORAL at 09:01

## 2025-01-28 RX ADMIN — HEPARIN SODIUM 7500 UNITS: 5000 INJECTION INTRAVENOUS; SUBCUTANEOUS at 09:01

## 2025-01-28 RX ADMIN — GABAPENTIN 600 MG: 300 CAPSULE ORAL at 08:01

## 2025-01-28 RX ADMIN — OXYCODONE 5 MG: 5 TABLET ORAL at 09:01

## 2025-01-28 RX ADMIN — POLYETHYLENE GLYCOL 3350 17 G: 17 POWDER, FOR SOLUTION ORAL at 08:01

## 2025-01-28 RX ADMIN — CEFEPIME 2 G: 2 INJECTION, POWDER, FOR SOLUTION INTRAVENOUS at 02:01

## 2025-01-28 RX ADMIN — MICONAZOLE NITRATE 2 % TOPICAL POWDER: at 02:01

## 2025-01-28 RX ADMIN — ATORVASTATIN CALCIUM 40 MG: 40 TABLET, FILM COATED ORAL at 08:01

## 2025-01-28 RX ADMIN — HEPARIN SODIUM 7500 UNITS: 5000 INJECTION INTRAVENOUS; SUBCUTANEOUS at 06:01

## 2025-01-28 RX ADMIN — ASPIRIN 81 MG: 81 TABLET, COATED ORAL at 08:01

## 2025-01-28 RX ADMIN — HEPARIN SODIUM 7500 UNITS: 5000 INJECTION INTRAVENOUS; SUBCUTANEOUS at 02:01

## 2025-01-28 RX ADMIN — CARVEDILOL 25 MG: 25 TABLET, FILM COATED ORAL at 09:01

## 2025-01-28 RX ADMIN — FUROSEMIDE 40 MG: 40 TABLET ORAL at 08:01

## 2025-01-28 RX ADMIN — CEFEPIME 2 G: 2 INJECTION, POWDER, FOR SOLUTION INTRAVENOUS at 05:01

## 2025-01-28 RX ADMIN — LISINOPRIL 20 MG: 20 TABLET ORAL at 10:01

## 2025-01-28 RX ADMIN — SENNOSIDES 8.6 MG: 8.6 TABLET, FILM COATED ORAL at 08:01

## 2025-01-28 RX ADMIN — CARVEDILOL 25 MG: 25 TABLET, FILM COATED ORAL at 08:01

## 2025-01-28 NOTE — SUBJECTIVE & OBJECTIVE
Interval History: Vitals stable,, labs were unremarkable. CM working on placement, likely today.    Review of Systems  Objective:     Vital Signs (Most Recent):  Temp: 98.5 °F (36.9 °C) (01/28/25 1214)  Pulse: 67 (01/28/25 1214)  Resp: 20 (01/28/25 1214)  BP: 139/67 (01/28/25 1214)  SpO2: 95 % (01/28/25 1214) Vital Signs (24h Range):  Temp:  [97.3 °F (36.3 °C)-98.5 °F (36.9 °C)] 98.5 °F (36.9 °C)  Pulse:  [58-76] 67  Resp:  [12-22] 20  SpO2:  [94 %-97 %] 95 %  BP: (139-160)/(63-73) 139/67     Weight: (!) 181.4 kg (400 lb)  Body mass index is 54.25 kg/m².    Intake/Output Summary (Last 24 hours) at 1/28/2025 1418  Last data filed at 1/28/2025 1400  Gross per 24 hour   Intake --   Output 1270 ml   Net -1270 ml         Physical Exam  Vitals and nursing note reviewed.   Constitutional:       General: He is not in acute distress.     Appearance: He is not ill-appearing or diaphoretic.   HENT:      Head: Normocephalic and atraumatic.      Nose: Nose normal.      Mouth/Throat:      Mouth: Mucous membranes are moist.   Eyes:      Extraocular Movements: Extraocular movements intact.   Cardiovascular:      Rate and Rhythm: Normal rate and regular rhythm.      Heart sounds: No murmur heard.  Pulmonary:      Effort: Pulmonary effort is normal. No respiratory distress.      Breath sounds: Rhonchi: b/l lower lung fields.   Abdominal:      General: Abdomen is flat. There is no distension.      Tenderness: There is no abdominal tenderness.   Musculoskeletal:      Cervical back: Normal range of motion.      Right lower leg: Edema present.      Left lower leg: Edema present.      Comments: Lymphedema. Right medial thigh with large collection and wound covered by clean intact bandage   Skin:     General: Skin is warm.   Neurological:      Mental Status: He is alert and oriented to person, place, and time.             Significant Labs: All pertinent labs within the past 24 hours have been reviewed.    Significant Imaging: I have  reviewed all pertinent imaging results/findings within the past 24 hours.

## 2025-01-28 NOTE — PROGRESS NOTES
"[facility] ([place] [address])  [facility] Medicine  Progress Note    Patient Name: Edd Wills  MRN: [id number]  Patient Class: IP- Inpatient   Admission Date: 1/15/2025  Length of Stay: 12 days  Attending Physician: Abdon Dutton MD  Primary Care Provider: Zuleyka Sabillon MD        Subjective     Principal Problem:Cellulitis of right thigh        HPI:  Edd Wills is a 69 y.o. male with past medical history of malignant HTN, HLD, morbid obesity, Type B aortic dissection, chronic lymphedema, LLUVIA, chronic neck pain s/p cervical fusion and laminectomy, chronic bilateral knee pain, grade 1 diastolic dysfunction, and CKD who presented for abscess on his R medial thigh. He has a 6-7 year history of a lymphedematous collection to his R medial thigh, however noticed a week ago that there was mild purulent drainage coming from a new wound. He also reports the collection has increased in size over the past week. Wound care visits him 3 times a week to manage this collection, but a couple days before his presentation, while trying to express some of the drainage, drainage evolved to "blood clots" and a large amount of purulent drainage which wound care felt was too extensive to care for at home. He was then recommended to present to the ED. He denies fever, chills, SOB, chest pain, N/V, diarrhea, abdominal pain, and dysuria.      In ED, vitals were hypertensive to 144, but otherwise hemodynamically stable. Labs were significant for Hgb 13.2 and hematocrit 39.5 with no leukocytosis. Na 135 and albumin 3.0. He was started on clindamycin in D5W 600mg/50mL Q8 for his cellulitis.       Overview/Hospital Course:  Known hx of of aortic dissection, admitted d/o right medial thigh collection started on Clinamycin CT of RLE with scattered areas of subcutaneous dense infiltration of the subcutaneous fat in the posterior and medial upper thigh, the lower calf and dorsal foot c/f cellulitis. However no drainable fluid " collection or abscess seen. Patient's wound culture grew serratia and pseudomonas. Consulted ID who recommended to continue IV cefepime 2g Q8h to complete abx duration of at-least 2 weeks, SOHAN 02/03/25. Patient had rapid called on 1/17 when he was found unresponsive.  ICU team came to room to assess. CTH and CTA chest showed no acute abnormalities. Likely related to titration of antihypertensives to meet BP goal <120 given hx aortic dissection. Pt also had minor elevation in creatinine but no change in his urine output. Patient had second presyncopal episode on 1/26, likely related to strict BP control. Holding antihypertensives currently and plan to add back as tolerated. Patient's blood pressure control can be slightly more lenient on discharge to avoid any hypotensive episodes. CM is working on placement.    Interval History: Vitals stable,, labs were unremarkable. CM working on placement, likely today.    Review of Systems  Objective:     Vital Signs (Most Recent):  Temp: 98.5 °F (36.9 °C) (01/28/25 1214)  Pulse: 67 (01/28/25 1214)  Resp: 20 (01/28/25 1214)  BP: 139/67 (01/28/25 1214)  SpO2: 95 % (01/28/25 1214) Vital Signs (24h Range):  Temp:  [97.3 °F (36.3 °C)-98.5 °F (36.9 °C)] 98.5 °F (36.9 °C)  Pulse:  [58-76] 67  Resp:  [12-22] 20  SpO2:  [94 %-97 %] 95 %  BP: (139-160)/(63-73) 139/67     Weight: (!) 181.4 kg (400 lb)  Body mass index is 54.25 kg/m².    Intake/Output Summary (Last 24 hours) at 1/28/2025 1418  Last data filed at 1/28/2025 1400  Gross per 24 hour   Intake --   Output 1270 ml   Net -1270 ml         Physical Exam  Vitals and nursing note reviewed.   Constitutional:       General: He is not in acute distress.     Appearance: He is not ill-appearing or diaphoretic.   HENT:      Head: Normocephalic and atraumatic.      Nose: Nose normal.      Mouth/Throat:      Mouth: Mucous membranes are moist.   Eyes:      Extraocular Movements: Extraocular movements intact.   Cardiovascular:      Rate and  "Rhythm: Normal rate and regular rhythm.      Heart sounds: No murmur heard.  Pulmonary:      Effort: Pulmonary effort is normal. No respiratory distress.      Breath sounds: Rhonchi: b/l lower lung fields.   Abdominal:      General: Abdomen is flat. There is no distension.      Tenderness: There is no abdominal tenderness.   Musculoskeletal:      Cervical back: Normal range of motion.      Right lower leg: Edema present.      Left lower leg: Edema present.      Comments: Lymphedema. Right medial thigh with large collection and wound covered by clean intact bandage   Skin:     General: Skin is warm.   Neurological:      Mental Status: He is alert and oriented to person, place, and time.             Significant Labs: All pertinent labs within the past 24 hours have been reviewed.    Significant Imaging: I have reviewed all pertinent imaging results/findings within the past 24 hours.    Assessment and Plan     * Cellulitis of right thigh  Right medial thigh collection with overlying cellulitis and draining wound. In setting of chronic lymphedema.  Worsening over past 2 weeks with increased size and drainage of blood and pus.   CT R thigh reported scattered areas of subcutaneous dense infiltration of the subcutaneous fat in the posterior and medial upper thigh, the lower calf and dorsal foot.  Correlation for cellulitis. No drainable fluid collection or abscess.     - Wound cultures positive for serratia and pseudomonas  - Started Ceftriaxone 2g q 24hr, switched to cefepime,  - ID recs to continue IV cefepime 2g Q8h  -- [considered FQ, but avoiding d/t h/o type-B aortic dissection]. To complete abx duration of at-least 2wks, SOHAN 02/03/25.  - Oxycodone 5 mg PO q4 hr prn moderate to severe pain.   - Wound care consulted, recs below:  -Clean right inner thigh with CGH soap/water, pat dry. Loosely pack 1/4" Iodoform or AMD antimicrobial packing gauze into open "holes" to ulcerated wound. Cover with ABD and secure with cloth " tape, daily and prn. Apply a zinc barrier to periwound to protect skin from moisture/drainage.   - Remove bilateral lower leg wraps, once.     LEIF (acute kidney injury)  LEIF is likely due to acute tubular necrosis caused by blood pressure medications . Most recent creatinine and eGFR are listed below.  Recent Labs     01/26/25  0418 01/27/25  0505 01/28/25  0356   CREATININE 1.2 1.0 1.0   EGFRNORACEVR >60.0 >60.0 >60.0        Plan  - LEIF is stable  - Avoid nephrotoxins and renally dose meds for GFR listed above  - Monitor urine output, serial BMP, and adjust therapy as needed  - Trend RFT daily     AMS (altered mental status)  Rapid called 1/17 after pt found unresponsive by nursing.   Reportedly altered. Possibly vasovagal vs hypotension related to changes in antihypertensive regimen.   CT Head with no acute abnormality    -CTA Chest/Abdomen/Pelvis ordered, negative  -Resolved, pt currently A&Ox3.       Chronic knee pain  Reported pain 5/10 BLE (from knees down).     - Continue home gabapentin  - Continue oxy 5 mg q 4 prn    Debility  Patient with Acute on chronic debility due to  chronic lymphedema . The patient's latest AMPAC (Activity Measure for Post Acute Care) Score is listed below.    AM-PAC Score - How much help does the patient need for each activity listed  Basic Mobility Total Score: 15  Turning over in bed (including adjusting bedclothes, sheets and blankets)?: A lot  Sitting down on and standing up from a chair with arms (e.g., wheelchair, bedside commode, etc.): A little  Moving from lying on back to sitting on the side of the bed?: A little  Moving to and from a bed to a chair (including a wheelchair)?: A little  Need to walk in hospital room?: A little  Climbing 3-5 steps with a railing?: Unable    Plan  - PT/OT consulted  - Fall precautions in place  - PT and OT rec moderate intensity therapy.   - On 1/18 STONE sent referral to [facility], pending placement     Edd's mobility limitation cannot be  sufficiently resolved by the use of a cane. His functional mobility deficit can be sufficiently resolved with the use of a Rollator. Patient's mobility limitation significantly impairs their ability to participate in one of more activities of daily living.  The use of a Rollator will significantly improve the patient's ability to participate in MRADLS and the patient will use it on regular basis in the home.         Lymphedema  Chronic issue, pt reported ~ 7 yrs.  -Held home lasix 40 mg daily, restart as tolerated     History of aortic dissection  Hx Type B aortic dissection.     Plan:   Monitor vitals closely, avoid hypotension and high BPs  - Lasix 40 mg od  - Carvedilol 25 mg bd  - Lisinopril 20 mg od  - PRN labetalol if SBP >140, hr > 65    Hypertension  Patient's blood pressure range in the last 24 hours was: BP  Min: 139/67  Max: 160/72.The patient's inpatient anti-hypertensive regimen is listed below:  Current Antihypertensives  labetaloL tablet 100 mg, Daily PRN, Oral  furosemide tablet 40 mg, Daily, Oral  carvediloL tablet 25 mg, 2 times daily, Oral  lisinopriL tablet 20 mg, Daily, Oral  carvedilol (COREG) tablet, 2 times daily, Oral  furosemide (LASIX) tablet, Daily, Oral  lisinopril (PRINIVIL,ZESTRIL) tablet, Daily, Oral    Plan  - BP is uncontrolled, will adjust as follows: antihypertensives held currently following pre-syncopal episode on 1/24  - Can consider restarting carvdilol, nifedipine and lisinopril as needed  - Held home lasix 40 mg daily, restart as tolerated      VTE Risk Mitigation (From admission, onward)           Ordered     heparin (porcine) injection 7,500 Units  Every 8 hours         01/16/25 1857     IP VTE HIGH RISK PATIENT  Once         01/15/25 2233                    Discharge Planning   BROWN: 1/29/2025     Code Status: Full Code   Medical Readiness for Discharge Date:   Discharge Plan A: Skilled Nursing Facility ([facility] [phone])   Discharge Delays: None known at  this time            Please place Justification for DME        Ricardo Harmon MD  Department of Hospital Medicine   [address] - Stepdown Flex ([place])

## 2025-01-28 NOTE — TELEPHONE ENCOUNTER
JAMES with scheduled appointment of 2- with Dr.William Alarcon, and contact number provided for any questions, My Chart message to be sent.

## 2025-01-28 NOTE — PLAN OF CARE
NURSING HOME ORDERS    01/29/2025  [facility]  [facility] - ACUTE MEDICAL STEPDOWN  [address]  [facility] [address]  Dept: [phone]  Loc: [phone]     Admit to Nursing Home:      Diagnoses:  Active Hospital Problems    Diagnosis  POA    *Cellulitis of right thigh [L03.115]  Yes    Hyponatremia [E87.1]  Yes    ATN (acute tubular necrosis) [N17.0]  Yes    LEIF (acute kidney injury) [N17.9]  Yes    Cellulitis of leg [L03.119]  Yes    AMS (altered mental status) [R41.82]  No    Debility [R53.81]  Yes    Chronic knee pain [M25.569, G89.29]  Yes    Lymphedema [I89.0]  Yes    History of aortic dissection [Z86.79]  Not Applicable    Hypertension [I10]  Yes      Resolved Hospital Problems   No resolved problems to display.       Patient is homebound due to:  Cellulitis of right thigh    Allergies:Review of patient's allergies indicates:  No Known Allergies    Vitals:  Routine    Diet: regular diet    Activities:   Activity as tolerated    Goals of Care Treatment Preferences:  Code Status: Full Code          What is most important right now is to focus on avoiding the hospital.  Accordingly, we have decided that the best plan to meet the patient's goals includes ordered home health wound care..  Antibiotic:  Continue Iv-cefepime 2g Q8h  -- [considered FQ, but avoiding d/t h/o type-B aortic dissection].   To complete abx duration of at-least 2wks, SOHAN 02/03  Continue wound care  Accepting facility to perform weekly labs (cbc, cmp, crp), and picc line dressing changes.  Facility to fax results to [facility] Fax Number: [phone].   Nursing Precautions:  Fall and Pressure ulcer prevention    Consults:   PT to evaluate and treat- 5 times a week, OT to evaluate and treat- 5 times a week, and Wound Care     Miscellaneous Care: Wound Care: yes:  Abcess s/p I&D:    Location:   Right leg        Irrigate with saline or wound spray     Loosely fill with iodoform gauze or nugauze daily. Size:  "1/4"      Cover with gauze dressing             Medications: Discontinue all previous medication orders, if any. See new list below.     Medication List        START taking these medications      ceFEPIme 2 gram injection  Commonly known as: MAXIPIME  Inject 2 g into the vein every 8 (eight) hours. for 6 days            CHANGE how you take these medications      carvediloL 25 MG tablet  Commonly known as: COREG  Take 1 tablet (25 mg total) by mouth 2 (two) times daily.  What changed: when to take this     lisinopriL 20 MG tablet  Commonly known as: PRINIVIL,ZESTRIL  Take 2 tablets (40 mg total) by mouth once daily.  What changed:   medication strength  when to take this     polyethylene glycol 17 gram Pwpk  Commonly known as: GLYCOLAX  Take 17 g by mouth 2 (two) times daily as needed.  What changed:   when to take this  reasons to take this            CONTINUE taking these medications      amLODIPine 10 MG tablet  Commonly known as: NORVASC  TAKE ONE TABLET BY MOUTH EVERY DAY     aspirin 81 MG EC tablet  Commonly known as: ECOTRIN  Take 81 mg by mouth once daily.     atorvastatin 40 MG tablet  Commonly known as: LIPITOR  TAKE ONE TABLET BY MOUTH EVERY DAY     diclofenac sodium 1 % Gel  Commonly known as: VOLTAREN  Apply 2 g topically once daily. Apply to affected area     furosemide 40 MG tablet  Commonly known as: LASIX  Take 1 tablet (40 mg total) by mouth once daily.     gabapentin 600 MG tablet  Commonly known as: NEURONTIN  Take 1 tablet (600 mg total) by mouth 3 (three) times daily.            STOP taking these medications      LAXATIVE ORAL                Immunizations Administered as of 1/29/2025       No immunizations on file.            This patient has had both covid vaccinations    Some patients may experience side effects after vaccination.  These may include fever, headache, muscle or joint aches.  Most symptoms resolve with 24-48 hours and do not require urgent medical evaluation unless they persist " for more than 72 hours or symptoms are concerning for an unrelated medical condition.          _________________________________  Jinny Campa MD  01/29/2025

## 2025-01-28 NOTE — ASSESSMENT & PLAN NOTE
Patient's blood pressure range in the last 24 hours was: BP  Min: 139/67  Max: 160/72.The patient's inpatient anti-hypertensive regimen is listed below:  Current Antihypertensives  labetaloL tablet 100 mg, Daily PRN, Oral  furosemide tablet 40 mg, Daily, Oral  carvediloL tablet 25 mg, 2 times daily, Oral  lisinopriL tablet 20 mg, Daily, Oral  carvedilol (COREG) tablet, 2 times daily, Oral  furosemide (LASIX) tablet, Daily, Oral  lisinopril (PRINIVIL,ZESTRIL) tablet, Daily, Oral    Plan  - BP is uncontrolled, will adjust as follows: antihypertensives held currently following pre-syncopal episode on 1/24  - Can consider restarting carvdilol, nifedipine and lisinopril as needed  - Held home lasix 40 mg daily, restart as tolerated

## 2025-01-28 NOTE — PT/OT/SLP PROGRESS
Occupational Therapy   Treatment    Name: Edd Wills  MRN: [id number]  Admitting Diagnosis:  Cellulitis of right thigh       Recommendations:     Discharge Recommendations: Moderate Intensity Therapy  Discharge Equipment Recommendations:  rollator  Barriers to discharge:       Assessment:     Edd Wills is a 69 y.o. male with a medical diagnosis of Cellulitis of right thigh.  In today's session, patient completed functional mobility and increased time with toileting task. Performance deficits affecting function are weakness, impaired endurance, impaired functional mobility, gait instability, impaired balance, impaired skin, impaired self care skills, edema.     Rehab Prognosis:  Good; patient would benefit from acute skilled OT services to address these deficits and reach maximum level of function.       Plan:     Patient to be seen 4 x/week to address the above listed problems via self-care/home management, therapeutic activities, therapeutic exercises  Plan of Care Expires: 02/15/25  Plan of Care Reviewed with: patient    Subjective     Chief Complaint: N/A  Patient/Family Comments/goals: Patient reports he needs to use the bathroom.  Pain/Comfort:  Pain Rating 1: 0/10    Objective:     Communicated with: Nurse prior to session.  Patient found up in chair with pulse ox (continuous), telemetry (no active lines) upon OT entry to room.    A client care conference was completed by the OTR and the MAHMOOD prior to treatment by the MAHMOOD to discuss the patient's POC and current status.    OTR Celi supervised MAHMOOD.    General Precautions: Standard, fall    Orthopedic Precautions:N/A  Braces: N/A  Respiratory Status: Room air    Functional Mobility/Transfers:  Patient completed x3 Sit <> Stand Transfers from bedside chair and BSC with stand by assistance with rolling walker   Functional Mobility: Patient engaged in functional mobility training to simulate a household distance. From bedside chair > ~4 steps forwards and back  for x3 trials with bedside chair behind for safety with CGA with RW to maximize functional endurance and standing balance required for home/community mobility and occupational engagement. No noted LOB.    Activities of Daily Living:  Grooming: supervision for oral care and facial hygiene seated in bedside chair.  Upper Body Dressing: moderate assistance to lani gown on anterior and posterior side x2 trials. Minimum assistance to doff gown x2 trials seated in bedside chair.  Toileting: supervision for toileting on BSC and garth-care seated on BSC. Patient requested to complete garth-care for residue in standing. No residue found. Increased time for toielting task. Successful BM. Nurse notified.    [facility] 6 Click ADL: 18    Treatment & Education:  Patient educated on OOB mobility to improve overall activity tolerance and promote recovery.  Patient educated on energy conservation techniques upon discharge to return home to maintain occupational roles.  Patient educated on rolling walker management for functional mobility.   Patient educated on role of occupational therapy, POC, and safety during ADLs and functional mobility. Patient and OT discussed importance of safe, continued mobility to optimize daily living skills. Patient verbalized understanding. Patient given instruction to call for medical staff/nurse for assistance.     Patient left up in chair with all lines intact and call button in reach    GOALS:   Multidisciplinary Problems       Occupational Therapy Goals          Problem: Occupational Therapy    Goal Priority Disciplines Outcome Interventions   Occupational Therapy Goal     OT, PT/OT Progressing    Description: Goals to be met by: 2/15/25     Patient will increase functional independence with ADLs by performing:    UE Dressing with Modified [place].  LE Dressing with Modified [place].  Grooming while standing at sink with Modified [place].  Toileting from toilet/bedside commode with  Modified [place] for hygiene and clothing management.   Supine to sit with Modified [place].  Toilet transfer to toilet/bedside commode with Modified [place].                         Time Tracking:     OT Date of Treatment: 01/28/25  OT Start Time: 0928  OT Stop Time: 1019  OT Total Time (min): 51 min    Billable Minutes:Self Care/Home Management 35  Therapeutic Activity 16    OT/DEVIN: DEVIN     Number of DEVIN visits since last OT visit: 1 1/28/2025

## 2025-01-28 NOTE — ASSESSMENT & PLAN NOTE
LEIF is likely due to acute tubular necrosis caused by blood pressure medications . Most recent creatinine and eGFR are listed below.  Recent Labs     01/26/25  0418 01/27/25  0505 01/28/25  0356   CREATININE 1.2 1.0 1.0   EGFRNORACEVR >60.0 >60.0 >60.0        Plan  - LEIF is stable  - Avoid nephrotoxins and renally dose meds for GFR listed above  - Monitor urine output, serial BMP, and adjust therapy as needed  - Trend RFT daily

## 2025-01-28 NOTE — PLAN OF CARE
Problem: Adult Inpatient Plan of Care  Goal: Plan of Care Review  Outcome: Progressing  Goal: Patient-Specific Goal (Individualized)  Outcome: Progressing  Goal: Absence of Hospital-Acquired Illness or Injury  Outcome: Progressing  Goal: Optimal Comfort and Wellbeing  Outcome: Progressing  Goal: Readiness for Transition of Care  Outcome: Progressing     Problem: Bariatric Environmental Safety  Goal: Safety Maintained with Care  Outcome: Progressing     Problem: Skin or Soft Tissue Infection  Goal: Absence of Infection Signs and Symptoms  Outcome: Progressing     Problem: Wound  Goal: Optimal Coping  Outcome: Progressing  Goal: Optimal Functional Ability  Outcome: Progressing  Goal: Absence of Infection Signs and Symptoms  Outcome: Progressing  Goal: Improved Oral Intake  Outcome: Progressing  Goal: Optimal Pain Control and Function  Outcome: Progressing  Goal: Skin Health and Integrity  Outcome: Progressing  Goal: Optimal Wound Healing  Outcome: Progressing     Problem: Skin Injury Risk Increased  Goal: Skin Health and Integrity  Outcome: Progressing     Problem: Infection  Goal: Absence of Infection Signs and Symptoms  Outcome: Progressing     Problem: Acute Kidney Injury/Impairment  Goal: Fluid and Electrolyte Balance  Outcome: Progressing  Goal: Improved Oral Intake  Outcome: Progressing  Goal: Effective Renal Function  Outcome: Progressing     Patient alert and oriented. Cooperative with care. Medicated as ordered. Assisted to beside recliner. Assisted to commode and necessary. Dressing to right leg changed. Patient resting comfortably at this time with call light in reach

## 2025-01-28 NOTE — PLAN OF CARE
"  CM received a message form Talisha COX at [facility] [phone] and have not heard back form the son, Edd reza Jr and has left several messages and needs to complete admit paperwork    1015 am  SNF orders uploaded via EPIC and will need clarification of end date for IV abx     12:27 pm  CM called and spoke with Mr Edd Reza Jr via phone [phone]  and provided the contact number and informed Mr Reza that the facility has been attempting to contact him and Mr Reza will need to contact the facility for completion of admit paperwork. Son verified an understanding      1:26 pm  CM called Talisha COX and stated, "I called the son and left a message."    1:40 pm  CM called the son and he informed the CM that he had spoken with Talisha COX at [facility]    1:41 pm   CM spoke  with Talisha COX @ [facility] and confirmed speaking with the son and the son will be able to complete admit paperwork after work,    2:15 pm   CM reached out to the medial team and updated that patient will have to DC tomorrow due to the son has not completed paperwork.         Ria Tian RN  Case Management  [facility]  [phone]    "

## 2025-01-28 NOTE — ASSESSMENT & PLAN NOTE
Hx Type B aortic dissection.     Plan:   Monitor vitals closely, avoid hypotension and high BPs  - Lasix 40 mg od  - Carvedilol 25 mg bd  - Lisinopril 20 mg od  - PRN labetalol if SBP >140, hr > 65

## 2025-01-29 VITALS
TEMPERATURE: 98 F | HEART RATE: 65 BPM | DIASTOLIC BLOOD PRESSURE: 56 MMHG | RESPIRATION RATE: 17 BRPM | HEIGHT: 72 IN | WEIGHT: 315 LBS | BODY MASS INDEX: 42.66 KG/M2 | OXYGEN SATURATION: 98 % | SYSTOLIC BLOOD PRESSURE: 106 MMHG

## 2025-01-29 LAB
ALBUMIN SERPL BCP-MCNC: 2.5 G/DL (ref 3.5–5.2)
ALP SERPL-CCNC: 61 U/L (ref 40–150)
ALT SERPL W/O P-5'-P-CCNC: 19 U/L (ref 10–44)
ANION GAP SERPL CALC-SCNC: 4 MMOL/L (ref 8–16)
AST SERPL-CCNC: 17 U/L (ref 10–40)
BASOPHILS # BLD AUTO: 0.08 K/UL (ref 0–0.2)
BASOPHILS NFR BLD: 0.9 % (ref 0–1.9)
BILIRUB SERPL-MCNC: 0.3 MG/DL (ref 0.1–1)
BUN SERPL-MCNC: 30 MG/DL (ref 8–23)
CALCIUM SERPL-MCNC: 8.6 MG/DL (ref 8.7–10.5)
CHLORIDE SERPL-SCNC: 108 MMOL/L (ref 95–110)
CO2 SERPL-SCNC: 23 MMOL/L (ref 23–29)
CREAT SERPL-MCNC: 0.9 MG/DL (ref 0.5–1.4)
DIFFERENTIAL METHOD BLD: ABNORMAL
EOSINOPHIL # BLD AUTO: 0.3 K/UL (ref 0–0.5)
EOSINOPHIL NFR BLD: 3 % (ref 0–8)
ERYTHROCYTE [DISTWIDTH] IN BLOOD BY AUTOMATED COUNT: 14.7 % (ref 11.5–14.5)
EST. GFR  (NO RACE VARIABLE): >60 ML/MIN/1.73 M^2
GLUCOSE SERPL-MCNC: 79 MG/DL (ref 70–110)
HCT VFR BLD AUTO: 35 % (ref 40–54)
HGB BLD-MCNC: 11.4 G/DL (ref 14–18)
IMM GRANULOCYTES # BLD AUTO: 0.07 K/UL (ref 0–0.04)
IMM GRANULOCYTES NFR BLD AUTO: 0.8 % (ref 0–0.5)
LYMPHOCYTES # BLD AUTO: 2.6 K/UL (ref 1–4.8)
LYMPHOCYTES NFR BLD: 28.9 % (ref 18–48)
MAGNESIUM SERPL-MCNC: 2 MG/DL (ref 1.6–2.6)
MCH RBC QN AUTO: 27.7 PG (ref 27–31)
MCHC RBC AUTO-ENTMCNC: 32.6 G/DL (ref 32–36)
MCV RBC AUTO: 85 FL (ref 82–98)
MONOCYTES # BLD AUTO: 1.5 K/UL (ref 0.3–1)
MONOCYTES NFR BLD: 16.3 % (ref 4–15)
NEUTROPHILS # BLD AUTO: 4.5 K/UL (ref 1.8–7.7)
NEUTROPHILS NFR BLD: 50.1 % (ref 38–73)
NRBC BLD-RTO: 0 /100 WBC
PHOSPHATE SERPL-MCNC: 2.8 MG/DL (ref 2.7–4.5)
PLATELET # BLD AUTO: 241 K/UL (ref 150–450)
PMV BLD AUTO: 11 FL (ref 9.2–12.9)
POTASSIUM SERPL-SCNC: 4.2 MMOL/L (ref 3.5–5.1)
PROT SERPL-MCNC: 6.5 G/DL (ref 6–8.4)
RBC # BLD AUTO: 4.12 M/UL (ref 4.6–6.2)
SODIUM SERPL-SCNC: 135 MMOL/L (ref 136–145)
WBC # BLD AUTO: 9.06 K/UL (ref 3.9–12.7)

## 2025-01-29 PROCEDURE — 63600175 PHARM REV CODE 636 W HCPCS

## 2025-01-29 PROCEDURE — 25000003 PHARM REV CODE 250: Performed by: PHYSICIAN ASSISTANT

## 2025-01-29 PROCEDURE — 85025 COMPLETE CBC W/AUTO DIFF WBC: CPT

## 2025-01-29 PROCEDURE — 80053 COMPREHEN METABOLIC PANEL: CPT

## 2025-01-29 PROCEDURE — 84100 ASSAY OF PHOSPHORUS: CPT

## 2025-01-29 PROCEDURE — 83735 ASSAY OF MAGNESIUM: CPT

## 2025-01-29 PROCEDURE — 25000003 PHARM REV CODE 250

## 2025-01-29 PROCEDURE — 97110 THERAPEUTIC EXERCISES: CPT | Mod: CQ

## 2025-01-29 PROCEDURE — 97110 THERAPEUTIC EXERCISES: CPT

## 2025-01-29 PROCEDURE — 97535 SELF CARE MNGMENT TRAINING: CPT

## 2025-01-29 PROCEDURE — 36415 COLL VENOUS BLD VENIPUNCTURE: CPT

## 2025-01-29 PROCEDURE — 25000003 PHARM REV CODE 250: Performed by: STUDENT IN AN ORGANIZED HEALTH CARE EDUCATION/TRAINING PROGRAM

## 2025-01-29 PROCEDURE — 63600175 PHARM REV CODE 636 W HCPCS: Performed by: STUDENT IN AN ORGANIZED HEALTH CARE EDUCATION/TRAINING PROGRAM

## 2025-01-29 PROCEDURE — 97116 GAIT TRAINING THERAPY: CPT | Mod: CQ

## 2025-01-29 RX ORDER — LISINOPRIL 20 MG/1
40 TABLET ORAL DAILY
Status: DISCONTINUED | OUTPATIENT
Start: 2025-01-30 | End: 2025-01-29 | Stop reason: HOSPADM

## 2025-01-29 RX ADMIN — SENNOSIDES 8.6 MG: 8.6 TABLET, FILM COATED ORAL at 08:01

## 2025-01-29 RX ADMIN — MICONAZOLE NITRATE 2 % TOPICAL POWDER: at 08:01

## 2025-01-29 RX ADMIN — FUROSEMIDE 40 MG: 40 TABLET ORAL at 08:01

## 2025-01-29 RX ADMIN — HEPARIN SODIUM 7500 UNITS: 5000 INJECTION INTRAVENOUS; SUBCUTANEOUS at 06:01

## 2025-01-29 RX ADMIN — OXYCODONE 5 MG: 5 TABLET ORAL at 08:01

## 2025-01-29 RX ADMIN — GABAPENTIN 600 MG: 300 CAPSULE ORAL at 08:01

## 2025-01-29 RX ADMIN — MICONAZOLE NITRATE: 20 OINTMENT TOPICAL at 08:01

## 2025-01-29 RX ADMIN — HEPARIN SODIUM 7500 UNITS: 5000 INJECTION INTRAVENOUS; SUBCUTANEOUS at 03:01

## 2025-01-29 RX ADMIN — CEFEPIME 2 G: 2 INJECTION, POWDER, FOR SOLUTION INTRAVENOUS at 09:01

## 2025-01-29 RX ADMIN — ASPIRIN 81 MG: 81 TABLET, COATED ORAL at 08:01

## 2025-01-29 RX ADMIN — LISINOPRIL 20 MG: 20 TABLET ORAL at 08:01

## 2025-01-29 RX ADMIN — POLYETHYLENE GLYCOL 3350 17 G: 17 POWDER, FOR SOLUTION ORAL at 08:01

## 2025-01-29 RX ADMIN — ATORVASTATIN CALCIUM 40 MG: 40 TABLET, FILM COATED ORAL at 08:01

## 2025-01-29 RX ADMIN — CEFEPIME 2 G: 2 INJECTION, POWDER, FOR SOLUTION INTRAVENOUS at 04:01

## 2025-01-29 RX ADMIN — CARVEDILOL 25 MG: 25 TABLET, FILM COATED ORAL at 08:01

## 2025-01-29 RX ADMIN — CEFEPIME 2 G: 2 INJECTION, POWDER, FOR SOLUTION INTRAVENOUS at 02:01

## 2025-01-29 NOTE — ASSESSMENT & PLAN NOTE
Chronic issue, pt reported ~ 7 yrs.  -stable for discharge to SNF with wound care orders and continue cefepime

## 2025-01-29 NOTE — PT/OT/SLP PROGRESS
Occupational Therapy   Co-Treatment  Co-treatment performed due to patient's multiple deficits requiring two skilled therapists to appropriately and safely assess patient's strength and endurance while facilitating functional tasks in addition to accommodating for patient's activity tolerance.        Name: Edd Wills  MRN: [id number]  Admitting Diagnosis:  Cellulitis of right thigh       Recommendations:     Discharge Recommendations: Moderate Intensity Therapy  Discharge Equipment Recommendations:  rollator  Barriers to discharge:  Decreased caregiver support    Assessment:     Edd Wills is a 69 y.o. male with a medical diagnosis of Cellulitis of right thigh.  He presents with the following performance deficits affecting function are weakness, impaired endurance, impaired self care skills, impaired functional mobility, impaired balance, decreased lower extremity function, pain, impaired skin.     Pt agreeable to therapy and tolerated fairly well. Pt was cooperative & demonstrated good effort throughout the therapy session. Pt would continue to benefit from skilled OT services to maximize functional independence with ADLs and functional mobility, reduce caregiver burden, and facilitate safe discharge in the least restrictive environment.      Rehab Prognosis:  Good; patient would benefit from acute skilled OT services to address these deficits and reach maximum level of function.       Plan:     Patient to be seen 4 x/week to address the above listed problems via self-care/home management, therapeutic activities, therapeutic exercises  Plan of Care Expires: 02/15/25  Plan of Care Reviewed with: patient    Subjective     Chief Complaint: R LE pain  Patient/Family Comments/goals: regain PLOF  Pain/Comfort:  Pain Rating 1: 5/10  Location - Side 1: Right  Location 1: leg  Pain Addressed 1: Reposition, Distraction  Pain Rating Post-Intervention 1: 5/10    Objective:     Communicated with: nurse prior to session.   Patient found up in chair with pulse ox (continuous), telemetry upon OT entry to room.    General Precautions: Standard, fall    Orthopedic Precautions:N/A  Braces: N/A  Respiratory Status: Room air     Occupational Performance:     Functional Mobility/Transfers:  Patient completed 2 Sit <> Stand Transfers (from wheelchair) with minimum assistance  with  rolling walker     Patient completed Wheelchair Transfer with contact guard assistance with  rolling walker  Pt assisted with hand placement    Patient completed Chair Transfer with step transfer technique with contact guard assistance with  rolling walker  Pt assisted with hand placement    Functional Mobility: pt ambulated in room to simulate household environment to improve overall strength, coordination, activity tolerance & safety awareness required for participation/independence wit MRADLs/IADLs  Pt required additional time to ambulate throughout room but no LOB noted    Activities of Daily Living:  Grooming: contact guard assistance while standing at sink to complete oral care    [facility] 6 Click ADL: 17    Treatment & Education:  Therapeutic exercises: pt completed the following UE exercises to maintain/improve UE strength and overall endurance required for participation/independence with ADLs, IADLs & functional mobility/transfers   - Modified unilateral chest press against mod resistance x8 each (L&R)   - Modified elbow extension against mod resistance x10 each   - Elbow flex x8 each using yellow theraband (YTB)   - Elbow ext x8 each using YTB  -Education on task modification to maximize safety and (I) during ADLs and mobility  -Education on importance of OOB activity to maintain/improve overall strength, activity tolerance and promote recovery  -Pt educated to call for assistance and to transfer with hospital staff only  Pt had no further questions & when asked whether there were any concerns pt reported none.     Patient left up in chair with all lines  intact and call button in reach    GOALS:   Multidisciplinary Problems       Occupational Therapy Goals          Problem: Occupational Therapy    Goal Priority Disciplines Outcome Interventions   Occupational Therapy Goal     OT, PT/OT Progressing    Description: Goals to be met by: 2/15/25     Patient will increase functional independence with ADLs by performing:    UE Dressing with Modified [place].  LE Dressing with Modified [place].  Grooming while standing at sink with Modified [place].  Toileting from toilet/bedside commode with Modified [place] for hygiene and clothing management.   Supine to sit with Modified [place].  Toilet transfer to toilet/bedside commode with Modified [place].                       Time Tracking:     OT Date of Treatment: 01/29/25  OT Start Time: 1047  OT Stop Time: 1118  OT Total Time (min): 31 min    Billable Minutes:Self Care/Home Management 17  Therapeutic Exercise 14    OT/DEVIN: OT     Number of DEVIN visits since last OT visit: 1 1/29/2025

## 2025-01-29 NOTE — ASSESSMENT & PLAN NOTE
Hx Type B aortic dissection.     Plan:   Will resume home medications on discharge:  - Lasix 40 mg od, lisinopril 40 mg once daily, coreg 25 bid, norvasc 10mg once daily

## 2025-01-29 NOTE — ASSESSMENT & PLAN NOTE
LEIF is likely due to acute tubular necrosis caused by blood pressure medications . Most recent creatinine and eGFR are listed below.  Recent Labs     01/27/25  0505 01/28/25  0356 01/29/25  0256   CREATININE 1.0 1.0 0.9   EGFRNORACEVR >60.0 >60.0 >60.0        Plan  - LEIF is resolved   yes

## 2025-01-29 NOTE — DISCHARGE SUMMARY
"[facility] Medicine  Discharge Summary      Patient Name: Edd Wills  MRN: [id number]  APRIL: [id number]  Patient Class: IP- Inpatient  Admission Date: 1/15/2025  Hospital Length of Stay: 13 days  Discharge Date and Time:  01/29/2025 5:49 PM  Attending Physician: Abdon Dutton MD   Discharging Provider: Jinny Campa MD  Primary Care Provider: Zuleyka Sabillon MD  Hospital Medicine Team: [facility] HOS[facility] MED 1 Jinny Campa MD  Primary Care Team: [facility] 1    HPI:   Edd Wills is a 69 y.o. male with past medical history of malignant HTN, HLD, morbid obesity, Type B aortic dissection, chronic lymphedema, LLUVIA, chronic neck pain s/p cervical fusion and laminectomy, chronic bilateral knee pain, grade 1 diastolic dysfunction, and CKD who presented for abscess on his R medial thigh. He has a 6-7 year history of a lymphedematous collection to his R medial thigh, however noticed a week ago that there was mild purulent drainage coming from a new wound. He also reports the collection has increased in size over the past week. Wound care visits him 3 times a week to manage this collection, but a couple days before his presentation, while trying to express some of the drainage, drainage evolved to "blood clots" and a large amount of purulent drainage which wound care felt was too extensive to care for at home. He was then recommended to present to the ED. He denies fever, chills, SOB, chest pain, N/V, diarrhea, abdominal pain, and dysuria.      In ED, vitals were hypertensive to 144, but otherwise hemodynamically stable. Labs were significant for Hgb 13.2 and hematocrit 39.5 with no leukocytosis. Na 135 and albumin 3.0. He was started on clindamycin in D5W 600mg/50mL Q8 for his cellulitis.       * No surgery found *      Hospital Course:   Mr. Wills is a 69 year old gentleman with morbid obesity (BMI 54), chronic lymphedema with consequent severe chronic swelling of bilateral lower " extremities, obstructive sleep apnea, hypertension, type B aortic dissection from left subclavian to left common iliac, history of cervical spine fusion with laminectomy and persistent chronic pain, heart failure with preserved ejection fraction, CKD. He is admitted with abscess of right medial thigh. Site drained spontaneously.  Patient's wound culture grew serratia and pseudomonas. Consulted ID who recommended to continue IV cefepime 2g Q8h to complete abx duration of at-least 2 weeks, SOHAN 02/03/25. Patient had rapid called on 1/17 when he was found unresponsive.  ICU team came to room to assess. CTH and CTA chest showed no acute abnormalities. Likely related to titration of antihypertensives to meet BP goal <120 given hx aortic dissection. Pt also had minor elevation in creatinine but no change in his urine output. Patient had second presyncopal episode on 1/26, likely related to strict BP control.  Held antihypertensives then added back as tolerated.  Resumed carvedilol and added lisinopril at lower dose.  Patient's blood pressure control can be slightly more lenient on discharge to avoid any hypotensive episodes. CM is working on placement. SNF placement attained, patient is fit for discharge from medical point of view.     Goals of Care Treatment Preferences:  Code Status: Full Code          What is most important right now is to focus on avoiding the hospital.  Accordingly, we have decided that the best plan to meet the patient's goals includes ordered home health wound care..      SDOH Screening:  The patient was screened for food insecurity, housing instability, transportation needs, utility difficulties, and interpersonal safety. The social determinant(s) of health identified as a concern this admission are:    Vitals:    01/29/25 0900 01/29/25 1100 01/29/25 1225 01/29/25 1559   BP:   122/60 (!) 106/56   BP Location:   Left arm Left arm   Patient Position:   Lying Sitting   Pulse:   70 65   Resp:   17 17    Temp:   98.3 °F (36.8 °C) 98.4 °F (36.9 °C)   TempSrc:   Oral Oral   SpO2: 98% 98% 98% 97%   Weight:       Height:            Physical Exam  Vitals and nursing note reviewed.   Constitutional:       General: He is not in acute distress.     Appearance: He is not ill-appearing or diaphoretic.   HENT:      Head: Normocephalic and atraumatic.      Nose: Nose normal.      Mouth/Throat:      Mouth: Mucous membranes are moist.   Eyes:      Extraocular Movements: Extraocular movements intact.   Cardiovascular:      Rate and Rhythm: Normal rate and regular rhythm.      Heart sounds: No murmur heard.  Pulmonary:      Effort: Pulmonary effort is normal. No respiratory distress.      Breath sounds: Rhonchi: b/l lower lung fields.   Abdominal:      General: Abdomen is flat. There is no distension.      Tenderness: There is no abdominal tenderness.   Musculoskeletal:      Cervical back: Normal range of motion.      Right lower leg: Edema present.      Left lower leg: Edema present.      Comments: Lymphedema. Right medial thigh with large collection and wound covered by clean intact bandage   Skin:     General: Skin is warm.   Neurological:      Mental Status: He is alert and oriented to person, place, and time.     Social Drivers of Health with Concerns     Transportation Needs: Unmet Transportation Needs (1/16/2025)        Consults:   Consults (From admission, onward)          Status Ordering Provider     Inpatient consult to Midline team  Once        Provider:  (Not yet assigned)    Completed JOYCE PERALTA     Inpatient consult to Infectious Diseases  Once        Provider:  (Not yet assigned)    Completed CHAVO AGUSTIN     Inpatient consult to Social Work  Once        Provider:  (Not yet assigned)    TITI Fuller            Final Active Diagnoses:    Diagnosis Date Noted POA    PRINCIPAL PROBLEM:  Cellulitis of right thigh [L03.115] 01/16/2025 Yes    Hyponatremia [E87.1] 01/23/2025 Yes    ATN (acute  tubular necrosis) [N17.0] 01/23/2025 Yes    LEIF (acute kidney injury) [N17.9] 01/22/2025 Yes    Cellulitis of leg [L03.119] 01/19/2025 Yes    AMS (altered mental status) [R41.82] 01/17/2025 No    Debility [R53.81] 01/16/2025 Yes    Chronic knee pain [M25.569, G89.29] 01/16/2025 Yes    Lymphedema [I89.0] 10/04/2022 Yes    History of aortic dissection [Z86.79] 07/09/2020 Not Applicable    Hypertension [I10] 06/18/2016 Yes      Problems Resolved During this Admission:       Discharged Condition: fair    Disposition: Skilled Nursing Facility    Follow Up:   Contact information for follow-up providers       [facility] Follow up.    Contact information:  [address] [place] [address] [place]  [place] [address] [phone]                     Contact information for after-discharge care       Home Medical Care       [facility] .    Service: Home Health Services  Contact information:  [address] [place] [address] [phone]                                 Patient Instructions:      WALKER FOR HOME USE     Order Specific Question Answer Comments   Type of Walker: Heavy duty (300+ lbs)    With wheels? Yes    Height: 6' (1.829 m)    Weight: 181.4 kg (400 lb)    Length of need (1-99 months): 99    Does patient have medical equipment at home? shower chair    Does patient have medical equipment at home? grab bar    Does patient have medical equipment at home? walker, rolling    Please check all that apply: Patient's condition impairs ambulation.      Ambulatory referral/consult to Vascular Surgery   Standing Status: Future   Referral Priority: Routine Referral Type: Consultation   Referral Reason: Specialty Services Required   Requested Specialty: Vascular Surgery   Number of Visits Requested: 1       Significant Diagnostic Studies: Labs: CMP   Recent Labs   Lab 01/28/25  0356 01/29/25  0256   * 135*   K 4.5 4.2    108   CO2 21* 23   GLU 90 79   BUN 31* 30*   CREATININE 1.0 0.9   CALCIUM 8.6* 8.6*  "  PROT 6.6 6.5   ALBUMIN 2.6* 2.5*   BILITOT 0.3 0.3   ALKPHOS 65 61   AST 16 17   ALT 18 19   ANIONGAP 5* 4*   , CBC   Recent Labs   Lab 01/28/25  0356 01/29/25  0256   WBC 8.03 9.06   HGB 11.7* 11.4*   HCT 36.0* 35.0*    241   , INR   Lab Results   Component Value Date    INR 1.1 02/11/2013    INR 1.1 02/10/2013    INR 1.1 02/09/2013   , Lipid Panel   Lab Results   Component Value Date    CHOL 85 (L) 01/10/2025    HDL 30 (L) 01/10/2025    LDLCALC 48.0 (L) 01/10/2025    TRIG 35 01/10/2025    CHOLHDL 35.3 01/10/2025   , Troponin No results for input(s): "TROPONINI" in the last 168 hours., A1C:   Recent Labs   Lab 01/10/25  1321   HGBA1C 5.2   , and All labs within the past 24 hours have been reviewed    Pending Diagnostic Studies:       None           Medications:  Reconciled Home Medications:      Medication List        START taking these medications      ceFEPIme 2 gram injection  Commonly known as: MAXIPIME  Inject 2 g into the vein every 8 (eight) hours. for 6 days            CHANGE how you take these medications      carvediloL 25 MG tablet  Commonly known as: COREG  Take 1 tablet (25 mg total) by mouth 2 (two) times daily.  What changed: when to take this     lisinopriL 20 MG tablet  Commonly known as: PRINIVIL,ZESTRIL  Take 2 tablets (40 mg total) by mouth once daily.  What changed:   medication strength  when to take this     polyethylene glycol 17 gram Pwpk  Commonly known as: GLYCOLAX  Take 17 g by mouth 2 (two) times daily as needed.  What changed:   when to take this  reasons to take this            CONTINUE taking these medications      amLODIPine 10 MG tablet  Commonly known as: NORVASC  TAKE ONE TABLET BY MOUTH EVERY DAY     aspirin 81 MG EC tablet  Commonly known as: ECOTRIN  Take 81 mg by mouth once daily.     atorvastatin 40 MG tablet  Commonly known as: LIPITOR  TAKE ONE TABLET BY MOUTH EVERY DAY     diclofenac sodium 1 % Gel  Commonly known as: VOLTAREN  Apply 2 g topically once daily. " Apply to affected area     furosemide 40 MG tablet  Commonly known as: LASIX  Take 1 tablet (40 mg total) by mouth once daily.     gabapentin 600 MG tablet  Commonly known as: NEURONTIN  Take 1 tablet (600 mg total) by mouth 3 (three) times daily.            STOP taking these medications      LAXATIVE ORAL              Indwelling Lines/Drains at time of discharge:   Lines/Drains/Airways       None                   Time spent on the discharge of patient: 45 minutes         Jinny Campa MD  Department of Hospital Medicine  [facility] - Acute Medical Stepdown

## 2025-01-29 NOTE — ASSESSMENT & PLAN NOTE
Patient's blood pressure range in the last 24 hours was: BP  Min: 106/56  Max: 169/76.The patient's inpatient anti-hypertensive regimen is listed below:  Current Antihypertensives  labetaloL tablet 100 mg, Daily PRN, Oral  furosemide tablet 40 mg, Daily, Oral  carvediloL tablet 25 mg, 2 times daily, Oral  carvedilol (COREG) tablet, 2 times daily, Oral  furosemide (LASIX) tablet, Daily, Oral  lisinopril (PRINIVIL,ZESTRIL) tablet, Daily, Oral  lisinopriL tablet 40 mg, Daily, Oral    Plan  - will resume home medications on discharge: - Lasix 40 mg od, lisinopril 40 mg once daily, coreg 25 bid, norvasc 10mg once daily

## 2025-01-29 NOTE — PLAN OF CARE
[facility] - Stepdown Flex ([place])  Discharge Final Note    Primary Care Provider: Zuleyka Sabillon MD    Expected Discharge Date: 1/29/2025  Discharge Plan A and Plan B have been determined by review of patient's clinical status, future medical and therapeutic needs, and coverage/benefits for post-acute care in coordination with multidisciplinary team members.     Final Discharge Note (most recent)       Final Note - 01/29/25 1024          Final Note    Assessment Type Final Discharge Note     Anticipated Discharge Disposition Planned Readmission - Skilled Nursing Facility    [facility] [phone]    What phone number can be called within the next 1-3 days to see how you are doing after discharge? [phone]   [facility]    Hospital Resources/Appts/Education Provided Appointments scheduled and added to AVS        Post-Acute Status    Post-Acute Authorization Placement     Post-Acute Placement Status Set-up Complete/Auth obtained     Home Health Status Discharge Plan Changed     Coverage MEDICARE - MEDICARE PART A & B     Discharge Delays None known at this time                                 Follow-up providers       [address] [place] [address]  [phone]       Next Steps: Follow up              After-discharge care                Home Medical Care       [facility]   Service: Home Health Services    [address] [facility] [address]   Phone: [phone]                               Future Appointments   Date Time Provider Department Center   2/6/2025 10:00 AM José Manuel Au PA-C [facility] ID [address]   2/11/2025 10:00 AM Rambo Alarcon MD PhD [facility] [address]   4/17/2025  8:00 AM Eide Smith NP [address]          1009 am  CM called and spoke Rima @ [facility] [phone] and updated that patient will transfer to [facility] for skilled services.     1010 am  Patient is assigned to room 104 and nurse is to call  report to [phone].  CM will set up transportation after patients has settled in new room on 10 SICU.     Transportation scheduled or 3:00   pm,  ETA pending       ETA 4:45 pm     CM met with patient  and discussed discharge plans, patient to DC to [facility] cor skilled services.   No medications delivered to bedside and follow up appointment[s] scheduled.   Transportation provided by [facility] via stretcher.    3:46 pm  CM sent updated SNF orders via secure email to [facility]    Ria Tian RN  Case Management  [facility]  [phone]

## 2025-01-29 NOTE — ASSESSMENT & PLAN NOTE
"Right medial thigh collection with overlying cellulitis and draining wound. In setting of chronic lymphedema.  Worsening over past 2 weeks with increased size and drainage of blood and pus.   CT R thigh reported scattered areas of subcutaneous dense infiltration of the subcutaneous fat in the posterior and medial upper thigh, the lower calf and dorsal foot.  Correlation for cellulitis. No drainable fluid collection or abscess.     - Wound cultures positive for serratia and pseudomonas  - Started Ceftriaxone 2g q 24hr, switched to cefepime,  - ID recs to continue IV cefepime 2g Q8h  -- [considered FQ, but avoiding d/t h/o type-B aortic dissection]. To complete abx duration of at-least 2wks, SOHAN 02/03/25.  - Wound care consulted, recs below:  -Clean right inner thigh with CGH soap/water, pat dry. Loosely pack 1/4" Iodoform or AMD antimicrobial packing gauze into open "holes" to ulcerated wound. Cover with ABD and secure with cloth tape, daily and prn. Apply a zinc barrier to periwound to protect skin from moisture/drainage.   - Remove bilateral lower leg wraps, once.   "

## 2025-01-29 NOTE — PT/OT/SLP PROGRESS
"Physical Therapy Co Treatment    Patient Name:  Edd Wills   MRN:  [id number]    Recommendations:     Discharge Recommendations: Moderate Intensity Therapy  Discharge Equipment Recommendations: rollator  Barriers to discharge: None    Assessment:     Edd Wills is a 69 y.o. male admitted with a medical diagnosis of Cellulitis of right thigh.  He presents with the following impairments/functional limitations: weakness, impaired endurance, impaired self care skills, impaired functional mobility, gait instability, impaired balance, decreased lower extremity function, pain, decreased ROM, impaired skin, edema, impaired cardiopulmonary response to activity.  Pt was agreeable to skilled therapy session and ambulation trial. Pt demonstrated improved endurance by completing increased walking distance prior to taking seated rest break. Pt did not exhibit any signs of SOB but c/o LE fatigue. Pt will benefit from further graded progressions provided by skilled therapist.   Co-treatment performed due to patient's multiple deficits requiring two skilled therapists to appropriately and safely assess patient's strength and endurance while facilitating functional tasks in addition to accommodating for patient's activity tolerance.  Rehab Prognosis: Good; patient would benefit from acute skilled PT services to address these deficits and reach maximum level of function.    Recent Surgery: * No surgery found *      Plan:     During this hospitalization, patient to be seen 4 x/week to address the identified rehab impairments via gait training, therapeutic activities, therapeutic exercises, neuromuscular re-education and progress toward the following goals:    Plan of Care Expires:  02/15/25    Subjective     Chief Complaint: "My legs are bothering my the most"  Patient/Family Comments/goals: Improve LOF  Pain/Comfort:  Pain Rating 1: 5/10  Location - Side 1: Right  Location - Orientation 1: generalized  Location 1: leg  Pain Addressed " 1: Reposition, Distraction  Pain Rating Post-Intervention 1: 5/10      Objective:     Communicated with RN prior to session.  Patient found  in wheelchair  with pulse ox (continuous), telemetry upon PT entry to room.     General Precautions: Standard, fall  Orthopedic Precautions: N/A  Braces: N/A  Respiratory Status: Room air       Functional Mobility:   Therapeutic Exercise: Patient performed 2 set(s) of 10 repetitions of  the following seated exercises: ankle pumps, long arc quads, marches, hip abduction, and hip adduction for bilateral LE. Patient required skilled PT for instruction of exercises and appropriate cues to perform exercises safely and appropriately. Performed in wheelchair prior to gait trial for LE warm up.  Transfers:     Sit to Stand:  minimum assistance with rolling walker  Gait: Patient gait trained FWB/WBAT: bilateral lower extremity 10'x2 tirals    on level tile with Rolling Walker with Contact Guard Assistance.  Pt with demonstarting a  reciprocal gait with decreased grace, increased time in double stance, decreased velocity of limb motion, decreased step length, decreased stride length, and decreased toe-to-floor clearance.Impairments contributing to gait deviations include impaired balance, decreased flexibility, pain, decreased ROM, and decreased strength  Balance: Sitting: Independnent; Standing: CGA (~10 minutes @ sink for ADL's w/ OT)      AM-PAC 6 CLICK MOBILITY  Turning over in bed (including adjusting bedclothes, sheets and blankets)?: 2  Sitting down on and standing up from a chair with arms (e.g., wheelchair, bedside commode, etc.): 3  Moving from lying on back to sitting on the side of the bed?: 3  Moving to and from a bed to a chair (including a wheelchair)?: 3  Need to walk in hospital room?: 3  Climbing 3-5 steps with a railing?: 1  Basic Mobility Total Score: 15       Treatment & Education:  Patient provided with daily orientation and goals of this PT session. They were  educated to call for assistance and to transfer with hospital staff only.  Also, pt was educated on the effects of prolonged immobility and the importance of performing OOB activity and exercises to promote healing and reduce recovery time    Patient left up in chair with all lines intact, call button in reach, and OT present..    GOALS:   Multidisciplinary Problems       Physical Therapy Goals       Not on file              Multidisciplinary Problems (Resolved)          Problem: Physical Therapy    Goal Priority Disciplines Outcome Interventions   Physical Therapy Goal   (Resolved)     PT, PT/OT Met    Description: Goals to be met by: 25     Patient will increase functional independence with mobility by performin. Supine to sit with Modified [place] - Not met  2. Sit to stand transfer with Stand-by Assistance using bariatric RW - Met   3. Gait  x 100 feet with Contact Guard Assistance using bariatric Rolling Walker - Not met  4. Pt will demo ability to stand for 5 minutes with either bariatric RW and sink support (for self-care) with SBA - Not met                         Time Tracking:     PT Received On: 25  PT Start Time: 1038     PT Stop Time: 1103  PT Total Time (min): 25 min     Billable Minutes: Gait Training 15 and Therapeutic Exercise 10    Treatment Type: Treatment  PT/PTA: PTA     Number of PTA visits since last PT visit: 2025

## 2025-01-29 NOTE — ASSESSMENT & PLAN NOTE
Patient with Acute on chronic debility due to  chronic lymphedema . The patient's latest AMPAC (Activity Measure for Post Acute Care) Score is listed below.    AM-PAC Score - How much help does the patient need for each activity listed  Basic Mobility Total Score: 15  Turning over in bed (including adjusting bedclothes, sheets and blankets)?: A lot  Sitting down on and standing up from a chair with arms (e.g., wheelchair, bedside commode, etc.): A little  Moving from lying on back to sitting on the side of the bed?: A little  Moving to and from a bed to a chair (including a wheelchair)?: A little  Need to walk in hospital room?: A little  Climbing 3-5 steps with a railing?: Unable    Plan  Discharging to SNF today

## 2025-01-30 ENCOUNTER — TELEPHONE (OUTPATIENT)
Dept: INTERNAL MEDICINE | Facility: CLINIC | Age: 70
End: 2025-01-30
Payer: MEDICARE

## 2025-01-30 NOTE — PROGRESS NOTES
Patient given discharge instructions. Patients discharge to SNF. Patient's telemetry monitor discontinued. Patient midline intact. Patient discharged to SNF per MD orders with all personal belongings.

## 2025-02-03 ENCOUNTER — TELEPHONE (OUTPATIENT)
Dept: INFECTIOUS DISEASES | Facility: CLINIC | Age: 70
End: 2025-02-03
Payer: MEDICARE

## 2025-02-03 NOTE — TELEPHONE ENCOUNTER
Canceled pt appt.    ----- Message from José Manuel Au PA-C sent at 2/2/2025 11:14 PM CST -----  Regarding: cancel  Hey we can cancel pt appt on Thursday with me, no need to re-sched, he's at SNF. Thank you

## 2025-02-04 ENCOUNTER — TELEPHONE (OUTPATIENT)
Dept: ADMINISTRATIVE | Facility: OTHER | Age: 70
End: 2025-02-04
Payer: MEDICARE

## 2025-02-04 ENCOUNTER — EXTERNAL HOME HEALTH (OUTPATIENT)
Dept: HOME HEALTH SERVICES | Facility: HOSPITAL | Age: 70
End: 2025-02-04
Payer: MEDICARE

## 2025-02-11 ENCOUNTER — TELEPHONE (OUTPATIENT)
Dept: CARDIOLOGY | Facility: CLINIC | Age: 70
End: 2025-02-11
Payer: MEDICARE

## 2025-02-12 ENCOUNTER — TELEPHONE (OUTPATIENT)
Dept: CARDIOLOGY | Facility: CLINIC | Age: 70
End: 2025-02-12
Payer: MEDICARE

## 2025-02-18 ENCOUNTER — TELEPHONE (OUTPATIENT)
Dept: HOME HEALTH SERVICES | Facility: CLINIC | Age: 70
End: 2025-02-18
Payer: MEDICARE

## 2025-02-18 NOTE — TELEPHONE ENCOUNTER
Spoke with patient's HH nurse with [facility]. States that patient's wound to the inner thigh has green tinged drainage. States that it doesn't have an odor and no fever. Patient was on IV antibiotics in the hospital but wasn't discharged on anything so currently not taking any antibiotics at this time. Requesting if its okay to get an order for Wound care to go to treat and evaluate the wound. After speaking with NP she states that patient is current with [facility] and all wound care concerns need to go through [facility] NP.  Called [facility] and notified Sidra () @ [phone].

## 2025-02-20 ENCOUNTER — DOCUMENT SCAN (OUTPATIENT)
Dept: HOME HEALTH SERVICES | Facility: HOSPITAL | Age: 70
End: 2025-02-20
Payer: MEDICARE

## 2025-02-21 ENCOUNTER — TELEPHONE (OUTPATIENT)
Dept: HOME HEALTH SERVICES | Facility: CLINIC | Age: 70
End: 2025-02-21
Payer: MEDICARE

## 2025-02-21 DIAGNOSIS — I89.0 LYMPHEDEMA: Primary | ICD-10-CM

## 2025-02-21 NOTE — TELEPHONE ENCOUNTER
Contacted by [facility] .   She notified pt. about appointment with Dr. Yang and patient states he is unable to get out of the home to see .  states he has lymphedema to BLE's and a wound to the inner thigh that needs to be looked at. They were wondering if they can start the 2 layer compression wraps again for the lymphedema. Patient did have [facility] but he is switching to [facility] because [facility] is only seeing him once a month and [facility] will see him weekly.  CM states that [facility] is coming on Wednesday 2/26.  Requesting sooner visit with NP.  Notified NP.  States she can see him on Tuesday, 2/25.  Also states they can resume 2 layer compression wraps until seen by [facility].  Orders placed per NP request for 2 layer compression wraps to BLE's.  Demographics, orders, and notes faxed to [facility].  Fax confirmation received.

## 2025-02-25 ENCOUNTER — OFFICE VISIT (OUTPATIENT)
Dept: HOME HEALTH SERVICES | Facility: CLINIC | Age: 70
End: 2025-02-25
Payer: MEDICARE

## 2025-02-25 DIAGNOSIS — I87.8 VENOUS STASIS: ICD-10-CM

## 2025-02-25 DIAGNOSIS — E78.2 MIXED HYPERLIPIDEMIA: ICD-10-CM

## 2025-02-25 DIAGNOSIS — I10 HYPERTENSION, UNSPECIFIED TYPE: ICD-10-CM

## 2025-02-25 DIAGNOSIS — M17.0 PRIMARY OSTEOARTHRITIS OF BOTH KNEES: ICD-10-CM

## 2025-02-25 DIAGNOSIS — E66.01 MORBID OBESITY: Primary | ICD-10-CM

## 2025-02-25 DIAGNOSIS — I51.89 GRADE I DIASTOLIC DYSFUNCTION: ICD-10-CM

## 2025-02-26 VITALS
SYSTOLIC BLOOD PRESSURE: 140 MMHG | DIASTOLIC BLOOD PRESSURE: 75 MMHG | HEART RATE: 70 BPM | OXYGEN SATURATION: 99 % | RESPIRATION RATE: 20 BRPM | TEMPERATURE: 98 F

## 2025-02-26 PROBLEM — L03.119 CELLULITIS OF LEG: Status: RESOLVED | Noted: 2025-01-19 | Resolved: 2025-02-26

## 2025-02-26 PROBLEM — R41.82 AMS (ALTERED MENTAL STATUS): Status: RESOLVED | Noted: 2025-01-17 | Resolved: 2025-02-26

## 2025-02-26 NOTE — ASSESSMENT & PLAN NOTE
chronic  Continue gabapentin  He has been educated on the negative effects of obesity to one's health and encouraged to consider lifestyle diet modifications and exercise.    Ordered lidocaine patches

## 2025-02-26 NOTE — ASSESSMENT & PLAN NOTE
Chronic lymphedema noted   Denies chest pain, SOB  Continue medication as prescribed  Keep scheduled follow up appts  Activity and Diet restrictions:   Recommend 2-3 gram sodium restriction and 1500cc- 2000cc fluid restriction.  Encourage physical activity with graded exercise program.  Requested patient to weigh themselves daily, and to notify us if their weight increases by more than 3 lbs in 1 day or 5 lbs in 3 days.  Elevated lower extremities while sitting/lying, compression stockings
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
## 2025-02-26 NOTE — ASSESSMENT & PLAN NOTE
Wound provider with [facility] home wound care expected to start tomorrow  Continue home health wound care  Elevated low extremities while sitting/lying  Low Na diet   Continue furosemide

## 2025-02-28 LAB
ALLENS TEST: NORMAL
DELSYS: NORMAL
LDH SERPL L TO P-CCNC: 1.08 MMOL/L (ref 0.36–1.25)
MODE: NORMAL
SAMPLE: NORMAL
SITE: NORMAL
SP02: 97

## 2025-03-13 ENCOUNTER — TELEPHONE (OUTPATIENT)
Dept: HOME HEALTH SERVICES | Facility: CLINIC | Age: 70
End: 2025-03-13
Payer: MEDICARE

## 2025-03-13 DIAGNOSIS — I87.8 VENOUS STASIS: Primary | ICD-10-CM

## 2025-03-13 DIAGNOSIS — I89.0 CHRONIC ACQUIRED LYMPHEDEMA: ICD-10-CM

## 2025-03-13 DIAGNOSIS — I89.0 LYMPHEDEMA: ICD-10-CM

## 2025-03-13 DIAGNOSIS — L97.909 VENOUS STASIS ULCER, UNSPECIFIED SITE, UNSPECIFIED ULCER STAGE, UNSPECIFIED WHETHER VARICOSE VEINS PRESENT: ICD-10-CM

## 2025-03-13 DIAGNOSIS — R60.0 PERIPHERAL EDEMA: ICD-10-CM

## 2025-03-13 DIAGNOSIS — I83.009 VENOUS STASIS ULCER, UNSPECIFIED SITE, UNSPECIFIED ULCER STAGE, UNSPECIFIED WHETHER VARICOSE VEINS PRESENT: ICD-10-CM

## 2025-03-13 NOTE — TELEPHONE ENCOUNTER
Received call from Kristal Dill NP with [facility] making sure we received the results from the BLE ultrsounds she ordered.  Notified her that it was forwarded to Edie Smith and scanned into the chart.  She is asking that Edie review it d/t findings of progression of arterial insufficiency and wanted to see if she wanted to order compression wraps and what pressure.  Notified NP and she request Vascular Medicine referral be placed. Called Kristal back and left her a voicemail.  Also received a call from Sidra,  with [facility].  Requesting visit notes from 2/25/25 be faxed to her at [phone].  Also called to notify us that patient has wound care ordered 3x weekly by [facility] NP and they are short staffed and may not be able to staff that patient at this time.  States she is trying to work on working it out but if she is unable to, wanted to make NP aware that they might have to transfer the patient to another  company.  Notified Edie Smith NP and she states that is fine as long as the patient's needs are met.  Faxed visit notes to Sidra with [facility].  Fax confirmation received.  Orders placed per NP request for referral to vascular Medicine.

## 2025-03-18 ENCOUNTER — TELEPHONE (OUTPATIENT)
Dept: HOME HEALTH SERVICES | Facility: CLINIC | Age: 70
End: 2025-03-18
Payer: MEDICARE

## 2025-03-18 DIAGNOSIS — I89.0 CHRONIC ACQUIRED LYMPHEDEMA: ICD-10-CM

## 2025-03-18 DIAGNOSIS — I10 HYPERTENSION, UNSPECIFIED TYPE: ICD-10-CM

## 2025-03-18 DIAGNOSIS — I87.8 VENOUS STASIS: Primary | ICD-10-CM

## 2025-03-18 NOTE — TELEPHONE ENCOUNTER
Received call from Sidra with [facility] stating that patient was accepted for transfer to [facility] because [facility] cannot staff patient.  Notified NP.  Subsequent orders placed for patient to be transferred.  Faxed orders, notes, and demographics to [facility].  Fax confirmation received.

## 2025-03-24 ENCOUNTER — TELEPHONE (OUTPATIENT)
Dept: HOME HEALTH SERVICES | Facility: CLINIC | Age: 70
End: 2025-03-24
Payer: MEDICARE

## 2025-03-24 DIAGNOSIS — I89.0 CHRONIC ACQUIRED LYMPHEDEMA: ICD-10-CM

## 2025-03-24 DIAGNOSIS — I10 HYPERTENSION, UNSPECIFIED TYPE: ICD-10-CM

## 2025-03-24 DIAGNOSIS — I87.8 VENOUS STASIS: Primary | ICD-10-CM

## 2025-03-24 NOTE — TELEPHONE ENCOUNTER
Orders placed per NP request for home health services (SN, PT, OT) .  Demographics, notes, and orders faxed to [facility].  Fax confirmation received.

## 2025-03-31 ENCOUNTER — DOCUMENT SCAN (OUTPATIENT)
Dept: HOME HEALTH SERVICES | Facility: HOSPITAL | Age: 70
End: 2025-03-31
Payer: MEDICARE

## 2025-03-31 ENCOUNTER — TELEPHONE (OUTPATIENT)
Dept: HOME HEALTH SERVICES | Facility: CLINIC | Age: 70
End: 2025-03-31
Payer: MEDICARE

## 2025-03-31 NOTE — TELEPHONE ENCOUNTER
Received phone call from [facility] requesting electric wheelchair assessment visit be scheduled for patient with NP.  They notified that once they know the visit is scheduled, they can send the paperwork to be completed.  Transferred rep to scheduling department to get patient scheduled and gave them our fax number to send documentation.  Received documentation and forwarded to NP, Edie Smith.  Patient scheduled to NP on 4/2/25.

## 2025-04-02 ENCOUNTER — CARE AT HOME (OUTPATIENT)
Dept: HOME HEALTH SERVICES | Facility: CLINIC | Age: 70
End: 2025-04-02
Payer: MEDICARE

## 2025-04-02 DIAGNOSIS — G89.29 CHRONIC KNEE PAIN, UNSPECIFIED LATERALITY: ICD-10-CM

## 2025-04-02 DIAGNOSIS — R53.81 DEBILITY: Primary | ICD-10-CM

## 2025-04-02 DIAGNOSIS — I89.0 LYMPHEDEMA: ICD-10-CM

## 2025-04-02 DIAGNOSIS — I10 HYPERTENSION, UNSPECIFIED TYPE: ICD-10-CM

## 2025-04-02 DIAGNOSIS — M79.606 PAIN OF LOWER EXTREMITY, UNSPECIFIED LATERALITY: ICD-10-CM

## 2025-04-02 DIAGNOSIS — E66.01 MORBID OBESITY: ICD-10-CM

## 2025-04-02 DIAGNOSIS — M25.569 CHRONIC KNEE PAIN, UNSPECIFIED LATERALITY: ICD-10-CM

## 2025-04-02 DIAGNOSIS — I87.8 VENOUS STASIS: ICD-10-CM

## 2025-04-02 PROCEDURE — 99350 HOME/RES VST EST HIGH MDM 60: CPT | Mod: S$GLB,,, | Performed by: NURSE PRACTITIONER

## 2025-04-05 VITALS
HEART RATE: 73 BPM | RESPIRATION RATE: 20 BRPM | TEMPERATURE: 98 F | OXYGEN SATURATION: 98 % | DIASTOLIC BLOOD PRESSURE: 86 MMHG | SYSTOLIC BLOOD PRESSURE: 140 MMHG

## 2025-04-05 PROBLEM — M79.606 LEG PAIN: Status: ACTIVE | Noted: 2025-04-05

## 2025-04-05 NOTE — ASSESSMENT & PLAN NOTE
Chronic, worsening due to worsening lymphedema   Continue gabapentin  Ordered ibuprofen   Continue pt/ot

## 2025-04-05 NOTE — ASSESSMENT & PLAN NOTE
Chronic, worsening   Defer to [facility] wound care provider lymphedema therapy  Elevated low extremities while sitting/lying  Low Na diet   Continue furosemide

## 2025-04-05 NOTE — ASSESSMENT & PLAN NOTE
Declining functioning  Continue home PT/OT  Discussed fall precautions  Ordered PMD due to functional need
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
## 2025-04-05 NOTE — ASSESSMENT & PLAN NOTE
Defer to [facility] wound care provider   Elevated low extremities while sitting/lying  Low Na diet   Continue furosemide

## 2025-04-08 ENCOUNTER — TELEPHONE (OUTPATIENT)
Dept: HOME HEALTH SERVICES | Facility: CLINIC | Age: 70
End: 2025-04-08
Payer: MEDICARE

## 2025-04-08 NOTE — TELEPHONE ENCOUNTER
Request from NP to fax last 3 visit notes to [phone].  Visit notes faxed and confirmation received.

## 2025-05-06 ENCOUNTER — DOCUMENT SCAN (OUTPATIENT)
Dept: HOME HEALTH SERVICES | Facility: HOSPITAL | Age: 70
End: 2025-05-06
Payer: MEDICARE

## 2025-05-08 ENCOUNTER — TELEPHONE (OUTPATIENT)
Dept: HOME HEALTH SERVICES | Facility: CLINIC | Age: 70
End: 2025-05-08
Payer: MEDICARE

## 2025-05-08 NOTE — TELEPHONE ENCOUNTER
Signed orders faxed to [facility] per NP request.  Scanned into chart in Epic as well.

## 2025-05-12 ENCOUNTER — TELEPHONE (OUTPATIENT)
Dept: HOME HEALTH SERVICES | Facility: CLINIC | Age: 70
End: 2025-05-12
Payer: MEDICARE

## 2025-05-12 NOTE — TELEPHONE ENCOUNTER
Received call from [facility] asking if we received the documentation sent on 5/8/25.  Notified them that docs were received and forwarded to NP to complete.  Once completed will refax to them at [phone].  Refaxed documentation to NP to be completed.

## 2025-05-15 ENCOUNTER — TELEPHONE (OUTPATIENT)
Dept: HOME HEALTH SERVICES | Facility: CLINIC | Age: 70
End: 2025-05-15
Payer: MEDICARE

## 2025-05-15 NOTE — TELEPHONE ENCOUNTER
Signed orders for [facility] were faxed to [phone].  Fax confirmation received.  Also scanned into patient's chart.

## 2025-05-19 ENCOUNTER — EXTERNAL HOME HEALTH (OUTPATIENT)
Dept: HOME HEALTH SERVICES | Facility: HOSPITAL | Age: 70
End: 2025-05-19
Payer: MEDICARE

## 2025-07-15 RX ORDER — FUROSEMIDE 40 MG/1
40 TABLET ORAL
Qty: 90 TABLET | Refills: 3 | Status: SHIPPED | OUTPATIENT
Start: 2025-07-15

## 2025-07-31 ENCOUNTER — DOCUMENT SCAN (OUTPATIENT)
Dept: HOME HEALTH SERVICES | Facility: HOSPITAL | Age: 70
End: 2025-07-31
Payer: MEDICARE

## 2025-08-06 ENCOUNTER — EXTERNAL HOME HEALTH (OUTPATIENT)
Dept: HOME HEALTH SERVICES | Facility: HOSPITAL | Age: 70
End: 2025-08-06
Payer: MEDICARE

## 2025-08-13 ENCOUNTER — CARE AT HOME (OUTPATIENT)
Dept: HOME HEALTH SERVICES | Facility: CLINIC | Age: 70
End: 2025-08-13
Payer: MEDICARE

## 2025-08-13 DIAGNOSIS — I51.89 GRADE I DIASTOLIC DYSFUNCTION: ICD-10-CM

## 2025-08-13 DIAGNOSIS — I89.0 LYMPHEDEMA: ICD-10-CM

## 2025-08-13 DIAGNOSIS — E66.813 CLASS 3 SEVERE OBESITY DUE TO EXCESS CALORIES WITH SERIOUS COMORBIDITY AND BODY MASS INDEX (BMI) OF 50.0 TO 59.9 IN ADULT: Primary | ICD-10-CM

## 2025-08-13 DIAGNOSIS — R53.81 PHYSICAL DECONDITIONING: ICD-10-CM

## 2025-08-13 PROCEDURE — 99350 HOME/RES VST EST HIGH MDM 60: CPT | Mod: S$GLB,,, | Performed by: NURSE PRACTITIONER

## 2025-08-22 ENCOUNTER — EXTERNAL HOME HEALTH (OUTPATIENT)
Dept: HOME HEALTH SERVICES | Facility: HOSPITAL | Age: 70
End: 2025-08-22
Payer: MEDICARE

## 2025-08-22 ENCOUNTER — EXTERNAL HOME HEALTH (OUTPATIENT)
Dept: HOME HEALTH SERVICES | Facility: HOSPITAL | Age: 70
End: 2025-08-22

## 2025-08-23 VITALS
HEART RATE: 61 BPM | OXYGEN SATURATION: 98 % | SYSTOLIC BLOOD PRESSURE: 145 MMHG | TEMPERATURE: 98 F | RESPIRATION RATE: 20 BRPM | DIASTOLIC BLOOD PRESSURE: 89 MMHG

## 2025-08-26 ENCOUNTER — TELEPHONE (OUTPATIENT)
Dept: HOME HEALTH SERVICES | Facility: CLINIC | Age: 70
End: 2025-08-26
Payer: MEDICARE

## 2025-08-26 DIAGNOSIS — I70.0 AORTIC ATHEROSCLEROSIS: ICD-10-CM

## 2025-08-26 DIAGNOSIS — I10 HYPERTENSION, UNSPECIFIED TYPE: ICD-10-CM

## 2025-08-26 DIAGNOSIS — E78.2 MIXED HYPERLIPIDEMIA: ICD-10-CM

## 2025-08-26 DIAGNOSIS — Z13.1 ENCOUNTER FOR SCREENING FOR DIABETES MELLITUS: ICD-10-CM

## 2025-08-26 DIAGNOSIS — N17.9 AKI (ACUTE KIDNEY INJURY): ICD-10-CM

## 2025-08-26 DIAGNOSIS — I50.9 CONGESTIVE HEART FAILURE, UNSPECIFIED HF CHRONICITY, UNSPECIFIED HEART FAILURE TYPE: Primary | ICD-10-CM

## 2025-08-26 DIAGNOSIS — R53.1 WEAKNESS: ICD-10-CM

## 2025-08-26 DIAGNOSIS — I87.2 PERIPHERAL VENOUS INSUFFICIENCY: ICD-10-CM

## 2025-08-26 DIAGNOSIS — R53.83 FATIGUE, UNSPECIFIED TYPE: ICD-10-CM

## 2025-08-28 ENCOUNTER — EXTERNAL HOME HEALTH (OUTPATIENT)
Dept: HOME HEALTH SERVICES | Facility: HOSPITAL | Age: 70
End: 2025-08-28
Payer: MEDICARE

## 2025-09-05 ENCOUNTER — TELEPHONE (OUTPATIENT)
Dept: HOME HEALTH SERVICES | Facility: CLINIC | Age: 70
End: 2025-09-05
Payer: MEDICARE